# Patient Record
Sex: FEMALE | Race: WHITE | NOT HISPANIC OR LATINO | Employment: FULL TIME | URBAN - NONMETROPOLITAN AREA
[De-identification: names, ages, dates, MRNs, and addresses within clinical notes are randomized per-mention and may not be internally consistent; named-entity substitution may affect disease eponyms.]

---

## 2017-01-28 ENCOUNTER — OFFICE VISIT - GICH (OUTPATIENT)
Dept: FAMILY MEDICINE | Facility: OTHER | Age: 67
End: 2017-01-28

## 2017-01-28 ENCOUNTER — HISTORY (OUTPATIENT)
Dept: FAMILY MEDICINE | Facility: OTHER | Age: 67
End: 2017-01-28

## 2017-01-28 DIAGNOSIS — J39.2 OTHER DISEASES OF PHARYNX (CODE): ICD-10-CM

## 2017-01-28 DIAGNOSIS — H57.89 OTHER SPECIFIED DISORDERS OF EYE AND ADNEXA: ICD-10-CM

## 2017-01-28 DIAGNOSIS — G44.89 OTHER HEADACHE SYNDROME: ICD-10-CM

## 2017-01-28 DIAGNOSIS — M43.6 TORTICOLLIS: ICD-10-CM

## 2017-01-28 DIAGNOSIS — J34.89 OTHER SPECIFIED DISORDERS OF NOSE AND NASAL SINUSES: ICD-10-CM

## 2017-02-25 ENCOUNTER — COMMUNICATION - GICH (OUTPATIENT)
Dept: FAMILY MEDICINE | Facility: OTHER | Age: 67
End: 2017-02-25

## 2017-02-25 DIAGNOSIS — I10 ESSENTIAL (PRIMARY) HYPERTENSION: ICD-10-CM

## 2017-03-01 ENCOUNTER — COMMUNICATION - GICH (OUTPATIENT)
Dept: FAMILY MEDICINE | Facility: OTHER | Age: 67
End: 2017-03-01

## 2017-03-01 DIAGNOSIS — E78.5 HYPERLIPIDEMIA: ICD-10-CM

## 2017-03-01 DIAGNOSIS — E87.6 HYPOKALEMIA: ICD-10-CM

## 2017-03-27 ENCOUNTER — COMMUNICATION - GICH (OUTPATIENT)
Dept: FAMILY MEDICINE | Facility: OTHER | Age: 67
End: 2017-03-27

## 2017-03-30 ENCOUNTER — COMMUNICATION - GICH (OUTPATIENT)
Dept: FAMILY MEDICINE | Facility: OTHER | Age: 67
End: 2017-03-30

## 2017-03-30 DIAGNOSIS — K21.9 GASTRO-ESOPHAGEAL REFLUX DISEASE WITHOUT ESOPHAGITIS: ICD-10-CM

## 2017-03-31 ENCOUNTER — OFFICE VISIT - GICH (OUTPATIENT)
Dept: FAMILY MEDICINE | Facility: OTHER | Age: 67
End: 2017-03-31

## 2017-03-31 ENCOUNTER — HISTORY (OUTPATIENT)
Dept: FAMILY MEDICINE | Facility: OTHER | Age: 67
End: 2017-03-31

## 2017-03-31 ENCOUNTER — COMMUNICATION - GICH (OUTPATIENT)
Dept: FAMILY MEDICINE | Facility: OTHER | Age: 67
End: 2017-03-31

## 2017-03-31 DIAGNOSIS — E78.5 HYPERLIPIDEMIA: ICD-10-CM

## 2017-03-31 DIAGNOSIS — K21.9 GASTRO-ESOPHAGEAL REFLUX DISEASE WITHOUT ESOPHAGITIS: ICD-10-CM

## 2017-03-31 DIAGNOSIS — I10 ESSENTIAL (PRIMARY) HYPERTENSION: ICD-10-CM

## 2017-03-31 DIAGNOSIS — E87.6 HYPOKALEMIA: ICD-10-CM

## 2017-03-31 DIAGNOSIS — F41.9 ANXIETY DISORDER: ICD-10-CM

## 2017-03-31 LAB
A/G RATIO - HISTORICAL: 1.2 (ref 1–2)
ALBUMIN SERPL-MCNC: 3.8 G/DL (ref 3.5–5.7)
ALP SERPL-CCNC: 53 IU/L (ref 34–104)
ALT (SGPT) - HISTORICAL: 21 IU/L (ref 7–52)
ANION GAP - HISTORICAL: 9 (ref 5–18)
AST SERPL-CCNC: 23 IU/L (ref 13–39)
BILIRUB SERPL-MCNC: 0.4 MG/DL (ref 0.3–1)
BUN SERPL-MCNC: 16 MG/DL (ref 7–25)
BUN/CREAT RATIO - HISTORICAL: 18
CALCIUM SERPL-MCNC: 9.5 MG/DL (ref 8.6–10.3)
CHLORIDE SERPLBLD-SCNC: 105 MMOL/L (ref 98–107)
CHOL/HDL RATIO - HISTORICAL: 3.94
CHOLESTEROL TOTAL: 193 MG/DL
CO2 SERPL-SCNC: 27 MMOL/L (ref 21–31)
CREAT SERPL-MCNC: 0.89 MG/DL (ref 0.7–1.3)
GFR IF NOT AFRICAN AMERICAN - HISTORICAL: >60 ML/MIN/1.73M2
GLOBULIN - HISTORICAL: 3.3 G/DL (ref 2–3.7)
GLUCOSE SERPL-MCNC: 114 MG/DL (ref 70–105)
HDLC SERPL-MCNC: 49 MG/DL (ref 23–92)
LDLC SERPL CALC-MCNC: 109 MG/DL
NON-HDL CHOLESTEROL - HISTORICAL: 144 MG/DL
PATIENT STATUS - HISTORICAL: ABNORMAL
POTASSIUM SERPL-SCNC: 4 MMOL/L (ref 3.5–5.1)
PROT SERPL-MCNC: 7.1 G/DL (ref 6.4–8.9)
SODIUM SERPL-SCNC: 141 MMOL/L (ref 133–143)
TRIGL SERPL-MCNC: 177 MG/DL

## 2017-03-31 ASSESSMENT — PATIENT HEALTH QUESTIONNAIRE - PHQ9: SUM OF ALL RESPONSES TO PHQ QUESTIONS 1-9: 11

## 2017-03-31 ASSESSMENT — ANXIETY QUESTIONNAIRES
1. FEELING NERVOUS, ANXIOUS, OR ON EDGE: NEARLY EVERY DAY
7. FEELING AFRAID AS IF SOMETHING AWFUL MIGHT HAPPEN: NOT AT ALL
5. BEING SO RESTLESS THAT IT IS HARD TO SIT STILL: MORE THAN HALF THE DAYS
4. TROUBLE RELAXING: NEARLY EVERY DAY
6. BECOMING EASILY ANNOYED OR IRRITABLE: NEARLY EVERY DAY
3. WORRYING TOO MUCH ABOUT DIFFERENT THINGS: NEARLY EVERY DAY
GAD7 TOTAL SCORE: 17
2. NOT BEING ABLE TO STOP OR CONTROL WORRYING: NEARLY EVERY DAY

## 2017-04-06 ENCOUNTER — AMBULATORY - GICH (OUTPATIENT)
Dept: SCHEDULING | Facility: OTHER | Age: 67
End: 2017-04-06

## 2017-04-20 ENCOUNTER — AMBULATORY - GICH (OUTPATIENT)
Dept: SCHEDULING | Facility: OTHER | Age: 67
End: 2017-04-20

## 2017-04-28 ENCOUNTER — OFFICE VISIT - GICH (OUTPATIENT)
Dept: FAMILY MEDICINE | Facility: OTHER | Age: 67
End: 2017-04-28

## 2017-04-28 ENCOUNTER — HISTORY (OUTPATIENT)
Dept: FAMILY MEDICINE | Facility: OTHER | Age: 67
End: 2017-04-28

## 2017-04-28 DIAGNOSIS — F41.9 ANXIETY DISORDER: ICD-10-CM

## 2017-04-28 ASSESSMENT — ANXIETY QUESTIONNAIRES
6. BECOMING EASILY ANNOYED OR IRRITABLE: MORE THAN HALF THE DAYS
2. NOT BEING ABLE TO STOP OR CONTROL WORRYING: MORE THAN HALF THE DAYS
GAD7 TOTAL SCORE: 10
5. BEING SO RESTLESS THAT IT IS HARD TO SIT STILL: SEVERAL DAYS
7. FEELING AFRAID AS IF SOMETHING AWFUL MIGHT HAPPEN: NOT AT ALL
3. WORRYING TOO MUCH ABOUT DIFFERENT THINGS: SEVERAL DAYS
4. TROUBLE RELAXING: MORE THAN HALF THE DAYS
1. FEELING NERVOUS, ANXIOUS, OR ON EDGE: MORE THAN HALF THE DAYS

## 2017-04-28 ASSESSMENT — PATIENT HEALTH QUESTIONNAIRE - PHQ9: SUM OF ALL RESPONSES TO PHQ QUESTIONS 1-9: 14

## 2017-05-02 ENCOUNTER — AMBULATORY - GICH (OUTPATIENT)
Dept: SCHEDULING | Facility: OTHER | Age: 67
End: 2017-05-02

## 2017-05-25 ENCOUNTER — COMMUNICATION - GICH (OUTPATIENT)
Dept: FAMILY MEDICINE | Facility: OTHER | Age: 67
End: 2017-05-25

## 2017-05-25 DIAGNOSIS — F41.9 ANXIETY DISORDER: ICD-10-CM

## 2017-05-30 ENCOUNTER — HISTORY (OUTPATIENT)
Dept: FAMILY MEDICINE | Facility: OTHER | Age: 67
End: 2017-05-30

## 2017-05-30 ENCOUNTER — OFFICE VISIT - GICH (OUTPATIENT)
Dept: FAMILY MEDICINE | Facility: OTHER | Age: 67
End: 2017-05-30

## 2017-05-30 DIAGNOSIS — F41.9 ANXIETY DISORDER: ICD-10-CM

## 2017-05-30 ASSESSMENT — ANXIETY QUESTIONNAIRES
GAD7 TOTAL SCORE: 10
5. BEING SO RESTLESS THAT IT IS HARD TO SIT STILL: MORE THAN HALF THE DAYS
4. TROUBLE RELAXING: MORE THAN HALF THE DAYS
6. BECOMING EASILY ANNOYED OR IRRITABLE: MORE THAN HALF THE DAYS
2. NOT BEING ABLE TO STOP OR CONTROL WORRYING: MORE THAN HALF THE DAYS
1. FEELING NERVOUS, ANXIOUS, OR ON EDGE: SEVERAL DAYS
3. WORRYING TOO MUCH ABOUT DIFFERENT THINGS: SEVERAL DAYS
7. FEELING AFRAID AS IF SOMETHING AWFUL MIGHT HAPPEN: NOT AT ALL

## 2017-05-30 ASSESSMENT — PATIENT HEALTH QUESTIONNAIRE - PHQ9: SUM OF ALL RESPONSES TO PHQ QUESTIONS 1-9: 16

## 2017-06-01 ENCOUNTER — COMMUNICATION - GICH (OUTPATIENT)
Dept: FAMILY MEDICINE | Facility: OTHER | Age: 67
End: 2017-06-01

## 2017-06-01 DIAGNOSIS — E78.5 HYPERLIPIDEMIA: ICD-10-CM

## 2017-07-24 ENCOUNTER — OFFICE VISIT - GICH (OUTPATIENT)
Dept: FAMILY MEDICINE | Facility: OTHER | Age: 67
End: 2017-07-24

## 2017-07-24 ENCOUNTER — HISTORY (OUTPATIENT)
Dept: FAMILY MEDICINE | Facility: OTHER | Age: 67
End: 2017-07-24

## 2017-07-24 DIAGNOSIS — G44.209 TENSION-TYPE HEADACHE, NOT INTRACTABLE: ICD-10-CM

## 2017-08-01 ENCOUNTER — COMMUNICATION - GICH (OUTPATIENT)
Dept: FAMILY MEDICINE | Facility: OTHER | Age: 67
End: 2017-08-01

## 2017-08-01 DIAGNOSIS — F41.9 ANXIETY DISORDER: ICD-10-CM

## 2017-09-17 ENCOUNTER — COMMUNICATION - GICH (OUTPATIENT)
Dept: FAMILY MEDICINE | Facility: OTHER | Age: 67
End: 2017-09-17

## 2017-09-17 DIAGNOSIS — G44.209 TENSION-TYPE HEADACHE, NOT INTRACTABLE: ICD-10-CM

## 2017-10-08 ENCOUNTER — OFFICE VISIT - GICH (OUTPATIENT)
Dept: FAMILY MEDICINE | Facility: OTHER | Age: 67
End: 2017-10-08

## 2017-10-08 ENCOUNTER — HISTORY (OUTPATIENT)
Dept: FAMILY MEDICINE | Facility: OTHER | Age: 67
End: 2017-10-08

## 2017-10-08 DIAGNOSIS — A08.4 VIRAL INTESTINAL INFECTION: ICD-10-CM

## 2017-10-08 DIAGNOSIS — R11.11 VOMITING WITHOUT NAUSEA: ICD-10-CM

## 2017-10-08 LAB
ABSOLUTE BASOPHILS - HISTORICAL: 0.1 THOU/CU MM
ABSOLUTE EOSINOPHILS - HISTORICAL: 0.3 THOU/CU MM
ABSOLUTE IMMATURE GRANULOCYTES(METAS,MYELOS,PROS) - HISTORICAL: 0 THOU/CU MM
ABSOLUTE LYMPHOCYTES - HISTORICAL: 3.4 THOU/CU MM (ref 0.9–2.9)
ABSOLUTE MONOCYTES - HISTORICAL: 1.4 THOU/CU MM
ABSOLUTE NEUTROPHILS - HISTORICAL: 5.3 THOU/CU MM (ref 1.7–7)
ANION GAP - HISTORICAL: 14 (ref 5–18)
BASOPHILS # BLD AUTO: 0.8 %
BUN SERPL-MCNC: 24 MG/DL (ref 7–25)
BUN/CREAT RATIO - HISTORICAL: 27
CALCIUM SERPL-MCNC: 9.8 MG/DL (ref 8.6–10.3)
CHLORIDE SERPLBLD-SCNC: 104 MMOL/L (ref 98–107)
CO2 SERPL-SCNC: 26 MMOL/L (ref 21–31)
CREAT SERPL-MCNC: 0.9 MG/DL (ref 0.7–1.3)
EOSINOPHIL NFR BLD AUTO: 3.2 %
ERYTHROCYTE [DISTWIDTH] IN BLOOD BY AUTOMATED COUNT: 13.2 % (ref 11.5–15.5)
GFR IF NOT AFRICAN AMERICAN - HISTORICAL: >60 ML/MIN/1.73M2
GLUCOSE SERPL-MCNC: 140 MG/DL (ref 70–105)
HCT VFR BLD AUTO: 46.3 % (ref 33–51)
HEMOGLOBIN: 15.6 G/DL (ref 12–16)
IMMATURE GRANULOCYTES(METAS,MYELOS,PROS) - HISTORICAL: 0.3 %
LYMPHOCYTES NFR BLD AUTO: 32.3 % (ref 20–44)
MCH RBC QN AUTO: 29.9 PG (ref 26–34)
MCHC RBC AUTO-ENTMCNC: 33.7 G/DL (ref 32–36)
MCV RBC AUTO: 89 FL (ref 80–100)
MONOCYTES NFR BLD AUTO: 13.2 %
NEUTROPHILS NFR BLD AUTO: 50.2 % (ref 42–72)
PLATELET # BLD AUTO: 277 THOU/CU MM (ref 140–440)
PMV BLD: 9.4 FL (ref 6.5–11)
POTASSIUM SERPL-SCNC: 4.2 MMOL/L (ref 3.5–5.1)
RED BLOOD COUNT - HISTORICAL: 5.21 MIL/CU MM (ref 4–5.2)
SODIUM SERPL-SCNC: 144 MMOL/L (ref 133–143)
WHITE BLOOD COUNT - HISTORICAL: 10.5 THOU/CU MM (ref 4.5–11)

## 2017-12-15 ENCOUNTER — HISTORY (OUTPATIENT)
Dept: FAMILY MEDICINE | Facility: OTHER | Age: 67
End: 2017-12-15

## 2017-12-15 ENCOUNTER — OFFICE VISIT - GICH (OUTPATIENT)
Dept: FAMILY MEDICINE | Facility: OTHER | Age: 67
End: 2017-12-15

## 2017-12-15 DIAGNOSIS — H92.09 OTALGIA: ICD-10-CM

## 2017-12-15 DIAGNOSIS — J04.0 ACUTE LARYNGITIS: ICD-10-CM

## 2017-12-28 NOTE — TELEPHONE ENCOUNTER
Patient Information     Patient Name MRN Ovidio Cevallos [id number] Female 1950      Telephone Encounter by Ashu Arceo RN at 8/3/2017  1:22 PM     Author:  Ashu Arceo RN Service:  (none) Author Type:  NURS- Registered Nurse     Filed:  8/3/2017  1:25 PM Encounter Date:  2017 Status:  Signed     :  Ashu Arceo RN (NURS- Registered Nurse)            Depression-in adults 18 and over  SSRI    Office visit in the past 12 months or as indicated in chart.  Should have clinic visit 1-2 months after initial prescription.    Last visit with ELAINA AGUAYO was on: 2017 in [facility]  Next visit with ELAINA AGUAYO is on: No future appointment listed with this provider  Next visit with Family Practice is on: No future appointment listed in this department    Max refills 12 months from last office visit or per providers notes.      PHQ Depression Screening 2017   Date of PHQ exam (doc flow) 2017   1. Lack of interest/pleasure 1 - Several days 2 - More than half the days   2. Feeling down/depressed 2 - More than half the days 2 - More than half the days   PHQ-2 TOTAL SCORE 3 4   3. Trouble sleeping 3 - Nearly every day 2 - More than half the days   4. Decreased energy 3 - Nearly every day 3 - Nearly every day   5. Appetite change 1 - Several days 1 - Several days   6. Feelings of failure 2 - More than half the days 2 - More than half the days   7. Trouble concentrating 1 - Several days 2 - More than half the days   8. Activity level 1 - Several days 2 - More than half the days   9. Hurting yourself 0 - Not at all 0 - Not at all   PHQ-9 TOTAL SCORE 14 16   PHQ-9 Severity Level moderate moderately severe   Functional Impairment somewhat difficult somewhat difficult   Some recent data might be hidden       Chart review shows that patient was last seen by PCP for diagnosis of anxiety as well as use of celexa on 17. Celexa 10 mg daily was to  continue as per office visit notes on that date. Writer will refill rx as requested.    Prescription refilled per RN Medication Refill Policy.................... Ashu Arceo RN ....................  8/3/2017   1:24 PM

## 2017-12-28 NOTE — TELEPHONE ENCOUNTER
Patient Information     Patient Name MRN Sex Gunner Reynosorikathy [id number] Female 1950      Telephone Encounter by Gris Dial RN at 2017  2:38 PM     Author:  Gris Dial RN Service:  (none) Author Type:  NURS- Registered Nurse     Filed:  2017  2:40 PM Encounter Date:  2017 Status:  Signed     :  Gris Dial RN (NURS- Registered Nurse)            Simvastatin refilled on 3/31/17 #90 x 3 refills. To [facility]  Unable to complete prescription refill per RN Medication Refill Policy.................... GRIS DIAL RN ....................  2017   2:39 PM

## 2017-12-28 NOTE — PROGRESS NOTES
"Patient Information     Patient Name MRN Sex Oivdio Reynoso [id number] Female 1950      Progress Notes by Glynn Botello MD at 2017 11:00 AM     Author:  Glynn Botello MD Service:  (none) Author Type:  Physician     Filed:  2017 11:25 AM Encounter Date:  2017 Status:  Signed     :  Glynn Botello MD (Physician)            SUBJECTIVE:  Ovidio Luna is a 66 y.o. female here for headache. Patient reports that she has had a headache over the last week. She describes the headache as starting in her right side of her neck going up to the top of her head and feels like there is a \"vice .\" She is also had some numbness on both her right and left face. She's had no vision change. She's had no hearing changes. No nausea or vomiting. The pain is not brought on by any particular movements. There is no activities that started her symptoms.    Allergies:  Allergies      Allergen   Reactions     Altaryl  [Diphenhydramine Hcl]  Tachycardia     Rapid heart Rate      Benadryl [Diphenhydramine]  Tachycardia     Rapid heart Rate      Chantix [Varenicline]  Nausea And Vomiting     Codeine  Confusion     Rapid heart rate.  Nauseated      Darvocet [Propoxyphene-Acetaminophen]  Tremors     unknown      Lisinopril  Cough     Morphine Sulfate  Tachycardia     Rapid heart rate      Tetracycline  Rash and Tremors     unknown        ROS:    As above otherwise ROS is unremarkable.    OBJECTIVE:  /78  Pulse 60  Ht 1.575 m (5' 2\")  Wt 66.6 kg (146 lb 12.8 oz)  BMI 26.85 kg/m2    EXAM:  General Appearance: Pleasant, alert, appropriate appearance for age. No acute distress  Head: Normal. Normocephalic, atraumatic.  Eyes: PERRL, EOMI  Ears: Normal TM's bilaterally. Normal auditory canals and external ears.   OroPharynx: Dental hygiene adequate. Normal buccal mucosa. Normal pharynx.  Neck: Supple, no masses or nodes, no lymphadenopathy.  No thyromegaly.  eletal: No edema.  Skin: no concerning or " new rashes.  Neurologic Exam: Subjective decreased sensation over all 3 branches of her fifth cranial nerve. Otherwise normal cranial nerve exam.  Normal gait.  Symmetric DTRs, No focal motor or sensory deficits. No tremor.  Psychiatric Exam: Alert and oriented, appropriate affect.    ASSESSEMENT AND PLAN:    Ovidio was seen today for headache.    Diagnoses and all orders for this visit:    Tension headache  -     naproxen (NAPROSYN) 500 mg tablet; Take 1 tablet by mouth 2 times daily with meals.  -     cyclobenzaprine (FLEXERIL) 10 mg tablet; Take 1 tablet by mouth 3 times daily if needed for Muscle Spasm.    Her symptoms and history are consistent with a tension type headache. We'll try naproxen twice daily with meals for the next couple of days in addition to her Tylenol. We'll also add Flexeril for muscle spasming. If she has no significant improvement in her headaches and facial numbness continue with the recommend MRI of her head and neck.      Anup Botello MD    This document was prepared using voice generated software.  While every attempt was made for accuracy, grammatical errors may exist.

## 2017-12-28 NOTE — TELEPHONE ENCOUNTER
Patient Information     Patient Name MRN Sex Ovidio Reynoso [id number] Female 1950      Telephone Encounter by Ashu Arceo RN at 2017  4:03 PM     Author:  Ashu Arceo RN Service:  (none) Author Type:  NURS- Registered Nurse     Filed:  2017  4:10 PM Encounter Date:  2017 Status:  Signed     :  Ashu Arceo RN (NURS- Registered Nurse)            Nsaids    Office visit in the past 12 months or per provider note.    Last visit with ELAINA AGUAYO was on: 2017 in [facility]  Next visit with ELAINA AGUAYO is on: No future appointment listed with this provider  Next visit with Family Practice is on: No future appointment listed in this department    Max refill for 12 months from last office visit or per provider note.    Chart review shows that rx as requested was started by PCP on 17. Naproxen was filled for a 2 month supply at that time, however no specific follow up timeline noted in office visit notes on that date other than patient was to follow up if pain did not decrease. Writer is unable to fill rx as requested as is not sure on if patient was to remain on rx long term. Did contact patient to discuss. Patient reports that the naproxen has helped with her symptoms as described on 17, and that she would like to continue on rx as requested. Writer will route rx request to PCP for his consideration/approval at this time.    Prescription refilled per RN Medication Refill Policy.................... Ashu Arceo RN ....................  2017   4:08 PM

## 2017-12-28 NOTE — PATIENT INSTRUCTIONS
Patient Information     Patient Name MRN Ovidio Cevallos [id number] Female 1950      Patient Instructions by Glynn Botello MD at 2017 11:18 AM     Author:  Glynn Botello MD Service:  (none) Author Type:  Physician     Filed:  2017 11:18 AM Encounter Date:  2017 Status:  Signed     :  Glynn Botello MD (Physician)               Index Qatari Related topics   Tension Headache   ________________________________________________________________________  KEY POINTS    A tension headache is caused by tense muscles or stress. Most often the muscles are in your neck, shoulders, or scalp.    Treatment to relive pain may include rest, stretches and massage, nonprescription pain medicine, ice, or moist heat.    If taking care of yourself at home does not relieve a tension headache, your healthcare provider may recommend physical therapy, biofeedback, or a different pain reliever.  ________________________________________________________________________  What is a tension headache?  A tension headache is caused by tense muscles or stress. Most often the muscles are in your neck, shoulders, or scalp. Tension headaches are very common.  What is the cause?  Tension headaches may be caused by:    Stress, such as family, work, finances, or health problems    Anxiety or depression    Sitting or standing in one position for a long time    Injury such as from a car accident    Getting too little or too much sleep    Heavy smoking    Drinking too much alcohol, or going through alcohol withdrawal    Being somewhere that is too noisy    Pushing your body too hard at work or playing sports    Some medical conditions, such as sleep apnea or arthritis  Taking pain medicine too often for headaches can cause rebound headaches or drug-induced headaches. It can create a bad cycle: You have a headache, so you take pain medicine. When the pain medicine wears off it causes another headache, which causes  you to take more medicine, which causes another headache. Examples of nonprescription medicines that can cause rebound headaches are aspirin, acetaminophen, and ibuprofen. Some prescribed pain medicines can also cause this problem. If you often take medicine for headaches, talk with your healthcare provider.  To learn about possible triggers and help you to avoid them, it will help if you keep a record of:    When you have the headaches    What part of your head, body, or face is affected    What type of pain it is, such as sharp, dull, or pressure    How bad the pain is and how long it lasts    Other symptoms you have at the same time    What makes the headache better or worse  What are the symptoms?  Symptoms usually last a few hours to a day. Symptoms may include:    A feeling like a tight band is around your head    An ache or dull and steady pain felt at the temples or affecting the whole head that worsens through the day, sometimes with a sore or stiff neck    Trouble concentrating    Trouble sleeping    Your muscles might twitch or spasm. Sometimes your head may feel like it is throbbing.  How is it treated?  Most of the time, you don t need to see your healthcare provider for treatment. Here are some things you can do to relieve a tension headache:    Rest in a quiet, dark room until symptoms lessen or go away.    Stretch and massage your neck, shoulders, and back.    Put an ice pack, gel pack, or package of frozen vegetables wrapped in a cloth on your neck, shoulders, and back every 3 to 4 hours for up to 20 minutes at a time.    Put moist heat on tense muscles for up to 30 minutes to relieve pain. Moist heat includes heat patches or moist heating pads that you can buy at most drugstores, a warm wet washcloth, or a hot shower. To prevent burns to your skin, follow directions on the package and do not lie on any type of hot pad. Don t use heat if you have swelling.    Take a warm bath or hot shower.    Take  a walk or do some other type of physical exercise.    Take nonprescription pain medicine as soon as you notice symptoms. Recognizing early warning signs and starting treatment right away can lower the pain and shorten the time you have the headache.  If taking care of yourself at home does not relieve a tension headache, your healthcare provider may be able to help. Tell your healthcare provider about all the medicines and supplements that you take. Your provider may recommend:    Physical therapy    Biofeedback therapy, which uses a machine to help you learn to control muscle tension    A different pain reliever  Follow the full course of treatment prescribed by your healthcare provider. Ask your provider:    How long it will normally take to recover from each headache    If there are activities you should avoid and when you can return to your normal activities    How to take care of yourself at home    What symptoms or problems you should watch for and what to do if you have them  Make sure you know when you should come back for a checkup. Keep all appointments for provider visits or tests. Contact your healthcare provider if you have new or worsening symptoms.  How can I help prevent tension headaches?  A healthy lifestyle may help:    Eat a healthy diet. Don t skip meals.    Stay fit with the right kind of exercise for you.    Limit caffeine.    Learn to manage stress. Ask for help at home and work when the load is too great to handle. Find ways to relax. For example, take up a hobby, listen to music, watch movies, or take walks. Try yoga, meditation, or deep breathing exercises when you feel stressed.    If you smoke, try to quit. Talk to your healthcare provider about ways to quit smoking.    If you want to drink alcohol, ask your healthcare provider how much is safe for you to drink.    Try to get at least 7 to 9 hours of sleep each night.  You are at risk for rebound headaches if you regularly take pain  medicine for headaches 3 or more days every week or on more than 9 days per month. If you find that you are taking medicine for headaches this often, talk to your provider.  You can get more information from:    American [place] for Headache Education (ACHE)  311-601- 9912  http://www.achenet.org/    National Headache Foundation  929.382.9064   http://www.headaches.org/  Developed by [employer]  Adult Advisor 2016.3 published by [employer].  Last modified: 2016-05-09  Last reviewed: 2016-05-03  This content is reviewed periodically and is subject to change as new health information becomes available. The information is intended to inform and educate and is not a replacement for medical evaluation, advice, diagnosis or treatment by a healthcare professional.  References   Adult Advisor 2016.3 Index    Copyright   2016 [employer], a division of [employer] All rights reserved.        Index Amharic Related topics   Tension Headache   ________________________________________________________________________  KEY POINTS    A tension headache is caused by tense muscles or stress. Most often the muscles are in your neck, shoulders, or scalp.    Treatment to relive pain may include rest, stretches and massage, nonprescription pain medicine, ice, or moist heat.    If taking care of yourself at home does not relieve a tension headache, your healthcare provider may recommend physical therapy, biofeedback, or a different pain reliever.  ________________________________________________________________________  What is a tension headache?  A tension headache is caused by tense muscles or stress. Most often the muscles are in your neck, shoulders, or scalp. Tension headaches are very common.  What is the cause?  Tension headaches may be caused by:    Stress, such as family, work, finances, or health problems    Anxiety or depression    Sitting or standing in one position for a long time    Injury such as from a  car accident    Getting too little or too much sleep    Heavy smoking    Drinking too much alcohol, or going through alcohol withdrawal    Being somewhere that is too noisy    Pushing your body too hard at work or playing sports    Some medical conditions, such as sleep apnea or arthritis  Taking pain medicine too often for headaches can cause rebound headaches or drug-induced headaches. It can create a bad cycle: You have a headache, so you take pain medicine. When the pain medicine wears off it causes another headache, which causes you to take more medicine, which causes another headache. Examples of nonprescription medicines that can cause rebound headaches are aspirin, acetaminophen, and ibuprofen. Some prescribed pain medicines can also cause this problem. If you often take medicine for headaches, talk with your healthcare provider.  To learn about possible triggers and help you to avoid them, it will help if you keep a record of:    When you have the headaches    What part of your head, body, or face is affected    What type of pain it is, such as sharp, dull, or pressure    How bad the pain is and how long it lasts    Other symptoms you have at the same time    What makes the headache better or worse  What are the symptoms?  Symptoms usually last a few hours to a day. Symptoms may include:    A feeling like a tight band is around your head    An ache or dull and steady pain felt at the temples or affecting the whole head that worsens through the day, sometimes with a sore or stiff neck    Trouble concentrating    Trouble sleeping    Your muscles might twitch or spasm. Sometimes your head may feel like it is throbbing.  How is it treated?  Most of the time, you don t need to see your healthcare provider for treatment. Here are some things you can do to relieve a tension headache:    Rest in a quiet, dark room until symptoms lessen or go away.    Stretch and massage your neck, shoulders, and back.    Put an ice  pack, gel pack, or package of frozen vegetables wrapped in a cloth on your neck, shoulders, and back every 3 to 4 hours for up to 20 minutes at a time.    Put moist heat on tense muscles for up to 30 minutes to relieve pain. Moist heat includes heat patches or moist heating pads that you can buy at most drugstores, a warm wet washcloth, or a hot shower. To prevent burns to your skin, follow directions on the package and do not lie on any type of hot pad. Don t use heat if you have swelling.    Take a warm bath or hot shower.    Take a walk or do some other type of physical exercise.    Take nonprescription pain medicine as soon as you notice symptoms. Recognizing early warning signs and starting treatment right away can lower the pain and shorten the time you have the headache.  If taking care of yourself at home does not relieve a tension headache, your healthcare provider may be able to help. Tell your healthcare provider about all the medicines and supplements that you take. Your provider may recommend:    Physical therapy    Biofeedback therapy, which uses a machine to help you learn to control muscle tension    A different pain reliever  Follow the full course of treatment prescribed by your healthcare provider. Ask your provider:    How long it will normally take to recover from each headache    If there are activities you should avoid and when you can return to your normal activities    How to take care of yourself at home    What symptoms or problems you should watch for and what to do if you have them  Make sure you know when you should come back for a checkup. Keep all appointments for provider visits or tests. Contact your healthcare provider if you have new or worsening symptoms.  How can I help prevent tension headaches?  A healthy lifestyle may help:    Eat a healthy diet. Don t skip meals.    Stay fit with the right kind of exercise for you.    Limit caffeine.    Learn to manage stress. Ask for help  at home and work when the load is too great to handle. Find ways to relax. For example, take up a hobby, listen to music, watch movies, or take walks. Try yoga, meditation, or deep breathing exercises when you feel stressed.    If you smoke, try to quit. Talk to your healthcare provider about ways to quit smoking.    If you want to drink alcohol, ask your healthcare provider how much is safe for you to drink.    Try to get at least 7 to 9 hours of sleep each night.  You are at risk for rebound headaches if you regularly take pain medicine for headaches 3 or more days every week or on more than 9 days per month. If you find that you are taking medicine for headaches this often, talk to your provider.  You can get more information from:    American [place] for Headache Education (ACHE)  939-044- 1344  http://www.achenet.org/    National Headache Foundation  451.853.9907   http://www.headaches.org/  Developed by [employer].  Adult Advisor 2016.3 published by [employer].  Last modified: 2016-05-09  Last reviewed: 2016-05-03  This content is reviewed periodically and is subject to change as new health information becomes available. The information is intended to inform and educate and is not a replacement for medical evaluation, advice, diagnosis or treatment by a healthcare professional.  References   Adult Advisor 2016.3 Index    Copyright   2016 [employer], a division of [employer] All rights reserved.

## 2017-12-28 NOTE — PROGRESS NOTES
Patient Information     Patient Name MRN Sex Ovidio Reynoso [id number] Female 1950      Progress Notes by China Weaver NP at 10/8/2017  2:15 PM     Author:  China Weaver NP Service:  (none) Author Type:  PHYS- Nurse Practitioner     Filed:  10/9/2017 11:13 PM Encounter Date:  10/8/2017 Status:  Signed     :  China Weaver NP (PHYS- Nurse Practitioner)            Nursing Notes:   Makayla Quigley  10/8/2017  3:24 PM  Signed  Patient presents to the clinic for vomiting that started on Friday. Patient reports her left ear aching, having a raw, scratchy throat and being unable to keep anything down due to the vomiting.  Makayla JIMENEZ CMA.......10/8/2017..2:59 PM    SUBJECTIVE:    Ovidio Luna is a 66 y.o. female who presents for Nausea and vomiting.     Gi Problem   The primary symptoms include fatigue, abdominal pain, nausea and vomiting. Primary symptoms do not include fever, weight loss, diarrhea, hematemesis, jaundice, dysuria, myalgias, arthralgias or rash. The illness began 2 days ago. The onset was sudden. The problem has been gradually improving.   The fatigue began 2 days ago. The fatigue has been improving since its onset.   Nausea began 2 days ago. The nausea is associated with eating. The nausea is exacerbated by food and activity.   The vomiting began 2 days ago. Vomiting occurs 2 to 5 times per day (Slowly decreaseing in amounts and times. ). The emesis contains stomach contents.   Associated medical issues do not include inflammatory bowel disease, gallstones, liver disease, PUD, gastric bypass or bowel resection.       Current Outpatient Prescriptions on File Prior to Visit       Medication  Sig Dispense Refill     amLODIPine (NORVASC) 5 mg tablet Take 1 tablet by mouth once daily. 90 tablet 3     busPIRone (BUSPAR) 10 mg tablet Take 0.5-1 tablets by mouth 3 times daily if needed (anxiety). 30 tablet 3     cholecalciferol (VITAMIN D-3) 2,000 unit capsule Take 1 capsule by  mouth once daily.  0     citalopram (CELEXA) 10 mg tablet TAKE 1 TABLET BY MOUTH EVERY MORNING 90 tablet 2     cyclobenzaprine (FLEXERIL) 10 mg tablet Take 1 tablet by mouth 3 times daily if needed for Muscle Spasm. 30 tablet 0     fexofenadine (ALLEGRA) 180 mg tablet Take 1 tablet by mouth once daily with a meal.  0     hydroCHLOROthiazide (HCTZ) 25 mg tablet Take 1 tablet by mouth once daily. 90 tablet 3     metoprolol (LOPRESSOR) 100 mg tablet Take 1 tablet by mouth 2 times daily. 180 tablet 3     naproxen (NAPROSYN) 500 mg tablet Take 1 tablet by mouth 2 times daily with meals. 180 tablet 2     nitroglycerin 0.4 mg per dose sublingual (NITROLINGUAL) 0.4 mg/dose spray Place 1 Spray under the tongue every 5 minutes if needed for Chest Pain. 1 Bottle 11     omega-3 fatty acids-vitamin E (FISH OIL) 1,000 mg cap Take 1 capsule by mouth once daily.  0     omeprazole (PRILOSEC) 20 mg Delayed-Release capsule Take 1 capsule by mouth once daily before a meal. 90 capsule 3     potassium chloride (KLOR-CON M20) 20 mEq Extended-Release tablet Take 1 tablet by mouth 2 times daily with meals. 180 tablet 3     simvastatin (ZOCOR) 80 mg tablet Take 1 tablet by mouth once daily. 90 tablet 3     No current facility-administered medications on file prior to visit.     and   Patient Active Problem List       Diagnosis  Date Noted     Centrilobular emphysema (HC)  12/12/2016     SOMATIC DYSFUNCTION, SPINE, THORACIC-LUMBAR  08/17/2012     SOMATIC DYSFUNCTION, PELVIC  08/17/2012     LUMBAGO  08/17/2012     CVA WITH LEFT HEMIPARESIS  06/15/2012     MENOPAUSE-RELATED VASOMOTOR SYMPTOMS  03/12/2010     CORONARY ARTERY DISEASE       Coronary artery disease, PTCA of mid LAD and PTCA of the proximal right   coronary artery 2/28/04, normal left ventricular systolic function          PERIPHERAL VASCULAR DISEASE       TOBACCO ABUSE       HYPERLIPIDEMIA       HEMANGIOMA       Multiple capillary spider hemangioma          HYPERTENSION        "VASCULAR INSUFFICIENCY       right lower extremity          SHOULDER IMPINGEMENT SYNDROME, LEFT       OSTEOPENIA  11/14/2008     DXA scan - osteopenia of hips.  Normal density LS spine.          GASTRITIS  10/27/2008     Acute            REVIEW OF SYSTEMS:  Review of Systems   Constitutional: Positive for fatigue. Negative for fever and weight loss.   Gastrointestinal: Positive for abdominal pain, nausea and vomiting. Negative for diarrhea, hematemesis and jaundice.   Genitourinary: Negative for dysuria.   Musculoskeletal: Negative for arthralgias and myalgias.   Skin: Negative for rash.       OBJECTIVE:  /68  Pulse 56  Temp 98  F (36.7  C) (Tympanic)  Ht 1.581 m (5' 2.25\")  Wt 66.8 kg (147 lb 6 oz)  BMI 26.74 kg/m2    EXAM:   Physical Exam   Constitutional: She is well-developed, well-nourished, and in no distress.   HENT:   Head: Normocephalic and atraumatic.   Eyes: Conjunctivae are normal.   Cardiovascular: Normal rate, regular rhythm and normal heart sounds.    Pulmonary/Chest: Effort normal and breath sounds normal. No respiratory distress. She has no wheezes. She has no rales.   Abdominal: Soft. Bowel sounds are normal. There is no hepatosplenomegaly. There is tenderness in the epigastric area. There is no rigidity, no rebound, no guarding and no CVA tenderness.   Lymphadenopathy:     She has no cervical adenopathy.   Skin: Skin is warm and dry. No rash noted.   Psychiatric: Mood and affect normal.   Nursing note and vitals reviewed.    Results for orders placed or performed in visit on 10/08/17      BASIC METABOLIC PANEL      Result  Value Ref Range    SODIUM 144 (H) 133 - 143 mmol/L    POTASSIUM 4.2 3.5 - 5.1 mmol/L    CHLORIDE 104 98 - 107 mmol/L    CO2,TOTAL 26 21 - 31 mmol/L    ANION GAP 14 5 - 18                    GLUCOSE 140 (H) 70 - 105 mg/dL    CALCIUM 9.8 8.6 - 10.3 mg/dL    BUN 24 7 - 25 mg/dL    CREATININE 0.90 0.70 - 1.30 mg/dL    BUN/CREAT RATIO           27                    GFR if " African American >60 >60 ml/min/1.73m2    GFR if not African American >60 >60 ml/min/1.73m2   CBC WITH AUTO DIFFERENTIAL      Result  Value Ref Range    WHITE BLOOD COUNT         10.5 4.5 - 11.0 thou/cu mm    RED BLOOD COUNT           5.21 (H) 4.00 - 5.20 mil/cu mm    HEMOGLOBIN                15.6 12.0 - 16.0 g/dL    HEMATOCRIT                46.3 33.0 - 51.0 %    MCV                       89 80 - 100 fL    MCH                       29.9 26.0 - 34.0 pg    MCHC                      33.7 32.0 - 36.0 g/dL    RDW                       13.2 11.5 - 15.5 %    PLATELET COUNT            277 140 - 440 thou/cu mm    MPV                       9.4 6.5 - 11.0 fL    NEUTROPHILS               50.2 42.0 - 72.0 %    LYMPHOCYTES               32.3 20.0 - 44.0 %    MONOCYTES                 13.2 (H) <12.0 %    EOSINOPHILS               3.2 <8.0 %    BASOPHILS                 0.8 <3.0 %    IMMATURE GRANULOCYTES(METAS,MYELOS,PROS) 0.3 %    ABSOLUTE NEUTROPHILS      5.3 1.7 - 7.0 thou/cu mm    ABSOLUTE LYMPHOCYTES      3.4 (H) 0.9 - 2.9 thou/cu mm    ABSOLUTE MONOCYTES        1.4 (H) <0.9 thou/cu mm    ABSOLUTE EOSINOPHILS      0.3 <0.5 thou/cu mm    ABSOLUTE BASOPHILS        0.1 <0.3 thou/cu mm    ABSOLUTE IMMATURE GRANULOCYTES(METAS,MYELOS,PROS) 0.0 <=0.3 thou/cu mm       ASSESSMENT/PLAN:    ICD-10-CM    1. Vomiting without nausea, intractability of vomiting not specified, unspecified vomiting type R11.11 CBC WITH DIFFERENTIAL      BASIC METABOLIC PANEL      CBC WITH DIFFERENTIAL      BASIC METABOLIC PANEL      CBC WITH AUTO DIFFERENTIAL      ondansetron (ZOFRAN ODT) 4 mg disintegrating tablet   2. Viral gastroenteritis A08.4         Plan:  Completed labs at today's visit CBC diff, CMP.  I personally reviewed the labs with the patient/parent at the visit. Abnormalities include Not contributory. Note written for work. Viral illness is gone over. Home cares and OTC gone over.    F/u with PCP if needed.         SERVANDO GUZMAN NP  ....................  10/9/2017   11:13 PM

## 2017-12-29 NOTE — PATIENT INSTRUCTIONS
Patient Information     Patient Name MROvidio Oviedo [id number] Female 1950      Patient Instructions by China Weaver NP at 10/8/2017  2:15 PM     Author:  China Weaver NP Service:  (none) Author Type:  PHYS- Nurse Practitioner     Filed:  10/8/2017  4:36 PM Encounter Date:  10/8/2017 Status:  Signed     :  China Weaver NP (PHYS- Nurse Practitioner)            Stomach Flu   ________________________________________________________________________  KEY POINTS    Stomach flu is a viral infection that affects the stomach and small bowel and usually lasts just 1 to 3 days.    Most of the time, you do not need to see your healthcare provider for treatment. You can rest your stomach and bowel but make sure that you keep getting fluids. If you are very dehydrated, you may need IV fluids at the hospital.    The best thing you can do to help prevent the spread of stomach flu is to wash your hands often.  ________________________________________________________________________  What is stomach flu?  Stomach flu is a viral infection that affects the stomach and small bowel. The illness is usually brief, lasting just 1 to 3 days. However, it may be 1 to 2 weeks before your bowel habits are back to normal.  What is the cause?  Many different kinds of viruses can cause stomach flu. You can get a virus from contact with other people who are infected. For example, you might get it by kissing or shaking hands or by sharing food, drink, or eating utensils.  What are the symptoms?  When you have stomach flu, you may have 1 or more of the following symptoms:    Nausea    Vomiting    Stomach cramps    Diarrhea    Mild fever and chills    Feeling very tired    Loss of appetite    Headache    Muscle aches  You may suddenly have stomach cramps, vomiting, or diarrhea, or your symptoms may start more slowly.  Some bacteria, parasites, medicines, or other medical problems can cause symptoms that  are like stomach flu. If your symptoms are severe, last longer than a couple days, or you have blood in your vomit or bowel movement, contact your healthcare provider.  How is it treated?  Most of the time, you don t need to see your healthcare provider for treatment. Here are some things you can do to feel better:    Rest your stomach and bowel but make sure that you keep getting fluids. You can do this by not eating anything and by drinking clear liquids only. If you have been vomiting a lot, it s best to have just small, frequent sips. Drinking too much at once may cause more vomiting. Clear liquids include:    Water    Weak tea    Fruit juice mixed half and half with water    Light-colored soft drinks without caffeine (like 7-UP) after stirring until the bubbles are gone (the bubbles can make vomiting worse)    Sport drinks or other rehydration drinks    Avoid liquids that are acidic, like orange juice, or caffeinated, like coffee. If you have diarrhea, don t drink milk.    It may be easier to keep down liquids that are cold. Suck on ice chips or Popsicles if you feel too sick to sip fluids. Build up to drinking larger amounts of clear fluids over several hours. If you vomit, wait an hour and then start over with small sips.    You may start eating soft, plain foods when you have not vomited for several hours and are able to drink clear liquids without further upset. Good choices are:    Jell-O    Soda crackers    Toast    Plain noodles    Rice    Cooked cereal    Baked or mashed potatoes    Soft-boiled eggs    Applesauce    Bananas    Eat small amounts slowly and avoid foods that are hard to digest or may irritate your stomach, such as foods with acid (like tomatoes or oranges), spicy or fatty food, meats, and raw vegetables. You may be able to go back to your normal diet in 3 days or so.    Rest as much as possible. Sit or lie down with your head propped up. Don t lie flat for at least 2 hours after  eating.    Don t take aspirin, ibuprofen, or other nonsteroidal anti-inflammatory drugs (NSAIDS) without checking first with your healthcare provider. NSAIDs, such as ibuprofen, naproxen, and aspirin, may cause stomach bleeding and other problems. These risks increase with age. Read the label and take as directed. Unless recommended by your healthcare provider, do not take for more than 10 days.    You can buy nonprescription medicine to treat diarrhea at the drugstore. If you use it, make sure you use only the dose recommended on the package. Don t use the medicine for more than 2 days without checking with your healthcare provider. If you have chronic health problems, always check with your provider before you use any medicine for diarrhea.    Call your healthcare provider if:    Your symptoms are getting worse.    You keep having severe symptoms (vomiting or diarrhea every 1 to 2 hours) for more than 24 hours, or you are not getting better after a few days.    You start having symptoms that are not usually caused by stomach flu, such as blood in your vomit, bloody diarrhea, or severe stomach pain.    You have a long term health problem such as diabetes, liver disease, or kidney disease  Your healthcare provider may recommend prescription medicine to prevent nausea and vomiting or to treat diarrhea.  If you have severe vomiting or diarrhea, your body can lose too much fluid and you can get dehydrated. Dehydration can be very dangerous, especially for children and older adults. You may also be losing minerals that your body needs to keep working normally. Your healthcare provider may recommend an oral rehydration solution, which is a drink that replaces fluids and minerals. If you are very dehydrated, you may need to be given IV fluids at the hospital.  How can I help prevent stomach flu?  The best thing you can do to help prevent the spread of stomach flu is to wash your hands often. Be especially sure to always  wash your hands before you eat and after using the bathroom. Also, avoid contact with the body fluids of others who may be sick, including their saliva. Don't share food with someone who has stomach flu.

## 2017-12-30 NOTE — NURSING NOTE
"Patient Information     Patient Name MROvidio Oviedo [id number] Female 1950      Nursing Note by Keira Klein at 2017 11:00 AM     Author:  Keira Klein Service:  (none) Author Type:  (none)     Filed:  2017 11:10 AM Encounter Date:  2017 Status:  Signed     :  Keira Klein            Patient presents today with complaints of on and off headache x 1 week. Patient states she has been taking a lot of extra strength tylenol and that helps dull the pain some, but not completely alleviate. Patient states her current headache starts at the back of her neck and works up into her head and feel like a \"vice \" over her head.  Keira Klein LPN .............2017  11:06 AM          "

## 2018-01-03 NOTE — NURSING NOTE
Patient Information     Patient Name MRN Sex Ovidio Reynoso [id number] Female 1950      Nursing Note by Antoinette Nguyen at 2017  9:00 AM     Author:  Antoinette Nguyen Service:  (none) Author Type:  NURS- Student Practical Nurse     Filed:  2017  9:09 AM Encounter Date:  2017 Status:  Signed     :  Antoinette Nguyen (NURS- Student Practical Nurse)            Patient presents with stiff right neck, ear pain, watery eye and scratchy throat starting yesterday. Patient states low grade fever. Antoinette Nguyen LPN .............2017  9:00 AM

## 2018-01-03 NOTE — TELEPHONE ENCOUNTER
Patient Information     Patient Name MRN Sex Ovidio Reynoso [id number] Female 1950      Telephone Encounter by Ashu Arceo RN at 3/1/2017  4:12 PM     Author:  Ashu Arceo RN Service:  (none) Author Type:  NURS- Registered Nurse     Filed:  3/1/2017  4:17 PM Encounter Date:  3/1/2017 Status:  Signed     :  Ashu Arceo RN (NURS- Registered Nurse)            Statins    Office visit in the past 12 months.    Last visit with ELAINA AGUAYO was on: 2016 in [employer] [facility]  Next visit with ELAINA AGUAYO is on: No future appointment listed with this provider  Next visit with Family Practice is on: No future appointment listed in this department    Last Lipids:  Chol: 192    2016  T    2016  HDL:   46    2016  LDL:  98    2016  LDL DIRECT:  No results found in past 5 years    .    Max refills 12 months from last office visit.      Patient is due for medication management appointment as last office visit with PCP to address continued use of simvastatin as well as hyperlipidemia noted to be on 2/15/16. Limited refill provided at this time and letter sent for reminder to patient. Prescription refilled per RN Medication Refill Policy.................... Ashu Arceo RN ....................  3/1/2017   4:15 PM

## 2018-01-03 NOTE — TELEPHONE ENCOUNTER
Patient Information     Patient Name MRN Sex Ovidio Reynoso [id number] Female 1950      Telephone Encounter by Ashu Arceo RN at 3/1/2017  4:02 PM     Author:  Ashu Arceo RN Service:  (none) Author Type:  NURS- Registered Nurse     Filed:  3/1/2017  4:12 PM Encounter Date:  3/1/2017 Status:  Signed     :  Ashu Arceo RN (NURS- Registered Nurse)            This is a Refill request from: [facility]  Name of Medication: Potassium chloride  Quantity requested: 180 tabs with 3 refills  Last fill date: 2017  Due for refill: Unknown  Last visit with JAYME LOMAX was on: 2016 in [facility]  PCP:  Glynn Botello MD  Controlled Substance Agreement:  N/A   Diagnosis r/t this medication request: Hypokalemia    Per chart review, last K+ level of 3.0 on 16 at office visit with Dr. Lomax. No indication in office visit notes that patient was to take or continue K+, but lab result notes show that Dr. Lomax sent in new rx for K+ on 17. PCP completed refill of HCTZ on 17 for duration of 1 year. Will pend this rx request to PCP for same duration.      Unable to complete prescription refill per RN Medication Refill Policy.................... Ashu Arceo RN ....................  3/1/2017   4:05 PM

## 2018-01-03 NOTE — PROGRESS NOTES
Patient Information     Patient Name MRN Sex Ovidio Reynoso [id number] Female 1950      Progress Notes by Maya Combs NP at 2017  9:00 AM     Author:  Maya Combs NP Service:  (none) Author Type:  PHYS- Nurse Practitioner     Filed:  2017 10:09 AM Encounter Date:  2017 Status:  Signed     :  Maya Combs NP (PHYS- Nurse Practitioner)            HPI:    Ovidio Luna is a 66 y.o. female who presents to clinic today for throat/neck.  Right side of neck with soreness and stiffness, scratchy throat, watery right eye, and right ear ache started yesterday.  Chills.  No sweats.  No known fevers.  Intermittent occasional mild cough.  No pain with swallowing.  No choking or difficulty swallowing.  Neck is painful to move.  Neck feels tight and stiff.  Decreased movement of neck.  No muscle spasms.  Dull steady headache across the entire right side of head.  Denies dizziness or light headedness.  Denies any weakness.  States she has had a stroke in the past and this is not it.  No rashes.  Energy decreased for a while.  No change in appetite.  Right side with congestion, occasional drainage.  Taking Tylenol.  Using heat on the neck.            Past Medical History      Diagnosis   Date     ASO (arteriosclerosis obliterans)       History of ASO with claudication      Bilateral tibial fractures  01     History of bilateral tibial fx, work related injury       Coronary artery disease  2004     Stent times 2      CVA (cerebral infarction)  2012     CVA with residual left sided weakness, incidental meningioma found      Estrogen Replacement Therapy       ERT      Herpes zoster       Herpes zoster, left shoulder and neck       History of migraines       History of  migraines      Hx of osteopenia   06     Osteopenia, proximal femur.  Normal bone mineral density lumbar spine 06.  Start Fosamax 70 mg. weekly (didn't start until ), stopped  fosamax.      Shingles  2008     left shoulder and neck      Past Surgical History       Procedure   Laterality Date     Wojciech and bso   1987     Hemorrhoidectomy        Stress test pharmacological   2003     dobutamine, normal       Colonoscopy screening   2004     2 hyperplastic polyps, repeat in 2014       Pr bypass graft aortobifemoral   2004     Ptca   2004     mid LAD, proximal RCA       Stress test pharmacological   2006     adenosine, negative       Colonoscopy screening   1992, 2004     Colonoscopy, normal       Nm cardiac mpi stress test   03/06     Adenosine Cardiolite stress done and is negative for ischemia or infarction.  Ejection fraction is 54%.       Cystoscopy w/ ureteroscopy w/ lithotripsy  Right 03/08/16     Dr. Mario DC       Social History       Substance Use Topics         Smoking status:   Current Every Day Smoker     Packs/day:  0.50     Years:  46.00     Types:  Cigarettes     Smokeless tobacco:   Never Used      Comment: states very hard to do.       Alcohol use   No     Current Outpatient Prescriptions       Medication  Sig Dispense Refill     amLODIPine (NORVASC) 5 mg tablet Take 1 tablet by mouth once daily. 90 tablet 3     cholecalciferol (VITAMIN D-3) 2,000 unit capsule Take 1 capsule by mouth once daily.  0     fexofenadine (ALLEGRA) 180 mg tablet Take 1 tablet by mouth once daily with a meal.  0     hydrochlorothiazide (HCTZ) 25 mg tablet Take 1 tablet by mouth once daily. 90 tablet 3     metoprolol (LOPRESSOR) 100 mg tablet Take 1 tablet by mouth 2 times daily. 180 tablet 3     nitroglycerin (NITROSTAT) 0.4 mg sublingual tablet Place 1 tablet under the tongue every 5 minutes if needed for Chest Pain. 25 tablet 0     nitroglycerin 0.4 mg per dose sublingual (NITROLINGUAL) 0.4 mg/dose spray Place 1 Spray under the tongue every 5 minutes if needed for Chest Pain. 1 Bottle 11     omega-3 fatty acids-vitamin E (FISH OIL) 1,000 mg cap Take 1 capsule by mouth once daily.  0      "omeprazole (PRILOSEC) 20 mg Delayed-Release capsule Take 1 capsule by mouth once daily before a meal. 30 capsule 3     potassium chloride (KLOR-CON M20) 20 mEq Extended-Release tablet Take 1 tablet by mouth 2 times daily with meals. 60 tablet 2     simvastatin (ZOCOR) 80 mg tablet Take 1 tablet by mouth once daily. 90 tablet 3     No current facility-administered medications for this visit.      Medications have been reviewed by me and are current to the best of my knowledge and ability.    Allergies      Allergen   Reactions     Altaryl  [Diphenhydramine Hcl]  Tachycardia     Rapid heart Rate      Benadryl [Diphenhydramine]  Tachycardia     Rapid heart Rate      Chantix [Varenicline]  Nausea And Vomiting     Codeine  Confusion     Rapid heart rate.  Nauseated      Darvocet [Propoxyphene-Acetaminophen]  Tremors     unknown      Lisinopril  Cough     Morphine Sulfate  Tachycardia     Rapid heart rate      Tetracycline  Rash and Tremors     unknown        ROS:  Refer to HPI    Visit Vitals       /64     Pulse 60     Temp 97.2  F (36.2  C) (Tympanic)     Ht 1.58 m (5' 2.21\")     Wt 65 kg (143 lb 3.2 oz)     Breastfeeding No     BMI 26.02 kg/m2       EXAM:  General Appearance: Fatigued appearing adult female, appropriate appearance for age. No acute distress  Head: normocephalic, atraumatic  Ears: Left TM with bony landmarks appreciated, no erythema, no effusion, no bulging, no purulence.  Right TM with bony landmarks appreciated, no erythema, no effusion, no bulging, no purulence.   Left auditory canal clear.  Right auditory canal clear.  Normal external ears, right ear with tenderness on exam.  Eyes: Right bulbar and palpebral conjunctivae without erythema, scant watery drainage.  Left conjunctivae without erythema, no drainage.  Corneas clear.  No subconjunctival hemorrhage.  PERRLA.    Orophayrnx: moist mucous membranes, posterior pharynx with erythema, tonsils without hypertrophy, no erythema, no exudates or " petechiae, no post nasal drip seen.    Right maxillary sinus tenderness to palpation.  Non tender left maxillary and bilateral frontal sinuses   Nose: Bilateral nares without erythema, edema, drainage, or congestion present  Neck: Right side of neck with generalized tenderness to even light palpation, no palpable enlarged lymph nodes, left side non tender  Respiratory: normal chest wall and respirations.  Normal effort.  Clear to auscultation bilaterally, no wheezes or rhonchi or congestion, no cough appreciated  Cardiac: RRR with no murmurs  Musculoskeletal:  No tenderness to palpation over the cervical spine.  Decreased ROM of neck side to side due to stiffness and guarding.  Able to touch chin to chest.  Right side of neck with taunt/tight musculature to palpation.    Dermatological: no facial erythema or rash  Neurological:  No facial droop.  Speech clear.  Normal gait and balance.    Psychological: normal affect, alert and pleasant    ASSESSMENT/PLAN:    ICD-10-CM    1. Neck stiffness M43.6 methocarbamol (ROBAXIN) 750 mg tablet      traMADol (ULTRAM) 50 mg tablet   2. Irritation of right eye H57.8    3. Throat irritation J39.2    4. Other headache syndrome G44.89 traMADol (ULTRAM) 50 mg tablet   5. Sinus pressure J34.89          Tramadol 50 mg Q 6 hours PRN severe pain  Tylenol TID PRN and Naproxen BID PRN  Encouraged fluids  Symptomatic treatment - salt water gargles, honey, elevation, humidifier, saline nasal spray, sinus rinse/netti pot, lozenges, alternate ice and heat to neck, topical analgesia to neck, etc   Follow up in ER or main clinic if symptoms persist or worsen or concerns    Discussed case with Dr. Rodriguez, agrees with plan       Patient Instructions   Tramadol 1 tab up to every 6 hours as needed for severe pain    Tylenol every 6 hours as needed    Aleve (Naproxen) 1 tab every 12 hours as needed for pain, stiffness    Alternate ice and heat    Try topical pain cream    Follow up in ER if pain  worsens, fevers, or concerns

## 2018-01-03 NOTE — PATIENT INSTRUCTIONS
Patient Information     Patient Name MRN Sex Ovidio Reynoso [id number] Female 1950      Patient Instructions by Maya Combs NP at 2017  9:00 AM     Author:  Maya Combs NP Service:  (none) Author Type:  PHYS- Nurse Practitioner     Filed:  2017  9:43 AM Encounter Date:  2017 Status:  Signed     :  Maya Combs NP (PHYS- Nurse Practitioner)            Tramadol 1 tab up to every 6 hours as needed for severe pain    Tylenol every 6 hours as needed    Aleve (Naproxen) 1 tab every 12 hours as needed for pain, stiffness    Alternate ice and heat    Try topical pain cream    Follow up in ER if pain worsens, fevers, or concerns

## 2018-01-03 NOTE — TELEPHONE ENCOUNTER
Patient Information     Patient Name MRN Sex Ovidio Reynoso [id number] Female 1950      Telephone Encounter by Ashu Arceo RN at 2017 11:50 AM     Author:  Ashu Arceo RN Service:  (none) Author Type:  NURS- Registered Nurse     Filed:  2017 12:00 PM Encounter Date:  2017 Status:  Signed     :  Ashu Arceo RN (NURS- Registered Nurse)            This is a Refill request from: [facility]  Name of Medication: HCTZ  Quantity requested: 90 tabs  Last fill date: 2016  Due for refill: Yes, as per refill request and per chart review  Last visit with GLYNN AGUAYO was on: 2016 in [facility]  PCP:  Glynn Aguayo MD  Controlled Substance Agreement:  N/A   Diagnosis r/t this medication request: HTN    Per chart review, last K+ level of 3.0 on 16. Patient was also seen by Dr. Lomax on that date, and per chart review is on a K+ supplement. Was to continue on current medications both per 16 office visit notes, as well as 16 Internal medicine MD notes. However, writer is uncomfortable refilling requested rx. Will pend rx request to PCP for his consideration/approval.     Unable to complete prescription refill per RN Medication Refill Policy.................... Ashu Arceo RN ....................  2017   11:55 AM

## 2018-01-04 NOTE — TELEPHONE ENCOUNTER
Patient Information     Patient Name MRN Sex Gunner Reynosorikathy [id number] Female 1950      Telephone Encounter by Teri Zuleta at 3/27/2017 10:13 AM     Author:  Teri Zuleta Service:  (none) Author Type:  (none)     Filed:  3/27/2017 10:14 AM Encounter Date:  3/27/2017 Status:  Signed     :  Teri Zuleta            She had an appointment already scheduled for 3/28/17 at 2:30 that she canceled. There are openings on Friday, 3/31/17 at the [facility].  Teri Zuleta [facility])  ..................3/27/2017   10:14 AM

## 2018-01-04 NOTE — TELEPHONE ENCOUNTER
Patient Information     Patient Name MRN Sex Ovidio Reynoso [id number] Female 1950      Telephone Encounter by Shirley Nunn RN at 3/30/2017 11:05 AM     Author:  Shirley Nunn RN Service:  (none) Author Type:  NURS- Registered Nurse     Filed:  3/30/2017 11:06 AM Encounter Date:  3/30/2017 Status:  Signed     :  Shirley Nunn RN (NURS- Registered Nurse)            Proton Pump Inhibitors    Office visit in the past 12 months or per provider note.    Last visit with JAYME DUARTE was on: 2016 in [facility]  Next visit with JAYME DUARTE is on: No future appointment listed with this provider  Next visit with Family Practice is on: 2017 in [facility]    Max refill for 12 months from last office visit or per provider note.    Due for FU on medication. Has appt with Glynn Botello MD on 3/31/17 will give one refill pending appt. Prescription refilled per RN Medication Refill Policy.................... SHIRLEY NUNN RN ....................  3/30/2017   11:05 AM

## 2018-01-04 NOTE — NURSING NOTE
Patient Information     Patient Name MRGunner Oviedorikathy [id number] Female 1950      Nursing Note by Catie Hodgson at 3/31/2017 10:45 AM     Author:  Catie Hodgson Service:  (none) Author Type:  (none)     Filed:  3/31/2017 10:54 AM Encounter Date:  3/31/2017 Status:  Signed     :  Catie Hodgson            Patient presents of issues with work. States that she is under a lot of stress at work. Wondering about a short term disability while she gets several medical issues taken care of.  Catie Hodgson LPN   3/31/2017  10:34 AM

## 2018-01-04 NOTE — TELEPHONE ENCOUNTER
Patient Information     Patient Name MRN Sex Ovidio Reynoso [id number] Female 1950      Telephone Encounter by Nancy Zepeda at 3/27/2017  1:05 PM     Author:  Nancy Zepeda Service:  (none) Author Type:  (none)     Filed:  3/27/2017  1:09 PM Encounter Date:  3/27/2017 Status:  Signed     :  Still, Allison            Called patient and transferred her to the appointment line.  Nancy Zepeda LPN ....................3/27/2017  1:05 PM

## 2018-01-04 NOTE — NURSING NOTE
Patient Information     Patient Name MRN Sex GUZMAN Luna Nonamarie [id number] Female 1950      Nursing Note by Olive Nixon at 2017  9:45 AM     Author:  Olive Nixon Service:  (none) Author Type:  (none)     Filed:  2017 10:08 AM Encounter Date:  2017 Status:  Signed     :  Olive Nixon            Patient is here for follow up anxiety, states is about the same.  Olive Nixon LPN .............2017  9:55 AM

## 2018-01-04 NOTE — PROGRESS NOTES
Patient Information     Patient Name MRN Sex Ovidio Reynoso [id number] Female 1950      Progress Notes by Glynn Botello MD at 3/31/2017 10:45 AM     Author:  Glynn Botello MD Service:  (none) Author Type:  Physician     Filed:  3/31/2017 11:09 AM Encounter Date:  3/31/2017 Status:  Signed     :  Glynn Botello MD (Physician)            SUBJECTIVE:  Ovidio Luna is a 66 y.o. female here for follow-up.  Patient has a history of coronary artery disease, CVA with left-sided hemiparesis, tobacco abuse, hyperlipidemia, hypertension. She was seen over the winter for atypical chest pain and ultimately had a stress test which came back normal. She reports the last several months she has had increasing chest pain, shortness of breath. This is happening consistently while she is going to work. She admits that she is having quite a bit of stress at work and at home. She is interested in taking some time off of work to help get her anxiety and stress under control. She does not have a history of PTSD her anxiety the past. She has not been medicated for anxiety or depression.    She is also due for labs and refills of her medications.      Patient Active Problem List       Diagnosis  Date Noted     Centrilobular emphysema (HC)  2016     SOMATIC DYSFUNCTION, SPINE, THORACIC-LUMBAR  2012     SOMATIC DYSFUNCTION, PELVIC  2012     LUMBAGO  2012     CVA WITH LEFT HEMIPARESIS  06/15/2012     MENOPAUSE-RELATED VASOMOTOR SYMPTOMS  2010     CORONARY ARTERY DISEASE       Coronary artery disease, PTCA of mid LAD and PTCA of the proximal right   coronary artery 04, normal left ventricular systolic function          PERIPHERAL VASCULAR DISEASE       TOBACCO ABUSE       HYPERLIPIDEMIA       HEMANGIOMA       Multiple capillary spider hemangioma          HYPERTENSION       VASCULAR INSUFFICIENCY       right lower extremity          SHOULDER IMPINGEMENT SYNDROME, LEFT        OSTEOPENIA  11/14/2008     DXA scan - osteopenia of hips.  Normal density LS spine.          GASTRITIS  10/27/2008     Acute            Past Medical History      Diagnosis   Date     ASO (arteriosclerosis obliterans)       History of ASO with claudication      Bilateral tibial fractures  12/26/01     History of bilateral tibial fx, work related injury       Coronary artery disease  2004     Stent times 2      CVA (cerebral infarction)  05/20/2012     CVA with residual left sided weakness, incidental meningioma found      Estrogen Replacement Therapy       ERT      Herpes zoster  [id number]     Herpes zoster, left shoulder and neck       History of migraines       History of  migraines      Hx of osteopenia   03/14/06     Osteopenia, proximal femur.  Normal bone mineral density lumbar spine 03/14/06.  Start Fosamax 70 mg. weekly (didn't start until 08/07), stopped fosamax.      Shingles  2008     left shoulder and neck        Past Surgical History       Procedure   Laterality Date     Wojciech and bso   1987     Hemorrhoidectomy        Stress test pharmacological   2003     dobutamine, normal       Colonoscopy screening   2004     2 hyperplastic polyps, repeat in 2014       Pr bypass graft aortobifemoral   2004     Ptca   2004     mid LAD, proximal RCA       Stress test pharmacological   2006     adenosine, negative       Colonoscopy screening   1992, 2004     Colonoscopy, normal       Nm cardiac mpi stress test   03/06     Adenosine Cardiolite stress done and is negative for ischemia or infarction.  Ejection fraction is 54%.       Cystoscopy w/ ureteroscopy w/ lithotripsy  Right 03/08/16     Dr. Mario DC         Current Outpatient Prescriptions       Medication  Sig Dispense Refill     amLODIPine (NORVASC) 5 mg tablet Take 1 tablet by mouth once daily. 90 tablet 3     cholecalciferol (VITAMIN D-3) 2,000 unit capsule Take 1 capsule by mouth once daily.  0     citalopram (CELEXA) 10 mg tablet Take 1 tablet daily for 2  weeks, then 2 tablets daily. 45 tablet 1     fexofenadine (ALLEGRA) 180 mg tablet Take 1 tablet by mouth once daily with a meal.  0     hydroCHLOROthiazide (HCTZ) 25 mg tablet Take 1 tablet by mouth once daily. 90 tablet 3     methocarbamol (ROBAXIN) 750 mg tablet 1 tab twice daily as needed 12 tablet 0     metoprolol (LOPRESSOR) 100 mg tablet Take 1 tablet by mouth 2 times daily. 180 tablet 3     nitroglycerin 0.4 mg per dose sublingual (NITROLINGUAL) 0.4 mg/dose spray Place 1 Spray under the tongue every 5 minutes if needed for Chest Pain. 1 Bottle 11     omega-3 fatty acids-vitamin E (FISH OIL) 1,000 mg cap Take 1 capsule by mouth once daily.  0     omeprazole (PRILOSEC) 20 mg Delayed-Release capsule Take 1 capsule by mouth once daily before a meal. 90 capsule 3     potassium chloride (KLOR-CON M20) 20 mEq Extended-Release tablet Take 1 tablet by mouth 2 times daily with meals. 180 tablet 3     simvastatin (ZOCOR) 80 mg tablet Take 1 tablet by mouth once daily. 90 tablet 3     No current facility-administered medications for this visit.      Medications have been reviewed by me and are current to the best of my knowledge and ability.      Allergies:  Allergies      Allergen   Reactions     Altaryl  [Diphenhydramine Hcl]  Tachycardia     Rapid heart Rate      Benadryl [Diphenhydramine]  Tachycardia     Rapid heart Rate      Chantix [Varenicline]  Nausea And Vomiting     Codeine  Confusion     Rapid heart rate.  Nauseated      Darvocet [Propoxyphene-Acetaminophen]  Tremors     unknown      Lisinopril  Cough     Morphine Sulfate  Tachycardia     Rapid heart rate      Tetracycline  Rash and Tremors     unknown        Family History       Problem   Relation Age of Onset     Heart Disease  Father      Cancer  Father      myeloma       Heart Disease  Mother      MI, stenting       Other  Sister      chronic pain syndrome       Other  Other      FHx Father's side Coronary artery disease       Stroke  Paternal Uncle       Cancer  Paternal Uncle      Bladder x2       Cancer-breast  No Family History        Social History       Substance Use Topics         Smoking status:   Current Every Day Smoker     Packs/day:  0.50     Years:  46.00     Types:  Cigarettes     Smokeless tobacco:   Never Used      Comment: states very hard to do.       Alcohol use   No       ROS:    As above otherwise ROS is unremarkable.      OBJECTIVE:  /63  Pulse (!) 47  Wt 66.2 kg (146 lb)  BMI 26.53 kg/m2    EXAM:  General Appearance: Pleasant, alert, appropriate appearance for age. No acute distress  Lungs: Normal chest wall and respirations. Clear to auscultation, no wheezes or crackles.  Cardiovascular: Regular rate and rhythm. S1, S2, no murmurs.  Musculoskeletal: No edema.  Skin: no concerning or new rashes.  Neurologic Exam: CN 2-12 grossly intact.  Normal gait.  Symmetric DTRs, No focal motor or sensory deficits. No tremor.  Psychiatric Exam: Alert and oriented, appropriate affect. She is tearful at times. She has no psychomotor agitation or retardation. No hallucinations. No thoughts of harming herself or others.    ASSESSEMENT AND PLAN:    Ovidio was seen today for stress.    Diagnoses and all orders for this visit:    Anxiety  -     citalopram (CELEXA) 10 mg tablet; Take 1 tablet daily for 2 weeks, then 2 tablets daily.    Essential hypertension  -     amLODIPine (NORVASC) 5 mg tablet; Take 1 tablet by mouth once daily.  -     metoprolol (LOPRESSOR) 100 mg tablet; Take 1 tablet by mouth 2 times daily.    HTN (hypertension)  -     hydroCHLOROthiazide (HCTZ) 25 mg tablet; Take 1 tablet by mouth once daily.  -     LIPID PANEL; Future  -     COMPLETE METABOLIC PANEL; Future    Gastroesophageal reflux disease, esophagitis presence not specified  -     omeprazole (PRILOSEC) 20 mg Delayed-Release capsule; Take 1 capsule by mouth once daily before a meal.    Hypokalemia  -     potassium chloride (KLOR-CON M20) 20 mEq Extended-Release tablet; Take 1  tablet by mouth 2 times daily with meals.    Hyperlipidemia, unspecified hyperlipidemia type  -     simvastatin (ZOCOR) 80 mg tablet; Take 1 tablet by mouth once daily.    Other orders  -     Cancel: methocarbamol (ROBAXIN) 750 mg tablet; 1 tab twice daily as needed    Her symptoms are consistent with anxiety. We will initiate treatment with Celexa 10 mg daily for 2 weeks then increase up to 20 mg daily. I offered using clonazepam to use before work but she declined. She would rather take time off to get this under better control. I discussed that that seems reasonable but I would also highly encouraged her to seek psychotherapy which she will pursue on her own. She'll follow-up in one month for reassessment. Potential side effects were discussed.    Her blood pressure has been well controlled we'll continue current regimen. We'll get fasting labs today.    50 minutes were spent with the patient, greater than 50% was in coordination of further care, including counseling of anxiety, PTSD, work-related stress and related paperwork for her disability and counseling of the above medical problems.        Anup Botello MD

## 2018-01-05 NOTE — NURSING NOTE
Patient Information     Patient Name MRN Ovidio Cevallos [id number] Female 1950      Nursing Note by Keira Klein at 2017  9:30 AM     Author:  Keira Klein Service:  (none) Author Type:  (none)     Filed:  2017  9:34 AM Encounter Date:  2017 Status:  Signed     :  Keira Klein            Patient presents today to follow up on anxiety.  Keira Klein LPN .............2017  9:26 AM

## 2018-01-05 NOTE — PROGRESS NOTES
"Patient Information     Patient Name MRN Sex Ovidio Reynoso [id number] Female 1950      Progress Notes by Glynn Botello MD at 2017  9:30 AM     Author:  Glynn Botello MD Service:  (none) Author Type:  Physician     Filed:  2017 10:03 AM Encounter Date:  2017 Status:  Signed     :  Glynn Botello MD (Physician)            SUBJECTIVE:  Ovidio Luna is a 66 y.o. female here for follow-up.  She has a history of anxiety and depression and we have been using Celexa. She initially increased to 20 mg a day on her own which caused a \"dropping sensation\" that has since improved but has not resolved. She states that her anxiety seems to be somewhat improved. She has been the psychotherapy a couple of times which seems to be helping. She is ready to go back to work starting tonight. She's had no side effects from medications.    Patient Active Problem List       Diagnosis  Date Noted     Centrilobular emphysema (HC)  2016     SOMATIC DYSFUNCTION, SPINE, THORACIC-LUMBAR  2012     SOMATIC DYSFUNCTION, PELVIC  2012     LUMBAGO  2012     CVA WITH LEFT HEMIPARESIS  06/15/2012     MENOPAUSE-RELATED VASOMOTOR SYMPTOMS  2010     CORONARY ARTERY DISEASE       Coronary artery disease, PTCA of mid LAD and PTCA of the proximal right   coronary artery 04, normal left ventricular systolic function          PERIPHERAL VASCULAR DISEASE       TOBACCO ABUSE       HYPERLIPIDEMIA       HEMANGIOMA       Multiple capillary spider hemangioma          HYPERTENSION       VASCULAR INSUFFICIENCY       right lower extremity          SHOULDER IMPINGEMENT SYNDROME, LEFT       OSTEOPENIA  2008     DXA scan - osteopenia of hips.  Normal density LS spine.          GASTRITIS  10/27/2008     Acute            Past Medical History:     Diagnosis  Date     ASO (arteriosclerosis obliterans)     History of ASO with claudication      Bilateral tibial fractures 01    History of " bilateral tibial fx, work related injury       Coronary artery disease 2004    Stent times 2      CVA (cerebral infarction) 05/20/2012    CVA with residual left sided weakness, incidental meningioma found      Estrogen Replacement Therapy     ERT      Herpes zoster [id number]    Herpes zoster, left shoulder and neck       History of migraines     History of  migraines      Hx of osteopenia  03/14/06    Osteopenia, proximal femur.  Normal bone mineral density lumbar spine 03/14/06.  Start Fosamax 70 mg. weekly (didn't start until 08/07), stopped fosamax.      Shingles 2008    left shoulder and neck        Past Surgical History:      Procedure  Laterality Date     COLONOSCOPY SCREENING  2004    2 hyperplastic polyps, repeat in 2014       COLONOSCOPY SCREENING  1992, 2004    Colonoscopy, normal       CYSTOSCOPY W/ URETEROSCOPY W/ LITHOTRIPSY Right 03/08/16    Dr. Mario DC       HEMORRHOIDECTOMY       NM CARDIAC MPI STRESS TEST  03/06    Adenosine Cardiolite stress done and is negative for ischemia or infarction.  Ejection fraction is 54%.       MO BYPASS GRAFT AORTOBIFEMORAL  2004     PTCA  2004    mid LAD, proximal RCA       STRESS TEST PHARMACOLOGICAL  2003    dobutamine, normal       STRESS TEST PHARMACOLOGICAL  2006    adenosine, negative       JACK AND BSO  1987       Current Outpatient Prescriptions       Medication  Sig Dispense Refill     amLODIPine (NORVASC) 5 mg tablet Take 1 tablet by mouth once daily. 90 tablet 3     busPIRone (BUSPAR) 10 mg tablet Take 0.5-1 tablets by mouth 3 times daily if needed (anxiety). 30 tablet 3     cholecalciferol (VITAMIN D-3) 2,000 unit capsule Take 1 capsule by mouth once daily.  0     citalopram (CELEXA) 10 mg tablet Take 1 tablet by mouth every morning. 30 tablet 1     fexofenadine (ALLEGRA) 180 mg tablet Take 1 tablet by mouth once daily with a meal.  0     hydroCHLOROthiazide (HCTZ) 25 mg tablet Take 1 tablet by mouth once daily. 90 tablet 3     methocarbamol (ROBAXIN)  750 mg tablet 1 tab twice daily as needed 12 tablet 0     metoprolol (LOPRESSOR) 100 mg tablet Take 1 tablet by mouth 2 times daily. 180 tablet 3     nitroglycerin 0.4 mg per dose sublingual (NITROLINGUAL) 0.4 mg/dose spray Place 1 Spray under the tongue every 5 minutes if needed for Chest Pain. 1 Bottle 11     omega-3 fatty acids-vitamin E (FISH OIL) 1,000 mg cap Take 1 capsule by mouth once daily.  0     omeprazole (PRILOSEC) 20 mg Delayed-Release capsule Take 1 capsule by mouth once daily before a meal. 90 capsule 3     potassium chloride (KLOR-CON M20) 20 mEq Extended-Release tablet Take 1 tablet by mouth 2 times daily with meals. 180 tablet 3     simvastatin (ZOCOR) 80 mg tablet Take 1 tablet by mouth once daily. 90 tablet 3     No current facility-administered medications for this visit.      Medications have been reviewed by me and are current to the best of my knowledge and ability.      Allergies:  Allergies      Allergen   Reactions     Altaryl  [Diphenhydramine Hcl]  Tachycardia     Rapid heart Rate      Benadryl [Diphenhydramine]  Tachycardia     Rapid heart Rate      Chantix [Varenicline]  Nausea And Vomiting     Codeine  Confusion     Rapid heart rate.  Nauseated      Darvocet [Propoxyphene-Acetaminophen]  Tremors     unknown      Lisinopril  Cough     Morphine Sulfate  Tachycardia     Rapid heart rate      Tetracycline  Rash and Tremors     unknown        Family History       Problem   Relation Age of Onset     Heart Disease  Father      Cancer  Father      myeloma       Heart Disease  Mother      MI, stenting       Other  Sister      chronic pain syndrome       Other  Other      FHx Father's side Coronary artery disease       Stroke  Paternal Uncle      Cancer  Paternal Uncle      Bladder x2       Cancer-breast  No Family History        Social History       Substance Use Topics         Smoking status:   Current Every Day Smoker     Packs/day:  0.50     Years:  46.00     Types:  Cigarettes      Smokeless tobacco:   Never Used      Comment: states very hard to do.       Alcohol use   No       ROS:    As above otherwise ROS is unremarkable.      OBJECTIVE:  /70  Pulse 64  Wt 67.3 kg (148 lb 6.4 oz)  BMI 26.96 kg/m2    EXAM:  General Appearance: Pleasant, alert, appropriate appearance for age. No acute distress  Neurologic Exam: CN 2-12 grossly intact.  Normal gait.  Symmetric DTRs, No focal motor or sensory deficits. No tremor.  Psychiatric Exam: Alert and oriented, appropriate affect. She is tearful at times. She has no psychomotor agitation or retardation. No hallucinations. No thoughts of harming herself or others.    MIKE-7 ANXIETY SCREENING 4/28/2017 5/30/2017   MIKE date (doc flow) 4/28/2017 5/30/2017   Nervous, anxious 2 1   Cannot stop worrying 2 2   Worry about different things 1 1   Cannot relax 2 2   Feeling restless 1 2   Easily annoyed/irritated 2 2   Afraid of awful event 0 0   Score 10 10   Severity moderate anxiety moderate anxiety       PHQ Depression Screening 4/28/2017 5/30/2017   Date of PHQ exam (doc flow) 4/28/2017 5/30/2017   1. Lack of interest/pleasure 1 - Several days 2 - More than half the days   2. Feeling down/depressed 2 - More than half the days 2 - More than half the days   PHQ-2 TOTAL SCORE 3 4   3. Trouble sleeping 3 - Nearly every day 2 - More than half the days   4. Decreased energy 3 - Nearly every day 3 - Nearly every day   5. Appetite change 1 - Several days 1 - Several days   6. Feelings of failure 2 - More than half the days 2 - More than half the days   7. Trouble concentrating 1 - Several days 2 - More than half the days   8. Activity level 1 - Several days 2 - More than half the days   9. Hurting yourself 0 - Not at all 0 - Not at all   PHQ-9 TOTAL SCORE 14 16   PHQ-9 Severity Level moderate moderately severe   Functional Impairment somewhat difficult somewhat difficult         ASSESSEMENT AND PLAN:    Ovidio was seen today for follow up.    Diagnoses and  all orders for this visit:    Anxiety  -     busPIRone (BUSPAR) 10 mg tablet; Take 0.5-1 tablets by mouth 3 times daily if needed (anxiety).    We'll continue Celexa 10 mg daily and add BuSpar 5-10 mg up to 3 times daily as needed. Over this will help prior to her going to work as this seems to be a significant trigger for her stresses and anxiety. She'll continue psychotherapy. Paperwork was completed for her to return to work tonight.    Anup Botello MD

## 2018-01-05 NOTE — TELEPHONE ENCOUNTER
Patient Information     Patient Name MRN Sex Gunner Reynosorikathy [id number] Female 1950      Telephone Encounter by Daniella Glaser RN at 2017  4:01 PM     Author:  Daniella Glaser RN Service:  (none) Author Type:  NURS- Registered Nurse     Filed:  2017  4:12 PM Encounter Date:  2017 Status:  Signed     :  Daniella Glaser RN (NURS- Registered Nurse)            Refill request for citalopram refused.  eRx on 17 for 30 and 1 refill - take 1 tablet by mouth every morning - sent to [facility].  Pt also has f/u visit scheduled for May 30th.  Unable to complete prescription refill per RN Medication Refill Policy.................... Daniella Glaser RN ....................  2017   4:10 PM

## 2018-01-17 PROBLEM — D18.00 HEMANGIOMA: Status: ACTIVE | Noted: 2018-01-17

## 2018-01-17 PROBLEM — I25.10 CORONARY ATHEROSCLEROSIS: Status: ACTIVE | Noted: 2018-01-17

## 2018-01-17 PROBLEM — I10 HYPERTENSION: Status: ACTIVE | Noted: 2018-01-17

## 2018-01-17 PROBLEM — I99.9 CIRCULATORY SYSTEM DISORDER: Status: ACTIVE | Noted: 2018-01-17

## 2018-01-17 PROBLEM — E78.5 HYPERLIPIDEMIA: Status: ACTIVE | Noted: 2018-01-17

## 2018-01-17 PROBLEM — Z72.0 TOBACCO ABUSE: Status: ACTIVE | Noted: 2018-01-17

## 2018-01-17 PROBLEM — M25.819 OTHER AFFECTIONS OF SHOULDER REGION, NOT ELSEWHERE CLASSIFIED: Status: ACTIVE | Noted: 2018-01-17

## 2018-01-17 PROBLEM — I73.9 PERIPHERAL VASCULAR DISEASE (H): Status: ACTIVE | Noted: 2018-01-17

## 2018-01-17 RX ORDER — CYCLOBENZAPRINE HCL 10 MG
10 TABLET ORAL 3 TIMES DAILY PRN
COMMUNITY
Start: 2017-07-24 | End: 2018-06-03

## 2018-01-17 RX ORDER — NAPROXEN 500 MG/1
500 TABLET ORAL 2 TIMES DAILY WITH MEALS
COMMUNITY
Start: 2017-09-19 | End: 2018-06-26

## 2018-01-17 RX ORDER — ONDANSETRON 4 MG/1
4 TABLET, ORALLY DISINTEGRATING ORAL EVERY 8 HOURS PRN
COMMUNITY
Start: 2017-10-08 | End: 2018-06-03

## 2018-01-17 RX ORDER — AMLODIPINE BESYLATE 5 MG/1
5 TABLET ORAL DAILY
COMMUNITY
Start: 2017-03-31 | End: 2018-04-11

## 2018-01-17 RX ORDER — CITALOPRAM HYDROBROMIDE 10 MG/1
10 TABLET ORAL EVERY MORNING
COMMUNITY
Start: 2017-08-03 | End: 2018-04-26

## 2018-01-17 RX ORDER — POTASSIUM CHLORIDE 1500 MG/1
20 TABLET, EXTENDED RELEASE ORAL 2 TIMES DAILY WITH MEALS
COMMUNITY
Start: 2017-03-31 | End: 2018-06-04

## 2018-01-17 RX ORDER — SIMVASTATIN 80 MG
80 TABLET ORAL DAILY
COMMUNITY
Start: 2017-03-31 | End: 2018-05-22

## 2018-01-17 RX ORDER — ACETAMINOPHEN 160 MG
4000 TABLET,DISINTEGRATING ORAL 2 TIMES DAILY
COMMUNITY
Start: 2012-09-18

## 2018-01-17 RX ORDER — CHLORAL HYDRATE 500 MG
1 CAPSULE ORAL DAILY
COMMUNITY
Start: 2012-09-18 | End: 2019-03-21

## 2018-01-17 RX ORDER — METOPROLOL TARTRATE 100 MG
100 TABLET ORAL 2 TIMES DAILY
COMMUNITY
Start: 2017-03-31 | End: 2018-04-11

## 2018-01-17 RX ORDER — HYDROCHLOROTHIAZIDE 25 MG/1
25 TABLET ORAL DAILY
COMMUNITY
Start: 2017-03-31 | End: 2018-03-07

## 2018-01-17 RX ORDER — NITROGLYCERIN 400 UG/1
1 SPRAY ORAL EVERY 5 MIN PRN
COMMUNITY
Start: 2016-12-12 | End: 2019-03-21

## 2018-01-17 RX ORDER — BUSPIRONE HYDROCHLORIDE 10 MG/1
5-10 TABLET ORAL 3 TIMES DAILY PRN
COMMUNITY
Start: 2017-05-30 | End: 2018-04-04

## 2018-01-17 RX ORDER — FEXOFENADINE HCL 180 MG/1
180 TABLET ORAL DAILY
COMMUNITY
Start: 2012-09-18 | End: 2020-01-01 | Stop reason: ALTCHOICE

## 2018-01-26 VITALS
WEIGHT: 147.38 LBS | BODY MASS INDEX: 27.12 KG/M2 | TEMPERATURE: 98 F | SYSTOLIC BLOOD PRESSURE: 120 MMHG | HEIGHT: 62 IN | HEART RATE: 56 BPM | DIASTOLIC BLOOD PRESSURE: 68 MMHG

## 2018-01-26 VITALS
SYSTOLIC BLOOD PRESSURE: 115 MMHG | DIASTOLIC BLOOD PRESSURE: 63 MMHG | HEART RATE: 47 BPM | WEIGHT: 146 LBS | BODY MASS INDEX: 26.49 KG/M2

## 2018-01-26 VITALS
WEIGHT: 148.4 LBS | HEART RATE: 64 BPM | DIASTOLIC BLOOD PRESSURE: 70 MMHG | BODY MASS INDEX: 26.96 KG/M2 | SYSTOLIC BLOOD PRESSURE: 118 MMHG

## 2018-01-26 VITALS
HEIGHT: 62 IN | BODY MASS INDEX: 27.02 KG/M2 | HEART RATE: 60 BPM | WEIGHT: 146.8 LBS | SYSTOLIC BLOOD PRESSURE: 126 MMHG | DIASTOLIC BLOOD PRESSURE: 78 MMHG

## 2018-01-26 VITALS
TEMPERATURE: 97.2 F | HEART RATE: 60 BPM | HEIGHT: 62 IN | BODY MASS INDEX: 26.35 KG/M2 | SYSTOLIC BLOOD PRESSURE: 112 MMHG | DIASTOLIC BLOOD PRESSURE: 64 MMHG | WEIGHT: 143.2 LBS

## 2018-01-26 VITALS
SYSTOLIC BLOOD PRESSURE: 98 MMHG | HEART RATE: 51 BPM | BODY MASS INDEX: 26.38 KG/M2 | WEIGHT: 145.4 LBS | DIASTOLIC BLOOD PRESSURE: 61 MMHG

## 2018-01-30 ENCOUNTER — OFFICE VISIT - GICH (OUTPATIENT)
Dept: FAMILY MEDICINE | Facility: OTHER | Age: 68
End: 2018-01-30

## 2018-01-30 ENCOUNTER — HISTORY (OUTPATIENT)
Dept: FAMILY MEDICINE | Facility: OTHER | Age: 68
End: 2018-01-30

## 2018-01-30 DIAGNOSIS — I69.959 HEMIPLEGIA AND HEMIPARESIS FOLLOWING UNSPECIFIED CEREBROVASCULAR DISEASE AFFECTING UNSPECIFIED SIDE (H): ICD-10-CM

## 2018-02-04 ASSESSMENT — PATIENT HEALTH QUESTIONNAIRE - PHQ9
SUM OF ALL RESPONSES TO PHQ QUESTIONS 1-9: 16
SUM OF ALL RESPONSES TO PHQ QUESTIONS 1-9: 14
SUM OF ALL RESPONSES TO PHQ QUESTIONS 1-9: 11

## 2018-02-04 ASSESSMENT — ANXIETY QUESTIONNAIRES
GAD7 TOTAL SCORE: 10
GAD7 TOTAL SCORE: 10
GAD7 TOTAL SCORE: 17

## 2018-02-05 ENCOUNTER — HOSPITAL ENCOUNTER (OUTPATIENT)
Dept: PHYSICAL THERAPY | Facility: OTHER | Age: 68
Setting detail: THERAPIES SERIES
End: 2018-02-05
Attending: FAMILY MEDICINE

## 2018-02-05 DIAGNOSIS — I69.959 HEMIPLEGIA AND HEMIPARESIS FOLLOWING UNSPECIFIED CEREBROVASCULAR DISEASE AFFECTING UNSPECIFIED SIDE (H): ICD-10-CM

## 2018-02-09 VITALS
SYSTOLIC BLOOD PRESSURE: 130 MMHG | BODY MASS INDEX: 26.51 KG/M2 | DIASTOLIC BLOOD PRESSURE: 66 MMHG | HEART RATE: 56 BPM | WEIGHT: 145 LBS

## 2018-02-09 VITALS
DIASTOLIC BLOOD PRESSURE: 72 MMHG | BODY MASS INDEX: 26.68 KG/M2 | SYSTOLIC BLOOD PRESSURE: 112 MMHG | HEART RATE: 60 BPM | WEIGHT: 145 LBS | HEIGHT: 62 IN | TEMPERATURE: 98.4 F

## 2018-02-12 NOTE — PATIENT INSTRUCTIONS
Patient Information     Patient Name MRN Sex Ovidio Reynoso [id number] Female 1950      Patient Instructions by Maya Combs NP at 12/15/2017 10:35 AM     Author:  Maya Combs NP  Service:  (none) Author Type:  PHYS- Nurse Practitioner     Filed:  12/15/2017 10:35 AM  Encounter Date:  12/15/2017 Status:  Addendum     :  Maya Combs NP (PHYS- Nurse Practitioner)        Related Notes: Original Note by Maya Combs NP (PHYS- Nurse Practitioner) filed at 12/15/2017 10:35 AM               Index Yoruba   Laryngitis   ________________________________________________________________________  KEY POINTS    Laryngitis is irritation and swelling of your vocal cords and voice box (larynx). It causes hoarseness, which means your voice sounds unnaturally low, deep, or raspy. Sometimes you can only whisper or hardly speak at all.    If another health problem is causing the laryngitis, treatment for that problem will also treat the laryngitis. The main treatment is resting your voice as much as you can.    Follow the full course of treatment prescribed by your healthcare provider. Ask your healthcare provider if there are activities you should avoid and when you can return to your normal activities.  ________________________________________________________________________  What is laryngitis?  Laryngitis is irritation and swelling of your vocal cords and voice box (larynx). It causes hoarseness, which means your voice sounds unnaturally low, deep, or raspy. Sometimes you can only whisper or hardly speak at all.  Laryngitis may be acute or chronic. Acute laryngitis starts suddenly and lasts no more than a few days. Laryngitis is chronic if symptoms last for at least 3 weeks.  What is the cause?  Acute laryngitis is usually a symptom of a cold, flu, bronchitis, sinusitis, and other infections or allergies. Chronic laryngitis can be caused by:    Heavy smoking or drinking    Overuse of  your voice, such as in teaching or public speaking, singing or shouting    Coughing often and hard    Exposure to dust, chemicals, or cigarette smoke  Health problems that can cause changes in your vocal cords include:    Drainage from your sinuses (post-nasal drip) that you may not notice    Thyroid disease    Noncancerous growths on the vocal cords    Acid reflux from the stomach    Cancer of the vocal cords  What are the symptoms?  Symptoms may include:    Low, raspy voice and hoarseness    A dry cough (meaning that you don t cough up mucus)    A throat that feels dry or sore    A voice that weakens throughout the day  You may lose your voice completely and just be able to whisper.  How is it diagnosed?  Your healthcare provider will ask about your symptoms and medical history and examine you. To check for possible causes of your symptoms, you may have tests such as:    Laryngoscopy, in which a slim, flexible, lighted tube is passed through your mouth to look at your vocal cords    A biopsy, which is the removal of a small sample of tissue for testing    CT scan, which uses X-rays and a computer to show detailed pictures of the throat    MRI, which uses a strong magnetic field and radio waves to show detailed pictures of the throat  Your healthcare provider may check you for allergies or infections also.  How is it treated?  If another health problem is causing the laryngitis, such as thyroid disease, acid reflux, or sinusitis, treatment for that problem will also treat the laryngitis. If no health problem has caused laryngitis, the main treatment is resting your voice as much as you can. Symptoms should improve in a few days or a couple of weeks.  Your healthcare provider may recommend using a steroid spray to help the larynx (voice box) heal faster. Using a steroid for a long time can have serious side effects. Take steroid medicine exactly as your healthcare provider prescribes. Don t take more or less of it  than prescribed by your provider and don t take it longer than prescribed. Don t stop taking a steroid without your provider's approval. You may have to lower your dosage slowly before stopping it.  How can I take care of myself?  Follow the full course of treatment prescribed by your healthcare provider. In addition:    Don t smoke, and stay away from others who are smoking.    Avoid breathing dust and chemical fumes.    Rest your voice as much as possible.    Drink extra fluids, such as water, fruit juice, and tea.    Use a humidifier to put more moisture in the air. Avoid steam vaporizers because they can cause burns. Be sure to keep the humidifier clean, as recommended in the 's instructions. It's important to keep bacteria and mold from growing in the water container.  Ask your healthcare provider:    How and when you will get your test results    How long it will take to recover    If there are activities you should avoid and when you can return to your normal activities    How to take care of yourself at home    What symptoms or problems you should watch for and what to do if you have them  Make sure you know when you should come back for a checkup. Keep all appointments for provider visits or tests.  How can I help prevent laryngitis?    Get plenty of rest when you have a viral or bacterial infection, such as a cold or sinusitis.    Ask your healthcare provider how to treat your allergies to prevent sinus infections.    Avoid vocal strain by not yelling, screaming, or talking loudly, especially when you have a cold or other throat or sinus infection.    If you smoke, try to quit. Talk to your healthcare provider about ways to quit smoking.    Avoid secondhand smoke.    If you want to drink alcohol, ask your healthcare provider how much is safe for you to drink.    If you have frequent heartburn or reflux disease, see your healthcare provider about preventing or treating these problems.  Developed  by [employer].  Adult Advisor 2017.2 published by [employer].  Last modified: 2016-09-21  Last reviewed: 2016-08-30  This content is reviewed periodically and is subject to change as new health information becomes available. The information is intended to inform and educate and is not a replacement for medical evaluation, advice, diagnosis or treatment by a healthcare professional.  References   Adult Advisor 2017.2 Index    Copyright   2017 [employer], a division of [employer] All rights reserved.            Index Nepali Related topics   Hoarseness   ________________________________________________________________________  KEY POINTS    Hoarseness is a symptom of an irritated voice box. When you are hoarse, your voice sounds unnaturally low, deep, or raspy.    If another health problem is causing the hoarseness, such as thyroid disease, acid reflux, or sinusitis, treatment for that problem will also treat the hoarseness.    Follow the full course of treatment prescribed by your healthcare provider. Don't smoke, and rest your voice. Ask your healthcare provider how long it will take to recover.  ________________________________________________________________________  What is hoarseness?  Hoarseness is a symptom of an irritated voice box. When you are hoarse, your voice sounds unnaturally low, deep, or raspy. Depending on the cause, hoarseness can last for hours to weeks, or it can be a life-long condition.  What is the cause?  Hoarseness can be a symptom of a cold, flu, bronchitis, sinusitis, and other infections or allergies. Hoarseness can be caused by:    Heavy smoking or drinking    Overuse of your voice, such as in teaching or public speaking, shouting or singing    Coughing often or hard    Exposure to dust, chemicals, or cigarette smoke    Thyroid disease    Noncancerous growths on the vocal cords    Acid reflux from the stomach    Cancer of the vocal cords  How is it diagnosed?  Your  healthcare provider will ask about your symptoms and medical history and examine you. If your hoarseness lasts for more than a few weeks you may have tests to check for possible causes of your symptoms. You may have tests such as:    Blood tests    Laryngoscopy, which uses a slim, flexible, lighted tube passed through your mouth to look at your vocal cords    A biopsy, which is the removal of a small sample of tissue for testing    CT scan, which uses X-rays and a computer to show detailed pictures of the throat    MRI, which uses a strong magnetic field and radio waves to show detailed pictures of the throat  Your healthcare provider may check you for allergies or infections also.  How is it treated?  If another health problem is causing the hoarseness, such as thyroid disease, acid reflux, or sinusitis, treatment for that problem will also treat the hoarseness. If no other health problem has caused hoarseness, the main treatment is resting your voice as much as you can. Symptoms should improve in a few days.  Your healthcare provider may recommend using a steroid spray to help your voice box heal faster. Using a steroid for a long time can have serious side effects. Take steroid medicine exactly as your healthcare provider prescribes. Don t take more or less of it than prescribed by your provider and don t take it longer than prescribed. Don t stop taking a steroid without your provider's approval. You may have to lower your dosage slowly before stopping it.  How can I take care of myself?  Follow the full course of treatment prescribed by your healthcare provider. In addition    Don t smoke, and stay away from others who are smoking.    Avoid breathing dust and chemical fumes.    Rest your voice as much as possible.    Drink extra fluids, such as water, fruit juice, and tea.    Use a humidifier to put more moisture in the air. Avoid steam vaporizers because they can cause burns. Be sure to keep the humidifier  clean, as recommended in the 's instructions. It's important to keep bacteria and mold from growing in the water container.  Ask your healthcare provider:    How and when you will get your test results    How long it will take to recover    If there are activities you should avoid and when you can return to your normal activities    How to take care of yourself at home    What symptoms or problems you should watch for and what to do if you have them  Make sure you know when you should come back for a checkup. Keep all appointments for provider visits or tests.  How can I help prevent hoarseness?    Get plenty of rest when you have an infection, such as a cold or sinusitis.    Avoid vocal strain by not yelling, screaming, or talking loudly, especially when you have a cold or other throat or sinus infection.    If you smoke, try to quit. Talk to your healthcare provider about ways to quit smoking.    If you want to drink alcohol, ask your healthcare provider how much is safe for you to drink.    If you have frequent heartburn or reflux disease, see your healthcare provider about preventing or treating these problems.  Developed by [employer].  Adult Advisor 2017.2 published by [employer].  Last modified: 2016-08-30  Last reviewed: 2016-08-30  This content is reviewed periodically and is subject to change as new health information becomes available. The information is intended to inform and educate and is not a replacement for medical evaluation, advice, diagnosis or treatment by a healthcare professional.  References   Adult Advisor 2017.2 Index    Copyright   2017 [employer], a division of [employer] All rights reserved.

## 2018-02-12 NOTE — PROGRESS NOTES
Patient Information     Patient Name MRN Sex     Ovidio Luna [id number] Female 1950      Progress Notes by Maya Combs NP at 12/15/2017 10:15 AM     Author:  Maya Combs NP Service:  (none) Author Type:  PHYS- Nurse Practitioner     Filed:  12/15/2017 10:40 AM Encounter Date:  12/15/2017 Status:  Signed     :  Maya Combs NP (PHYS- Nurse Practitioner)            HPI:    Ovidio Luna is a 67 y.o. female who presents to clinic today for ear pain, laryngitis.   Laryngitis x 2 days.  Difficult to talk. No pain with talking.  No sore throat.  Throat feels scratchy/itchy.   Right ear painful since yesterday.  Left ear feels plugged.  No ringing in ears.  Stuffy nose.  Cough at night x 2 days.  Dry cough.  No chest tightness.  No shortness of breath.  No painful breathing.  Intermittent headaches.  Chronic chills.  No fevers.  Appetite decreased the past few days.  Drinking extra fluids.  feels dry.  No body aches.  Energy decreased.  Taking Tylenol.  No known strep exposure.  Works in [employer].            Past Medical History:     Diagnosis  Date     ASO (arteriosclerosis obliterans)     History of ASO with claudication      Bilateral tibial fractures 01    History of bilateral tibial fx, work related injury       Coronary artery disease 2004    Stent times 2      CVA (cerebral infarction) 2012    CVA with residual left sided weakness, incidental meningioma found      Estrogen Replacement Therapy     ERT      Herpes zoster     Herpes zoster, left shoulder and neck       History of migraines     History of  migraines      Hx of osteopenia  06    Osteopenia, proximal femur.  Normal bone mineral density lumbar spine 06.  Start Fosamax 70 mg. weekly (didn't start until ), stopped fosamax.      Shingles 2008    left shoulder and neck      Past Surgical History:      Procedure  Laterality Date     COLONOSCOPY SCREENING      2 hyperplastic  polyps, repeat in 2014       COLONOSCOPY SCREENING  1992, 2004    Colonoscopy, normal       CYSTOSCOPY W/ URETEROSCOPY W/ LITHOTRIPSY Right 03/08/16    Dr. Mario DC       HEMORRHOIDECTOMY       NM CARDIAC MPI STRESS TEST  03/06    Adenosine Cardiolite stress done and is negative for ischemia or infarction.  Ejection fraction is 54%.       SD BYPASS GRAFT AORTOBIFEMORAL  2004     PTCA  2004    mid LAD, proximal RCA       STRESS TEST PHARMACOLOGICAL  2003    dobutamine, normal       STRESS TEST PHARMACOLOGICAL  2006    adenosine, negative       JACK AND BSO  1987     Social History      Substance Use Topics        Smoking status:  Current Every Day Smoker     Packs/day: 0.50     Years: 46.00     Types: Cigarettes     Smokeless tobacco:  Never Used     Alcohol use  No     Current Outpatient Prescriptions       Medication  Sig Dispense Refill     amLODIPine (NORVASC) 5 mg tablet Take 1 tablet by mouth once daily. 90 tablet 3     busPIRone (BUSPAR) 10 mg tablet Take 0.5-1 tablets by mouth 3 times daily if needed (anxiety). 30 tablet 3     cholecalciferol (VITAMIN D-3) 2,000 unit capsule Take 1 capsule by mouth once daily.  0     citalopram (CELEXA) 10 mg tablet TAKE 1 TABLET BY MOUTH EVERY MORNING 90 tablet 2     cyclobenzaprine (FLEXERIL) 10 mg tablet Take 1 tablet by mouth 3 times daily if needed for Muscle Spasm. 30 tablet 0     fexofenadine (ALLEGRA) 180 mg tablet Take 1 tablet by mouth once daily with a meal.  0     hydroCHLOROthiazide (HCTZ) 25 mg tablet Take 1 tablet by mouth once daily. 90 tablet 3     metoprolol (LOPRESSOR) 100 mg tablet Take 1 tablet by mouth 2 times daily. 180 tablet 3     naproxen (NAPROSYN) 500 mg tablet Take 1 tablet by mouth 2 times daily with meals. 180 tablet 2     nitroglycerin 0.4 mg per dose sublingual (NITROLINGUAL) 0.4 mg/dose spray Place 1 Spray under the tongue every 5 minutes if needed for Chest Pain. 1 Bottle 11     omega-3 fatty acids-vitamin E (FISH OIL) 1,000 mg cap Take 1  "capsule by mouth once daily.  0     omeprazole (PRILOSEC) 20 mg Delayed-Release capsule Take 1 capsule by mouth once daily before a meal. 90 capsule 3     ondansetron (ZOFRAN ODT) 4 mg disintegrating tablet Place 1 tablet on the tongue every 8 hours if needed for Nausea/Vomiting. 6 tablet 0     potassium chloride (KLOR-CON M20) 20 mEq Extended-Release tablet Take 1 tablet by mouth 2 times daily with meals. 180 tablet 3     simvastatin (ZOCOR) 80 mg tablet Take 1 tablet by mouth once daily. 90 tablet 3     No current facility-administered medications for this visit.      Medications have been reviewed by me and are current to the best of my knowledge and ability.    Allergies      Allergen   Reactions     Altaryl  [Diphenhydramine Hcl]  Tachycardia     Rapid heart Rate      Benadryl [Diphenhydramine]  Tachycardia     Rapid heart Rate      Chantix [Varenicline]  Nausea And Vomiting     Codeine  Confusion     Rapid heart rate.  Nauseated      Darvocet [Propoxyphene-Acetaminophen]  Tremors     unknown      Lisinopril  Cough     Morphine Sulfate  Tachycardia     Rapid heart rate      Tetracycline  Rash and Tremors     unknown        Past medical history, past surgical history, current medications and allergies reviewed and accurate to the best of my knowledge.        ROS:  Refer to HPI    /72 (Cuff Site: Right Arm, Position: Sitting, Cuff Size: Adult Regular)  Pulse 60  Temp 98.4  F (36.9  C) (Tympanic)   Ht 1.575 m (5' 2.01\")  Wt 65.8 kg (145 lb)  Breastfeeding? No  BMI 26.51 kg/m2    EXAM:  General Appearance: Well appearing adult female, appropriate appearance for age. No acute distress  Head: normocephalic, atraumatic  Ears: Left TM with bony landmarks appreciated, no erythema, no effusion, no bulging, no purulence.  Right TM with bony landmarks appreciated, no erythema, no effusion, no bulging, no purulence.   Left auditory canal clear.  Right auditory canal clear.  Normal external ears, non " tender.  Eyes: conjunctivae normal without erythema or irritation, no drainage or crusting, no eyelid swelling, pupils equal   Orophayrnx: moist mucous membranes, posterior pharynx with diffuse erythema, tonsils without hypertrophy, no erythema, no exudates or petechiae, no post nasal drip seen, no lesions.    Neck: supple without adenopathy  Respiratory: normal chest wall and respirations.  Normal effort.  Clear to auscultation bilaterally, no wheezing, crackles or rhonchi.  No increased work of breathing.  No cough appreciated.   Cardiac: RRR with no murmurs  Musculoskeletal:  Normal gait.  Equal movement of bilateral upper extremities.  Equal movement of bilateral lower extremities.    Dermatological: no rashes noted of exposed skin  Psychological: normal affect, alert and pleasant          ASSESSMENT/PLAN:    ICD-10-CM    1. Laryngitis, acute J04.0    2. Ear ache H92.09          No indications of infection on exam.  No antibiotics indicated.  Encouraged fluids - water, tea, soup, etc   Symptomatic treatment - salt water gargles, honey, elevation, humidifier,  lozenges, etc   Tylenol PRN  Follow up if symptoms persist or worsen or concerns          Patient Instructions      Index [place]   Laryngitis   ________________________________________________________________________  KEY POINTS    Laryngitis is irritation and swelling of your vocal cords and voice box (larynx). It causes hoarseness, which means your voice sounds unnaturally low, deep, or raspy. Sometimes you can only whisper or hardly speak at all.    If another health problem is causing the laryngitis, treatment for that problem will also treat the laryngitis. The main treatment is resting your voice as much as you can.    Follow the full course of treatment prescribed by your healthcare provider. Ask your healthcare provider if there are activities you should avoid and when you can return to your normal  activities.  ________________________________________________________________________  What is laryngitis?  Laryngitis is irritation and swelling of your vocal cords and voice box (larynx). It causes hoarseness, which means your voice sounds unnaturally low, deep, or raspy. Sometimes you can only whisper or hardly speak at all.  Laryngitis may be acute or chronic. Acute laryngitis starts suddenly and lasts no more than a few days. Laryngitis is chronic if symptoms last for at least 3 weeks.  What is the cause?  Acute laryngitis is usually a symptom of a cold, flu, bronchitis, sinusitis, and other infections or allergies. Chronic laryngitis can be caused by:    Heavy smoking or drinking    Overuse of your voice, such as in teaching or public speaking, singing or shouting    Coughing often and hard    Exposure to dust, chemicals, or cigarette smoke  Health problems that can cause changes in your vocal cords include:    Drainage from your sinuses (post-nasal drip) that you may not notice    Thyroid disease    Noncancerous growths on the vocal cords    Acid reflux from the stomach    Cancer of the vocal cords  What are the symptoms?  Symptoms may include:    Low, raspy voice and hoarseness    A dry cough (meaning that you don t cough up mucus)    A throat that feels dry or sore    A voice that weakens throughout the day  You may lose your voice completely and just be able to whisper.  How is it diagnosed?  Your healthcare provider will ask about your symptoms and medical history and examine you. To check for possible causes of your symptoms, you may have tests such as:    Laryngoscopy, in which a slim, flexible, lighted tube is passed through your mouth to look at your vocal cords    A biopsy, which is the removal of a small sample of tissue for testing    CT scan, which uses X-rays and a computer to show detailed pictures of the throat    MRI, which uses a strong magnetic field and radio waves to show detailed pictures  of the throat  Your healthcare provider may check you for allergies or infections also.  How is it treated?  If another health problem is causing the laryngitis, such as thyroid disease, acid reflux, or sinusitis, treatment for that problem will also treat the laryngitis. If no health problem has caused laryngitis, the main treatment is resting your voice as much as you can. Symptoms should improve in a few days or a couple of weeks.  Your healthcare provider may recommend using a steroid spray to help the larynx (voice box) heal faster. Using a steroid for a long time can have serious side effects. Take steroid medicine exactly as your healthcare provider prescribes. Don t take more or less of it than prescribed by your provider and don t take it longer than prescribed. Don t stop taking a steroid without your provider's approval. You may have to lower your dosage slowly before stopping it.  How can I take care of myself?  Follow the full course of treatment prescribed by your healthcare provider. In addition:    Don t smoke, and stay away from others who are smoking.    Avoid breathing dust and chemical fumes.    Rest your voice as much as possible.    Drink extra fluids, such as water, fruit juice, and tea.    Use a humidifier to put more moisture in the air. Avoid steam vaporizers because they can cause burns. Be sure to keep the humidifier clean, as recommended in the 's instructions. It's important to keep bacteria and mold from growing in the water container.  Ask your healthcare provider:    How and when you will get your test results    How long it will take to recover    If there are activities you should avoid and when you can return to your normal activities    How to take care of yourself at home    What symptoms or problems you should watch for and what to do if you have them  Make sure you know when you should come back for a checkup. Keep all appointments for provider visits or  tests.  How can I help prevent laryngitis?    Get plenty of rest when you have a viral or bacterial infection, such as a cold or sinusitis.    Ask your healthcare provider how to treat your allergies to prevent sinus infections.    Avoid vocal strain by not yelling, screaming, or talking loudly, especially when you have a cold or other throat or sinus infection.    If you smoke, try to quit. Talk to your healthcare provider about ways to quit smoking.    Avoid secondhand smoke.    If you want to drink alcohol, ask your healthcare provider how much is safe for you to drink.    If you have frequent heartburn or reflux disease, see your healthcare provider about preventing or treating these problems.  Developed by [employer].  Adult Advisor 2017.2 published by [employer].  Last modified: 2016-09-21  Last reviewed: 2016-08-30  This content is reviewed periodically and is subject to change as new health information becomes available. The information is intended to inform and educate and is not a replacement for medical evaluation, advice, diagnosis or treatment by a healthcare professional.  References   Adult Advisor 2017.2 Index    Copyright   2017 [employer], a division of [employer] All rights reserved.            Index [place] Related topics   Hoarseness   ________________________________________________________________________  KEY POINTS    Hoarseness is a symptom of an irritated voice box. When you are hoarse, your voice sounds unnaturally low, deep, or raspy.    If another health problem is causing the hoarseness, such as thyroid disease, acid reflux, or sinusitis, treatment for that problem will also treat the hoarseness.    Follow the full course of treatment prescribed by your healthcare provider. Don't smoke, and rest your voice. Ask your healthcare provider how long it will take to recover.  ________________________________________________________________________  What is  hoarseness?  Hoarseness is a symptom of an irritated voice box. When you are hoarse, your voice sounds unnaturally low, deep, or raspy. Depending on the cause, hoarseness can last for hours to weeks, or it can be a life-long condition.  What is the cause?  Hoarseness can be a symptom of a cold, flu, bronchitis, sinusitis, and other infections or allergies. Hoarseness can be caused by:    Heavy smoking or drinking    Overuse of your voice, such as in teaching or public speaking, shouting or singing    Coughing often or hard    Exposure to dust, chemicals, or cigarette smoke    Thyroid disease    Noncancerous growths on the vocal cords    Acid reflux from the stomach    Cancer of the vocal cords  How is it diagnosed?  Your healthcare provider will ask about your symptoms and medical history and examine you. If your hoarseness lasts for more than a few weeks you may have tests to check for possible causes of your symptoms. You may have tests such as:    Blood tests    Laryngoscopy, which uses a slim, flexible, lighted tube passed through your mouth to look at your vocal cords    A biopsy, which is the removal of a small sample of tissue for testing    CT scan, which uses X-rays and a computer to show detailed pictures of the throat    MRI, which uses a strong magnetic field and radio waves to show detailed pictures of the throat  Your healthcare provider may check you for allergies or infections also.  How is it treated?  If another health problem is causing the hoarseness, such as thyroid disease, acid reflux, or sinusitis, treatment for that problem will also treat the hoarseness. If no other health problem has caused hoarseness, the main treatment is resting your voice as much as you can. Symptoms should improve in a few days.  Your healthcare provider may recommend using a steroid spray to help your voice box heal faster. Using a steroid for a long time can have serious side effects. Take steroid medicine exactly  as your healthcare provider prescribes. Don t take more or less of it than prescribed by your provider and don t take it longer than prescribed. Don t stop taking a steroid without your provider's approval. You may have to lower your dosage slowly before stopping it.  How can I take care of myself?  Follow the full course of treatment prescribed by your healthcare provider. In addition    Don t smoke, and stay away from others who are smoking.    Avoid breathing dust and chemical fumes.    Rest your voice as much as possible.    Drink extra fluids, such as water, fruit juice, and tea.    Use a humidifier to put more moisture in the air. Avoid steam vaporizers because they can cause burns. Be sure to keep the humidifier clean, as recommended in the 's instructions. It's important to keep bacteria and mold from growing in the water container.  Ask your healthcare provider:    How and when you will get your test results    How long it will take to recover    If there are activities you should avoid and when you can return to your normal activities    How to take care of yourself at home    What symptoms or problems you should watch for and what to do if you have them  Make sure you know when you should come back for a checkup. Keep all appointments for provider visits or tests.  How can I help prevent hoarseness?    Get plenty of rest when you have an infection, such as a cold or sinusitis.    Avoid vocal strain by not yelling, screaming, or talking loudly, especially when you have a cold or other throat or sinus infection.    If you smoke, try to quit. Talk to your healthcare provider about ways to quit smoking.    If you want to drink alcohol, ask your healthcare provider how much is safe for you to drink.    If you have frequent heartburn or reflux disease, see your healthcare provider about preventing or treating these problems.  Developed by [facility].  Adult Advisor 2017.2 published by  [employer].  Last modified: 2016-08-30  Last reviewed: 2016-08-30  This content is reviewed periodically and is subject to change as new health information becomes available. The information is intended to inform and educate and is not a replacement for medical evaluation, advice, diagnosis or treatment by a healthcare professional.  References   Adult Advisor 2017.2 Index    Copyright   2017 [employer], a division of [employer] All rights reserved.

## 2018-02-12 NOTE — NURSING NOTE
Patient Information     Patient Name MRN Sex Ovidio Reynoso [id number] Female 1950      Nursing Note by Antoinette Nguyen at 12/15/2017 10:15 AM     Author:  Antoinette Nguyen Service:  (none) Author Type:  NURS- Student Practical Nurse     Filed:  12/15/2017 10:27 AM Encounter Date:  12/15/2017 Status:  Signed     :  Antoinette Nguyen (NURS- Student Practical Nurse)            Patient presents with loss of voice since Wednesday. Bilateral ear pain and feels plugged starting yesterday. Antoinette Nguyen LPN .............12/15/2017  10:19 AM

## 2018-02-13 NOTE — PROGRESS NOTES
Patient Information     Patient Name MRN Sex Ovidio Reynoos [id number] Female 1950      Progress Notes by Glynn Botello MD at 2018 11:30 AM     Author:  Glynn Botello MD Service:  (none) Author Type:  Physician     Filed:  2018 12:17 PM Encounter Date:  2018 Status:  Signed     :  Glynn Botello MD (Physician)            SUBJECTIVE:  Ovidio Luna is a 67 y.o. female here for follow-up. She has a history of a stroke with left-sided hemiparesis in May 2011. She states that 3 weeks ago she had 24 hours of pain in her left hand which was new for her. Within 24 hours she is able to start moving her fingers, wrist and elbow. She has not regained full range of motion or strength but her hand is more functional. She has little bit of numbness from her fifth finger up into her elbow but otherwise no numbness or pain at this time. She is working on range of motion at home and she is also working on strength. She is right-hand dominant. Her left leg has some weakness and she has a little bit of a foot drop but this is also improving.    ROS:    As above otherwise ROS is unremarkable.    OBJECTIVE:  /66 (Cuff Site: Right Arm, Position: Sitting, Cuff Size: Adult Regular)  Pulse 56  Wt 65.8 kg (145 lb)  BMI 26.51 kg/m2    EXAM:  General Appearance: Pleasant, alert, appropriate appearance for age. No acute distress  Musculoskeletal: Left elbow shows range of motion and she lacks the last 10  of extension. Wrist lacks approximately 10-15  when compared to her left side with extension. Finger range of motion is improved, her index finger is worsened especially with extension.  Skin: Capillary refill is intact.  Neurologic Exam: Sensation is intact to light touch along the majority of her hand with the exception of her fifth volar finger.    ASSESSEMENT AND PLAN:    Ovidio was seen today for arm pain/problem.    Diagnoses and all orders for this visit:    CVA WITH LEFT  HEMIPARESIS  -     AMB CONSULT TO OCCUPATIONAL THERAPY; Future     interestingly after almost 7 years she has regained function of her left arm. We'll refer her to occupational therapy continue to work on range of motion and strengthening. She'll continue working on her lower extremity on her own but it could consider physical therapy in the future for this. She'll follow-up as needed.      Anup Botello MD    This document was prepared using voice generated software.  While every attempt was made for accuracy, grammatical errors may exist.

## 2018-02-13 NOTE — NURSING NOTE
Patient Information     Patient Name MRN Sex Ovidio Reynoso [id number] Female 1950      Nursing Note by Keira Klein at 2018 11:30 AM     Author:  Keira Klein Service:  (none) Author Type:  (none)     Filed:  2018 11:49 AM Encounter Date:  2018 Status:  Signed     :  Keira Klein            Patient presents today with complaints of left arm pain that started about 3 weeks ago. The pain starts in her middle finger and radiates up the arm. She would like to discuss PT.  Keira Klein LPN .............2018  11:32 AM

## 2018-02-13 NOTE — INITIAL ASSESSMENTS
"Patient Information     Patient Name MRN Ovidio Cevallos [id number] Female 1950      Initial Assessments by Marysol Adam OT at 2018  2:01 PM     Author:  Marysol Adam OT Service:  (none) Author Type:  OT- Occupational Therapist     Filed:  2018  2:40 PM Date of Service:  2018  2:01 PM Status:  Signed     :  Marysol Adam OT (OT- Occupational Therapist)            [facility]  Outpatient OT   Neurological Initial Evaluation      Date of Service:  18  Visit #: 1    Patient Name:  Francesca Luna  Referring MD/Provider: Anup Botello  Diagnosis: CVA with left hemiparesis and recent onset of new active movement in LUE  Treatment Diagnosis:  LUE incoordination/weakness  Insurance: [employer] Replacement  Onset date:  CVA , recent onset of new LUE motion 2018  Start of Care Date:    18  Certification Dates: 18-18    Next MD visit: prn    Subjective:    Reason for referral:  Francesca presents today after receiving a referral to occupational therapy.  She has a history of a stroke with left-sided hemiparesis in May 2012. She states that 3 weeks ago she had severe pain in her left UE. She states \"It woke me up like 3 times in the night\". Subsequently she started noticing more ability to move all joints of her LUE.  She has not regained full range of motion or strength but her hand is more functional. She has little bit of numbness from her fifth finger up into her elbow but otherwise no numbness or pain at this time. She was pleased to show therapist that she can now reach behind her back and scratch it, \"I haven't been able to do that since before the stroke.\" States,  \"My arm knows it's supposed to work, but my brain and arm aren't working together like they should.\"    Pain Ratin = no pain, comfortable / Location:  No daily pain    Living Situation:  Independent in living situation - lives in [place] with her " "mom (90 years old) daughter and granddaughter, 3 dogs.       Preadmission Functional Ability: Independent- ambulates without an assistive device  Precautions:  none  Cognition:  Oriented to Person, Place, and Time.      Were cultural / age or other special adaptations needed? 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 Rarely has to move a box of rolled coin, when necessary she uses the rolling cart. Has started incorporating her left arm into daily tasks, washing dishes, preparing meals. She tries to use her left hand on the steering wheel, but due to incoordination this is not yet safe. See below for functional rating scores.     Fall Risk Screening:  Have you fallen 2 more more times in the past year? no  Have you fallen and had an injury in the past year? no    Objective:  Hand Dominance:   Right     ROM and/or MMT Norms Right- all WNL Left   Shoulder Flexion 170  4   Shoulder Extension 60  NT   Shoulder Abduction 170  4   Shoulder IR 70  3+   Shoulder ER 90  3+   Elbow Extension 0  4   Elbow Flexion 135-150  4   Supination 80-90  2+   Pronation 80-90  2+   Wrist Flexion 80  3   Wrist Extension 70  3   Radial Deviation 20  NT   Ulnar Deviation 30  NT   **measurements in degrees        Strength Right Norms Left Norms    66 49.6 55 41   Lateral pinch 14 15 14 14.1   3 point pinch  16 14.2 15 13.7   2 point pinch 15 10.6 11 10.5   P = pain with testing  (Therapist  use only: Dynamometer #8   Pinch gauge #1)  Able to make composite fist  Opposition thumb to digits: intact to all fingertips (unable to do this before the severe pain 2 weeks ago)    Sensory:  Little finger and proximally \"feels kind of numbish\"     Coordination testing:     Right Standard Score Left " "Standard Score   9 Hole Peg Test: (time to place/remove 9 pegs) 22 seconds 16-25 sec 1'08\"- below average 17-26 seconds   PCE Nuts and Bolts: (time to place/remove 10 nuts) 1'41\" 70- average 9 nuts placed in 4'01\", stopped due to fatigue     \"My arm is getting tired.\" 0- Below average   PCE 3 Piece Peg (# pieces placed in 1 minute) 24 70- average 9 25 below average   PCE round blocks (# pieces turned over in 1 minute) 46 55 below average 17    Index finger flexes with fatigue/tone 20 below average     Standard Scores:  0-70: Low  : Average  100-130: Above Average    Edema: no concerns, no edema present or reported    Cognition: no concerns, no recent changes     Visual Perception:   Motor-Free Visual Perception Test (MFVPT-R): not indicated  Visual Motor:  Will have her cataracts checked \"one of these days\". Wears reading glasses, no glasses needed for driving. Francesca denies vision concerns.       Today s treatment included:  Upper body cycle: cues for pacing and technique, cues to attend to left hand (tends to slide from handle)  Effort level 2-3  BUE: 1 minute forward, 1 minute backward  LUE alone: 1 minute forward, 1 minute backward     Standing pull downs: cues to promote symmetry of BUE (unable, but tries hard)  10#  10 reps x 1    Chest press:   20#  BUE: 10 reps x 1, cues for symmetry and technique  LUE alone: 5 reps max before fatigue limited motion    Initiated home program including: functional hand activities handout, instructed to choose one daily, hand exerciser (one yellow and one red band), yellow putty for intrinsic strengthening (tip pinch)     Assessment:    Signs and symptoms are consistent with: recent onset of new LUE AROM post CVA in 2012    Problem list includes: LUE incoordination/weakness, impaired bilateral coordination skills    Patient s goal: use both hands to do dishes and cook.     Functional short term goals (established in collaboration with the patient, to be met in 8 " weeks):  1) Francesca will rate her ability to manage the steering wheel of her car with left hand as at least a 6 on the PSFS rating scale, improving from a current rating of 3.  2) Francesca will rate her ability to wash her body/hair using left hand as at least 5 on the PSFS rating scale, improving from a current rating of 2, to facilitate improved I with ADL tasks.  3) Francesca will rate her ability to use her left UE for washing dishes and cooking as at least 6 on the PSFS rating scale, improving from a current rating of 1-2, to facilitate improved I with ADL tasks.   4) Francesca will rate her ability to manage buttons as at least a 4 on the PSFS rating scale, improving from a current rating of 0, to facilitate improved I with ADL tasks.     Long term goal (to be met in 12 weeks):  1) Francesca will improve the functional use of LUE, as evidenced by improving her PSFS score from an impairment rating of 84% to an impairment rating of 50% or above.     Rehab potential: Good  for stated goals.    Risk, benefits, and alternatives were discussed with patient. Patient agrees with the plan of care.    Response to intervention: Positive. Francesca is an active participant in the therapeutic process, and is familiar with interventions from rehab post-stroke in 2012.    Patient Specific Functional and Pain Scales (PSFS):    Clinician Instructions: Complete after the history and before the exam.    Initial Assessment: We want to know what 3 activities in your life you are unable to perform, or are having the most difficulty performing, as a result of your chief problem. Please list and score at least 3 activities that you are unable to perform, or having the most difficulty performing, because of your chief problem.     Patient Specific Activity Scoring Scheme (score one number for each activity):   Activity Score (0-10)  0= Unable to perform activity  10= Able to perform activity at same level as before injury or problem   1. Steering wheel 3/10    2. Doing dishes 2/10   3. cooking 1/10   4. buttons 0/10   5. Bathing-using left hand to wash right side and hair 2/10   Totals:  8/50 = 16% ability which relates to 84% impairment    Patient verbally states that they understand that the information they have provided above is current and complete to the best of their knowledge.     G codes and Modifier taken from patient completing the PSFS:     Initial Primary G Code and Modifier:    Per the Patient's intake and/or assessment the Primary G Code is: Handling Objects .   The Patient's Impairment, Limitation or Restriction Modifier would be best described as: CM - 80% - 100% Impairment.     Goal Primary G Code and Modifier:    The Patient's G Code Goal would be: Handling Objects    The Patient's Impairment, Limitation or Restriction Modifier goal would be best described as: CK - 40% - 60% Impairment.       Plan:    Treatment Plan (may include any combination of the following):    Home programming   Therapeutic exercise   Self-care/home managment   Manual therapy   Therapeutic activities   NMR   Ultrasound   Phonophoresis (10% Ketoprofen)   Iontophoresis (.2% Dexamethasone)   Superficial modalities prn   Electrical stimulation, including NMES   Orthotic management   Standardized testing   Cognitive skills development   ROM hand measurements  Other    Frequency/Duration: up to 24 visits in 12 weeks    Plan for next visit: train in sup/pro strengthening, add MedEx strengthening, incorporate coordination tasks (ball catch/bounce, dynavision for LUE)    Student has been instructed in and demonstrates skills necessary to carry out above stated treatment plan.     Brittany Adam OTR/L  Occupational Therapist

## 2018-03-07 DIAGNOSIS — I10 HYPERTENSION: Primary | ICD-10-CM

## 2018-03-07 RX ORDER — HYDROCHLOROTHIAZIDE 25 MG/1
25 TABLET ORAL DAILY
Qty: 90 TABLET | Refills: 1 | Status: SHIPPED | OUTPATIENT
Start: 2018-03-07 | End: 2018-08-23

## 2018-03-07 NOTE — TELEPHONE ENCOUNTER
Chart review shows that patient was last seen by PCP on 1/30/18. No changes to rx as requested noted in office visit notes on that date. Patient was to follow up as needed. Labs up to date through October 2018. Writer will refill rx as requested for a 6 month supply at this time.    Prescription refilled per RN Medication Refill Policy..................Ashu Arceo 3/7/2018 3:01 PM

## 2018-03-13 ENCOUNTER — OFFICE VISIT (OUTPATIENT)
Dept: FAMILY MEDICINE | Facility: OTHER | Age: 68
End: 2018-03-13
Attending: NURSE PRACTITIONER
Payer: COMMERCIAL

## 2018-03-13 VITALS
HEART RATE: 46 BPM | SYSTOLIC BLOOD PRESSURE: 130 MMHG | DIASTOLIC BLOOD PRESSURE: 78 MMHG | HEIGHT: 62 IN | BODY MASS INDEX: 27.38 KG/M2 | RESPIRATION RATE: 22 BRPM | OXYGEN SATURATION: 98 % | TEMPERATURE: 97.5 F | WEIGHT: 148.8 LBS

## 2018-03-13 DIAGNOSIS — R09.81 SINUS CONGESTION: ICD-10-CM

## 2018-03-13 DIAGNOSIS — J06.9 VIRAL UPPER RESPIRATORY INFECTION: Primary | ICD-10-CM

## 2018-03-13 PROCEDURE — 99213 OFFICE O/P EST LOW 20 MIN: CPT | Performed by: NURSE PRACTITIONER

## 2018-03-13 RX ORDER — POTASSIUM CHLORIDE 1500 MG/1
TABLET, EXTENDED RELEASE ORAL
COMMUNITY
Start: 2018-02-21 | End: 2018-03-15

## 2018-03-13 RX ORDER — BUSPIRONE HYDROCHLORIDE 10 MG/1
TABLET ORAL
COMMUNITY
Start: 2018-02-21 | End: 2018-03-15

## 2018-03-13 RX ORDER — METOPROLOL TARTRATE 100 MG
TABLET ORAL
COMMUNITY
Start: 2017-12-26 | End: 2018-03-15

## 2018-03-13 RX ORDER — SIMVASTATIN 80 MG
TABLET ORAL
COMMUNITY
Start: 2018-02-19 | End: 2018-03-15

## 2018-03-13 RX ORDER — HYDROCHLOROTHIAZIDE 25 MG/1
TABLET ORAL
COMMUNITY
Start: 2018-03-07 | End: 2018-03-15

## 2018-03-13 RX ORDER — NAPROXEN 500 MG/1
TABLET ORAL
COMMUNITY
Start: 2017-12-17 | End: 2018-03-15

## 2018-03-13 RX ORDER — CITALOPRAM HYDROBROMIDE 10 MG/1
TABLET ORAL
COMMUNITY
Start: 2018-01-29 | End: 2018-03-15

## 2018-03-13 RX ORDER — AMLODIPINE BESYLATE 5 MG/1
TABLET ORAL
COMMUNITY
Start: 2017-12-26 | End: 2018-03-15

## 2018-03-13 ASSESSMENT — PAIN SCALES - GENERAL: PAINLEVEL: MODERATE PAIN (4)

## 2018-03-13 NOTE — PROGRESS NOTES
"Nursing Notes:   ElenaAissatoukathy LONG, LPN  3/13/2018  1:11 PM  Unsigned  Patient presents to the clinic for possible sinus infection. States last Friday she started with sinus congestion and now every time she blows her nose she gets a sharp, stabbing feeling in her cheeks. Has been afebrile.   Eveline Parker LPN............. March 13, 2018 1:11 PM       SUBJECTIVE:   Ovidio Luna is a 67 year old female who presents to clinic today for the following health issues:    RESPIRATORY SYMPTOMS      Duration: 4 days    Description  nasal congestion, facial pain/pressure, headache and fatigue/malaise    Severity: moderate    Accompanying signs and symptoms: facial pain when blowing nose    History (predisposing factors):  none    Precipitating or alleviating factors: None    Therapies tried and outcome:  rest and fluids      Problem list and histories reviewed & adjusted, as indicated.  Additional history: as documented    Current Outpatient Prescriptions   Medication Sig Dispense Refill     amLODIPine (NORVASC) 5 MG tablet        busPIRone (BUSPAR) 10 MG tablet        citalopram (CELEXA) 10 MG tablet        hydrochlorothiazide (HYDRODIURIL) 25 MG tablet        metoprolol tartrate (LOPRESSOR) 100 MG tablet        naproxen (NAPROSYN) 500 MG tablet        potassium chloride SA (K-DUR/KLOR-CON M) 20 MEQ CR tablet        simvastatin (ZOCOR) 80 MG tablet        No Known Allergies    Reviewed and updated as needed this visit by clinical staff  Tobacco  Allergies  Meds  Soc Hx      Reviewed and updated as needed this visit by Provider     ROS:  A comprehensive 10 point ROS was obtained and documented for notable findings in the HPI.       OBJECTIVE:     /78 (BP Location: Right arm, Patient Position: Sitting, Cuff Size: Adult Large)  Pulse (!) 46  Temp 97.5  F (36.4  C) (Tympanic)  Resp 22  Ht 5' 2\" (1.575 m)  Wt 148 lb 12.8 oz (67.5 kg)  SpO2 98%  Breastfeeding? No  BMI 27.22 kg/m2  Body mass index is " 27.22 kg/(m^2).  GENERAL: healthy, alert and no distress  EYES: Eyes grossly normal to inspection  HENT: normal cephalic/atraumatic, right ear: normal: no effusions, no erythema, normal landmarks, left ear: normal: no effusions, no erythema, normal landmarks, nose and mouth without ulcers or lesions, nasal mucosa edematous , rhinorrhea clear, oropharynx clear and oral mucous membranes moist. Both maxillary and frontal sinuses tender on palpation.   NECK: no adenopathy  RESP: lungs clear to auscultation - no rales, rhonchi or wheezes  CV: regular rate and rhythm, normal S1 S2, no S3 or S4, no murmur, click or rub, no peripheral edema and peripheral pulses strong  SKIN: no suspicious lesions or rashes  PSYCH: mentation appears normal, affect normal/bright    Diagnostic Test Results:  none     ASSESSMENT/PLAN:     1. Viral upper respiratory infection    2. Sinus congestion      Medical Decision Making:    Differential Diagnosis:  URI Adult/Peds:  Sinusitis, Viral upper respiratory illness and Tobacco dependency    Serious Comorbid Conditions:  Adult:  Smoker    PLAN:    URI Adult:  Tylenol, Ibuprofen, Fluids, Rest, OTC decongestant/antihistamine, Saline nasal spray and Vaporizer    Symptoms likely due to virus. No antibiotic is needed at this time. Symptoms typically worse on days 2-5 and then stabilize and you are sick for days 5-12. Days 12-14 there is slow resolution and if there is a cough, studies show it can linger longer, however one is not as ill as in the beginning. If symptoms begin worsening or fail to improve after 14 days, return to clinic for reevaluation. All questions were answered and she is in agreement with plan.       Followup:    If not improving or if condition worsens, follow up with your Primary Care Provider        China Weaver NP, 3/13/2018 1:19 PM

## 2018-03-13 NOTE — NURSING NOTE
Patient presents to the clinic for possible sinus infection. States last Friday she started with sinus congestion and now every time she blows her nose she gets a sharp, stabbing feeling in her cheeks. Has been afebrile.   Eveline Parker LPN............. March 13, 2018 1:11 PM

## 2018-03-13 NOTE — MR AVS SNAPSHOT
After Visit Summary   3/13/2018    Oivdio Luna    MRN: [id number]           Patient Information     Date Of Birth          1950        Visit Information        Provider Department      3/13/2018 12:45 PM China Weaver NP [facility]        Care Instructions      Sinusitis (No Antibiotics)    The sinuses are air-filled spaces within the bones of the face. They connect to the inside of the nose. Sinusitis is an inflammation of the tissue lining the sinus cavity. Sinus inflammation can occur during a cold. It can also be due to allergies to pollens and other particles in the air. It can cause symptoms such as sinus congestion, headache, sore throat, facial swelling and fullness. It may also cause a low-grade fever. No infection is present, and no antibiotic treatment is needed.  Home care    Drink plenty of water, hot tea, and other liquids. This may help thin mucus. It also may promote sinus drainage.    Heat may help soothe painful areas of the face. Use a towel soaked in hot water. Or,  the shower and direct the hot spray onto your face. Using a vaporizer along with a menthol rub at night may also help.     An expectorant containing guaifenesin may help thin the mucus and promote drainage from the sinuses.    Over-the-counter decongestants may be used unless a similar medicine was prescribed. Nasal sprays work the fastest. Use one that contains phenylephrine or oxymetazoline. First blow the nose gently. Then use the spray. Do not use these medicines more often than directed on the label or symptoms may get worse. You may also use tablets containing pseudoephedrine. Avoid products that combine ingredients, because side effects may be increased. Read labels. You can also ask the pharmacist for help. (NOTE: Persons with high blood pressure should not use decongestants. They can raise blood pressure.)    Over-the-counter antihistamines may help if allergies  contributed to your sinusitis.      Use acetaminophen or ibuprofen to control pain, unless another pain medicine was prescribed. (If you have chronic liver or kidney disease or ever had a stomach ulcer, talk with your doctor before using these medicines. Aspirin should never be used in anyone under 18 years of age who is ill with a fever. It may cause severe liver damage.)    Use nasal rinses or irrigation as instructed by your health care provider.    Don't smoke. This can worsen symptoms.  Follow-up care  Follow up with your healthcare provider or our staff if you are not improving within the next week.  When to seek medical advice  Call your healthcare provider if any of these occur:    Green or yellow discharge from the nose or into the throat    Facial pain or headache becoming more severe    Stiff neck    Unusual drowsiness or confusion    Swelling of the forehead or eyelids    Vision problems, including blurred or double vision    Fever of 100.4 F (38 C) or higher, or as directed by your healthcare provider    Seizure    Breathing problems    Symptoms not resolving within 14 days  Date Last Reviewed: 4/13/2015 2000-2017 [employer]. [address] [place] [address]. All rights reserved. This information is not intended as a substitute for professional medical care. Always follow your healthcare professional's instructions.                Follow-ups after your visit        Who to contact     If you have questions or need follow up information about today's clinic visit or your schedule please contact [facility] directly at [phone].  Normal or non-critical lab and imaging results will be communicated to you by MyChart, letter or phone within 4 business days after the clinic has received the results. If you do not hear from us within 7 days, please contact the clinic through MyChart or phone. If you have a critical or abnormal lab result, we will notify you by  "phone as soon as possible.  Submit refill requests through [employer] or call your pharmacy and they will forward the refill request to us. Please allow 3 business days for your refill to be completed.          Additional Information About Your Visit        [employer] Information     [employer] lets you send messages to your doctor, view your test results, renew your prescriptions, schedule appointments and more. To sign up, go to [email or web address] . Click on \"Log in\" on the left side of the screen, which will take you to the Welcome page. Then click on \"Sign up Now\" on the right side of the page.     You will be asked to enter the access code listed below, as well as some personal information. Please follow the directions to create your username and password.     Your access code is: [email or web address]  Expires: 2018  1:32 PM     Your access code will  in 90 days. If you need help or a new code, please call your [place] clinic or [phone].        Care EveryWhere ID     This is your Care EveryWhere ID. This could be used by other organizations to access your [place] medical records  [id number]        Your Vitals Were     Pulse Temperature Respirations Height Pulse Oximetry Breastfeeding?    46 97.5  F (36.4  C) (Tympanic) 22 5' 2\" (1.575 m) 98% No    BMI (Body Mass Index)                   27.22 kg/m2            Blood Pressure from Last 3 Encounters:   18 130/78    Weight from Last 3 Encounters:   18 148 lb 12.8 oz (67.5 kg)              Today, you had the following     No orders found for display       Primary Care Provider Office Phone # Fax #    Glynn Botello [phone] [address] [facility] [address]        Equal Access to Services     CECILIA PAYNE : Hadii cruz Griggs, waheather mendoza, qaybta kaaldennise vazquez, tiffany jimenez. So [facility] [phone].    ATENCIÓN: Si habla español, tiene a garcia disposición servicios " stephanie de asistencia lingüística. Ag sue [phone].    We comply with applicable federal civil rights laws and [place] laws. We do not discriminate on the basis of race, color, national origin, age, disability, sex, sexual orientation, or gender identity.            Thank you!     Thank you for choosing [facility]  for your care. Our goal is always to provide you with excellent care. Hearing back from our patients is one way we can continue to improve our services. Please take a few minutes to complete the written survey that you may receive in the mail after your visit with us. Thank you!             Your Updated Medication List - Protect others around you: Learn how to safely use, store and throw away your medicines at www.disposemymeds.org.          This list is accurate as of 3/13/18  1:32 PM.  Always use your most recent med list.                   Brand Name Dispense Instructions for use Diagnosis    amLODIPine 5 MG tablet    NORVASC          busPIRone 10 MG tablet    BUSPAR          citalopram 10 MG tablet    celeXA          hydrochlorothiazide 25 MG tablet    HYDRODIURIL          metoprolol tartrate 100 MG tablet    LOPRESSOR          naproxen 500 MG tablet    NAPROSYN          potassium chloride SA 20 MEQ CR tablet    K-DUR/KLOR-CON M          simvastatin 80 MG tablet    ZOCOR

## 2018-03-15 ENCOUNTER — OFFICE VISIT (OUTPATIENT)
Dept: FAMILY MEDICINE | Facility: OTHER | Age: 68
End: 2018-03-15
Attending: FAMILY MEDICINE
Payer: MEDICARE

## 2018-03-15 VITALS
DIASTOLIC BLOOD PRESSURE: 80 MMHG | SYSTOLIC BLOOD PRESSURE: 122 MMHG | HEART RATE: 52 BPM | BODY MASS INDEX: 26.89 KG/M2 | WEIGHT: 147 LBS | TEMPERATURE: 98 F

## 2018-03-15 DIAGNOSIS — J01.00 ACUTE MAXILLARY SINUSITIS, RECURRENCE NOT SPECIFIED: Primary | ICD-10-CM

## 2018-03-15 PROCEDURE — 99213 OFFICE O/P EST LOW 20 MIN: CPT | Performed by: FAMILY MEDICINE

## 2018-03-15 PROCEDURE — G0463 HOSPITAL OUTPT CLINIC VISIT: HCPCS

## 2018-03-15 RX ORDER — AMOXICILLIN 500 MG/1
500 CAPSULE ORAL 3 TIMES DAILY
Qty: 30 CAPSULE | Refills: 0 | Status: SHIPPED | OUTPATIENT
Start: 2018-03-15 | End: 2018-06-03

## 2018-03-15 NOTE — MR AVS SNAPSHOT
"              After Visit Summary   3/15/2018    Ovidio Luna    MRN: [id number]           Patient Information     Date Of Birth          1950        Visit Information        Provider Department      3/15/2018 10:00 AM Luan Rodriguez MD [facility]        Today's Diagnoses     Acute maxillary sinusitis, recurrence not specified    -  1       Follow-ups after your visit        Who to contact     If you have questions or need follow up information about today's clinic visit or your schedule please contact [facility] directly at [phone].  Normal or non-critical lab and imaging results will be communicated to you by [email or web address], letter or phone within 4 business days after the clinic has received the results. If you do not hear from us within 7 days, please contact the clinic through [employer] or phone. If you have a critical or abnormal lab result, we will notify you by phone as soon as possible.  Submit refill requests through [employer] or call your pharmacy and they will forward the refill request to us. Please allow 3 business days for your refill to be completed.          Additional Information About Your Visit        MyChart Information     [employer] lets you send messages to your doctor, view your test results, renew your prescriptions, schedule appointments and more. To sign up, go to [email or web address] . Click on \"Log in\" on the left side of the screen, which will take you to the Welcome page. Then click on \"Sign up Now\" on the right side of the page.     You will be asked to enter the access code listed below, as well as some personal information. Please follow the directions to create your username and password.     Your access code is: [email or web address]  Expires: 2018  1:32 PM     Your access code will  in 90 days. If you need help or a new code, please call your [facility] clinic or [phone].        Care EveryWhere ID     This is your Care " EveryWhere ID. This could be used by other organizations to access your [facility] medical records  [id number]        Your Vitals Were     Pulse Temperature BMI (Body Mass Index)             52 98  F (36.7  C) 26.89 kg/m2          Blood Pressure from Last 3 Encounters:   03/15/18 122/80   03/13/18 130/78   01/30/18 130/66    Weight from Last 3 Encounters:   03/15/18 147 lb (66.7 kg)   03/13/18 148 lb 12.8 oz (67.5 kg)   01/30/18 145 lb (65.8 kg)              Today, you had the following     No orders found for display         Today's Medication Changes          These changes are accurate as of 3/15/18 11:59 PM.  If you have any questions, ask your nurse or doctor.               Start taking these medicines.        Dose/Directions    amoxicillin 500 MG capsule   Commonly known as:  AMOXIL   Used for:  Acute maxillary sinusitis, recurrence not specified   Started by:  Luan Rodriguez MD        Dose:  500 mg   Take 1 capsule (500 mg) by mouth 3 times daily   Quantity:  30 capsule   Refills:  0            Where to get your medicines      These medications were sent to [id number] - [place], [place] - [address]  [address], [facility] [address]     Phone:  [phone]     amoxicillin 500 MG capsule                Primary Care Provider Office Phone # Fax #    Glynn Botello [phone] [address] [facility] [address]        Equal Access to Services     [facility]: Hadii cruz morris Soalmita, waaxda luqadaha, qaybta kaalmada adeegyada, waxnuha jose jimenez. So [facility] [phone].    ATENCIÓN: Si sindyla jina, tiene a garcia disposición servicios gratuitos de asistencia lingüística. Llame al [phone].    We comply with applicable federal civil rights laws and [place] laws. We do not discriminate on the basis of race, color, national origin, age, disability, sex, sexual orientation, or gender  identity.            Thank you!     Thank you for choosing [facility] [place]  for your care. Our goal is always to provide you with excellent care. Hearing back from our patients is one way we can continue to improve our services. Please take a few minutes to complete the written survey that you may receive in the mail after your visit with us. Thank you!             Your Updated Medication List - Protect others around you: Learn how to safely use, store and throw away your medicines at www.disposemymeds.org.          This list is accurate as of 3/15/18 11:59 PM.  Always use your most recent med list.                   Brand Name Dispense Instructions for use Diagnosis    amLODIPine 5 MG tablet    NORVASC     Take 5 mg by mouth daily        amoxicillin 500 MG capsule    AMOXIL    30 capsule    Take 1 capsule (500 mg) by mouth 3 times daily    Acute maxillary sinusitis, recurrence not specified       busPIRone 10 MG tablet    BUSPAR     Take 5-10 mg by mouth 3 times daily as needed        citalopram 10 MG tablet    celeXA     Take 10 mg by mouth every morning        cyclobenzaprine 10 MG tablet    FLEXERIL     Take 10 mg by mouth 3 times daily as needed        fexofenadine 180 MG tablet    ALLEGRA     Take 180 mg by mouth daily with food        fish oil-omega-3 fatty acids 1000 MG capsule      Take 1 capsule by mouth daily        hydrochlorothiazide 25 MG tablet    HYDRODIURIL    90 tablet    Take 1 tablet (25 mg) by mouth daily    Hypertension       metoprolol tartrate 100 MG tablet    LOPRESSOR     Take 100 mg by mouth 2 times daily        naproxen 500 MG tablet    NAPROSYN     Take 500 mg by mouth 2 times daily (with meals)        nitroGLYcerin 0.4 MG/SPRAY spray    NITROLINGUAL     Place 1 spray under the tongue        omeprazole 20 MG CR capsule    priLOSEC     Take 20 mg by mouth every morning (before breakfast)        ondansetron 4 MG ODT tab    ZOFRAN-ODT     Place 4 mg under the tongue every 8  hours as needed        potassium chloride SA 20 MEQ CR tablet    K-DUR/KLOR-CON M     Take 20 mEq by mouth 2 times daily (with meals)        simvastatin 80 MG tablet    ZOCOR     Take 80 mg by mouth daily        vitamin D3 2000 UNITS Caps      Take 2,000 Units by mouth daily

## 2018-03-15 NOTE — NURSING NOTE
Patient presents to clinic with c/o sinus pressure and pain.  Traci Chavira LPN ...... 3/15/2018 10:14 AM

## 2018-03-15 NOTE — LETTER
[facility]  [address]  [place] [address]  Phone: [phone]  Fax: [phone]    March 15, 2018        Ovidio Luna  [address]  [facility] [address]          To whom it may concern:    RE: Ovidio Luna    Patient was seen and treated today at our clinic. She has missed the last couple of day of word   Patient may return to work in 2 to 3 days      Please contact me for questions or concerns.      Sincerely,        Luan Rodriguez MD

## 2018-03-16 NOTE — PROGRESS NOTES
SUBJECTIVE:   Ovidio Luna is a 67 year old female who presents to clinic today for the following health issues: Right maxillary sinus pain    HPI Comments: Patient arrives here for sinus pain.  It has been going on for about a week.  Is very sharp extends up into the eye.  Comes in waves at times.  No recent cold.  No fevers or chills.  [facility].    Sinus Problem            Patient Active Problem List    Diagnosis Date Noted     Coronary atherosclerosis 01/17/2018     Priority: Medium     Overview:   Coronary artery disease, PTCA of mid LAD and PTCA of the proximal right   coronary artery 2/28/04, normal left ventricular systolic function       Hemangioma 01/17/2018     Priority: Medium     Overview:   Multiple capillary spider hemangioma       Hyperlipidemia 01/17/2018     Priority: Medium     Hypertension 01/17/2018     Priority: Medium     Peripheral vascular disease (H) 01/17/2018     Priority: Medium     Other affections of shoulder region, not elsewhere classified 01/17/2018     Priority: Medium     Tobacco abuse 01/17/2018     Priority: Medium     Circulatory system disorder 01/17/2018     Priority: Medium     Overview:   right lower extremity       Centrilobular emphysema (H) 12/12/2016     Priority: Medium     Lumbago 08/17/2012     Priority: Medium     Nonallopathic lesion of pelvic region 08/17/2012     Priority: Medium     Nonallopathic lesion of thoracic region 08/17/2012     Priority: Medium     Hemiplegia, late effect of cerebrovascular disease (H) 06/15/2012     Priority: Medium     Symptomatic menopausal or female climacteric states 03/12/2010     Priority: Medium     Disorder of bone and cartilage 11/14/2008     Priority: Medium     Overview:   DXA scan - osteopenia of hips.  Normal density LS spine.       Gastritis 10/27/2008     Priority: Medium     Overview:   Acute       Past Medical History:   Diagnosis Date     Allergy status to other antibiotic agents  status     ERT     Atherosclerosis     History of ASO with claudication     Atherosclerotic heart disease of native coronary artery without angina pectoris     2004,Stent times 2     Cerebral infarction (H)     05/20/2012,CVA with residual left sided weakness, incidental meningioma found     Closed fracture of shaft of left tibia     12/26/01,History of bilateral tibial fx, work related injury     Personal history of other diseases of the musculoskeletal system and connective tissue      03/14/06,Osteopenia, proximal femur.  Normal bone mineral density lumbar spine 03/14/06.  Start Fosamax 70 mg. weekly (didn't start until 08/07), stopped fosamax.     Personal history of other diseases of the nervous system and sense organs     History of  migraines     Zoster without complications     2008,left shoulder and neck     Zoster without complications     209/35504,Herpes zoster, left shoulder and neck      Past Surgical History:   Procedure Laterality Date     COLONOSCOPY      2004,2 hyperplastic polyps, repeat in 2014     COLONOSCOPY      1992, 2004,Colonoscopy, normal     HEMORRHOID SURGERY      No Comments Provided     HYSTERECTOMY TOTAL ABDOMINAL, BILATERAL SALPINGO-OOPHORECTOMY, COMBINED      1987     OTHER SURGICAL HISTORY      2003,206617,STRESS TEST PHARMACOLOGICAL,dobutamine, normal     OTHER SURGICAL HISTORY      2004,79000.0,VA BYPASS GRAFT AORTOBIFEMORAL     OTHER SURGICAL HISTORY      2004,LOC-1006.05,PTCA,mid LAD, proximal RCA     OTHER SURGICAL HISTORY      2006,206617,STRESS TEST PHARMACOLOGICAL,adenosine, negative     OTHER SURGICAL HISTORY      03/06,062713,NM CARDIAC MPI STRESS TEST,Adenosine Cardiolite stress done and is negative for ischemia or infarction.  Ejection fraction is 54%.     OTHER SURGICAL HISTORY      03/08/16,QVB715,CYSTOSCOPY W/ URETEROSCOPY W/ LITHOTRIPSY,Right,Dr. Mario DC     Current Outpatient Prescriptions   Medication Sig Dispense Refill     amoxicillin (AMOXIL) 500 MG  capsule Take 1 capsule (500 mg) by mouth 3 times daily 30 capsule 0     hydrochlorothiazide (HYDRODIURIL) 25 MG tablet Take 1 tablet (25 mg) by mouth daily 90 tablet 1     amLODIPine (NORVASC) 5 MG tablet Take 5 mg by mouth daily       busPIRone (BUSPAR) 10 MG tablet Take 5-10 mg by mouth 3 times daily as needed       Cholecalciferol (VITAMIN D3) 2000 UNITS CAPS Take 2,000 Units by mouth daily       citalopram (CELEXA) 10 MG tablet Take 10 mg by mouth every morning       cyclobenzaprine (FLEXERIL) 10 MG tablet Take 10 mg by mouth 3 times daily as needed       fexofenadine (ALLEGRA) 180 MG tablet Take 180 mg by mouth daily with food       metoprolol tartrate (LOPRESSOR) 100 MG tablet Take 100 mg by mouth 2 times daily       naproxen (NAPROSYN) 500 MG tablet Take 500 mg by mouth 2 times daily (with meals)       nitroGLYcerin (NITROLINGUAL) 0.4 MG/SPRAY spray Place 1 spray under the tongue       fish oil-omega-3 fatty acids 1000 MG capsule Take 1 capsule by mouth daily       omeprazole (PRILOSEC) 20 MG CR capsule Take 20 mg by mouth every morning (before breakfast)       ondansetron (ZOFRAN-ODT) 4 MG ODT tab Place 4 mg under the tongue every 8 hours as needed       potassium chloride SA (K-DUR/KLOR-CON M) 20 MEQ CR tablet Take 20 mEq by mouth 2 times daily (with meals)       simvastatin (ZOCOR) 80 MG tablet Take 80 mg by mouth daily       Allergies   Allergen Reactions     Codeine      Rapid heart rate.  Nauseated  Other reaction(s): Confusion  Rapid heart rate.  Nauseated     Diphenhydramine      Rapid heart Rate  Other reaction(s): Tachycardia  Rapid heart Rate  Other reaction(s): Tachycardia  Rapid heart Rate     Lisinopril Cough     Morphine      Rapid heart rate  Other reaction(s): Tachycardia  Rapid heart rate     No Clinical Screening - See Comments      Pt states allergies to white/yellow gold.  Sugical steel.  Copper.     Propoxyphene N-Apap      unknown  Other reaction(s): Tremors  unknown     Varenicline  Nausea and Vomiting     Tetracycline Rash     unknown  Other reaction(s): Tremors  unknown       Review of Systems     OBJECTIVE:     /80 (BP Location: Right arm, Patient Position: Sitting, Cuff Size: Adult Regular)  Pulse 52  Temp 98  F (36.7  C)  Wt 147 lb (66.7 kg)  BMI 26.89 kg/m2  Body mass index is 26.89 kg/(m^2).  Physical Exam   Constitutional:   Patient at times during the exam appears to be in moderate amount of pain   HENT:   Right Ear: External ear normal.   Left Ear: External ear normal.   Pulmonary/Chest: Effort normal.   Abdominal: Soft.   Musculoskeletal: She exhibits tenderness.   All along the maxillary sinus   Skin: Skin is warm. No rash noted. No erythema.       none     ASSESSMENT/PLAN:           ICD-10-CM    1. Acute maxillary sinusitis, recurrence not specified J01.00 amoxicillin (AMOXIL) 500 MG capsule     Also discussed that this could be trigeminal neuralgia versus early Bell's palsy versus shingles.  If any changes should occur she does not get good relief in the next 3-4 days follow-up in clinic.  Return if worsening.    Luan Rodriguez MD  [facility]

## 2018-04-04 ENCOUNTER — TELEPHONE (OUTPATIENT)
Dept: FAMILY MEDICINE | Facility: OTHER | Age: 68
End: 2018-04-04

## 2018-04-04 DIAGNOSIS — F43.20 ADJUSTMENT DISORDER, UNSPECIFIED TYPE: Primary | ICD-10-CM

## 2018-04-04 RX ORDER — BUSPIRONE HYDROCHLORIDE 10 MG/1
10 TABLET ORAL 3 TIMES DAILY PRN
Qty: 90 TABLET | Refills: 3 | Status: SHIPPED | OUTPATIENT
Start: 2018-04-04 | End: 2018-10-15

## 2018-04-11 DIAGNOSIS — I10 ESSENTIAL HYPERTENSION: Primary | ICD-10-CM

## 2018-04-11 RX ORDER — METOPROLOL TARTRATE 100 MG
100 TABLET ORAL 2 TIMES DAILY
Qty: 180 TABLET | Refills: 1 | Status: SHIPPED | OUTPATIENT
Start: 2018-04-11 | End: 2018-10-29

## 2018-04-11 RX ORDER — AMLODIPINE BESYLATE 5 MG/1
5 TABLET ORAL DAILY
Qty: 90 TABLET | Refills: 1 | Status: SHIPPED | OUTPATIENT
Start: 2018-04-11 | End: 2018-10-15

## 2018-04-11 NOTE — TELEPHONE ENCOUNTER
Chart review shows that patient was last seen by Dr. Rodriguez on 3/15/18. Both rxs as requested were noted in office visit notes on that date with no changes. Writer will refill both rxs as requested as per RN refill protocol at this time.    Prescription refilled per RN Medication Refill Policy..................Ashu Arceo 4/11/2018 3:58 PM

## 2018-04-26 DIAGNOSIS — K21.9 GASTROESOPHAGEAL REFLUX DISEASE, ESOPHAGITIS PRESENCE NOT SPECIFIED: Primary | ICD-10-CM

## 2018-04-26 DIAGNOSIS — F41.9 ANXIETY: ICD-10-CM

## 2018-04-26 RX ORDER — CITALOPRAM HYDROBROMIDE 10 MG/1
10 TABLET ORAL EVERY MORNING
Qty: 90 TABLET | Refills: 2 | Status: SHIPPED | OUTPATIENT
Start: 2018-04-26 | End: 2018-11-02

## 2018-04-26 NOTE — TELEPHONE ENCOUNTER
Chart review shows that patient was last seen by Dr. Rodriguez on 3/15/18. Both rxs as requested were noted in office visit notes on that date with no changes. Patient was to follow up as needed. Writer will refill both rxs as requested at this time.    Prescription refilled per RN Medication Refill Policy..................Ashu Arceo 4/26/2018 12:43 PM

## 2018-05-22 DIAGNOSIS — E78.5 HYPERLIPIDEMIA, UNSPECIFIED HYPERLIPIDEMIA TYPE: Primary | ICD-10-CM

## 2018-05-22 RX ORDER — SIMVASTATIN 80 MG
80 TABLET ORAL DAILY
Qty: 90 TABLET | Refills: 3 | Status: SHIPPED | OUTPATIENT
Start: 2018-05-22 | End: 2019-03-21

## 2018-05-29 DIAGNOSIS — E87.6 HYPOKALEMIA: Primary | ICD-10-CM

## 2018-06-03 ENCOUNTER — OFFICE VISIT (OUTPATIENT)
Dept: FAMILY MEDICINE | Facility: OTHER | Age: 68
End: 2018-06-03
Attending: FAMILY MEDICINE
Payer: COMMERCIAL

## 2018-06-03 VITALS
BODY MASS INDEX: 27.44 KG/M2 | DIASTOLIC BLOOD PRESSURE: 80 MMHG | RESPIRATION RATE: 24 BRPM | SYSTOLIC BLOOD PRESSURE: 114 MMHG | OXYGEN SATURATION: 92 % | HEART RATE: 60 BPM | TEMPERATURE: 97 F | WEIGHT: 150 LBS

## 2018-06-03 DIAGNOSIS — J43.2 CENTRILOBULAR EMPHYSEMA (H): ICD-10-CM

## 2018-06-03 DIAGNOSIS — Z72.0 TOBACCO ABUSE: ICD-10-CM

## 2018-06-03 DIAGNOSIS — J44.1 CHRONIC OBSTRUCTIVE PULMONARY DISEASE WITH ACUTE EXACERBATION (H): Primary | ICD-10-CM

## 2018-06-03 PROCEDURE — G0463 HOSPITAL OUTPT CLINIC VISIT: HCPCS

## 2018-06-03 PROCEDURE — 99213 OFFICE O/P EST LOW 20 MIN: CPT | Performed by: FAMILY MEDICINE

## 2018-06-03 PROCEDURE — G0463 HOSPITAL OUTPT CLINIC VISIT: HCPCS | Mod: 25

## 2018-06-03 RX ORDER — DOXYCYCLINE 100 MG/1
100 CAPSULE ORAL 2 TIMES DAILY
Qty: 14 CAPSULE | Refills: 0 | Status: SHIPPED | OUTPATIENT
Start: 2018-06-03 | End: 2018-07-09

## 2018-06-03 RX ORDER — PREDNISONE 20 MG/1
TABLET ORAL
Qty: 20 TABLET | Refills: 0 | Status: SHIPPED | OUTPATIENT
Start: 2018-06-03 | End: 2018-07-09

## 2018-06-03 ASSESSMENT — PAIN SCALES - GENERAL: PAINLEVEL: NO PAIN (0)

## 2018-06-03 NOTE — PROGRESS NOTES
SUBJECTIVE:   Ovidio Luna is a 67 year old female who presents to clinic today for the following health issues: Congestion    HPI Comments: Patient arrives here for congestion cold.  Reports initially was very productive yellow sputum.  That has lessened but she continues to have congestion whitish sputum she gets cold.  Is wearing a jacket.  She has shortness of breath more than usual.  In the cough is getting worse.  She does have a history of tobacco abuse and smoking 1 pack per day no fevers    URI           Patient Active Problem List    Diagnosis Date Noted     Chronic obstructive pulmonary disease with acute exacerbation (H) 06/03/2018     Priority: Medium     Coronary atherosclerosis 01/17/2018     Priority: Medium     Overview:   Coronary artery disease, PTCA of mid LAD and PTCA of the proximal right   coronary artery 2/28/04, normal left ventricular systolic function       Hemangioma 01/17/2018     Priority: Medium     Overview:   Multiple capillary spider hemangioma       Hyperlipidemia 01/17/2018     Priority: Medium     Hypertension 01/17/2018     Priority: Medium     Peripheral vascular disease (H) 01/17/2018     Priority: Medium     Other affections of shoulder region, not elsewhere classified 01/17/2018     Priority: Medium     Tobacco abuse 01/17/2018     Priority: Medium     Circulatory system disorder 01/17/2018     Priority: Medium     Overview:   right lower extremity       Centrilobular emphysema (H) 12/12/2016     Priority: Medium     Lumbago 08/17/2012     Priority: Medium     Nonallopathic lesion of pelvic region 08/17/2012     Priority: Medium     Nonallopathic lesion of thoracic region 08/17/2012     Priority: Medium     Hemiplegia, late effect of cerebrovascular disease (H) 06/15/2012     Priority: Medium     Symptomatic menopausal or female climacteric states 03/12/2010     Priority: Medium     Disorder of bone and cartilage 11/14/2008     Priority: Medium     Overview:   DXA scan  - osteopenia of hips.  Normal density LS spine.       Gastritis 10/27/2008     Priority: Medium     Overview:   Acute       Past Medical History:   Diagnosis Date     Allergy status to other antibiotic agents status     ERT     Atherosclerosis     History of ASO with claudication     Atherosclerotic heart disease of native coronary artery without angina pectoris     2004,Stent times 2     Cerebral infarction (H)     05/20/2012,CVA with residual left sided weakness, incidental meningioma found     Closed fracture of shaft of left tibia     12/26/01,History of bilateral tibial fx, work related injury     Personal history of other diseases of the musculoskeletal system and connective tissue      03/14/06,Osteopenia, proximal femur.  Normal bone mineral density lumbar spine 03/14/06.  Start Fosamax 70 mg. weekly (didn't start until 08/07), stopped fosamax.     Personal history of other diseases of the nervous system and sense organs     History of  migraines     Zoster without complications     2008,left shoulder and neck     Zoster without complications     209/25008,Herpes zoster, left shoulder and neck      Past Surgical History:   Procedure Laterality Date     COLONOSCOPY      2004,2 hyperplastic polyps, repeat in 2014     COLONOSCOPY      1992, 2004,Colonoscopy, normal     HEMORRHOID SURGERY      No Comments Provided     HYSTERECTOMY TOTAL ABDOMINAL, BILATERAL SALPINGO-OOPHORECTOMY, COMBINED      1987     OTHER SURGICAL HISTORY      2003,206617,STRESS TEST PHARMACOLOGICAL,dobutamine, normal     OTHER SURGICAL HISTORY      2004,32317.0,WA BYPASS GRAFT AORTOBIFEMORAL     OTHER SURGICAL HISTORY      2004,LOC-1006.05,PTCA,mid LAD, proximal RCA     OTHER SURGICAL HISTORY      2006,206617,STRESS TEST PHARMACOLOGICAL,adenosine, negative     OTHER SURGICAL HISTORY      03/06,063976,NM CARDIAC MPI STRESS TEST,Adenosine Cardiolite stress done and is negative for ischemia or infarction.  Ejection fraction is 54%.     OTHER  SURGICAL HISTORY      03/08/16,VMA795,CYSTOSCOPY W/ URETEROSCOPY W/ LITHOTRIPSY,Right,Dr. Mario DC     Current Outpatient Prescriptions   Medication Sig Dispense Refill     amLODIPine (NORVASC) 5 MG tablet Take 1 tablet (5 mg) by mouth daily 90 tablet 1     busPIRone (BUSPAR) 10 MG tablet Take 1 tablet (10 mg) by mouth 3 times daily as needed 90 tablet 3     Cholecalciferol (VITAMIN D3) 2000 UNITS CAPS Take 2,000 Units by mouth daily       citalopram (CELEXA) 10 MG tablet Take 1 tablet (10 mg) by mouth every morning 90 tablet 2     doxycycline (VIBRAMYCIN) 100 MG capsule Take 1 capsule (100 mg) by mouth 2 times daily 14 capsule 0     fexofenadine (ALLEGRA) 180 MG tablet Take 180 mg by mouth daily with food       fish oil-omega-3 fatty acids 1000 MG capsule Take 1 capsule by mouth daily       hydrochlorothiazide (HYDRODIURIL) 25 MG tablet Take 1 tablet (25 mg) by mouth daily 90 tablet 1     metoprolol tartrate (LOPRESSOR) 100 MG tablet Take 1 tablet (100 mg) by mouth 2 times daily 180 tablet 1     naproxen (NAPROSYN) 500 MG tablet Take 500 mg by mouth 2 times daily (with meals)       nitroGLYcerin (NITROLINGUAL) 0.4 MG/SPRAY spray Place 1 spray under the tongue       omeprazole (PRILOSEC) 20 MG CR capsule Take 1 capsule (20 mg) by mouth every morning (before breakfast) 90 capsule 2     potassium chloride SA (K-DUR/KLOR-CON M) 20 MEQ CR tablet Take 20 mEq by mouth 2 times daily (with meals)       predniSONE (DELTASONE) 20 MG tablet Take 3 tabs (60 mg) by mouth daily x 3 days, 2 tabs (40 mg) daily x 3 days, 1 tab (20 mg) daily x 3 days, then 1/2 tab (10 mg) x 3 days. 20 tablet 0     simvastatin (ZOCOR) 80 MG tablet Take 1 tablet (80 mg) by mouth daily 90 tablet 3     Allergies   Allergen Reactions     Codeine      Rapid heart rate.  Nauseated  Other reaction(s): Confusion  Rapid heart rate.  Nauseated     Diphenhydramine      Rapid heart Rate  Other reaction(s): Tachycardia  Rapid heart Rate  Other reaction(s):  Tachycardia  Rapid heart Rate     Lisinopril Cough     Morphine      Rapid heart rate  Other reaction(s): Tachycardia  Rapid heart rate     No Clinical Screening - See Comments      Pt states allergies to white/yellow gold.  Sugical steel.  Copper.     Propoxyphene N-Apap      unknown  Other reaction(s): Tremors  unknown     Varenicline Nausea and Vomiting     Tetracycline Rash     unknown  Other reaction(s): Tremors  unknown       Review of Systems     OBJECTIVE:     /80 (BP Location: Right arm, Patient Position: Chair, Cuff Size: Adult Regular)  Pulse 60  Temp 97  F (36.1  C)  Resp 24  Wt 150 lb (68 kg)  SpO2 92%  Breastfeeding? No  BMI 27.44 kg/m2  Body mass index is 27.44 kg/(m^2).  Physical Exam   Constitutional:   Older than stated age   HENT:   Head: Normocephalic.   Pulmonary/Chest: Effort normal.   Patient has both rhonchi and expiratory wheezes.  In all lung fields.  Very wet sounding cough   Musculoskeletal: Normal range of motion.       none     ASSESSMENT/PLAN:           ICD-10-CM    1. Chronic obstructive pulmonary disease with acute exacerbation (H) J44.1 predniSONE (DELTASONE) 20 MG tablet     doxycycline (VIBRAMYCIN) 100 MG capsule   2. Tobacco abuse Z72.0    3. Centrilobular emphysema (H) J43.2      Start prednisone and doxycycline.  Highly encourage the patient to quit smoking and she was only minimally receptive to this.  Discussed that these episodes will continue to worsen.  To the point where she will need continuous oxygen.  She states her sister is currently at the state.  Luan Rodriguez MD  [facility]

## 2018-06-03 NOTE — LETTER
[facility]  [address]  [place] [address]  Phone: [phone]  Fax: [phone]    Kelsey 3, 2018        Ovidio Luna  [address] [facility] [address]          To whom it may concern:    RE: Ovidio Luna    Patient was seen and treated today at our clinic and missed work.    Please contact me for questions or concerns.      Sincerely,        Luan Rodriguez MD

## 2018-06-03 NOTE — NURSING NOTE
She stated she has had a cough for 3 days. She started friday night coughing up thick green mucous.  Sylvia Gardner LPN..................6/3/2018   10:28 AM

## 2018-06-03 NOTE — MR AVS SNAPSHOT
"              After Visit Summary   6/3/2018    Ovidio Luna    MRN: [id number]           Patient Information     Date Of Birth          1950        Visit Information        Provider Department      6/3/2018 10:30 AM Luan Rodriguez MD [facility]        Today's Diagnoses     Chronic obstructive pulmonary disease with acute exacerbation (H)    -  1    Tobacco abuse        Centrilobular emphysema (H)           Follow-ups after your visit        Who to contact     If you have questions or need follow up information about today's clinic visit or your schedule please contact [facility] directly at [phone].  Normal or non-critical lab and imaging results will be communicated to you by [employer], letter or phone within 4 business days after the clinic has received the results. If you do not hear from us within 7 days, please contact the clinic through [employer] or phone. If you have a critical or abnormal lab result, we will notify you by phone as soon as possible.  Submit refill requests through [employer] or call your pharmacy and they will forward the refill request to us. Please allow 3 business days for your refill to be completed.          Additional Information About Your Visit        MyChart Information     [employer] lets you send messages to your doctor, view your test results, renew your prescriptions, schedule appointments and more. To sign up, go to [email or web address] . Click on \"Log in\" on the left side of the screen, which will take you to the Welcome page. Then click on \"Sign up Now\" on the right side of the page.     You will be asked to enter the access code listed below, as well as some personal information. Please follow the directions to create your username and password.     Your access code is: [email or web address]  Expires: 2018  1:32 PM     Your access code will  in 90 days. If you need help or a new code, please call your [place] clinic or " [phone].        Care EveryWhere ID     This is your Care EveryWhere ID. This could be used by other organizations to access your [place] medical records  [id number]        Your Vitals Were     Pulse Temperature Respirations Pulse Oximetry Breastfeeding? BMI (Body Mass Index)    60 97  F (36.1  C) 24 92% No 27.44 kg/m2       Blood Pressure from Last 3 Encounters:   06/03/18 114/80   03/15/18 122/80   03/13/18 130/78    Weight from Last 3 Encounters:   06/03/18 150 lb (68 kg)   03/15/18 147 lb (66.7 kg)   03/13/18 148 lb 12.8 oz (67.5 kg)              Today, you had the following     No orders found for display         Today's Medication Changes          These changes are accurate as of 6/3/18 10:46 AM.  If you have any questions, ask your nurse or doctor.               Start taking these medicines.        Dose/Directions    doxycycline 100 MG capsule   Commonly known as:  VIBRAMYCIN   Used for:  Chronic obstructive pulmonary disease with acute exacerbation (H)   Started by:  Luan Rodriguez MD        Dose:  100 mg   Take 1 capsule (100 mg) by mouth 2 times daily   Quantity:  14 capsule   Refills:  0       predniSONE 20 MG tablet   Commonly known as:  DELTASONE   Used for:  Chronic obstructive pulmonary disease with acute exacerbation (H)   Started by:  Luan Rodriguez MD        Take 3 tabs (60 mg) by mouth daily x 3 days, 2 tabs (40 mg) daily x 3 days, 1 tab (20 mg) daily x 3 days, then 1/2 tab (10 mg) x 3 days.   Quantity:  20 tablet   Refills:  0         Stop taking these medicines if you haven't already. Please contact your care team if you have questions.     amoxicillin 500 MG capsule   Commonly known as:  AMOXIL   Stopped by:  Luan Rodriguez MD           cyclobenzaprine 10 MG tablet   Commonly known as:  FLEXERIL   Stopped by:  Luan Rodriguez MD           ondansetron 4 MG ODT tab   Commonly known as:  ZOFRAN-ODT   Stopped by:  Luan Rodriguez MD                Where to get your medicines       These medications were sent to [id number] - [place], [place] - [address] AT [address]  [address], [place] [address]     Phone:  [phone]     doxycycline 100 MG capsule    predniSONE 20 MG tablet                Primary Care Provider Office Phone # Fax #    Glynn Botello [phone]       [address]  [place] [address]        Equal Access to Services     CECILIA PAYNE : Hadii aad ku hadasho Soomaali, waaxda luqadaha, qaybta kaalmada adeegyada, waxay idiin hayaan adeeg kharash la'nelda . So [facility] [phone].    ATENCIÓN: Si habla español, tiene a garcia disposición servicios gratuitos de asistencia lingüística. [facility] [phone].    We comply with applicable federal civil rights laws and [place] laws. We do not discriminate on the basis of race, color, national origin, age, disability, sex, sexual orientation, or gender identity.            Thank you!     Thank you for choosing [facility] AND [facility]  for your care. Our goal is always to provide you with excellent care. Hearing back from our patients is one way we can continue to improve our services. Please take a few minutes to complete the written survey that you may receive in the mail after your visit with us. Thank you!             Your Updated Medication List - Protect others around you: Learn how to safely use, store and throw away your medicines at www.disposemymeds.org.          This list is accurate as of 6/3/18 10:46 AM.  Always use your most recent med list.                   Brand Name Dispense Instructions for use Diagnosis    amLODIPine 5 MG tablet    NORVASC    90 tablet    Take 1 tablet (5 mg) by mouth daily    Essential hypertension       busPIRone 10 MG tablet    BUSPAR    90 tablet    Take 1 tablet (10 mg) by mouth 3 times daily as needed    Adjustment disorder, unspecified type       citalopram 10 MG tablet    celeXA    90 tablet    Take 1 tablet (10 mg)  by mouth every morning    Anxiety       doxycycline 100 MG capsule    VIBRAMYCIN    14 capsule    Take 1 capsule (100 mg) by mouth 2 times daily    Chronic obstructive pulmonary disease with acute exacerbation (H)       fexofenadine 180 MG tablet    ALLEGRA     Take 180 mg by mouth daily with food        fish oil-omega-3 fatty acids 1000 MG capsule      Take 1 capsule by mouth daily        hydrochlorothiazide 25 MG tablet    HYDRODIURIL    90 tablet    Take 1 tablet (25 mg) by mouth daily    Hypertension       metoprolol tartrate 100 MG tablet    LOPRESSOR    180 tablet    Take 1 tablet (100 mg) by mouth 2 times daily    Essential hypertension       naproxen 500 MG tablet    NAPROSYN     Take 500 mg by mouth 2 times daily (with meals)        nitroGLYcerin 0.4 MG/SPRAY spray    NITROLINGUAL     Place 1 spray under the tongue        omeprazole 20 MG CR capsule    priLOSEC    90 capsule    Take 1 capsule (20 mg) by mouth every morning (before breakfast)    Gastroesophageal reflux disease, esophagitis presence not specified       potassium chloride SA 20 MEQ CR tablet    K-DUR/KLOR-CON M     Take 20 mEq by mouth 2 times daily (with meals)        predniSONE 20 MG tablet    DELTASONE    20 tablet    Take 3 tabs (60 mg) by mouth daily x 3 days, 2 tabs (40 mg) daily x 3 days, 1 tab (20 mg) daily x 3 days, then 1/2 tab (10 mg) x 3 days.    Chronic obstructive pulmonary disease with acute exacerbation (H)       simvastatin 80 MG tablet    ZOCOR    90 tablet    Take 1 tablet (80 mg) by mouth daily    Hyperlipidemia, unspecified hyperlipidemia type       vitamin D3 2000 units Caps      Take 2,000 Units by mouth daily

## 2018-06-04 ASSESSMENT — PATIENT HEALTH QUESTIONNAIRE - PHQ9: SUM OF ALL RESPONSES TO PHQ QUESTIONS 1-9: 17

## 2018-06-05 RX ORDER — POTASSIUM CHLORIDE 1500 MG/1
20 TABLET, EXTENDED RELEASE ORAL 2 TIMES DAILY WITH MEALS
Qty: 60 TABLET | Refills: 11 | Status: SHIPPED | OUTPATIENT
Start: 2018-06-05 | End: 2019-06-10

## 2018-06-07 ENCOUNTER — TELEPHONE (OUTPATIENT)
Dept: FAMILY MEDICINE | Facility: OTHER | Age: 68
End: 2018-06-07

## 2018-06-07 ENCOUNTER — HOSPITAL ENCOUNTER (OUTPATIENT)
Dept: GENERAL RADIOLOGY | Facility: OTHER | Age: 68
Discharge: HOME | End: 2018-06-07
Attending: NURSE PRACTITIONER | Admitting: NURSE PRACTITIONER
Payer: COMMERCIAL

## 2018-06-07 ENCOUNTER — OFFICE VISIT (OUTPATIENT)
Dept: FAMILY MEDICINE | Facility: OTHER | Age: 68
End: 2018-06-07
Attending: NURSE PRACTITIONER
Payer: COMMERCIAL

## 2018-06-07 VITALS
OXYGEN SATURATION: 98 % | WEIGHT: 151 LBS | TEMPERATURE: 97.9 F | DIASTOLIC BLOOD PRESSURE: 70 MMHG | RESPIRATION RATE: 16 BRPM | HEART RATE: 44 BPM | SYSTOLIC BLOOD PRESSURE: 114 MMHG | BODY MASS INDEX: 27.62 KG/M2

## 2018-06-07 DIAGNOSIS — R06.02 SOB (SHORTNESS OF BREATH): ICD-10-CM

## 2018-06-07 DIAGNOSIS — J44.1 COPD EXACERBATION (H): Primary | ICD-10-CM

## 2018-06-07 LAB — NT-PROBNP SERPL-MCNC: 287 PG/ML (ref 0–100)

## 2018-06-07 PROCEDURE — 71046 X-RAY EXAM CHEST 2 VIEWS: CPT

## 2018-06-07 PROCEDURE — 99213 OFFICE O/P EST LOW 20 MIN: CPT | Performed by: NURSE PRACTITIONER

## 2018-06-07 PROCEDURE — G0463 HOSPITAL OUTPT CLINIC VISIT: HCPCS | Mod: 25

## 2018-06-07 PROCEDURE — G0463 HOSPITAL OUTPT CLINIC VISIT: HCPCS

## 2018-06-07 PROCEDURE — 83880 ASSAY OF NATRIURETIC PEPTIDE: CPT | Performed by: NURSE PRACTITIONER

## 2018-06-07 PROCEDURE — 36415 COLL VENOUS BLD VENIPUNCTURE: CPT | Performed by: NURSE PRACTITIONER

## 2018-06-07 RX ORDER — LEVOFLOXACIN 500 MG/1
500 TABLET, FILM COATED ORAL DAILY
Qty: 7 TABLET | Refills: 0 | Status: SHIPPED | OUTPATIENT
Start: 2018-06-07 | End: 2018-07-09

## 2018-06-07 RX ORDER — ALBUTEROL SULFATE 90 UG/1
1-2 AEROSOL, METERED RESPIRATORY (INHALATION) EVERY 6 HOURS PRN
Qty: 1 INHALER | Refills: 0 | Status: SHIPPED | OUTPATIENT
Start: 2018-06-07

## 2018-06-07 NOTE — MR AVS SNAPSHOT
After Visit Summary   6/7/2018    Ovidio Luna    MRN: [id number]           Patient Information     Date Of Birth          1950        Visit Information        Provider Department      6/7/2018 11:00 AM Fely Gregorio APRN CNP [facility]        Today's Diagnoses     COPD exacerbation (H)    -  1    SOB (shortness of breath)           Follow-ups after your visit        Follow-up notes from your care team     Return if symptoms worsen or fail to improve.      Who to contact     If you have questions or need follow up information about today's clinic visit or your schedule please contact [facility] directly at [phone].  Normal or non-critical lab and imaging results will be communicated to you by MyChart, letter or phone within 4 business days after the clinic has received the results. If you do not hear from us within 7 days, please contact the clinic through MyChart or phone. If you have a critical or abnormal lab result, we will notify you by phone as soon as possible.  Submit refill requests through [employer] or call your pharmacy and they will forward the refill request to us. Please allow 3 business days for your refill to be completed.          Additional Information About Your Visit        Care EveryWhere ID     This is your Care EveryWhere ID. This could be used by other organizations to access your [place] medical records  [id number]        Your Vitals Were     Pulse Temperature Respirations Pulse Oximetry BMI (Body Mass Index)       44 97.9  F (36.6  C) (Tympanic) 16 98% 27.62 kg/m2        Blood Pressure from Last 3 Encounters:   06/07/18 114/70   06/03/18 114/80   03/15/18 122/80    Weight from Last 3 Encounters:   06/07/18 151 lb (68.5 kg)   06/03/18 150 lb (68 kg)   03/15/18 147 lb (66.7 kg)              We Performed the Following     Brain Natriuretic Peptide (BNP)          Today's Medication Changes          These  changes are accurate as of 6/7/18 11:59 PM.  If you have any questions, ask your nurse or doctor.               Start taking these medicines.        Dose/Directions    albuterol 108 (90 Base) MCG/ACT Inhaler   Commonly known as:  PROAIR HFA/PROVENTIL HFA/VENTOLIN HFA   Used for:  COPD exacerbation (H)   Started by:  Fely Gregorio APRN CNP        Dose:  1-2 puff   Inhale 1-2 puffs into the lungs every 6 hours as needed for shortness of breath / dyspnea or wheezing   Quantity:  1 Inhaler   Refills:  0       levofloxacin 500 MG tablet   Commonly known as:  LEVAQUIN   Used for:  COPD exacerbation (H)   Started by:  Fely Gregorio APRN CNP        Dose:  500 mg   Take 1 tablet (500 mg) by mouth daily   Quantity:  7 tablet   Refills:  0            Where to get your medicines      These medications were sent to [id number] - [place], [place] - [address] AT [address]  [address], [facility] [address]     Phone:  [phone]     albuterol 108 (90 Base) MCG/ACT Inhaler    levofloxacin 500 MG tablet                Primary Care Provider Office Phone # Fax #    Glynn Botello [phone]       [address] [facility] [address]        Equal Access to Services     [facility] AH: Hadii cruz dugan hadasho Sophillali, waaxda luqadaha, qaybta kaalmada adeegyada, tiffany buckner . So [facility] [phone].    ATENCIÓN: Si habla español, tiene a garcia disposición servicios gratuitos de asistencia lingüística. Llame al [phone].    We comply with applicable federal civil rights laws and [place] laws. We do not discriminate on the basis of race, color, national origin, age, disability, sex, sexual orientation, or gender identity.            Thank you!     Thank you for choosing [facility]  for your care. Our goal is always to provide you with excellent care. Hearing back from our patients is one way we can  continue to improve our services. Please take a few minutes to complete the written survey that you may receive in the mail after your visit with us. Thank you!             Your Updated Medication List - Protect others around you: Learn how to safely use, store and throw away your medicines at www.disposemymeds.org.          This list is accurate as of 6/7/18 11:59 PM.  Always use your most recent med list.                   Brand Name Dispense Instructions for use Diagnosis    albuterol 108 (90 Base) MCG/ACT Inhaler    PROAIR HFA/PROVENTIL HFA/VENTOLIN HFA    1 Inhaler    Inhale 1-2 puffs into the lungs every 6 hours as needed for shortness of breath / dyspnea or wheezing    COPD exacerbation (H)       amLODIPine 5 MG tablet    NORVASC    90 tablet    Take 1 tablet (5 mg) by mouth daily    Essential hypertension       busPIRone 10 MG tablet    BUSPAR    90 tablet    Take 1 tablet (10 mg) by mouth 3 times daily as needed    Adjustment disorder, unspecified type       citalopram 10 MG tablet    celeXA    90 tablet    Take 1 tablet (10 mg) by mouth every morning    Anxiety       doxycycline 100 MG capsule    VIBRAMYCIN    14 capsule    Take 1 capsule (100 mg) by mouth 2 times daily    Chronic obstructive pulmonary disease with acute exacerbation (H)       fexofenadine 180 MG tablet    ALLEGRA     Take 180 mg by mouth daily with food        fish oil-omega-3 fatty acids 1000 MG capsule      Take 1 capsule by mouth daily        hydrochlorothiazide 25 MG tablet    HYDRODIURIL    90 tablet    Take 1 tablet (25 mg) by mouth daily    Hypertension       levofloxacin 500 MG tablet    LEVAQUIN    7 tablet    Take 1 tablet (500 mg) by mouth daily    COPD exacerbation (H)       metoprolol tartrate 100 MG tablet    LOPRESSOR    180 tablet    Take 1 tablet (100 mg) by mouth 2 times daily    Essential hypertension       naproxen 500 MG tablet    NAPROSYN     Take 500 mg by mouth 2 times daily (with meals)        nitroGLYcerin 0.4  MG/SPRAY spray    NITROLINGUAL     Place 1 spray under the tongue        omeprazole 20 MG CR capsule    priLOSEC    90 capsule    Take 1 capsule (20 mg) by mouth every morning (before breakfast)    Gastroesophageal reflux disease, esophagitis presence not specified       potassium chloride SA 20 MEQ CR tablet    K-DUR/KLOR-CON M    60 tablet    Take 1 tablet (20 mEq) by mouth 2 times daily (with meals)    Hypokalemia       predniSONE 20 MG tablet    DELTASONE    20 tablet    Take 3 tabs (60 mg) by mouth daily x 3 days, 2 tabs (40 mg) daily x 3 days, 1 tab (20 mg) daily x 3 days, then 1/2 tab (10 mg) x 3 days.    Chronic obstructive pulmonary disease with acute exacerbation (H)       simvastatin 80 MG tablet    ZOCOR    90 tablet    Take 1 tablet (80 mg) by mouth daily    Hyperlipidemia, unspecified hyperlipidemia type       vitamin D3 2000 units Caps      Take 2,000 Units by mouth daily

## 2018-06-07 NOTE — PROGRESS NOTES
HPI Comments: Nursing Notes:   Rathje, Nathaniel, LPN  6/7/2018 11:17 AM  Signed  Patient presents to clinic for followup on cough and shortness of breath.   Patient was seen in clinic 6/3/18 for the same issues. She states that she   has not gotten better. Multiple times per day she will have coughing fits   that are so strong that she vomits.   Nathaniel Bland LPN...... 10:55 AM 6/7/2018    Symptoms started six days ago feeling SOB. Evaluated four days ago and started on Doxycycline. Tonsils are itchy, coughing so hard will vomit several times a day. SOB, no diagnosis with COPD. Denies fevers, sore throat, ear pain. Wheezing and SOB. Fatigue, no swelling in extremities.         Review of Systems   Constitutional: Negative for fever.   HENT: Negative for ear pain and sore throat.    Respiratory: Positive for cough, shortness of breath and wheezing.          Physical Exam   HENT:   Right Ear: External ear normal.   Left Ear: External ear normal.   Mouth/Throat: Oropharynx is clear and moist.   Cardiovascular: Normal heart sounds and intact distal pulses.    Pulmonary/Chest: She has wheezes.   Expiratory wheezes in all lobes   Lymphadenopathy:     She has no cervical adenopathy.   Skin: Skin is warm and dry.   Psychiatric: Affect normal.       Assessment: female without fever, expiratory wheezes in all lobes, no edema to extremities, tonsils without erythema, tms without erythema     Results for orders placed or performed in visit on 06/07/18   Brain Natriuretic Peptide (BNP)   Result Value Ref Range    N-Terminal Pro Bnp 287 (H) 0 - 100 pg/mL   Somewhat elevated BNP  Chest xray reviewed by radiologist with slightly enlarged heart-stable    Symptoms most likely COPD exacerbation    Treat with Levaquin 500 mgs PO Daily 7 days  If symptoms not improving in 2 days follow up with PCP

## 2018-06-07 NOTE — TELEPHONE ENCOUNTER
"Writer returned call to patient to discuss her concerns as noted in reason for call. Patient reports:    She is having coughing spells so bad that she is vomiting. Has been taking Rxs as prescribed by Dr. Rodriguez on 6/3/18, but she doesn't seem to be getting any better. Is short of breath, but she isn't worried about it. Is not in pain of any kind. Is coughing up sputum still, but has changed from green to clear now. Is almost like a really clear mucus now. No audible wheezing heard. Congestion heard with patient on the line. Is not using oxygen at this time.    Patient feels as though she is not getting better. Did not go into work today as because of the coughing and vomiting.     Has been taking robitussin DM and is sucking on honey drops.     \"I feel rotten, but not bad enough to go into the ED. I'm not having chest pains, not feeling faint, dizzy.\"     As patient is reporting no improvement in symptoms after being seen [facility] over the [facility], but did not identify any emergent symptoms to warrant an ED visit, as well as patient doesn't feel as though she should be seen in the ED, as well as no openings are noted in the clinic at this time, writer suggested another evaluation at the [facility].     Patient happy with this plan of care. Feels as though it is an appropriate start/option for her. [facility] will [facility] for an evaluation at this time. Writer will close this encounter.    Ashu Arceo RN on 6/7/2018 at 10:13 AM  "

## 2018-06-07 NOTE — LETTER
June 7, 2018      Ovidio Luna  [address]  [facility] [address]        To Whom It May Concern:    Ovidio Luna  was seen on 06/07/18  Please excuse her  until 06/09/18 due to illness.        Sincerely,        VOLODYMYR Pérez CNP     No significant past surgical history

## 2018-06-07 NOTE — NURSING NOTE
Patient presents to clinic for followup on cough and shortness of breath. Patient was seen in clinic 6/3/18 for the same issues. She states that she has not gotten better. Multiple times per day she will have coughing fits that are so strong that she vomits.   Nathaniel Bland LPN...... 10:55 AM 6/7/2018

## 2018-06-13 ASSESSMENT — ENCOUNTER SYMPTOMS
COUGH: 1
WHEEZING: 1
SHORTNESS OF BREATH: 1
SORE THROAT: 0
FEVER: 0

## 2018-06-26 DIAGNOSIS — M25.50 MULTIPLE JOINT PAIN: Primary | ICD-10-CM

## 2018-06-27 RX ORDER — NAPROXEN 500 MG/1
500 TABLET ORAL 2 TIMES DAILY WITH MEALS
Qty: 60 TABLET | Refills: 11 | Status: SHIPPED | OUTPATIENT
Start: 2018-06-27 | End: 2019-03-21

## 2018-07-09 ENCOUNTER — OFFICE VISIT (OUTPATIENT)
Dept: FAMILY MEDICINE | Facility: OTHER | Age: 68
End: 2018-07-09
Attending: FAMILY MEDICINE
Payer: MEDICARE

## 2018-07-09 VITALS
HEART RATE: 64 BPM | SYSTOLIC BLOOD PRESSURE: 98 MMHG | DIASTOLIC BLOOD PRESSURE: 68 MMHG | WEIGHT: 150.8 LBS | OXYGEN SATURATION: 97 % | BODY MASS INDEX: 27.58 KG/M2

## 2018-07-09 DIAGNOSIS — J30.89 CHRONIC NON-SEASONAL ALLERGIC RHINITIS, UNSPECIFIED TRIGGER: ICD-10-CM

## 2018-07-09 DIAGNOSIS — Z72.0 TOBACCO ABUSE: ICD-10-CM

## 2018-07-09 DIAGNOSIS — J44.1 CHRONIC OBSTRUCTIVE PULMONARY DISEASE WITH ACUTE EXACERBATION (H): Primary | ICD-10-CM

## 2018-07-09 PROBLEM — I99.9 CIRCULATORY SYSTEM DISORDER: Status: RESOLVED | Noted: 2018-01-17 | Resolved: 2018-07-09

## 2018-07-09 PROCEDURE — 99213 OFFICE O/P EST LOW 20 MIN: CPT | Performed by: FAMILY MEDICINE

## 2018-07-09 PROCEDURE — G0463 HOSPITAL OUTPT CLINIC VISIT: HCPCS

## 2018-07-09 RX ORDER — BENZONATATE 200 MG/1
200 CAPSULE ORAL 3 TIMES DAILY PRN
Qty: 21 CAPSULE | Refills: 3 | Status: SHIPPED | OUTPATIENT
Start: 2018-07-09 | End: 2019-03-21

## 2018-07-09 RX ORDER — FLUTICASONE PROPIONATE 50 MCG
1 SPRAY, SUSPENSION (ML) NASAL DAILY
Qty: 1 BOTTLE | Refills: 3 | Status: ON HOLD | OUTPATIENT
Start: 2018-07-09 | End: 2019-03-11

## 2018-07-09 NOTE — NURSING NOTE
Patient here for cough, sinus drainage since May. Marysol Danielle LPN .......................7/9/2018  8:33 AM

## 2018-07-09 NOTE — MR AVS SNAPSHOT
After Visit Summary   7/9/2018    Ovidio Luna    MRN: [id number]           Patient Information     Date Of Birth          1950        Visit Information        Provider Department      7/9/2018 8:30 AM Luan Rodriguez MD [facility]        Today's Diagnoses     Chronic obstructive pulmonary disease with acute exacerbation (H)    -  1    Tobacco abuse        Chronic non-seasonal allergic rhinitis, unspecified trigger           Follow-ups after your visit        Who to contact     If you have questions or need follow up information about today's clinic visit or your schedule please contact [facility] directly at [phone].  Normal or non-critical lab and imaging results will be communicated to you by MyChart, letter or phone within 4 business days after the clinic has received the results. If you do not hear from us within 7 days, please contact the clinic through MyChart or phone. If you have a critical or abnormal lab result, we will notify you by phone as soon as possible.  Submit refill requests through [employer] or call your pharmacy and they will forward the refill request to us. Please allow 3 business days for your refill to be completed.          Additional Information About Your Visit        Care EveryWhere ID     This is your Care EveryWhere ID. This could be used by other organizations to access your [facility] medical records  [id number]        Your Vitals Were     Pulse Pulse Oximetry BMI (Body Mass Index)             64 97% 27.58 kg/m2          Blood Pressure from Last 3 Encounters:   07/09/18 98/68   06/07/18 114/70   06/03/18 114/80    Weight from Last 3 Encounters:   07/09/18 150 lb 12.8 oz (68.4 kg)   06/07/18 151 lb (68.5 kg)   06/03/18 150 lb (68 kg)              Today, you had the following     No orders found for display         Today's Medication Changes          These changes are accurate as of 7/9/18 11:59 PM.  If you have  any questions, ask your nurse or doctor.               Start taking these medicines.        Dose/Directions    benzonatate 200 MG capsule   Commonly known as:  TESSALON   Used for:  Tobacco abuse, Chronic obstructive pulmonary disease with acute exacerbation (H)   Started by:  Luan Rodriguez MD        Dose:  200 mg   Take 1 capsule (200 mg) by mouth 3 times daily as needed for cough   Quantity:  21 capsule   Refills:  3       fluticasone 50 MCG/ACT spray   Commonly known as:  FLONASE   Used for:  Chronic non-seasonal allergic rhinitis, unspecified trigger   Started by:  Luan Rodriguez MD        Dose:  1 spray   Spray 1 spray into both nostrils daily   Quantity:  1 Bottle   Refills:  3       fluticasone-salmeterol 250-50 MCG/DOSE diskus inhaler   Commonly known as:  ADVAIR   Used for:  Chronic non-seasonal allergic rhinitis, unspecified trigger   Started by:  Luan Rodriguez MD        Dose:  1 puff   Inhale 1 puff into the lungs 2 times daily   Quantity:  1 Inhaler   Refills:  3         Stop taking these medicines if you haven't already. Please contact your care team if you have questions.     doxycycline 100 MG capsule   Commonly known as:  VIBRAMYCIN   Stopped by:  Luan Rodriguez MD           levofloxacin 500 MG tablet   Commonly known as:  LEVAQUIN   Stopped by:  Luan Rodriguez MD           predniSONE 20 MG tablet   Commonly known as:  DELTASONE   Stopped by:  Luan Rodriguez MD                Where to get your medicines      These medications were sent to [id number] - [place], [place] - [address]  [address], [facility] [address]     Phone:  [phone]     benzonatate 200 MG capsule    fluticasone 50 MCG/ACT spray    fluticasone-salmeterol 250-50 MCG/DOSE diskus inhaler                Primary Care Provider Office Phone # Fax #    Glynn Botello [phone]       [address] [facility] [address]        Equal Access  to Services     EMMA PAYNE : Aquiles Griggs, waheather mendoza, qamadysonteri kaalmatiffany raymundo. So [facility] [phone].    ATENCIÓN: Si habla jina, tiene a garcia disposición servicios gratuitos de asistencia lingüística. Llame al [phone].    We comply with applicable federal civil rights laws and [place] laws. We do not discriminate on the basis of race, color, national origin, age, disability, sex, sexual orientation, or gender identity.            Thank you!     Thank you for choosing [facility] AND [facility] [place]  for your care. Our goal is always to provide you with excellent care. Hearing back from our patients is one way we can continue to improve our services. Please take a few minutes to complete the written survey that you may receive in the mail after your visit with us. Thank you!             Your Updated Medication List - Protect others around you: Learn how to safely use, store and throw away your medicines at www.disposemymeds.org.          This list is accurate as of 7/9/18 11:59 PM.  Always use your most recent med list.                   Brand Name Dispense Instructions for use Diagnosis    albuterol 108 (90 Base) MCG/ACT Inhaler    PROAIR HFA/PROVENTIL HFA/VENTOLIN HFA    1 Inhaler    Inhale 1-2 puffs into the lungs every 6 hours as needed for shortness of breath / dyspnea or wheezing    COPD exacerbation (H)       amLODIPine 5 MG tablet    NORVASC    90 tablet    Take 1 tablet (5 mg) by mouth daily    Essential hypertension       benzonatate 200 MG capsule    TESSALON    21 capsule    Take 1 capsule (200 mg) by mouth 3 times daily as needed for cough    Tobacco abuse, Chronic obstructive pulmonary disease with acute exacerbation (H)       busPIRone 10 MG tablet    BUSPAR    90 tablet    Take 1 tablet (10 mg) by mouth 3 times daily as needed    Adjustment disorder, unspecified type       citalopram 10 MG tablet    celeXA    90 tablet     Take 1 tablet (10 mg) by mouth every morning    Anxiety       fexofenadine 180 MG tablet    ALLEGRA     Take 180 mg by mouth daily with food        fish oil-omega-3 fatty acids 1000 MG capsule      Take 1 capsule by mouth daily        fluticasone 50 MCG/ACT spray    FLONASE    1 Bottle    Spray 1 spray into both nostrils daily    Chronic non-seasonal allergic rhinitis, unspecified trigger       fluticasone-salmeterol 250-50 MCG/DOSE diskus inhaler    ADVAIR    1 Inhaler    Inhale 1 puff into the lungs 2 times daily    Chronic non-seasonal allergic rhinitis, unspecified trigger       hydrochlorothiazide 25 MG tablet    HYDRODIURIL    90 tablet    Take 1 tablet (25 mg) by mouth daily    Hypertension       metoprolol tartrate 100 MG tablet    LOPRESSOR    180 tablet    Take 1 tablet (100 mg) by mouth 2 times daily    Essential hypertension       naproxen 500 MG tablet    NAPROSYN    60 tablet    Take 1 tablet (500 mg) by mouth 2 times daily (with meals)    Multiple joint pain       nitroGLYcerin 0.4 MG/SPRAY spray    NITROLINGUAL     Place 1 spray under the tongue        omeprazole 20 MG CR capsule    priLOSEC    90 capsule    Take 1 capsule (20 mg) by mouth every morning (before breakfast)    Gastroesophageal reflux disease, esophagitis presence not specified       potassium chloride SA 20 MEQ CR tablet    K-DUR/KLOR-CON M    60 tablet    Take 1 tablet (20 mEq) by mouth 2 times daily (with meals)    Hypokalemia       simvastatin 80 MG tablet    ZOCOR    90 tablet    Take 1 tablet (80 mg) by mouth daily    Hyperlipidemia, unspecified hyperlipidemia type       vitamin D3 2000 units Caps      Take 2,000 Units by mouth daily

## 2018-07-10 ASSESSMENT — PATIENT HEALTH QUESTIONNAIRE - PHQ9: SUM OF ALL RESPONSES TO PHQ QUESTIONS 1-9: 0

## 2018-07-11 NOTE — PROGRESS NOTES
SUBJECTIVE:   Ovidio Luna is a 67 year old female who presents to clinic today for the following health issues: Cough congestion    HPI Comments: Patient arrives here for cough and congestion.  Very productive cough since May.  She does smoke.  She is been on doxycycline and Levaquin without any improvement.  Recent chest x-ray was unremarkable.  She is also been on steroid orally without any improvement.        Patient Active Problem List    Diagnosis Date Noted     Chronic non-seasonal allergic rhinitis 07/09/2018     Priority: Medium     Chronic obstructive pulmonary disease with acute exacerbation (H) 06/03/2018     Priority: Medium     Coronary atherosclerosis 01/17/2018     Priority: Medium     Overview:   Coronary artery disease, PTCA of mid LAD and PTCA of the proximal right   coronary artery 2/28/04, normal left ventricular systolic function       Hemangioma 01/17/2018     Priority: Medium     Overview:   Multiple capillary spider hemangioma       Hyperlipidemia 01/17/2018     Priority: Medium     Hypertension 01/17/2018     Priority: Medium     Peripheral vascular disease (H) 01/17/2018     Priority: Medium     Other affections of shoulder region, not elsewhere classified 01/17/2018     Priority: Medium     Tobacco abuse 01/17/2018     Priority: Medium     Centrilobular emphysema (H) 12/12/2016     Priority: Medium     Lumbago 08/17/2012     Priority: Medium     Nonallopathic lesion of pelvic region 08/17/2012     Priority: Medium     Nonallopathic lesion of thoracic region 08/17/2012     Priority: Medium     Hemiplegia, late effect of cerebrovascular disease (H) 06/15/2012     Priority: Medium     Symptomatic menopausal or female climacteric states 03/12/2010     Priority: Medium     Disorder of bone and cartilage 11/14/2008     Priority: Medium     Overview:   DXA scan - osteopenia of hips.  Normal density LS spine.       Gastritis 10/27/2008     Priority: Medium     Overview:   Acute       Past  Medical History:   Diagnosis Date     Allergy status to other antibiotic agents status     ERT     Atherosclerosis     History of ASO with claudication     Atherosclerotic heart disease of native coronary artery without angina pectoris     2004,Stent times 2     Cerebral infarction (H)     05/20/2012,CVA with residual left sided weakness, incidental meningioma found     Closed fracture of shaft of left tibia     12/26/01,History of bilateral tibial fx, work related injury     Personal history of other diseases of the musculoskeletal system and connective tissue      03/14/06,Osteopenia, proximal femur.  Normal bone mineral density lumbar spine 03/14/06.  Start Fosamax 70 mg. weekly (didn't start until 08/07), stopped fosamax.     Personal history of other diseases of the nervous system and sense organs     History of  migraines     Zoster without complications     2008,left shoulder and neck     Zoster without complications     209/25008,Herpes zoster, left shoulder and neck      Past Surgical History:   Procedure Laterality Date     COLONOSCOPY      2004,2 hyperplastic polyps, repeat in 2014     COLONOSCOPY      1992, 2004,Colonoscopy, normal     HEMORRHOID SURGERY      No Comments Provided     HYSTERECTOMY TOTAL ABDOMINAL, BILATERAL SALPINGO-OOPHORECTOMY, COMBINED      1987     OTHER SURGICAL HISTORY      2003,206617,STRESS TEST PHARMACOLOGICAL,dobutamine, normal     OTHER SURGICAL HISTORY      2004,86659.0,AR BYPASS GRAFT AORTOBIFEMORAL     OTHER SURGICAL HISTORY      2004,LOC-1006.05,PTCA,mid LAD, proximal RCA     OTHER SURGICAL HISTORY      2006,206617,STRESS TEST PHARMACOLOGICAL,adenosine, negative     OTHER SURGICAL HISTORY      03/06,054950,NM CARDIAC MPI STRESS TEST,Adenosine Cardiolite stress done and is negative for ischemia or infarction.  Ejection fraction is 54%.     OTHER SURGICAL HISTORY      03/08/16,LIJ378,CYSTOSCOPY W/ URETEROSCOPY W/ LITHOTRIPSY,Right,Dr. Mario HOGUE     Social History      Social History Narrative    ** Merged History Encounter **         Works at [employer]  [facility] Mother  [facility] Sister  One brother     Current Outpatient Prescriptions   Medication Sig Dispense Refill     albuterol (PROAIR HFA/PROVENTIL HFA/VENTOLIN HFA) 108 (90 Base) MCG/ACT Inhaler Inhale 1-2 puffs into the lungs every 6 hours as needed for shortness of breath / dyspnea or wheezing 1 Inhaler 0     amLODIPine (NORVASC) 5 MG tablet Take 1 tablet (5 mg) by mouth daily 90 tablet 1     benzonatate (TESSALON) 200 MG capsule Take 1 capsule (200 mg) by mouth 3 times daily as needed for cough 21 capsule 3     busPIRone (BUSPAR) 10 MG tablet Take 1 tablet (10 mg) by mouth 3 times daily as needed 90 tablet 3     Cholecalciferol (VITAMIN D3) 2000 UNITS CAPS Take 2,000 Units by mouth daily       citalopram (CELEXA) 10 MG tablet Take 1 tablet (10 mg) by mouth every morning 90 tablet 2     fexofenadine (ALLEGRA) 180 MG tablet Take 180 mg by mouth daily with food       fish oil-omega-3 fatty acids 1000 MG capsule Take 1 capsule by mouth daily       fluticasone (FLONASE) 50 MCG/ACT spray Spray 1 spray into both nostrils daily 1 Bottle 3     fluticasone-salmeterol (ADVAIR) 250-50 MCG/DOSE diskus inhaler Inhale 1 puff into the lungs 2 times daily 1 Inhaler 3     hydrochlorothiazide (HYDRODIURIL) 25 MG tablet Take 1 tablet (25 mg) by mouth daily 90 tablet 1     metoprolol tartrate (LOPRESSOR) 100 MG tablet Take 1 tablet (100 mg) by mouth 2 times daily 180 tablet 1     naproxen (NAPROSYN) 500 MG tablet Take 1 tablet (500 mg) by mouth 2 times daily (with meals) 60 tablet 11     nitroGLYcerin (NITROLINGUAL) 0.4 MG/SPRAY spray Place 1 spray under the tongue       omeprazole (PRILOSEC) 20 MG CR capsule Take 1 capsule (20 mg) by mouth every morning (before breakfast) 90 capsule 2     potassium chloride SA (K-DUR/KLOR-CON M) 20 MEQ CR tablet Take 1 tablet (20 mEq) by mouth 2 times daily (with meals) 60  tablet 11     simvastatin (ZOCOR) 80 MG tablet Take 1 tablet (80 mg) by mouth daily 90 tablet 3     Allergies   Allergen Reactions     Codeine      Rapid heart rate.  Nauseated  Other reaction(s): Confusion  Rapid heart rate.  Nauseated     Diphenhydramine      Rapid heart Rate  Other reaction(s): Tachycardia  Rapid heart Rate  Other reaction(s): Tachycardia  Rapid heart Rate     Lisinopril Cough     Morphine      Rapid heart rate  Other reaction(s): Tachycardia  Rapid heart rate     No Clinical Screening - See Comments      Pt states allergies to white/yellow gold.  Sugical steel.  Copper.     Propoxyphene N-Apap      unknown  Other reaction(s): Tremors  unknown     Varenicline Nausea and Vomiting     Tetracycline Rash     unknown  Other reaction(s): Tremors  unknown       Review of Systems     OBJECTIVE:     BP 98/68  Pulse 64  Wt 150 lb 12.8 oz (68.4 kg)  SpO2 97%  BMI 27.58 kg/m2  Body mass index is 27.58 kg/(m^2).  Physical Exam   Constitutional: She appears well-developed.   Older than stated age   HENT:   Head: Normocephalic.   Eyes: Pupils are equal, round, and reactive to light.   Cardiovascular: Normal rate and regular rhythm.    Pulmonary/Chest: Effort normal and breath sounds normal.   Musculoskeletal: Normal range of motion.   Neurological: She is alert.       none     ASSESSMENT/PLAN:         1. Chronic obstructive pulmonary disease with acute exacerbation (H)  Encourage tobacco sensation.  Patient's only mildly receptive to this.  - benzonatate (TESSALON) 200 MG capsule; Take 1 capsule (200 mg) by mouth 3 times daily as needed for cough  Dispense: 21 capsule; Refill: 3    2. Tobacco abuse  .- benzonatate (TESSALON) 200 MG capsule; Take 1 capsule (200 mg) by mouth 3 times daily as needed for cough  Dispense: 21 capsule; Refill: 3    3. Chronic non-seasonal allergic rhinitis, unspecified trigger  We will also add Advair and Flonase.  I advised her that the cough may not resolve as long as she  continues to smoke  - fluticasone-salmeterol (ADVAIR) 250-50 MCG/DOSE diskus inhaler; Inhale 1 puff into the lungs 2 times daily  Dispense: 1 Inhaler; Refill: 3  - fluticasone (FLONASE) 50 MCG/ACT spray; Spray 1 spray into both nostrils daily  Dispense: 1 Bottle; Refill: 3      Luan Rodriguez MD  [facility]

## 2018-07-23 NOTE — PROGRESS NOTES
Patient Information     Patient Name  Ovidio Luna MRN  [id number] Sex  Female   1950      Letter by Glynn Botello MD at      Author:  Glynn Botello MD Service:  (none) Author Type:  (none)    Filed:   Encounter Date:  3/31/2017 Status:  (Other)           Ovidio Luna  [address] [place] [address]          2017    Dear Ms. Luna:    Your recent lab values can be seen below.     Your cholesterol panel is stable. Your fasting glucose continues to be on the elevated side placing you at risk for diabetes in the future. I'll continue to recommend daily exercise and improving your diet. Your kidney and liver tests came back normal. We should repeat these labs again in one year.    If you have any questions, do not hesitate to contact me.    Results for orders placed or performed in visit on 17      LIPID PANEL      Result  Value Ref Range    CHOLESTEROL,TOTAL 193 <200 mg/dL    TRIGLYCERIDES 177 (H) <150 mg/dL    HDL CHOLESTEROL 49 23 - 92 mg/dL    NON-HDL CHOLESTEROL 144 <145 mg/dl    CHOL/HDL RATIO            3.94 <4.50                    LDL CHOLESTEROL 109 (H) <100 mg/dL    PATIENT STATUS            FASTING                   COMP METABOLIC PANEL      Result  Value Ref Range    SODIUM 141 133 - 143 mmol/L    POTASSIUM 4.0 3.5 - 5.1 mmol/L    CHLORIDE 105 98 - 107 mmol/L    CO2,TOTAL 27 21 - 31 mmol/L    ANION GAP 9 5 - 18                    GLUCOSE 114 (H) 70 - 105 mg/dL    CALCIUM 9.5 8.6 - 10.3 mg/dL    BUN 16 7 - 25 mg/dL    CREATININE 0.89 0.70 - 1.30 mg/dL    BUN/CREAT RATIO           18                    GFR if African American >60 >60 ml/min/1.73m2    GFR if not African American >60 >60 ml/min/1.73m2    ALBUMIN 3.8 3.5 - 5.7 g/dL    PROTEIN,TOTAL 7.1 6.4 - 8.9 g/dL    GLOBULIN                  3.3 2.0 - 3.7 g/dL    A/G RATIO 1.2 1.0 - 2.0                    BILIRUBIN,TOTAL 0.4 0.3 - 1.0 mg/dL    ALK PHOSPHATASE 53 34 - 104 IU/L    ALT (SGPT) 21 7 - 52 IU/L    AST (SGOT) 23  13 - 39 IU/L         Sincerely,        Anup Botello MD  Family Medicine

## 2018-07-23 NOTE — PROGRESS NOTES
Patient Information     Patient Name  Ovidio Luna MRN  [id number] Sex  Female   1950      Letter by Homar Herrera NP at      Author:  Homar Herrera NP Service:  (none) Author Type:  (none)    Filed:   Encounter Date:  2017 Status:  (Other)             Work/School Excuse and Restrictions                    Discharged:                                                              9:43 AM  2017        Ovidio Luna  was seen today in the [facility].    Ovidio is able to return to work with no restrictions on 17.            If you have any questions regarding the validity of this note please call the number above.       *As an acute care facility, we do not provide follow-up care and are therefore unable to complete paperwork for injuries requiring more than 72 hours away from work. Patients need to follow up with a primary care or occupational health provider.    **If you have questions regarding the validity of this note, please call the number above.        HOMAR HERRERA NP ....................  2017   9:44 AM

## 2018-07-23 NOTE — PROGRESS NOTES
Patient Information     Patient Name  Ovidio Luna MRN  [id number] Sex  Female   1950      Letter by China Weaver NP at      Author:  China Weaver NP Service:  (none) Author Type:  (none)    Filed:   Encounter Date:  10/8/2017 Status:  (Other)           Ovidio Luna  [address] [facility] [address]          2017    To whom it may concern,     Ovidio Luna was seen today in the [facility] and missed work. Patient may return to work on Monday 10/9/17.    Thank you,    China Weaver MSN, FNP  [facility]

## 2018-07-24 NOTE — PROGRESS NOTES
Patient Information     Patient Name  Ovidio Luna MRN  [id number] Sex  Female   1950      Letter by Glynn Botello MD at      Author:  Glynn Botello MD Service:  (none) Author Type:  (none)    Filed:   Encounter Date:  3/31/2017 Status:  (Other)           Ovidio Luna  [address] [facility] [address]          2017      CERTIFICATE TO RETURN TO WORK OR SCHOOL      Ovidio Luna was seen in clinic today, 3/31/2017.  She will be unable to work at this time due to a non-work related medical problem.  She will be seen in 1 month for reassessment where workability will be updated.    Sincerely,          Glynn Botello MD

## 2018-07-24 NOTE — PROGRESS NOTES
Patient Information     Patient Name  Ovidio Luna MRN  [id number] Sex  Female   1950      Letter by Glynn Botello MD at      Author:  Glynn Botello MD Service:  (none) Author Type:  (none)    Filed:   Encounter Date:  3/1/2017 Status:  (Other)           Ovidio Luna  [address] [facility] [address]          2017    Dear Ms. Luna:    This is to remind you that you are due for your 1 year medication management appointment with Glynn Botello MD in regards to hyperlipidemia as well as continued use of simvastatin.  Your last visit was on 2/15/16. Additional refills of your medication require you to complete this visit.    Please call [phone] to schedule your appointment.    Thank you for choosing [facility] for your health care needs.    Sincerely,      Refill RN  [facility]

## 2018-07-24 NOTE — PROGRESS NOTES
Patient Information     Patient Name  Ovidio Luna MRN  [id number] Sex  Female   1950      Letter by Glynn Botello MD at      Author:  Glynn Botello MD Service:  (none) Author Type:  (none)    Filed:   Encounter Date:  2017 Status:  (Other)           Ovidio Luna  [address] [facility] [address]          2017      CERTIFICATE TO RETURN TO WORK OR SCHOOL      Ovidio Luna was seen in clinic today, 2017.  She will be unable to work at this time due to a non-work related medical problem.  She will be seen in 1 month for reassessment where workability will be updated.    Sincerely,          Glynn Botello MD

## 2018-07-24 NOTE — PROGRESS NOTES
Patient Information     Patient Name  Ovidio Luna MRN  [id number] Sex  Female   1950      Letter by Homar Herrera NP at      Author:  Homar Herrera NP Service:  (none) Author Type:  (none)    Filed:   Encounter Date:  12/15/2017 Status:  (Other)             Work/School Excuse and Restrictions                    Discharged:                                                              10:35 AM  12/15/2017        Ovidio Luna  was seen today in the [facility] for illness.    Ovidio is unable to return to work until  due to illness.                 *As an acute care facility, we do not provide follow-up care and are therefore unable to complete paperwork for injuries requiring more than 72 hours away from work. Patients need to follow up with a primary care or occupational health provider.    **If you have questions regarding the validity of this note, please call the number above.          HOMAR HERRERA NP ....................  12/15/2017   10:36 AM

## 2018-08-23 DIAGNOSIS — I10 HYPERTENSION, UNSPECIFIED TYPE: ICD-10-CM

## 2018-08-27 RX ORDER — HYDROCHLOROTHIAZIDE 25 MG/1
25 TABLET ORAL DAILY
Qty: 90 TABLET | Refills: 3 | Status: SHIPPED | OUTPATIENT
Start: 2018-08-27 | End: 2019-06-10

## 2018-10-01 ENCOUNTER — OFFICE VISIT (OUTPATIENT)
Dept: FAMILY MEDICINE | Facility: OTHER | Age: 68
End: 2018-10-01
Payer: COMMERCIAL

## 2018-10-01 VITALS
HEIGHT: 62 IN | OXYGEN SATURATION: 97 % | HEART RATE: 48 BPM | RESPIRATION RATE: 16 BRPM | DIASTOLIC BLOOD PRESSURE: 60 MMHG | TEMPERATURE: 96.7 F | BODY MASS INDEX: 28.36 KG/M2 | SYSTOLIC BLOOD PRESSURE: 106 MMHG | WEIGHT: 154.1 LBS

## 2018-10-01 DIAGNOSIS — J01.00 ACUTE NON-RECURRENT MAXILLARY SINUSITIS: Primary | ICD-10-CM

## 2018-10-01 DIAGNOSIS — J01.10 ACUTE NON-RECURRENT FRONTAL SINUSITIS: ICD-10-CM

## 2018-10-01 PROCEDURE — G0463 HOSPITAL OUTPT CLINIC VISIT: HCPCS

## 2018-10-01 PROCEDURE — 99213 OFFICE O/P EST LOW 20 MIN: CPT | Performed by: PHYSICIAN ASSISTANT

## 2018-10-01 ASSESSMENT — PAIN SCALES - GENERAL: PAINLEVEL: SEVERE PAIN (6)

## 2018-10-01 NOTE — NURSING NOTE
"Patient presents to the clinic for possible sinus infection. States her s/sx started on Saturday and include right sided sinus pressure/congestion and headache. No OTC. Afebrile.   Eveline Parker LPN............. October 1, 2018 10:06 AM       Chief Complaint   Patient presents with     Sinus Problem       Initial /60 (BP Location: Left arm, Patient Position: Sitting, Cuff Size: Adult Regular)  Pulse (!) 48  Temp 96.7  F (35.9  C) (Tympanic)  Resp 16  Ht 5' 2\" (1.575 m)  Wt 154 lb 1.6 oz (69.9 kg)  SpO2 97%  Breastfeeding? No  BMI 28.19 kg/m2 Estimated body mass index is 28.19 kg/(m^2) as calculated from the following:    Height as of this encounter: 5' 2\" (1.575 m).    Weight as of this encounter: 154 lb 1.6 oz (69.9 kg).  Medication Reconciliation: complete    Eveline Parker LPN   "

## 2018-10-01 NOTE — PROGRESS NOTES
SUBJECTIVE: Ovidio Luna is a 67 year old female patient complaining of sinus pain, frontal headache, muffled hearing. Onset 3 days ago. Course is worsening. Associated symptoms: denies fever, cough, chest pain, shortness of breath.     Treatments: saline spray, Flonase, Advair  History of allergies and asthma  Tobacco use: typically 1 ppd,no about 1/3 ppd    Past Medical History:   Diagnosis Date     Allergy status to other antibiotic agents status     ERT     Atherosclerosis     History of ASO with claudication     Atherosclerotic heart disease of native coronary artery without angina pectoris     2004,Stent times 2     Cerebral infarction (H)     05/20/2012,CVA with residual left sided weakness, incidental meningioma found     Closed fracture of shaft of left tibia     12/26/01,History of bilateral tibial fx, work related injury     Personal history of other diseases of the musculoskeletal system and connective tissue      03/14/06,Osteopenia, proximal femur.  Normal bone mineral density lumbar spine 03/14/06.  Start Fosamax 70 mg. weekly (didn't start until 08/07), stopped fosamax.     Personal history of other diseases of the nervous system and sense organs     History of  migraines     Zoster without complications     2008,left shoulder and neck     Zoster without complications     [id number]/[id number],Herpes zoster, left shoulder and neck     Current Outpatient Prescriptions   Medication     albuterol (PROAIR HFA/PROVENTIL HFA/VENTOLIN HFA) 108 (90 Base) MCG/ACT Inhaler     amLODIPine (NORVASC) 5 MG tablet     benzonatate (TESSALON) 200 MG capsule     busPIRone (BUSPAR) 10 MG tablet     Cholecalciferol (VITAMIN D3) 2000 UNITS CAPS     citalopram (CELEXA) 10 MG tablet     fexofenadine (ALLEGRA) 180 MG tablet     fish oil-omega-3 fatty acids 1000 MG capsule     fluticasone (FLONASE) 50 MCG/ACT spray     fluticasone-salmeterol (ADVAIR) 250-50 MCG/DOSE diskus inhaler     hydrochlorothiazide (HYDRODIURIL) 25 MG tablet      "metoprolol tartrate (LOPRESSOR) 100 MG tablet     naproxen (NAPROSYN) 500 MG tablet     nitroGLYcerin (NITROLINGUAL) 0.4 MG/SPRAY spray     omeprazole (PRILOSEC) 20 MG CR capsule     potassium chloride SA (K-DUR/KLOR-CON M) 20 MEQ CR tablet     simvastatin (ZOCOR) 80 MG tablet     No current facility-administered medications for this visit.         Allergies   Allergen Reactions     Codeine      Rapid heart rate.  Nauseated  Other reaction(s): Confusion  Rapid heart rate.  Nauseated     Diphenhydramine      Rapid heart Rate  Other reaction(s): Tachycardia  Rapid heart Rate  Other reaction(s): Tachycardia  Rapid heart Rate     Lisinopril Cough     Morphine      Rapid heart rate  Other reaction(s): Tachycardia  Rapid heart rate     No Clinical Screening - See Comments      Pt states allergies to white/yellow gold.  Sugical steel.  Copper.     Propoxyphene N-Apap      unknown  Other reaction(s): Tremors  unknown     Varenicline Nausea and Vomiting     Tetracycline Rash     unknown  Other reaction(s): Tremors  unknown     ROS  General: fatigue, chills, no sweats or fever  HENT: sinus symptoms  Respiratory: no cough    OBJECTIVE:   /60 (BP Location: Left arm, Patient Position: Sitting, Cuff Size: Adult Regular)  Pulse (!) 48  Temp 96.7  F (35.9  C) (Tympanic)  Resp 16  Ht 5' 2\" (1.575 m)  Wt 154 lb 1.6 oz (69.9 kg)  SpO2 97%  Breastfeeding? No  BMI 28.19 kg/m2    The patient appears healthy, alert and mild distress.   EARS: positive findings: Mld effusion bilaterally  NOSE/SINUS: positive findings: mucosa erythematous and swollen  Sinus palpation: Frontal sinus and Maxillary sinus tender to palpation   THROAT: mild erythema   NECK:positive findings: mild adenopathy   CHEST: Clear    ASSESSMENT:   (J01.00) Acute non-recurrent maxillary sinusitis  (primary encounter diagnosis)  (J01.10) Acute non-recurrent frontal sinusitis    Plan: amoxicillin-clavulanate (AUGMENTIN) 875-125 MG         per tablet    Acute " maxillary sinusitis  Start Augmentin oral tablet, take twice daily for 10-14 days. Use a probiotic while on this medicine. Take with food.   Warm compress, hot steamy shower  OTC decongestant (sudafed), OTC 3 day nasal spray - Afrin, OTC inhaled corticosteroid - Flonase, OTC antihistamine - cetirizine as directed, OTC Nasal sinus rinse.   Ibuprofen or tylenol as directed for discomfort or fever  Return to clinic if symptoms persist or worsen  Patient received verbal and written instruction including review of warning signs    Lisa Quigley PA-C on 10/1/2018 at 12:42 PM

## 2018-10-01 NOTE — PATIENT INSTRUCTIONS
Acute maxillary sinusitis  Start Augmentin oral tablet, take twice daily for 10-14 days. Use a probiotic while on this medicine. Take with food.   Warm compress, hot steamy shower  OTC decongestant (sudafed), OTC 3 day nasal spray - Afrin, OTC inhaled corticosteroid - Flonase, OTC antihistamine - cetirizine as directed, OTC Nasal sinus rinse.   Ibuprofen or tylenol as directed for discomfort or fever  Return to clinic if symptoms persist or worsen  Seek immediate care for    Facial pain or headache that gets worse    Stiff neck    Unusual drowsiness or confusion    Swelling of your forehead or eyelids    Vision problems, such as blurred or double vision    Fever of 100.4 F (38 C) or higher, or as directed by your healthcare provider    Seizure    Breathing problems    Symptoms don't go away in 10 days    Sinusitis (Antibiotic Treatment)    The sinuses are air-filled spaces within the bones of the face. They connect to the inside of the nose. Sinusitis is an inflammation of the tissue that lines the sinuses. Sinusitis can occur during a cold. It can also happen due to allergies to pollens and other particles in the air. Sinusitis can cause symptoms of sinus congestion and a feeling of fullness. A sinus infection causes fever, headache, and facial pain. There is often green or yellow fluid draining from the nose or into the back of the throat (post-nasal drip). You have been given antibiotics to treat this condition.  Home care    Take the full course of antibiotics as instructed. Do not stop taking them, even when you feel better.    Drink plenty of water, hot tea, and other liquids. This may help thin nasal mucus. It also may help your sinuses drain fluids.    Heat may help soothe painful areas of your face. Use a towel soaked in hot water. Or,  the shower and direct the warm spray onto your face. Using a vaporizer along with a menthol rub at night may also help soothe symptoms.     An expectorant with  guaifenesin may help thin nasal mucus and help your sinuses drain fluids.    You can use an over-the-counter decongestant, unless a similar medicine was prescribed to you. Nasal sprays work the fastest. Use one that contains phenylephrine or oxymetazoline. First blow your nose gently. Then use the spray. Do not use these medicines more often than directed on the label. If you do, your symptoms may get worse. You may also take pills that contain pseudoephedrine. Don t use products that combine multiple medicines. This is because side effects may be increased. Read labels. You can also ask the pharmacist for help. (People with high blood pressure should not use decongestants. They can raise blood pressure.)    Over-the-counter antihistamines may help if allergies contributed to your sinusitis.      Do not use nasal rinses or irrigation during an acute sinus infection, unless your healthcare provider tells you to. Rinsing may spread the infection to other areas in your sinuses.    Use acetaminophen or ibuprofen to control pain, unless another pain medicine was prescribed to you. If you have chronic liver or kidney disease or ever had a stomach ulcer, talk with your healthcare provider before using these medicines. (Aspirin should never be taken by anyone under age 18 who is ill with a fever. It may cause severe liver damage.)    Don't smoke. This can make symptoms worse.  Follow-up care  Follow up with your healthcare provider or our staff if you are better in 1 week.  When to seek medical advice  Call your healthcare provider if any of these occur:    Facial pain or headache that gets worse    Stiff neck    Unusual drowsiness or confusion    Swelling of your forehead or eyelids    Vision problems, such as blurred or double vision    Fever of 100.4 F (38 C) or higher, or as directed by your healthcare provider    Seizure    Breathing problems    Symptoms don't go away in 10 days  Prevention  Here are steps you can take  to help prevent an infection:    Keep good hand washing habits.    Don t have close contact with people who have sore throats, colds, or other upper respiratory infections.    Don t smoke, and stay away from secondhand smoke.    Stay up to date with of your vaccines.  Date Last Reviewed: 11/1/2017 2000-2017 [employer]. [address] [place] [address], [place] [address]. All rights reserved. This information is not intended as a substitute for professional medical care. Always follow your healthcare professional's instructions.

## 2018-10-01 NOTE — MR AVS SNAPSHOT
After Visit Summary   10/1/2018    Ovidio Luna    MRN: [id number]           Patient Information     Date Of Birth          1950        Visit Information        Provider Department      10/1/2018 10:00 AM Lisa Quigley PA-C [facility]        Today's Diagnoses     Acute non-recurrent maxillary sinusitis    -  1    Acute non-recurrent frontal sinusitis          Care Instructions    Acute maxillary sinusitis  Start Augmentin oral tablet, take twice daily for 10-14 days. Use a probiotic while on this medicine. Take with food.   Warm compress, hot steamy shower  OTC decongestant (sudafed), OTC 3 day nasal spray - Afrin, OTC inhaled corticosteroid - Flonase, OTC antihistamine - cetirizine as directed, OTC Nasal sinus rinse.   Ibuprofen or tylenol as directed for discomfort or fever  Return to clinic if symptoms persist or worsen  Seek immediate care for    Facial pain or headache that gets worse    Stiff neck    Unusual drowsiness or confusion    Swelling of your forehead or eyelids    Vision problems, such as blurred or double vision    Fever of 100.4 F (38 C) or higher, or as directed by your healthcare provider    Seizure    Breathing problems    Symptoms don't go away in 10 days    Sinusitis (Antibiotic Treatment)    The sinuses are air-filled spaces within the bones of the face. They connect to the inside of the nose. Sinusitis is an inflammation of the tissue that lines the sinuses. Sinusitis can occur during a cold. It can also happen due to allergies to pollens and other particles in the air. Sinusitis can cause symptoms of sinus congestion and a feeling of fullness. A sinus infection causes fever, headache, and facial pain. There is often green or yellow fluid draining from the nose or into the back of the throat (post-nasal drip). You have been given antibiotics to treat this condition.  Home care    Take the full course of antibiotics as instructed. Do not stop  taking them, even when you feel better.    Drink plenty of water, hot tea, and other liquids. This may help thin nasal mucus. It also may help your sinuses drain fluids.    Heat may help soothe painful areas of your face. Use a towel soaked in hot water. Or,  the shower and direct the warm spray onto your face. Using a vaporizer along with a menthol rub at night may also help soothe symptoms.     An expectorant with guaifenesin may help thin nasal mucus and help your sinuses drain fluids.    You can use an over-the-counter decongestant, unless a similar medicine was prescribed to you. Nasal sprays work the fastest. Use one that contains phenylephrine or oxymetazoline. First blow your nose gently. Then use the spray. Do not use these medicines more often than directed on the label. If you do, your symptoms may get worse. You may also take pills that contain pseudoephedrine. Don t use products that combine multiple medicines. This is because side effects may be increased. Read labels. You can also ask the pharmacist for help. (People with high blood pressure should not use decongestants. They can raise blood pressure.)    Over-the-counter antihistamines may help if allergies contributed to your sinusitis.      Do not use nasal rinses or irrigation during an acute sinus infection, unless your healthcare provider tells you to. Rinsing may spread the infection to other areas in your sinuses.    Use acetaminophen or ibuprofen to control pain, unless another pain medicine was prescribed to you. If you have chronic liver or kidney disease or ever had a stomach ulcer, talk with your healthcare provider before using these medicines. (Aspirin should never be taken by anyone under age 18 who is ill with a fever. It may cause severe liver damage.)    Don't smoke. This can make symptoms worse.  Follow-up care  Follow up with your healthcare provider or our staff if you are better in 1 week.  When to seek medical  advice  Call your healthcare provider if any of these occur:    Facial pain or headache that gets worse    Stiff neck    Unusual drowsiness or confusion    Swelling of your forehead or eyelids    Vision problems, such as blurred or double vision    Fever of 100.4 F (38 C) or higher, or as directed by your healthcare provider    Seizure    Breathing problems    Symptoms don't go away in 10 days  Prevention  Here are steps you can take to help prevent an infection:    Keep good hand washing habits.    Don t have close contact with people who have sore throats, colds, or other upper respiratory infections.    Don t smoke, and stay away from secondhand smoke.    Stay up to date with of your vaccines.  Date Last Reviewed: 11/1/2017 2000-2017 [employer]. [address] [place] [address], [place] [address]. All rights reserved. This information is not intended as a substitute for professional medical care. Always follow your healthcare professional's instructions.                Follow-ups after your visit        Who to contact     If you have questions or need follow up information about today's clinic visit or your schedule please contact [facility] directly at [phone].  Normal or non-critical lab and imaging results will be communicated to you by Merushart, letter or phone within 4 business days after the clinic has received the results. If you do not hear from us within 7 days, please contact the clinic through zlient or phone. If you have a critical or abnormal lab result, we will notify you by phone as soon as possible.  Submit refill requests through Headplay or call your pharmacy and they will forward the refill request to us. Please allow 3 business days for your refill to be completed.          Additional Information About Your Visit        Headplay Information     Headplay lets you send messages to your doctor, view your test results, renew your prescriptions, schedule appointments  "and more. To sign up, go to [email or web address] . Click on \"Log in\" on the left side of the screen, which will take you to the Welcome page. Then click on \"Sign up Now\" on the right side of the page.     You will be asked to enter the access code listed below, as well as some personal information. Please follow the directions to create your username and password.     Your access code is: [email or web address]  Expires: 2018 10:22 AM     Your access code will  in 90 days. If you need help or a new code, please call your [facility] clinic or [phone].        Care EveryWhere ID     This is your Care EveryWhere ID. This could be used by other organizations to access your [facility] medical records  [id number]        Your Vitals Were     Pulse Temperature Respirations Height Pulse Oximetry Breastfeeding?    48 96.7  F (35.9  C) (Tympanic) 16 5' 2\" (1.575 m) 97% No    BMI (Body Mass Index)                   28.19 kg/m2            Blood Pressure from Last 3 Encounters:   10/01/18 106/60   18 98/68   18 114/70    Weight from Last 3 Encounters:   10/01/18 154 lb 1.6 oz (69.9 kg)   18 150 lb 12.8 oz (68.4 kg)   18 151 lb (68.5 kg)              Today, you had the following     No orders found for display         Today's Medication Changes          These changes are accurate as of 10/1/18 10:22 AM.  If you have any questions, ask your nurse or doctor.               Start taking these medicines.        Dose/Directions    amoxicillin-clavulanate 875-125 MG per tablet   Commonly known as:  AUGMENTIN   Used for:  Acute non-recurrent maxillary sinusitis, Acute non-recurrent frontal sinusitis   Started by:  Lisa Quigley PA-C        Dose:  1 tablet   Take 1 tablet by mouth 2 times daily for 10 days   Quantity:  20 tablet   Refills:  0            Where to get your medicines      These medications were sent to [id number] - [place], [place] - [address], [facility] [address]     Phone:  [phone]     amoxicillin-clavulanate 875-125 MG per tablet                Primary Care Provider Office Phone # Fax #    Glynn Botello [phone]       [address] [facility] [address]        Equal Access to Services     EMMA PAYNE : Hadii aad ku hadasho Soomaali, waaxda luqadaha, qaybta kaalmada adeegyada, waxnuha avendañoin hayaan adepedro galeminafartun buckner . So [facility] [phone].    ATENCIÓN: Si delta muro, tiene a garcia disposición servicios gratuitos de asistencia lingüística. [facility] [phone].    We comply with applicable federal civil rights laws and [place] laws. We do not discriminate on the basis of race, color, national origin, age, disability, sex, sexual orientation, or gender identity.            Thank you!     Thank you for choosing [facility] \[facility]\""  for your care. Our goal is always to provide you with excellent care. Hearing back from our patients is one way we can continue to improve our services. Please take a few minutes to complete the written survey that you may receive in the mail after your visit with us. Thank you!             Your Updated Medication List - Protect others around you: Learn how to safely use, store and throw away your medicines at www.disposemymeds.org.          This list is accurate as of 10/1/18 10:22 AM.  Always use your most recent med list.                   Brand Name Dispense Instructions for use Diagnosis    albuterol 108 (90 Base) MCG/ACT inhaler    PROAIR HFA/PROVENTIL HFA/VENTOLIN HFA    1 Inhaler    Inhale 1-2 puffs into the lungs every 6 hours as needed for shortness of breath / dyspnea or wheezing    COPD exacerbation (H)       amLODIPine 5 MG tablet    NORVASC    90 tablet    Take 1 tablet (5 mg) by mouth daily    Essential hypertension       amoxicillin-clavulanate 875-125 MG per tablet    AUGMENTIN    20 tablet    Take 1 tablet by mouth 2 times daily for 10 days     Acute non-recurrent maxillary sinusitis, Acute non-recurrent frontal sinusitis       benzonatate 200 MG capsule    TESSALON    21 capsule    Take 1 capsule (200 mg) by mouth 3 times daily as needed for cough    Tobacco abuse, Chronic obstructive pulmonary disease with acute exacerbation (H)       busPIRone 10 MG tablet    BUSPAR    90 tablet    Take 1 tablet (10 mg) by mouth 3 times daily as needed    Adjustment disorder, unspecified type       citalopram 10 MG tablet    celeXA    90 tablet    Take 1 tablet (10 mg) by mouth every morning    Anxiety       fexofenadine 180 MG tablet    ALLEGRA     Take 180 mg by mouth daily with food        fish oil-omega-3 fatty acids 1000 MG capsule      Take 1 capsule by mouth daily        fluticasone 50 MCG/ACT spray    FLONASE    1 Bottle    Spray 1 spray into both nostrils daily    Chronic non-seasonal allergic rhinitis, unspecified trigger       fluticasone-salmeterol 250-50 MCG/DOSE diskus inhaler    ADVAIR    1 Inhaler    Inhale 1 puff into the lungs 2 times daily    Chronic non-seasonal allergic rhinitis, unspecified trigger       hydrochlorothiazide 25 MG tablet    HYDRODIURIL    90 tablet    Take 1 tablet (25 mg) by mouth daily    Hypertension, unspecified type       metoprolol tartrate 100 MG tablet    LOPRESSOR    180 tablet    Take 1 tablet (100 mg) by mouth 2 times daily    Essential hypertension       naproxen 500 MG tablet    NAPROSYN    60 tablet    Take 1 tablet (500 mg) by mouth 2 times daily (with meals)    Multiple joint pain       nitroGLYcerin 0.4 MG/SPRAY spray    NITROLINGUAL     Place 1 spray under the tongue        omeprazole 20 MG CR capsule    priLOSEC    90 capsule    Take 1 capsule (20 mg) by mouth every morning (before breakfast)    Gastroesophageal reflux disease, esophagitis presence not specified       potassium chloride SA 20 MEQ CR tablet    K-DUR/KLOR-CON M    60 tablet    Take 1 tablet (20 mEq) by mouth 2 times daily (with meals)    Hypokalemia        simvastatin 80 MG tablet    ZOCOR    90 tablet    Take 1 tablet (80 mg) by mouth daily    Hyperlipidemia, unspecified hyperlipidemia type       vitamin D3 2000 units Caps      Take 2,000 Units by mouth daily

## 2018-10-03 DIAGNOSIS — F43.20 ADJUSTMENT DISORDER, UNSPECIFIED TYPE: ICD-10-CM

## 2018-10-08 RX ORDER — BUSPIRONE HYDROCHLORIDE 10 MG/1
10 TABLET ORAL 3 TIMES DAILY PRN
Qty: 90 TABLET | Refills: 3 | OUTPATIENT
Start: 2018-10-08

## 2018-10-08 NOTE — TELEPHONE ENCOUNTER
Redundant refill request refused: Too soon:    busPIRone (BUSPAR) 10 MG tablet 90 tablet 3 4/4/2018  No   Sig - Route: Take 1 tablet (10 mg) by mouth 3 times daily as needed - Oral   Class: E-Prescribe   Order: [id number]   E-Prescribing Status: Receipt confirmed by pharmacy (4/4/2018  2:10 PM CDT)     [facility] - [place], [place] - [address]     Unable to complete prescription refill per RN Medication Refill Policy. Daniella Luna RN .............. 10/8/2018  10:52 AM

## 2018-10-15 DIAGNOSIS — I10 ESSENTIAL HYPERTENSION: ICD-10-CM

## 2018-10-15 DIAGNOSIS — F43.20 ADJUSTMENT DISORDER, UNSPECIFIED TYPE: ICD-10-CM

## 2018-10-15 DIAGNOSIS — J44.1 CHRONIC OBSTRUCTIVE PULMONARY DISEASE WITH ACUTE EXACERBATION (H): ICD-10-CM

## 2018-10-15 DIAGNOSIS — Z72.0 TOBACCO ABUSE: ICD-10-CM

## 2018-10-17 RX ORDER — BUSPIRONE HYDROCHLORIDE 10 MG/1
10 TABLET ORAL 3 TIMES DAILY PRN
Qty: 90 TABLET | Refills: 1 | Status: ON HOLD | OUTPATIENT
Start: 2018-10-17 | End: 2019-03-11

## 2018-10-17 RX ORDER — AMLODIPINE BESYLATE 5 MG/1
5 TABLET ORAL DAILY
Qty: 60 TABLET | Refills: 0 | Status: SHIPPED | OUTPATIENT
Start: 2018-10-17 | End: 2018-12-31

## 2018-10-17 NOTE — TELEPHONE ENCOUNTER
"PT IS OUT  Refill request from [facility] for:  amLODIPine (NORVASC) 5 MG tablet-Ordered 4/11/2018 for 90 X 1 (1 tablet once daily)  busPIRone (BUSPAR) 10 MG tablet-Ordered 4/4/2018 for 90 X 3 with updated sig (3 times daily as needed for anxiety-as prn reason noted 5/30/2017)    LOV 10/1/2018 [facility]    LOV in clinic 7/9/2018 with Dr. Rodriguez COPD acute exacerbation    LOV with PCP 1/30/2018-hemiplegia and hemiparesis    Patient is due for medication management appt.     Will provide armand refill and add appt reminder to pharmacy note    Requested Prescriptions   Pending Prescriptions Disp Refills     amLODIPine (NORVASC) 5 MG tablet 90 tablet 1     Sig: Take 1 tablet (5 mg) by mouth daily    Calcium Channel Blockers Protocol  Failed    10/17/2018 10:30 AM       Failed - Normal serum creatinine on file in past 12 months    Recent Labs   Lab Test  10/08/17   1607   CR  0.90            Passed - Blood pressure under 140/90 in past 12 months    BP Readings from Last 3 Encounters:   10/01/18 106/60   07/09/18 98/68   06/07/18 114/70            Passed - Recent (12 mo) or future (30 days) visit within the authorizing provider's specialty    Patient had office visit in the last 12 months or has a visit in the next 30 days with authorizing provider or within the authorizing provider's specialty.  See \"Patient Info\" tab in inbasket, or \"Choose Columns\" in Meds & Orders section of the refill encounter.             Passed - Patient is age 18 or older       Passed - No active pregnancy on record       Passed - No positive pregnancy test in past 12 months        busPIRone (BUSPAR) 10 MG tablet 90 tablet 3     Sig: Take 1 tablet (10 mg) by mouth 3 times daily as needed    Atypical Antidepressants Protocol Passed    10/17/2018 10:30 AM       Passed - Recent (12 mo) or future (30 days) visit within the authorizing provider's specialty    Patient had office visit in the last 12 months or has a visit in the next 30 days with " "authorizing provider or within the authorizing provider's specialty.  See \"Patient Info\" tab in inbasket, or \"Choose Columns\" in Meds & Orders section of the refill encounter.             Passed - Patient is age 18 or older       Passed - No active pregnancy on record       Passed - No positive pregnancy test in past 12 mos        Didi Marks RN  ....................  10/17/2018   11:41 AM      "

## 2018-10-28 ENCOUNTER — OFFICE VISIT (OUTPATIENT)
Dept: FAMILY MEDICINE | Facility: OTHER | Age: 68
End: 2018-10-28
Payer: COMMERCIAL

## 2018-10-28 VITALS
HEART RATE: 65 BPM | DIASTOLIC BLOOD PRESSURE: 72 MMHG | HEIGHT: 62 IN | WEIGHT: 152.6 LBS | TEMPERATURE: 96.3 F | SYSTOLIC BLOOD PRESSURE: 120 MMHG | OXYGEN SATURATION: 98 % | BODY MASS INDEX: 28.08 KG/M2

## 2018-10-28 DIAGNOSIS — R42 VERTIGO: ICD-10-CM

## 2018-10-28 DIAGNOSIS — H92.02 OTALGIA, LEFT: Primary | ICD-10-CM

## 2018-10-28 PROCEDURE — 99213 OFFICE O/P EST LOW 20 MIN: CPT | Performed by: NURSE PRACTITIONER

## 2018-10-28 RX ORDER — MECLIZINE HYDROCHLORIDE 25 MG/1
25 TABLET ORAL EVERY 6 HOURS PRN
Qty: 15 TABLET | Refills: 0 | Status: SHIPPED | OUTPATIENT
Start: 2018-10-28 | End: 2019-03-10

## 2018-10-28 ASSESSMENT — PAIN SCALES - GENERAL: PAINLEVEL: SEVERE PAIN (7)

## 2018-10-28 NOTE — NURSING NOTE
EAR PAIN  Onset: friday  Location:  left  Fever:  no  Recent URI:  Runny nose  Discharge:  no  Able to sleep:  yes  Pain Scale:  7  Patient is having vertigo.  Antoinette Nguyen LPN .............10/28/2018  10:07 AM

## 2018-10-28 NOTE — PROGRESS NOTES
HPI:    Madelineamarikathy Luna is a 67 year old female who presents to clinic today for ear pain. Has pain in left ear and some crackling in her right ear. She does have dizziness with moving too quickly. Has runny nose due to allergies. She is not taking anything for her sx. She is sleeping a lot. No hx of recurrent OM. Was tx for sinusitis earlier this month with Augmentin. Smokes daily.     Past Medical History:   Diagnosis Date     Allergy status to other antibiotic agents status     ERT     Atherosclerosis     History of ASO with claudication     Atherosclerotic heart disease of native coronary artery without angina pectoris     2004,Stent times 2     Cerebral infarction (H)     05/20/2012,CVA with residual left sided weakness, incidental meningioma found     Closed fracture of shaft of left tibia     12/26/01,History of bilateral tibial fx, work related injury     Personal history of other diseases of the musculoskeletal system and connective tissue      03/14/06,Osteopenia, proximal femur.  Normal bone mineral density lumbar spine 03/14/06.  Start Fosamax 70 mg. weekly (didn't start until 08/07), stopped fosamax.     Personal history of other diseases of the nervous system and sense organs     History of  migraines     Zoster without complications     2008,left shoulder and neck     Zoster without complications     209/25008,Herpes zoster, left shoulder and neck       Current Outpatient Prescriptions   Medication Sig Dispense Refill     meclizine (ANTIVERT) 25 MG tablet Take 1 tablet (25 mg) by mouth every 6 hours as needed for dizziness 15 tablet 0     albuterol (PROAIR HFA/PROVENTIL HFA/VENTOLIN HFA) 108 (90 Base) MCG/ACT Inhaler Inhale 1-2 puffs into the lungs every 6 hours as needed for shortness of breath / dyspnea or wheezing 1 Inhaler 0     amLODIPine (NORVASC) 5 MG tablet Take 1 tablet (5 mg) by mouth daily 60 tablet 0     benzonatate (TESSALON) 200 MG capsule Take 1 capsule (200 mg) by mouth 3 times daily  as needed for cough 21 capsule 3     busPIRone (BUSPAR) 10 MG tablet Take 1 tablet (10 mg) by mouth 3 times daily as needed (anxiety) 90 tablet 1     Cholecalciferol (VITAMIN D3) 2000 UNITS CAPS Take 2,000 Units by mouth daily       citalopram (CELEXA) 10 MG tablet Take 1 tablet (10 mg) by mouth every morning 90 tablet 2     fexofenadine (ALLEGRA) 180 MG tablet Take 180 mg by mouth daily with food       fish oil-omega-3 fatty acids 1000 MG capsule Take 1 capsule by mouth daily       fluticasone (FLONASE) 50 MCG/ACT spray Spray 1 spray into both nostrils daily 1 Bottle 3     fluticasone-salmeterol (ADVAIR) 250-50 MCG/DOSE diskus inhaler Inhale 1 puff into the lungs 2 times daily 1 Inhaler 3     hydrochlorothiazide (HYDRODIURIL) 25 MG tablet Take 1 tablet (25 mg) by mouth daily 90 tablet 3     metoprolol tartrate (LOPRESSOR) 100 MG tablet Take 1 tablet (100 mg) by mouth 2 times daily 180 tablet 1     naproxen (NAPROSYN) 500 MG tablet Take 1 tablet (500 mg) by mouth 2 times daily (with meals) 60 tablet 11     nitroGLYcerin (NITROLINGUAL) 0.4 MG/SPRAY spray Place 1 spray under the tongue       omeprazole (PRILOSEC) 20 MG CR capsule Take 1 capsule (20 mg) by mouth every morning (before breakfast) 90 capsule 2     potassium chloride SA (K-DUR/KLOR-CON M) 20 MEQ CR tablet Take 1 tablet (20 mEq) by mouth 2 times daily (with meals) 60 tablet 11     simvastatin (ZOCOR) 80 MG tablet Take 1 tablet (80 mg) by mouth daily 90 tablet 3       Allergies   Allergen Reactions     Codeine      Rapid heart rate.  Nauseated  Other reaction(s): Confusion  Rapid heart rate.  Nauseated     Diphenhydramine      Rapid heart Rate  Other reaction(s): Tachycardia  Rapid heart Rate  Other reaction(s): Tachycardia  Rapid heart Rate     Lisinopril Cough     Morphine      Rapid heart rate  Other reaction(s): Tachycardia  Rapid heart rate     No Clinical Screening - See Comments      Pt states allergies to white/yellow gold.  Sugical steel.  Copper.      Propoxyphene N-Apap      unknown  Other reaction(s): Tremors  unknown     Varenicline Nausea and Vomiting     Tetracycline Rash     unknown  Other reaction(s): Tremors  unknown       ROS:  Pertinent positives and negatives are noted in HPI.    EXAM:  General appearance: well appearing female in no acute distress  Head: normocephalic, atraumatic  Ears: TM's with cone of light, no erythema, fluid level noted, canals clear bilaterally  Eyes: conjunctivae normal  Orophayrnx: moist mucous membranes, tonsils without erythema, exudates or petechiae, no post nasal drip seen  Neck: supple without adenopathy  Respiratory: clear to auscultation bilaterally  Cardiac: RRR with no murmurs  Neuro: CN II-XII intact  Psychological: normal affect, alert and pleasant    ASSESSMENT AND PLAN:    1. Otalgia, left    2. Vertigo      Exam normal other than some fluid noted behind TM. Discussed sx management including warm packs, chewing, fluids, flonase. Given meclizine for dizziness. Reviewed s/s that would warrant f/u. All questions were answered and she is in agreement with plan.         Maryan Aguilar..................10/28/2018 10:05 AM

## 2018-10-28 NOTE — MR AVS SNAPSHOT
"              After Visit Summary   10/28/2018    Ovidio Luna    MRN: [id number]           Patient Information     Date Of Birth          1950        Visit Information        Provider Department      10/28/2018 10:15 AM Maryan Aguilar APRN CNP [facility]        Today's Diagnoses     Otalgia, left    -  1    Vertigo          Care Instructions    Meclizine as needed for dizziness  Tylenol or ibuprofen for ear pain  Warm packs to ear for pain  flonase nasal spray daily  May use benadryl or sudafed to help reduce the fluid behind ears.          Follow-ups after your visit        Who to contact     If you have questions or need follow up information about today's clinic visit or your schedule please contact [facility] directly at [phone].  Normal or non-critical lab and imaging results will be communicated to you by MyChart, letter or phone within 4 business days after the clinic has received the results. If you do not hear from us within 7 days, please contact the clinic through MyChart or phone. If you have a critical or abnormal lab result, we will notify you by phone as soon as possible.  Submit refill requests through [facility] or call your pharmacy and they will forward the refill request to us. Please allow 3 business days for your refill to be completed.          Additional Information About Your Visit        Care EveryWhere ID     This is your Care EveryWhere ID. This could be used by other organizations to access your [place] medical records  [id number]        Your Vitals Were     Pulse Temperature Height Pulse Oximetry Breastfeeding? BMI (Body Mass Index)    65 96.3  F (35.7  C) (Tympanic) 5' 2.4\" (1.585 m) 98% No 27.55 kg/m2       Blood Pressure from Last 3 Encounters:   10/28/18 120/72   10/01/18 106/60   07/09/18 98/68    Weight from Last 3 Encounters:   10/28/18 152 lb 9.6 oz (69.2 kg)   10/01/18 154 lb 1.6 oz (69.9 kg)   07/09/18 150 lb 12.8 " oz (68.4 kg)              Today, you had the following     No orders found for display         Today's Medication Changes          These changes are accurate as of 10/28/18 10:21 AM.  If you have any questions, ask your nurse or doctor.               Start taking these medicines.        Dose/Directions    meclizine 25 MG tablet   Commonly known as:  ANTIVERT   Used for:  Vertigo   Started by:  Maryan Aguilar APRN CNP        Dose:  25 mg   Take 1 tablet (25 mg) by mouth every 6 hours as needed for dizziness   Quantity:  15 tablet   Refills:  0            Where to get your medicines      These medications were sent to [id number] - [place], [place] - [address]  [address], [place] [address]     Phone:  [phone]     meclizine 25 MG tablet                Primary Care Provider Office Phone # Fax #    Glynn Botello [phone] [address]  [facility] [address]        Equal Access to Services     [facility]: Hadii aad ku hadasho Soomaali, waaxda luqadaha, qaybta kaalmada adeegyada, waxay idiin hayaajared buckner . So [facility] [phone].    ATENCIÓN: Si habla español, tiene a garcia disposición servicios gratuitos de asistencia lingüística. [facility] [phone].    We comply with applicable federal civil rights laws and [place] laws. We do not discriminate on the basis of race, color, national origin, age, disability, sex, sexual orientation, or gender identity.            Thank you!     Thank you for choosing [facility] AND [facility]  for your care. Our goal is always to provide you with excellent care. Hearing back from our patients is one way we can continue to improve our services. Please take a few minutes to complete the written survey that you may receive in the mail after your visit with us. Thank you!             Your Updated Medication List - Protect others around you: Learn how to safely use,  store and throw away your medicines at www.disposemymeds.org.          This list is accurate as of 10/28/18 10:21 AM.  Always use your most recent med list.                   Brand Name Dispense Instructions for use Diagnosis    albuterol 108 (90 Base) MCG/ACT inhaler    PROAIR HFA/PROVENTIL HFA/VENTOLIN HFA    1 Inhaler    Inhale 1-2 puffs into the lungs every 6 hours as needed for shortness of breath / dyspnea or wheezing    COPD exacerbation (H)       amLODIPine 5 MG tablet    NORVASC    60 tablet    Take 1 tablet (5 mg) by mouth daily    Essential hypertension       benzonatate 200 MG capsule    TESSALON    21 capsule    Take 1 capsule (200 mg) by mouth 3 times daily as needed for cough    Tobacco abuse, Chronic obstructive pulmonary disease with acute exacerbation (H)       busPIRone 10 MG tablet    BUSPAR    90 tablet    Take 1 tablet (10 mg) by mouth 3 times daily as needed (anxiety)    Adjustment disorder, unspecified type       citalopram 10 MG tablet    celeXA    90 tablet    Take 1 tablet (10 mg) by mouth every morning    Anxiety       fexofenadine 180 MG tablet    ALLEGRA     Take 180 mg by mouth daily with food        fish oil-omega-3 fatty acids 1000 MG capsule      Take 1 capsule by mouth daily        fluticasone 50 MCG/ACT spray    FLONASE    1 Bottle    Spray 1 spray into both nostrils daily    Chronic non-seasonal allergic rhinitis, unspecified trigger       fluticasone-salmeterol 250-50 MCG/DOSE diskus inhaler    ADVAIR    1 Inhaler    Inhale 1 puff into the lungs 2 times daily    Chronic non-seasonal allergic rhinitis, unspecified trigger       hydrochlorothiazide 25 MG tablet    HYDRODIURIL    90 tablet    Take 1 tablet (25 mg) by mouth daily    Hypertension, unspecified type       meclizine 25 MG tablet    ANTIVERT    15 tablet    Take 1 tablet (25 mg) by mouth every 6 hours as needed for dizziness    Vertigo       metoprolol tartrate 100 MG tablet    LOPRESSOR    180 tablet    Take 1 tablet  (100 mg) by mouth 2 times daily    Essential hypertension       naproxen 500 MG tablet    NAPROSYN    60 tablet    Take 1 tablet (500 mg) by mouth 2 times daily (with meals)    Multiple joint pain       nitroGLYcerin 0.4 MG/SPRAY spray    NITROLINGUAL     Place 1 spray under the tongue        omeprazole 20 MG CR capsule    priLOSEC    90 capsule    Take 1 capsule (20 mg) by mouth every morning (before breakfast)    Gastroesophageal reflux disease, esophagitis presence not specified       potassium chloride SA 20 MEQ CR tablet    K-DUR/KLOR-CON M    60 tablet    Take 1 tablet (20 mEq) by mouth 2 times daily (with meals)    Hypokalemia       simvastatin 80 MG tablet    ZOCOR    90 tablet    Take 1 tablet (80 mg) by mouth daily    Hyperlipidemia, unspecified hyperlipidemia type       vitamin D3 2000 units Caps      Take 2,000 Units by mouth daily

## 2018-10-28 NOTE — PATIENT INSTRUCTIONS
Meclizine as needed for dizziness  Tylenol or ibuprofen for ear pain  Warm packs to ear for pain  flonase nasal spray daily  May use benadryl or sudafed to help reduce the fluid behind ears.

## 2018-10-28 NOTE — LETTER
October 28, 2018      Ovidio Luna  [address] [facility] [address]        To Whom It May Concern:    Ovidio Luna was seen in our clinic on 10/28/2018. She may return to work without restrictions once her dizziness is improved and she feels ready.      Sincerely,        VOLODYMYR Vences CNP

## 2018-10-29 DIAGNOSIS — I10 ESSENTIAL HYPERTENSION: ICD-10-CM

## 2018-11-01 RX ORDER — METOPROLOL TARTRATE 100 MG
100 TABLET ORAL 2 TIMES DAILY
Qty: 180 TABLET | Refills: 0 | Status: SHIPPED | OUTPATIENT
Start: 2018-11-01 | End: 2019-02-04

## 2018-11-01 NOTE — TELEPHONE ENCOUNTER
"Refill request from [facility]  for:  metoprolol tartrate (LOPRESSOR) 100 MG tablet  LOV 7/9/2018 with Luan Rodriguez MD    Last ordered 4/11/2018 for 180 X 1 = approx 6 month supply    Patient is due for medication management appt.    No upcoming appt noted at this time    Refilled for 90 days and added appt reminder letter to pharmacy in RX  Requested Prescriptions   Pending Prescriptions Disp Refills     metoprolol tartrate (LOPRESSOR) 100 MG tablet 180 tablet 1     Sig: Take 1 tablet (100 mg) by mouth 2 times daily    Beta-Blockers Protocol Passed    10/29/2018 11:04 AM       Passed - Blood pressure under 140/90 in past 12 months    BP Readings from Last 3 Encounters:   10/28/18 120/72   10/01/18 106/60   07/09/18 98/68          Passed - Patient is age 6 or older       Passed - Recent (12 mo) or future (30 days) visit within the authorizing provider's specialty    Patient had office visit in the last 12 months or has a visit in the next 30 days with authorizing provider or within the authorizing provider's specialty.  See \"Patient Info\" tab in inbasket, or \"Choose Columns\" in Meds & Orders section of the refill encounter.              Didi Marks RN  ....................  11/1/2018   11:46 AM        "

## 2018-11-02 DIAGNOSIS — F41.9 ANXIETY: ICD-10-CM

## 2018-11-02 RX ORDER — CITALOPRAM HYDROBROMIDE 10 MG/1
10 TABLET ORAL EVERY MORNING
Qty: 90 TABLET | Refills: 0 | Status: SHIPPED | OUTPATIENT
Start: 2018-11-02 | End: 2019-04-08

## 2018-11-02 NOTE — TELEPHONE ENCOUNTER
[facility] pharmacy and pt request a Rx refill for citalopram (Celaxa).  Pt's last exam with PCP Dr. MERCEDES Botello was on [id number].  Prescription approved per [facility] Refill Protocol.  Daniella Patricia RN on 11/2/2018 at 2:39 PM    SSRIs Protocol Passed

## 2018-12-31 DIAGNOSIS — I10 ESSENTIAL HYPERTENSION: Primary | ICD-10-CM

## 2019-01-01 ENCOUNTER — ALLIED HEALTH/NURSE VISIT (OUTPATIENT)
Dept: CARDIOLOGY | Facility: OTHER | Age: 69
End: 2019-01-01
Attending: NURSE PRACTITIONER
Payer: COMMERCIAL

## 2019-01-01 VITALS
DIASTOLIC BLOOD PRESSURE: 82 MMHG | HEART RATE: 92 BPM | OXYGEN SATURATION: 98 % | RESPIRATION RATE: 18 BRPM | SYSTOLIC BLOOD PRESSURE: 140 MMHG

## 2019-01-01 DIAGNOSIS — I15.0 RENOVASCULAR HYPERTENSION: ICD-10-CM

## 2019-01-01 DIAGNOSIS — D64.9 ANEMIA, UNSPECIFIED TYPE: ICD-10-CM

## 2019-01-01 LAB — HGB BLD-MCNC: 9.1 G/DL (ref 11.7–15.7)

## 2019-01-01 PROCEDURE — 85018 HEMOGLOBIN: CPT | Mod: ZL | Performed by: INTERNAL MEDICINE

## 2019-01-01 PROCEDURE — 36415 COLL VENOUS BLD VENIPUNCTURE: CPT | Mod: ZL | Performed by: INTERNAL MEDICINE

## 2019-01-01 RX ORDER — HYDROCHLOROTHIAZIDE 12.5 MG/1
25 TABLET ORAL DAILY
Qty: 180 TABLET | Refills: 3 | Status: SHIPPED | OUTPATIENT
Start: 2019-01-01 | End: 2020-01-01

## 2019-01-02 RX ORDER — AMLODIPINE BESYLATE 5 MG/1
5 TABLET ORAL DAILY
Qty: 90 TABLET | Refills: 1 | Status: SHIPPED | OUTPATIENT
Start: 2019-01-02 | End: 2019-03-25

## 2019-01-02 NOTE — TELEPHONE ENCOUNTER
[facility] sent Rx request for the following:     amLODIPine (NORVASC) 5 MG tablet  Sig: Take 1 tablet (5 mg) by mouth daily  Last Written Prescription Date:  10/17/18  Last Fill Quantity: 60,   # refills: 0   Notes to Pharmacy with last refill: Patient is due for medication management appt. Please advise patient to contact GI for assistance in scheduling appt.  Thank you.  Last Office Visit: 10/28/18  Future Office visit: None.  Calcium Channel Blockers Protocol Failed1/2 3:10 PM   Normal serum creatinine on file in past 12 months     CMP lab pending MD co-sign. If approved, please notify Patient or address at next OV.     Unable to complete prescription refill per RN Medication Refill Policy. Daniella Luna RN .............. 1/2/2019  3:19 PM

## 2019-01-13 ENCOUNTER — HOSPITAL ENCOUNTER (OUTPATIENT)
Dept: GENERAL RADIOLOGY | Facility: OTHER | Age: 69
Discharge: HOME | End: 2019-01-13
Attending: NURSE PRACTITIONER | Admitting: NURSE PRACTITIONER
Payer: COMMERCIAL

## 2019-01-13 ENCOUNTER — OFFICE VISIT (OUTPATIENT)
Dept: FAMILY MEDICINE | Facility: OTHER | Age: 69
End: 2019-01-13
Attending: NURSE PRACTITIONER
Payer: COMMERCIAL

## 2019-01-13 VITALS
BODY MASS INDEX: 27.82 KG/M2 | TEMPERATURE: 98 F | HEIGHT: 62 IN | WEIGHT: 151.2 LBS | OXYGEN SATURATION: 98 % | HEART RATE: 63 BPM | SYSTOLIC BLOOD PRESSURE: 122 MMHG | DIASTOLIC BLOOD PRESSURE: 76 MMHG | RESPIRATION RATE: 16 BRPM

## 2019-01-13 DIAGNOSIS — J40 BRONCHITIS: Primary | ICD-10-CM

## 2019-01-13 DIAGNOSIS — R05.9 COUGH: ICD-10-CM

## 2019-01-13 LAB
BASOPHILS # BLD AUTO: 0.1 10E9/L (ref 0–0.2)
BASOPHILS NFR BLD AUTO: 0.6 %
DIFFERENTIAL METHOD BLD: ABNORMAL
EOSINOPHIL # BLD AUTO: 0.3 10E9/L (ref 0–0.7)
EOSINOPHIL NFR BLD AUTO: 2.6 %
ERYTHROCYTE [DISTWIDTH] IN BLOOD BY AUTOMATED COUNT: 12.8 % (ref 10–15)
FLUAV+FLUBV RNA SPEC QL NAA+PROBE: NEGATIVE
FLUAV+FLUBV RNA SPEC QL NAA+PROBE: NEGATIVE
HCT VFR BLD AUTO: 44.3 % (ref 35–47)
HGB BLD-MCNC: 14.4 G/DL (ref 11.7–15.7)
IMM GRANULOCYTES # BLD: 0 10E9/L (ref 0–0.4)
IMM GRANULOCYTES NFR BLD: 0.3 %
LYMPHOCYTES # BLD AUTO: 2.6 10E9/L (ref 0.8–5.3)
LYMPHOCYTES NFR BLD AUTO: 23.2 %
MCH RBC QN AUTO: 29 PG (ref 26.5–33)
MCHC RBC AUTO-ENTMCNC: 32.5 G/DL (ref 31.5–36.5)
MCV RBC AUTO: 89 FL (ref 78–100)
MONOCYTES # BLD AUTO: 1.5 10E9/L (ref 0–1.3)
MONOCYTES NFR BLD AUTO: 13.6 %
NEUTROPHILS # BLD AUTO: 6.8 10E9/L (ref 1.6–8.3)
NEUTROPHILS NFR BLD AUTO: 59.7 %
PLATELET # BLD AUTO: 252 10E9/L (ref 150–450)
RBC # BLD AUTO: 4.96 10E12/L (ref 3.8–5.2)
RSV RNA SPEC NAA+PROBE: NEGATIVE
SPECIMEN SOURCE: NORMAL
WBC # BLD AUTO: 11.3 10E9/L (ref 4–11)

## 2019-01-13 PROCEDURE — 87631 RESP VIRUS 3-5 TARGETS: CPT | Performed by: NURSE PRACTITIONER

## 2019-01-13 PROCEDURE — 71046 X-RAY EXAM CHEST 2 VIEWS: CPT

## 2019-01-13 PROCEDURE — 36415 COLL VENOUS BLD VENIPUNCTURE: CPT | Performed by: NURSE PRACTITIONER

## 2019-01-13 PROCEDURE — 99214 OFFICE O/P EST MOD 30 MIN: CPT | Performed by: NURSE PRACTITIONER

## 2019-01-13 PROCEDURE — G0463 HOSPITAL OUTPT CLINIC VISIT: HCPCS

## 2019-01-13 PROCEDURE — G0463 HOSPITAL OUTPT CLINIC VISIT: HCPCS | Mod: 25

## 2019-01-13 PROCEDURE — 85025 COMPLETE CBC W/AUTO DIFF WBC: CPT | Performed by: NURSE PRACTITIONER

## 2019-01-13 RX ORDER — AZITHROMYCIN 250 MG/1
TABLET, FILM COATED ORAL
Qty: 6 TABLET | Refills: 0 | Status: SHIPPED | OUTPATIENT
Start: 2019-01-13 | End: 2019-02-20

## 2019-01-13 RX ORDER — PREDNISONE 20 MG/1
TABLET ORAL
Qty: 5 TABLET | Refills: 0 | Status: SHIPPED | OUTPATIENT
Start: 2019-01-13 | End: 2019-02-19

## 2019-01-13 ASSESSMENT — PAIN SCALES - GENERAL: PAINLEVEL: WORST PAIN (10)

## 2019-01-13 ASSESSMENT — MIFFLIN-ST. JEOR: SCORE: 1175.47

## 2019-01-13 NOTE — LETTER
January 13, 2019      To Whom It May Concern:      Madelineamarikathy Luna was evaluated for illness she's had the past 2 days, she can return to work without restriction when she has improved in 1-3 days. Will see her PCP if not improving.    Sincerely,        VOLODYMYR Mosquera, NP-C  [facility]  1:13 PM January 13, 2019

## 2019-01-13 NOTE — PATIENT INSTRUCTIONS

## 2019-01-13 NOTE — NURSING NOTE
Chief Complaint   Patient presents with     Cough       Medication Reconciliation: complete   Patient presents with cough productive foamy creamy color, runny nose, watery eyes, lack of voice for 3 days. Antoinette Nguyen LPN .............1/13/2019  11:54 AM

## 2019-01-13 NOTE — PROGRESS NOTES
Chief Complaint   Patient presents with     Cough     SUBJECTIVE:   Nonamarie Jeremy is a 68 year old female who complains of cough x 5 days. Has a ton of sinus drainage and watery eyes. Doesn't run a fever. Has pain d/t so much coughing. Denies history of COPD. Has been using her albuterol with no benefit. Smokes 1 ppd. Works in a smoking environment as well.    History of CAD/PVD and stent placement, no MI.   Has missed work the last 2 days and is fatigued.        Allergies   Allergen Reactions     Codeine      Rapid heart rate.  Nauseated  Other reaction(s): Confusion  Rapid heart rate.  Nauseated     Diphenhydramine      Rapid heart Rate  Other reaction(s): Tachycardia  Rapid heart Rate  Other reaction(s): Tachycardia  Rapid heart Rate     Lisinopril Cough     Morphine      Rapid heart rate  Other reaction(s): Tachycardia  Rapid heart rate     No Clinical Screening - See Comments      Pt states allergies to white/yellow gold.  Sugical steel.  Copper.     Propoxyphene N-Apap      unknown  Other reaction(s): Tremors  unknown     Varenicline Nausea and Vomiting     Tetracycline Rash     unknown  Other reaction(s): Tremors  unknown     Patient Active Problem List   Diagnosis     Centrilobular emphysema (H)     Coronary atherosclerosis     Hemiplegia, late effect of cerebrovascular disease (H)     Gastritis     Hemangioma     Hyperlipidemia     Hypertension     Lumbago     Symptomatic menopausal or female climacteric states     Disorder of bone and cartilage     Peripheral vascular disease (H)     Other affections of shoulder region, not elsewhere classified     Nonallopathic lesion of pelvic region     Nonallopathic lesion of thoracic region     Tobacco abuse     Chronic obstructive pulmonary disease with acute exacerbation (H)     Chronic non-seasonal allergic rhinitis     Current Outpatient Medications   Medication     albuterol (PROAIR HFA/PROVENTIL HFA/VENTOLIN HFA) 108 (90 Base) MCG/ACT Inhaler     amLODIPine  (NORVASC) 5 MG tablet     benzonatate (TESSALON) 200 MG capsule     Cholecalciferol (VITAMIN D3) 2000 UNITS CAPS     citalopram (CELEXA) 10 MG tablet     fexofenadine (ALLEGRA) 180 MG tablet     fish oil-omega-3 fatty acids 1000 MG capsule     fluticasone (FLONASE) 50 MCG/ACT spray     hydrochlorothiazide (HYDRODIURIL) 25 MG tablet     metoprolol tartrate (LOPRESSOR) 100 MG tablet     naproxen (NAPROSYN) 500 MG tablet     omeprazole (PRILOSEC) 20 MG CR capsule     potassium chloride SA (K-DUR/KLOR-CON M) 20 MEQ CR tablet     simvastatin (ZOCOR) 80 MG tablet     busPIRone (BUSPAR) 10 MG tablet     fluticasone-salmeterol (ADVAIR) 250-50 MCG/DOSE diskus inhaler     meclizine (ANTIVERT) 25 MG tablet     nitroGLYcerin (NITROLINGUAL) 0.4 MG/SPRAY spray     No current facility-administered medications for this visit.      Past Medical History:   Diagnosis Date     Allergy status to other antibiotic agents status     ERT     Atherosclerosis     History of ASO with claudication     Atherosclerotic heart disease of native coronary artery without angina pectoris     2004,Stent times 2     Cerebral infarction (H)     05/20/2012,CVA with residual left sided weakness, incidental meningioma found     Closed fracture of shaft of left tibia     12/26/01,History of bilateral tibial fx, work related injury     Personal history of other diseases of the musculoskeletal system and connective tissue      03/14/06,Osteopenia, proximal femur.  Normal bone mineral density lumbar spine 03/14/06.  Start Fosamax 70 mg. weekly (didn't start until 08/07), stopped fosamax.     Personal history of other diseases of the nervous system and sense organs     History of  migraines     Zoster without complications     2008,left shoulder and neck     Zoster without complications     [id number]/25008,Herpes zoster, left shoulder and neck     Past Surgical History:   Procedure Laterality Date     COLONOSCOPY      2004,2 hyperplastic polyps, repeat in 2014      COLONOSCOPY      1992, 2004,Colonoscopy, normal     HEMORRHOID SURGERY      No Comments Provided     HYSTERECTOMY TOTAL ABDOMINAL, BILATERAL SALPINGO-OOPHORECTOMY, COMBINED      1987     OTHER SURGICAL HISTORY      2003,206617,STRESS TEST PHARMACOLOGICAL,dobutamine, normal     OTHER SURGICAL HISTORY      2004,29717.0,SD BYPASS GRAFT AORTOBIFEMORAL     OTHER SURGICAL HISTORY      2004,LOC-1006.05,PTCA,mid LAD, proximal RCA     OTHER SURGICAL HISTORY      2006,206617,STRESS TEST PHARMACOLOGICAL,adenosine, negative     OTHER SURGICAL HISTORY      03/06,077478,NM CARDIAC MPI STRESS TEST,Adenosine Cardiolite stress done and is negative for ischemia or infarction.  Ejection fraction is 54%.     OTHER SURGICAL HISTORY      03/08/16,EYP590,CYSTOSCOPY W/ URETEROSCOPY W/ LITHOTRIPSY,Right,Dr. Mario [place]     Social History     Socioeconomic History     Marital status: Single     Spouse name: Not on file     Number of children: Not on file     Years of education: Not on file     Highest education level: Not on file   Social Needs     Financial resource strain: Not on file     Food insecurity - worry: Not on file     Food insecurity - inability: Not on file     Transportation needs - medical: Not on file     Transportation needs - non-medical: Not on file   Occupational History     Not on file   Tobacco Use     Smoking status: Current Every Day Smoker     Packs/day: 1.00     Years: 16.00     Pack years: 16.00     Types: Cigarettes     Smokeless tobacco: Never Used   Substance and Sexual Activity     Alcohol use: No     Drug use: No     Sexual activity: Not on file   Other Topics Concern     Parent/sibling w/ CABG, MI or angioplasty before 65F 55M? Not Asked   Social History Narrative    ** Merged History Encounter **         Works at [employer]  Melly Spawn Mother  [facility]  One brother     Review Of Systems  Fatigued, no fever.   Skin: negative  Eyes: negative  Ears/Nose/Throat: nasal  "congestion, postnasal drainage  Respiratory: Cough, wheezing  Cardiovascular: negative  Gastrointestinal: negative  Genitourinary: negative  Musculoskeletal: Body aches from coughing  Neurologic: headaches  Psychiatric: negative       /76 (BP Location: Left arm, Patient Position: Sitting, Cuff Size: Adult Regular)   Pulse 63   Temp 98  F (36.7  C) (Tympanic)   Resp 16   Ht 1.585 m (5' 2.4\")   Wt 68.6 kg (151 lb 3.2 oz)   SpO2 98%   Breastfeeding? No   BMI 27.30 kg/m    Exam:  Constitutional: alert, active, no distress, over weight and appears fatigued  Head: Normocephalic. No masses, lesions, tenderness or abnormalities  Neck: Neck supple. No adenopathy. Thyroid symmetric, normal size,  ENT: ENT exam normal, no neck nodes or sinus tenderness  Cardiovascular:  RRR. No murmurs, clicks gallops or rub  Respiratory: positive findings: prolonged expiration, dim in R lower posterior and L lower posterior  Skin: warm and dry  Psychiatric: mentation appears normal and affect normal/bright      ASSESSMENT:   (J40) Bronchitis  (primary encounter diagnosis)  Comment: Patient with a history of smoking presents with a persistent cough times 5 days symptoms are getting worse.  She has missed work for 2 days and is consistently fatigued.  Influenza swab is negative, WBC is 11.3.  Chest x-ray is clear pending radiology read.  Will treat as bronchitis with azithromycin and prednisone. Patient has an allergy to tetracycline, doxy was not ordered.   Given a work note. Advised rest and fluids.   F/u with PCP in 3-5 days. Return to ED if sx worsen.   I explained my diagnostic considerations and recommendations to the patient, who voiced understanding and agreement with the treatment plan. All questions were answered. We discussed potential side effects of any prescribed or recommended therapies, as well as expectations for response to treatments.     Plan: azithromycin (ZITHROMAX) 250 MG tablet,         predniSONE " (DELTASONE) 20 MG tablet    (R05) Cough  Plan: XR Chest 2 Views, CBC and Differential,         Influenza A and B and RSV PCR       Results for orders placed or performed in visit on 01/13/19   CBC and Differential   Result Value Ref Range    WBC 11.3 (H) 4.0 - 11.0 10e9/L    RBC Count 4.96 3.8 - 5.2 10e12/L    Hemoglobin 14.4 11.7 - 15.7 g/dL    Hematocrit 44.3 35.0 - 47.0 %    MCV 89 78 - 100 fl    MCH 29.0 26.5 - 33.0 pg    MCHC 32.5 31.5 - 36.5 g/dL    RDW 12.8 10.0 - 15.0 %    Platelet Count 252 150 - 450 10e9/L    Diff Method Automated Method     % Neutrophils 59.7 %    % Lymphocytes 23.2 %    % Monocytes 13.6 %    % Eosinophils 2.6 %    % Basophils 0.6 %    % Immature Granulocytes 0.3 %    Absolute Neutrophil 6.8 1.6 - 8.3 10e9/L    Absolute Lymphocytes 2.6 0.8 - 5.3 10e9/L    Absolute Monocytes 1.5 (H) 0.0 - 1.3 10e9/L    Absolute Eosinophils 0.3 0.0 - 0.7 10e9/L    Absolute Basophils 0.1 0.0 - 0.2 10e9/L    Abs Immature Granulocytes 0.0 0 - 0.4 10e9/L   Influenza A and B and RSV PCR   Result Value Ref Range    Specimen Description Nasal     Influenza A PCR Negative NEG^Negative    Influenza B PCR Negative NEG^Negative    Resp Syncytial Virus Negative NEG^Negative     Chest XR: I personally reviewed the x-rays and find a clear chest with lungs clear on both views.   Radiology review pending and nurse will notify patient if there is any change in the treatment plan.

## 2019-01-14 DIAGNOSIS — K21.9 GASTROESOPHAGEAL REFLUX DISEASE, ESOPHAGITIS PRESENCE NOT SPECIFIED: ICD-10-CM

## 2019-01-14 NOTE — TELEPHONE ENCOUNTER
This is a Refill request from: [facility]  Name of Medication and Dose:  Omeprazole 20mg  Quantity requested:  90  Last fill date: 10-24-18  Last Office Visit: 10-18-18  PCP:  Glynn Knight contract:   Associate diagnosis: Gastritis    Mala Johansen LPN.........................1/14/2019  3:38 PM

## 2019-01-15 NOTE — TELEPHONE ENCOUNTER
"Refill request from [facility] for:  omeprazole (PRILOSEC) 20 MG DR capsule    LOV 10/28/2018 with VOLODYMYR Brand, CNP    No upcoming appt noted at this time.      No changes noted to requested medication.     Will refill    Requested Prescriptions   Pending Prescriptions Disp Refills     omeprazole (PRILOSEC) 20 MG DR capsule 90 capsule 2     Sig: Take 1 capsule (20 mg) by mouth every morning (before breakfast)    PPI Protocol Passed - 1/14/2019  3:38 PM       Passed - Not on Clopidogrel (unless Pantoprazole ordered)       Passed - No diagnosis of osteoporosis on record       Passed - Recent (12 mo) or future (30 days) visit within the authorizing provider's specialty    Patient had office visit in the last 12 months or has a visit in the next 30 days with authorizing provider or within the authorizing provider's specialty.  See \"Patient Info\" tab in inbasket, or \"Choose Columns\" in Meds & Orders section of the refill encounter.           Passed - Medication is active on med list       Passed - Patient is age 18 or older       Passed - No active pregnacy on record       Passed - No positive pregnancy test in past 12 months        Didi Marks RN  ....................  1/15/2019   11:49 AM      "

## 2019-02-04 DIAGNOSIS — I10 ESSENTIAL HYPERTENSION: ICD-10-CM

## 2019-02-05 RX ORDER — METOPROLOL TARTRATE 100 MG
100 TABLET ORAL 2 TIMES DAILY
Qty: 180 TABLET | Refills: 3 | Status: SHIPPED | OUTPATIENT
Start: 2019-02-05 | End: 2019-03-21 | Stop reason: DRUGHIGH

## 2019-02-05 NOTE — TELEPHONE ENCOUNTER
[facility] sent Rx request for the following:      metoprolol tartrate (LOPRESSOR) 100 MG tablet  Sig: Take 1 tablet (100 mg) by mouth 2 times daily    Last Written Prescription:  metoprolol tartrate (LOPRESSOR) 100 MG tablet 180 tablet 0 11/1/2018  No   Sig - Route: Take 1 tablet (100 mg) by mouth 2 times daily - Oral   Notes to Pharmacy: Pt due for medication management appt with PCP. Please advise patient to contact [facility] for assistance in scheduling appt.  Thank you.     Last Office Visit:10/28/18   Future Office visit: None.    Routing refill request to provider for review/approval because:  Patient failed to schedule medication management appointment and is overdue.    Unable to complete prescription refill per RN Medication Refill Policy. Daniella Luna RN .............. 2/5/2019  3:12 PM

## 2019-02-19 ENCOUNTER — APPOINTMENT (OUTPATIENT)
Dept: GENERAL RADIOLOGY | Facility: OTHER | Age: 69
End: 2019-02-19
Attending: PHYSICIAN ASSISTANT
Payer: COMMERCIAL

## 2019-02-19 ENCOUNTER — HOSPITAL ENCOUNTER (EMERGENCY)
Facility: OTHER | Age: 69
Discharge: HOME | End: 2019-02-19
Attending: PHYSICIAN ASSISTANT | Admitting: PHYSICIAN ASSISTANT
Payer: COMMERCIAL

## 2019-02-19 VITALS
SYSTOLIC BLOOD PRESSURE: 159 MMHG | OXYGEN SATURATION: 92 % | WEIGHT: 149 LBS | DIASTOLIC BLOOD PRESSURE: 72 MMHG | RESPIRATION RATE: 29 BRPM | TEMPERATURE: 99.4 F | HEART RATE: 68 BPM | BODY MASS INDEX: 27.42 KG/M2 | HEIGHT: 62 IN

## 2019-02-19 DIAGNOSIS — J10.1 INFLUENZA A: ICD-10-CM

## 2019-02-19 LAB
ANION GAP SERPL CALCULATED.3IONS-SCNC: 9 MMOL/L (ref 3–14)
BASOPHILS # BLD AUTO: 0.1 10E9/L (ref 0–0.2)
BASOPHILS NFR BLD AUTO: 0.6 %
BUN SERPL-MCNC: 17 MG/DL (ref 7–25)
CALCIUM SERPL-MCNC: 9.2 MG/DL (ref 8.6–10.3)
CHLORIDE SERPL-SCNC: 106 MMOL/L (ref 98–107)
CO2 SERPL-SCNC: 23 MMOL/L (ref 21–31)
CREAT SERPL-MCNC: 0.95 MG/DL (ref 0.6–1.2)
DIFFERENTIAL METHOD BLD: ABNORMAL
EOSINOPHIL # BLD AUTO: 0 10E9/L (ref 0–0.7)
EOSINOPHIL NFR BLD AUTO: 0.2 %
ERYTHROCYTE [DISTWIDTH] IN BLOOD BY AUTOMATED COUNT: 13.3 % (ref 10–15)
FLUAV+FLUBV RNA SPEC QL NAA+PROBE: NEGATIVE
FLUAV+FLUBV RNA SPEC QL NAA+PROBE: POSITIVE
GFR SERPL CREATININE-BSD FRML MDRD: 59 ML/MIN/{1.73_M2}
GLUCOSE SERPL-MCNC: 114 MG/DL (ref 70–105)
HCT VFR BLD AUTO: 44.3 % (ref 35–47)
HGB BLD-MCNC: 14.5 G/DL (ref 11.7–15.7)
IMM GRANULOCYTES # BLD: 0 10E9/L (ref 0–0.4)
IMM GRANULOCYTES NFR BLD: 0.3 %
LYMPHOCYTES # BLD AUTO: 0.6 10E9/L (ref 0.8–5.3)
LYMPHOCYTES NFR BLD AUTO: 5.9 %
MCH RBC QN AUTO: 28.8 PG (ref 26.5–33)
MCHC RBC AUTO-ENTMCNC: 32.7 G/DL (ref 31.5–36.5)
MCV RBC AUTO: 88 FL (ref 78–100)
MONOCYTES # BLD AUTO: 1.7 10E9/L (ref 0–1.3)
MONOCYTES NFR BLD AUTO: 16.2 %
NEUTROPHILS # BLD AUTO: 7.9 10E9/L (ref 1.6–8.3)
NEUTROPHILS NFR BLD AUTO: 76.8 %
PLATELET # BLD AUTO: 228 10E9/L (ref 150–450)
POTASSIUM SERPL-SCNC: 3.9 MMOL/L (ref 3.5–5.1)
RBC # BLD AUTO: 5.03 10E12/L (ref 3.8–5.2)
RSV RNA SPEC NAA+PROBE: NEGATIVE
SODIUM SERPL-SCNC: 138 MMOL/L (ref 134–144)
SPECIMEN SOURCE: ABNORMAL
WBC # BLD AUTO: 10.3 10E9/L (ref 4–11)

## 2019-02-19 PROCEDURE — 87631 RESP VIRUS 3-5 TARGETS: CPT | Performed by: FAMILY MEDICINE

## 2019-02-19 PROCEDURE — A9270 NON-COVERED ITEM OR SERVICE: HCPCS | Mod: GY | Performed by: PHYSICIAN ASSISTANT

## 2019-02-19 PROCEDURE — 71046 X-RAY EXAM CHEST 2 VIEWS: CPT

## 2019-02-19 PROCEDURE — 40000275 ZZH STATISTIC RCP TIME EA 10 MIN

## 2019-02-19 PROCEDURE — 25000125 ZZHC RX 250: Performed by: PHYSICIAN ASSISTANT

## 2019-02-19 PROCEDURE — 25000132 ZZH RX MED GY IP 250 OP 250 PS 637: Mod: GY | Performed by: PHYSICIAN ASSISTANT

## 2019-02-19 PROCEDURE — 94640 AIRWAY INHALATION TREATMENT: CPT

## 2019-02-19 PROCEDURE — 36415 COLL VENOUS BLD VENIPUNCTURE: CPT | Performed by: PHYSICIAN ASSISTANT

## 2019-02-19 PROCEDURE — 96374 THER/PROPH/DIAG INJ IV PUSH: CPT | Performed by: PHYSICIAN ASSISTANT

## 2019-02-19 PROCEDURE — 80048 BASIC METABOLIC PNL TOTAL CA: CPT | Performed by: PHYSICIAN ASSISTANT

## 2019-02-19 PROCEDURE — 85025 COMPLETE CBC W/AUTO DIFF WBC: CPT | Performed by: PHYSICIAN ASSISTANT

## 2019-02-19 PROCEDURE — 25000128 H RX IP 250 OP 636: Performed by: PHYSICIAN ASSISTANT

## 2019-02-19 PROCEDURE — 99284 EMERGENCY DEPT VISIT MOD MDM: CPT | Mod: 25 | Performed by: PHYSICIAN ASSISTANT

## 2019-02-19 PROCEDURE — 99283 EMERGENCY DEPT VISIT LOW MDM: CPT | Mod: Z6 | Performed by: PHYSICIAN ASSISTANT

## 2019-02-19 RX ORDER — OSELTAMIVIR PHOSPHATE 75 MG/1
75 CAPSULE ORAL 2 TIMES DAILY
Qty: 20 CAPSULE | Refills: 0 | Status: SHIPPED | OUTPATIENT
Start: 2019-02-19 | End: 2019-03-10

## 2019-02-19 RX ORDER — OSELTAMIVIR PHOSPHATE 30 MG/1
30 CAPSULE ORAL DAILY
Status: DISCONTINUED | OUTPATIENT
Start: 2019-02-19 | End: 2019-02-19 | Stop reason: HOSPADM

## 2019-02-19 RX ORDER — METHYLPREDNISOLONE SODIUM SUCCINATE 125 MG/2ML
125 INJECTION, POWDER, LYOPHILIZED, FOR SOLUTION INTRAMUSCULAR; INTRAVENOUS ONCE
Status: COMPLETED | OUTPATIENT
Start: 2019-02-19 | End: 2019-02-19

## 2019-02-19 RX ORDER — IPRATROPIUM BROMIDE AND ALBUTEROL SULFATE 2.5; .5 MG/3ML; MG/3ML
3 SOLUTION RESPIRATORY (INHALATION) ONCE
Status: COMPLETED | OUTPATIENT
Start: 2019-02-19 | End: 2019-02-19

## 2019-02-19 RX ORDER — PREDNISONE 20 MG/1
TABLET ORAL
Qty: 10 TABLET | Refills: 0 | Status: SHIPPED | OUTPATIENT
Start: 2019-02-19 | End: 2019-02-28

## 2019-02-19 RX ADMIN — OSELTAMIVIR PHOSPHATE 30 MG: 30 CAPSULE ORAL at 12:23

## 2019-02-19 RX ADMIN — IBUPROFEN 600 MG: 200 TABLET, FILM COATED ORAL at 11:14

## 2019-02-19 RX ADMIN — IPRATROPIUM BROMIDE AND ALBUTEROL SULFATE 3 ML: .5; 3 SOLUTION RESPIRATORY (INHALATION) at 11:15

## 2019-02-19 RX ADMIN — SODIUM CHLORIDE 1000 ML: 900 INJECTION, SOLUTION INTRAVENOUS at 12:23

## 2019-02-19 RX ADMIN — METHYLPREDNISOLONE SODIUM SUCCINATE 125 MG: 125 INJECTION, POWDER, FOR SOLUTION INTRAMUSCULAR; INTRAVENOUS at 11:14

## 2019-02-19 ASSESSMENT — ENCOUNTER SYMPTOMS
DYSURIA: 0
FEVER: 1
CHILLS: 0
WOUND: 0
ABDOMINAL PAIN: 0
BACK PAIN: 0
MYALGIAS: 1
SHORTNESS OF BREATH: 1
ADENOPATHY: 0
CHEST TIGHTNESS: 0
NAUSEA: 0
CONFUSION: 0
VOMITING: 0
COUGH: 1
BRUISES/BLEEDS EASILY: 0
HEMATURIA: 0

## 2019-02-19 ASSESSMENT — MIFFLIN-ST. JEOR: SCORE: 1165.46

## 2019-02-19 NOTE — DISCHARGE INSTRUCTIONS
Get plenty of fluids and rest.  You can take Tylenol ibuprofen as needed for fever control and discomfort.  Take your medication as prescribed.  Keep and attend your scheduled appointment for influenza recheck with PCP.  Use your home inhalers as needed.  Return to the ED if you have intractable fevers, or becoming dehydrated, or worsening breathing.

## 2019-02-19 NOTE — ED PROVIDER NOTES
History     Chief Complaint   Patient presents with     Shortness of Breath     Headache     Cough     Fatigue     HPI  Nonamarikathy Luna is a 68 year old female who presents to the ED today with a chief complaint of shortness of breath, headache, cough, fatigue.  Patient reports that her symptoms started approximately 4 days prior.  Patient reports that her cough is productive of clear phlegm.  Patient has tried her home inhalers with little to no relief.  Patient has also tried Tylenol for fever control.  Patient reports she has not been diagnosed with COPD but does have a long history of smoking for which she still does.  Patient reports that she did not receive her flu shot this year.  Patient denies chest pain, abdominal pain, dysuria.    Allergies:  Allergies   Allergen Reactions     Codeine      Rapid heart rate.  Nauseated  Other reaction(s): Confusion  Rapid heart rate.  Nauseated     Diphenhydramine      Rapid heart Rate  Other reaction(s): Tachycardia  Rapid heart Rate  Other reaction(s): Tachycardia  Rapid heart Rate     Lisinopril Cough     Morphine      Rapid heart rate  Other reaction(s): Tachycardia  Rapid heart rate     No Clinical Screening - See Comments      Pt states allergies to white/yellow gold.  Sugical steel.  Copper.     Propoxyphene N-Apap      unknown  Other reaction(s): Tremors  unknown     Varenicline Nausea and Vomiting     Tetracycline Rash     unknown  Other reaction(s): Tremors  unknown       Problem List:    Patient Active Problem List    Diagnosis Date Noted     Chronic non-seasonal allergic rhinitis 07/09/2018     Priority: Medium     Chronic obstructive pulmonary disease with acute exacerbation (H) 06/03/2018     Priority: Medium     Coronary atherosclerosis 01/17/2018     Priority: Medium     Overview:   Coronary artery disease, PTCA of mid LAD and PTCA of the proximal right   coronary artery 2/28/04, normal left ventricular systolic function       Hemangioma 01/17/2018      Priority: Medium     Overview:   Multiple capillary spider hemangioma       Hyperlipidemia 01/17/2018     Priority: Medium     Hypertension 01/17/2018     Priority: Medium     Peripheral vascular disease (H) 01/17/2018     Priority: Medium     Other affections of shoulder region, not elsewhere classified 01/17/2018     Priority: Medium     Tobacco abuse 01/17/2018     Priority: Medium     Centrilobular emphysema (H) 12/12/2016     Priority: Medium     Lumbago 08/17/2012     Priority: Medium     Nonallopathic lesion of pelvic region 08/17/2012     Priority: Medium     Nonallopathic lesion of thoracic region 08/17/2012     Priority: Medium     Hemiplegia, late effect of cerebrovascular disease (H) 06/15/2012     Priority: Medium     Symptomatic menopausal or female climacteric states 03/12/2010     Priority: Medium     Disorder of bone and cartilage 11/14/2008     Priority: Medium     Overview:   DXA scan - osteopenia of hips.  Normal density LS spine.       Gastritis 10/27/2008     Priority: Medium     Overview:   Acute          Past Medical History:    Past Medical History:   Diagnosis Date     Allergy status to other antibiotic agents status      Atherosclerosis      Atherosclerotic heart disease of native coronary artery without angina pectoris      Cerebral infarction (H)      Closed fracture of shaft of left tibia      Personal history of other diseases of the musculoskeletal system and connective tissue      Personal history of other diseases of the nervous system and sense organs      Zoster without complications      Zoster without complications        Past Surgical History:    Past Surgical History:   Procedure Laterality Date     COLONOSCOPY      2004,2 hyperplastic polyps, repeat in 2014     COLONOSCOPY      1992, 2004,Colonoscopy, normal     HEMORRHOID SURGERY      No Comments Provided     HYSTERECTOMY TOTAL ABDOMINAL, BILATERAL SALPINGO-OOPHORECTOMY, COMBINED      1987     OTHER SURGICAL HISTORY       2003,206617,STRESS TEST PHARMACOLOGICAL,dobutamine, normal     OTHER SURGICAL HISTORY      2004,54544.0,SC BYPASS GRAFT AORTOBIFEMORAL     OTHER SURGICAL HISTORY      2004,LOC-1006.05,PTCA,mid LAD, proximal RCA     OTHER SURGICAL HISTORY      2006,206617,STRESS TEST PHARMACOLOGICAL,adenosine, negative     OTHER SURGICAL HISTORY      03/06,734103,NM CARDIAC MPI STRESS TEST,Adenosine Cardiolite stress done and is negative for ischemia or infarction.  Ejection fraction is 54%.     OTHER SURGICAL HISTORY      03/08/16,[id number],CYSTOSCOPY W/ URETEROSCOPY W/ LITHOTRIPSY,Right,Dr. Mario DC       Family History:    Family History   Problem Relation Age of Onset     Heart Disease Father         Heart Disease     Cancer Father         Cancer,myeloma     Heart Disease Mother         Heart Disease,MI, stenting     Other - See Comments Sister         chronic pain syndrome     Other - See Comments Other         FHx Father's side Coronary artery disease     Other - See Comments Paternal Uncle         Stroke     Cancer Paternal Uncle         Cancer,Bladder x2     Breast Cancer No family hx of         Cancer-breast       Social History:  Marital Status:  Single [1]  Social History     Tobacco Use     Smoking status: Current Every Day Smoker     Packs/day: 1.00     Years: 16.00     Pack years: 16.00     Types: Cigarettes     Smokeless tobacco: Never Used   Substance Use Topics     Alcohol use: No     Drug use: No        Medications:      albuterol (PROAIR HFA/PROVENTIL HFA/VENTOLIN HFA) 108 (90 Base) MCG/ACT Inhaler   amLODIPine (NORVASC) 5 MG tablet   benzonatate (TESSALON) 200 MG capsule   busPIRone (BUSPAR) 10 MG tablet   Cholecalciferol (VITAMIN D3) 2000 UNITS CAPS   citalopram (CELEXA) 10 MG tablet   fexofenadine (ALLEGRA) 180 MG tablet   fish oil-omega-3 fatty acids 1000 MG capsule   fluticasone (FLONASE) 50 MCG/ACT spray   fluticasone-salmeterol (ADVAIR) 250-50 MCG/DOSE diskus inhaler   hydrochlorothiazide (HYDRODIURIL) 25  "MG tablet   meclizine (ANTIVERT) 25 MG tablet   metoprolol tartrate (LOPRESSOR) 100 MG tablet   naproxen (NAPROSYN) 500 MG tablet   potassium chloride SA (K-DUR/KLOR-CON M) 20 MEQ CR tablet   simvastatin (ZOCOR) 80 MG tablet   nitroGLYcerin (NITROLINGUAL) 0.4 MG/SPRAY spray         Review of Systems   Constitutional: Positive for fever. Negative for chills.   HENT: Negative for congestion.    Eyes: Negative for visual disturbance.   Respiratory: Positive for cough and shortness of breath. Negative for chest tightness.    Cardiovascular: Negative for chest pain.   Gastrointestinal: Negative for abdominal pain, nausea and vomiting.   Genitourinary: Negative for dysuria and hematuria.   Musculoskeletal: Positive for myalgias. Negative for back pain.   Skin: Negative for rash and wound.   Neurological: Negative for syncope.   Hematological: Negative for adenopathy. Does not bruise/bleed easily.   Psychiatric/Behavioral: Negative for confusion.       Physical Exam   BP: 133/66  Pulse: 62  Heart Rate: 56  Temp: 99.4  F (37.4  C)  Resp: 20  Height: 158.5 cm (5' 2.4\")  Weight: 67.6 kg (149 lb)  SpO2: 93 %      Physical Exam   Constitutional: She is oriented to person, place, and time. She appears well-developed. No distress.   HENT:   Head: Normocephalic and atraumatic.   Eyes: Conjunctivae and EOM are normal. Pupils are equal, round, and reactive to light. No scleral icterus.   Neck: Normal range of motion. Neck supple.   Cardiovascular: Normal rate, regular rhythm and normal heart sounds.   No murmur heard.  Pulmonary/Chest: Effort normal. No respiratory distress. She has wheezes.   Abdominal: Soft. There is no tenderness.   Musculoskeletal: She exhibits no deformity.   Lymphadenopathy:     She has no cervical adenopathy.   Neurological: She is alert and oriented to person, place, and time.   Skin: Skin is warm and dry. No rash noted. She is not diaphoretic.   Psychiatric: She has a normal mood and affect. Her behavior is " normal. Judgment and thought content normal.       ED Course        Procedures               Critical Care time:  none               Results for orders placed or performed during the hospital encounter of 02/19/19 (from the past 24 hour(s))   CBC with platelets differential   Result Value Ref Range    WBC 10.3 4.0 - 11.0 10e9/L    RBC Count 5.03 3.8 - 5.2 10e12/L    Hemoglobin 14.5 11.7 - 15.7 g/dL    Hematocrit 44.3 35.0 - 47.0 %    MCV 88 78 - 100 fl    MCH 28.8 26.5 - 33.0 pg    MCHC 32.7 31.5 - 36.5 g/dL    RDW 13.3 10.0 - 15.0 %    Platelet Count 228 150 - 450 10e9/L    Diff Method Automated Method     % Neutrophils 76.8 %    % Lymphocytes 5.9 %    % Monocytes 16.2 %    % Eosinophils 0.2 %    % Basophils 0.6 %    % Immature Granulocytes 0.3 %    Absolute Neutrophil 7.9 1.6 - 8.3 10e9/L    Absolute Lymphocytes 0.6 (L) 0.8 - 5.3 10e9/L    Absolute Monocytes 1.7 (H) 0.0 - 1.3 10e9/L    Absolute Eosinophils 0.0 0.0 - 0.7 10e9/L    Absolute Basophils 0.1 0.0 - 0.2 10e9/L    Abs Immature Granulocytes 0.0 0 - 0.4 10e9/L       Medications   ibuprofen (ADVIL/MOTRIN) tablet 600 mg (600 mg Oral Given 2/19/19 1114)   ipratropium - albuterol 0.5 mg/2.5 mg/3 mL (DUONEB) neb solution 3 mL (3 mLs Nebulization Given 2/19/19 1115)   methylPREDNISolone sodium succinate (solu-MEDROL) injection 125 mg (125 mg Intravenous Given 2/19/19 1114)       Assessments & Plan (with Medical Decision Making)   Patient seen and examined.  Patient is nontoxic but slightly ill-appearing.  Heart, bowel sounds normal.  Abdomen soft nontender palpation, nondistended.  Patient had slight wheezes throughout all lung fields.  No lower extremity edema.    Pt found to have influenza A. Pt started on tamiflu. PT has stable vital signs and workup. Pt would prefer to be treated as outpt and I feel she is well appearing enough to try that. Strict return precautions given, referral placed to f/u with pcp, pt understood and agreed with plan and was discharged.      Fred Chong PA-C        I have reviewed the nursing notes.    I have reviewed the findings, diagnosis, plan and need for follow up with the patient.          Medication List      ASK your doctor about these medications    amoxicillin-clavulanate 875-125 MG tablet  Commonly known as:  AUGMENTIN  1 tablet, Oral, 2 TIMES DAILY  Ask about: Should I take this medication?     azithromycin 250 MG tablet  Commonly known as:  ZITHROMAX  Take 2 tablets (500 mg) by mouth daily for 1 day, THEN 1 tablet (250 mg) daily for 4 days.  Start taking on:  1/13/2019  Ask about: Should I take this medication?            Final diagnoses:   None       2/19/2019   [facility]     Fred Chong PA  02/23/19 [id number]

## 2019-02-19 NOTE — LETTER
February 19, 2019      To Whom It May Concern:      Ovidio Luna was seen in our Emergency Department today, 02/19/19.  I expect her condition to improve over the next 5 days.  She may return to work when improved.    Sincerely,        BRITTON Luna

## 2019-02-19 NOTE — ED TRIAGE NOTES
Pt comes to the ER from [facility].  Pt had low oxygen sats for them, last reading 90.  In triage 92-94%.  Pt has a history of COPD, not on oxygen.  Pt complaining of headache 9/10 pain.  Resp rate 34 in triage.  Temp at [facility] 100.5, B/P 102/66, HR 70.

## 2019-02-19 NOTE — ED AVS SNAPSHOT
[facility]  [address]  [place] [address]  Phone:  [phone]  Fax:  [phone]                                    Ovidio Luna   MRN: [id number]    Department:  [facility]   Date of Visit:  2/19/2019           After Visit Summary Signature Page    I have received my discharge instructions, and my questions have been answered. I have discussed any challenges I see with this plan with the nurse or doctor.    ..........................................................................................................................................  Patient/Patient Representative Signature      ..........................................................................................................................................  Patient Representative Print Name and Relationship to Patient    ..................................................               ................................................  Date                                   Time    ..........................................................................................................................................  Reviewed by Signature/Title    ...................................................              ..............................................  Date                                               Time          22EPIC Rev 08/18

## 2019-02-20 ENCOUNTER — TELEPHONE (OUTPATIENT)
Dept: FAMILY MEDICINE | Facility: OTHER | Age: 69
End: 2019-02-20

## 2019-02-20 ENCOUNTER — OFFICE VISIT (OUTPATIENT)
Dept: FAMILY MEDICINE | Facility: OTHER | Age: 69
End: 2019-02-20
Attending: PHYSICIAN ASSISTANT
Payer: COMMERCIAL

## 2019-02-20 VITALS
HEART RATE: 93 BPM | DIASTOLIC BLOOD PRESSURE: 80 MMHG | RESPIRATION RATE: 28 BRPM | SYSTOLIC BLOOD PRESSURE: 122 MMHG | OXYGEN SATURATION: 97 % | WEIGHT: 149 LBS | TEMPERATURE: 98.3 F | BODY MASS INDEX: 26.9 KG/M2

## 2019-02-20 DIAGNOSIS — R05.9 COUGH: ICD-10-CM

## 2019-02-20 DIAGNOSIS — J44.1 CHRONIC OBSTRUCTIVE PULMONARY DISEASE WITH ACUTE EXACERBATION (H): Primary | ICD-10-CM

## 2019-02-20 DIAGNOSIS — J10.1 INFLUENZA A: ICD-10-CM

## 2019-02-20 DIAGNOSIS — J10.1 INFLUENZA A: Primary | ICD-10-CM

## 2019-02-20 PROCEDURE — G0463 HOSPITAL OUTPT CLINIC VISIT: HCPCS | Mod: 25

## 2019-02-20 PROCEDURE — G0463 HOSPITAL OUTPT CLINIC VISIT: HCPCS

## 2019-02-20 PROCEDURE — 25000125 ZZHC RX 250: Performed by: PHYSICIAN ASSISTANT

## 2019-02-20 PROCEDURE — 99213 OFFICE O/P EST LOW 20 MIN: CPT | Performed by: PHYSICIAN ASSISTANT

## 2019-02-20 PROCEDURE — 94640 AIRWAY INHALATION TREATMENT: CPT | Performed by: PHYSICIAN ASSISTANT

## 2019-02-20 RX ORDER — IPRATROPIUM BROMIDE AND ALBUTEROL SULFATE 2.5; .5 MG/3ML; MG/3ML
1 SOLUTION RESPIRATORY (INHALATION) EVERY 6 HOURS PRN
Qty: 1 BOX | Refills: 1 | Status: SHIPPED | OUTPATIENT
Start: 2019-02-20 | End: 2019-02-20

## 2019-02-20 RX ORDER — IPRATROPIUM BROMIDE AND ALBUTEROL SULFATE 2.5; .5 MG/3ML; MG/3ML
1 SOLUTION RESPIRATORY (INHALATION) EVERY 6 HOURS PRN
Qty: 1 BOX | Refills: 1 | Status: ON HOLD | OUTPATIENT
Start: 2019-02-20 | End: 2019-03-11

## 2019-02-20 RX ORDER — NEBULIZER ACCESSORIES
KIT MISCELLANEOUS
Qty: 1 KIT | Refills: 0 | Status: SHIPPED | OUTPATIENT
Start: 2019-02-20

## 2019-02-20 RX ORDER — NEBULIZER ACCESSORIES
KIT MISCELLANEOUS
Qty: 1 KIT | Refills: 0 | Status: SHIPPED | OUTPATIENT
Start: 2019-02-20 | End: 2019-02-20

## 2019-02-20 RX ORDER — IPRATROPIUM BROMIDE AND ALBUTEROL SULFATE 2.5; .5 MG/3ML; MG/3ML
3 SOLUTION RESPIRATORY (INHALATION)
Status: DISCONTINUED | OUTPATIENT
Start: 2019-02-20 | End: 2019-02-20 | Stop reason: CLARIF

## 2019-02-20 RX ORDER — IPRATROPIUM BROMIDE AND ALBUTEROL SULFATE 2.5; .5 MG/3ML; MG/3ML
3 SOLUTION RESPIRATORY (INHALATION) ONCE
Status: COMPLETED | OUTPATIENT
Start: 2019-02-20 | End: 2019-02-20

## 2019-02-20 RX ADMIN — IPRATROPIUM BROMIDE AND ALBUTEROL SULFATE 3 ML: .5; 3 SOLUTION RESPIRATORY (INHALATION) at 09:29

## 2019-02-20 NOTE — LETTER
February 20, 2019      Ovidio Luna  [address] [place] [address]        To Whom It May Concern:    Ovidio Luna was seen in our clinic. Please excuse from work on 2/17/2019-2/26/2019. Patient has influenza A.  She may return to work without restrictions on 2/27/2019.      Sincerely,        Lizet Garcia PA-C

## 2019-02-20 NOTE — NURSING NOTE
Chief Complaint   Patient presents with     Follow Up     ER     Breathing Problem         Medication Reconciliation: complete    Elisabeth Chavira, LPN

## 2019-02-20 NOTE — PROGRESS NOTES
Nursing Notes:   Elisabeth Chavira LPN  2/20/2019  8:21 AM  Signed  Chief Complaint   Patient presents with     Follow Up     ER     Breathing Problem         Medication Reconciliation: complete    Elisabeth Chavira LPN      HPI:    Ovidio Luna is a 68 year old female who presents for ER follow up. Diagnosed with influenza A yesterday in ED. Hasn't picked up prednisone or tamiflu yet.  Patient did receive a pill of the Tamiflu in the emergency room.  Currently using albuterol 3x day and advair 2x per day, seems to help a little bit. Does not have a spacer for albuterol. Taking robitussim DM, tylenol 3000 mg/ day. She is asking about a nebulizer because it helped a lot in the ER.  Does not have a nebulizer at home.  Keeping food and fluids down, will also stock upon soup on the way home.  No dehydration concerns.  Headaches and myalgias much improved with tylenol, denies nausea, vomiting, diarrhea, swelling, palpations, chest pain, confusion, or extreme fatigue.  Patient is having wheezing in her chest.  Stable as compared to yesterday.  No sinus pain or pressure.    Past Medical History:   Diagnosis Date     Allergy status to other antibiotic agents status     ERT     Atherosclerosis     History of ASO with claudication     Atherosclerotic heart disease of native coronary artery without angina pectoris     2004,Stent times 2     Cerebral infarction (H)     05/20/2012,CVA with residual left sided weakness, incidental meningioma found     Closed fracture of shaft of left tibia     12/26/01,History of bilateral tibial fx, work related injury     Personal history of other diseases of the musculoskeletal system and connective tissue      03/14/06,Osteopenia, proximal femur.  Normal bone mineral density lumbar spine 03/14/06.  Start Fosamax 70 mg. weekly (didn't start until 08/07), stopped fosamax.     Personal history of other diseases of the nervous system and sense organs     History of  migraines      Zoster without complications     2008,left shoulder and neck     Zoster without complications     [id number]/62712,Herpes zoster, left shoulder and neck       Past Surgical History:   Procedure Laterality Date     COLONOSCOPY      2004,2 hyperplastic polyps, repeat in 2014     COLONOSCOPY      1992, 2004,Colonoscopy, normal     HEMORRHOID SURGERY      No Comments Provided     HYSTERECTOMY TOTAL ABDOMINAL, BILATERAL SALPINGO-OOPHORECTOMY, COMBINED      1987     OTHER SURGICAL HISTORY      2003,206617,STRESS TEST PHARMACOLOGICAL,dobutamine, normal     OTHER SURGICAL HISTORY      2004,27748.0,WI BYPASS GRAFT AORTOBIFEMORAL     OTHER SURGICAL HISTORY      2004,LOC-1006.05,PTCA,mid LAD, proximal RCA     OTHER SURGICAL HISTORY      2006,206617,STRESS TEST PHARMACOLOGICAL,adenosine, negative     OTHER SURGICAL HISTORY      03/06,115877,NM CARDIAC MPI STRESS TEST,Adenosine Cardiolite stress done and is negative for ischemia or infarction.  Ejection fraction is 54%.     OTHER SURGICAL HISTORY      03/08/16,RPD089,CYSTOSCOPY W/ URETEROSCOPY W/ LITHOTRIPSY,Right,Dr. Mario DC       Family History   Problem Relation Age of Onset     Heart Disease Father         Heart Disease     Cancer Father         Cancer,myeloma     Heart Disease Mother         Heart Disease,MI, stenting     Other - See Comments Sister         chronic pain syndrome     Other - See Comments Other         FHx Father's side Coronary artery disease     Other - See Comments Paternal Uncle         Stroke     Cancer Paternal Uncle         Cancer,Bladder x2     Breast Cancer No family hx of         Cancer-breast       Social History     Tobacco Use     Smoking status: Current Every Day Smoker     Packs/day: 1.00     Years: 16.00     Pack years: 16.00     Types: Cigarettes     Smokeless tobacco: Never Used   Substance Use Topics     Alcohol use: No       Current Outpatient Medications   Medication Sig Dispense Refill     albuterol (PROAIR HFA/PROVENTIL HFA/VENTOLIN  HFA) 108 (90 Base) MCG/ACT Inhaler Inhale 1-2 puffs into the lungs every 6 hours as needed for shortness of breath / dyspnea or wheezing 1 Inhaler 0     amLODIPine (NORVASC) 5 MG tablet Take 1 tablet (5 mg) by mouth daily 90 tablet 1     benzonatate (TESSALON) 200 MG capsule Take 1 capsule (200 mg) by mouth 3 times daily as needed for cough 21 capsule 3     busPIRone (BUSPAR) 10 MG tablet Take 1 tablet (10 mg) by mouth 3 times daily as needed (anxiety) 90 tablet 1     Cholecalciferol (VITAMIN D3) 2000 UNITS CAPS Take 2,000 Units by mouth daily       citalopram (CELEXA) 10 MG tablet Take 1 tablet (10 mg) by mouth every morning 90 tablet 0     fexofenadine (ALLEGRA) 180 MG tablet Take 180 mg by mouth daily with food       fish oil-omega-3 fatty acids 1000 MG capsule Take 1 capsule by mouth daily       fluticasone (FLONASE) 50 MCG/ACT spray Spray 1 spray into both nostrils daily 1 Bottle 3     fluticasone-salmeterol (ADVAIR) 250-50 MCG/DOSE diskus inhaler Inhale 1 puff into the lungs 2 times daily 1 Inhaler 3     hydrochlorothiazide (HYDRODIURIL) 25 MG tablet Take 1 tablet (25 mg) by mouth daily 90 tablet 3     meclizine (ANTIVERT) 25 MG tablet Take 1 tablet (25 mg) by mouth every 6 hours as needed for dizziness 15 tablet 0     metoprolol tartrate (LOPRESSOR) 100 MG tablet Take 1 tablet (100 mg) by mouth 2 times daily 180 tablet 3     naproxen (NAPROSYN) 500 MG tablet Take 1 tablet (500 mg) by mouth 2 times daily (with meals) 60 tablet 11     nitroGLYcerin (NITROLINGUAL) 0.4 MG/SPRAY spray Place 1 spray under the tongue       oseltamivir (TAMIFLU) 75 MG capsule Take 1 capsule (75 mg) by mouth 2 times daily for 10 days 20 capsule 0     potassium chloride SA (K-DUR/KLOR-CON M) 20 MEQ CR tablet Take 1 tablet (20 mEq) by mouth 2 times daily (with meals) 60 tablet 11     predniSONE (DELTASONE) 20 MG tablet Take two tablets (= 40mg) each day for 5 (five) days. 10 tablet 0     simvastatin (ZOCOR) 80 MG tablet Take 1 tablet  (80 mg) by mouth daily 90 tablet 3     ipratropium - albuterol 0.5 mg/2.5 mg/3 mL (DUONEB) 0.5-2.5 (3) MG/3ML neb solution Take 1 vial (3 mLs) by nebulization every 6 hours as needed for shortness of breath / dyspnea or wheezing 1 Box 1     Respiratory Therapy Supplies (NEBULIZER/TUBING/MOUTHPIECE) KIT Dx: COPD with exacerbation. Sig: Use as directed. 1 kit 0       Allergies   Allergen Reactions     Codeine      Rapid heart rate.  Nauseated  Other reaction(s): Confusion  Rapid heart rate.  Nauseated     Diphenhydramine      Rapid heart Rate  Other reaction(s): Tachycardia  Rapid heart Rate  Other reaction(s): Tachycardia  Rapid heart Rate     Lisinopril Cough     Morphine      Rapid heart rate  Other reaction(s): Tachycardia  Rapid heart rate     No Clinical Screening - See Comments      Pt states allergies to white/yellow gold.  Sugical steel.  Copper.     Propoxyphene N-Apap      unknown  Other reaction(s): Tremors  unknown     Varenicline Nausea and Vomiting     Tetracycline Rash     unknown  Other reaction(s): Tremors  unknown       REVIEW OF SYSTEMS:  Refer to HPI.    EXAM:   Vitals:    /80 (BP Location: Right arm, Patient Position: Sitting, Cuff Size: Adult Regular)   Pulse 93   Temp 98.3  F (36.8  C)   Resp 28   Wt 67.6 kg (149 lb)   SpO2 97%   BMI 26.90 kg/m      General Appearance: Pleasant, alert, appropriate appearance for age. Ill but non toxic appearing,   Ear Exam: Normal TM's bilaterally, grey, translucent, bony landmarks appreciated.   Left/Right TM: Effusion is not present. TM is not bulging. There is no pus appreciated.  Normal auditory canals and external ears. Non-tender.  OroPharynx Exam:  Erythematous posterior pharynx with no exudates. No sinus pain upon palpation of frontal, ethmoid, and maxillary sinuses.  Chest/Respiratory Exam: Normal chest wall and respirations.  Wheezing appreciated throughout anterior and posterior lobes bilaterally.  No crackling appreciated. No retractions  appreciated.  Cardiovascular Exam: Regular rate and rhythm. S1, S2, no murmur, click, gallop, or rubs.  Lymphatic Exam: mild anterior cervical lymphadenopathy   Skin: no rash or abnormalities  Psychiatric Exam: Alert and oriented - appropriate affect.    PHQ Depression Screen  PHQ-9 SCORE 5/30/2017 6/3/2018 7/9/2018   PHQ-9 Total Score 16 17 0       LABS:    Results for orders placed or performed during the hospital encounter of 02/19/19   XR Chest 2 Views    Narrative    PROCEDURE: XR CHEST 2 VW 2/19/2019 11:44 AM    HISTORY: cough, smoker, fever, lungs? Pneumonia?    COMPARISONS: 1/13/2019.    TECHNIQUE: 2 views.    FINDINGS: Heart is mildly enlarged. No confluent infiltrate or pleural  effusion is seen. Mild degenerative changes seen in the spine.         Impression    IMPRESSION: Mild cardiomegaly. No confluent infiltrate.    KOMAL ROMO MD   CBC with platelets differential   Result Value Ref Range    WBC 10.3 4.0 - 11.0 10e9/L    RBC Count 5.03 3.8 - 5.2 10e12/L    Hemoglobin 14.5 11.7 - 15.7 g/dL    Hematocrit 44.3 35.0 - 47.0 %    MCV 88 78 - 100 fl    MCH 28.8 26.5 - 33.0 pg    MCHC 32.7 31.5 - 36.5 g/dL    RDW 13.3 10.0 - 15.0 %    Platelet Count 228 150 - 450 10e9/L    Diff Method Automated Method     % Neutrophils 76.8 %    % Lymphocytes 5.9 %    % Monocytes 16.2 %    % Eosinophils 0.2 %    % Basophils 0.6 %    % Immature Granulocytes 0.3 %    Absolute Neutrophil 7.9 1.6 - 8.3 10e9/L    Absolute Lymphocytes 0.6 (L) 0.8 - 5.3 10e9/L    Absolute Monocytes 1.7 (H) 0.0 - 1.3 10e9/L    Absolute Eosinophils 0.0 0.0 - 0.7 10e9/L    Absolute Basophils 0.1 0.0 - 0.2 10e9/L    Abs Immature Granulocytes 0.0 0 - 0.4 10e9/L   Basic metabolic panel   Result Value Ref Range    Sodium 138 134 - 144 mmol/L    Potassium 3.9 3.5 - 5.1 mmol/L    Chloride 106 98 - 107 mmol/L    Carbon Dioxide 23 21 - 31 mmol/L    Anion Gap 9 3 - 14 mmol/L    Glucose 114 (H) 70 - 105 mg/dL    Urea Nitrogen 17 7 - 25 mg/dL    Creatinine  0.95 0.60 - 1.20 mg/dL    GFR Estimate 59 (L) >60 mL/min/[1.73_m2]    GFR Estimate If Black 71 >60 mL/min/[1.73_m2]    Calcium 9.2 8.6 - 10.3 mg/dL   Influenza A and B and RSV PCR   Result Value Ref Range    Specimen Description Nasopharyngeal     Influenza A PCR Positive (A) NEG^Negative    Influenza B PCR Negative NEG^Negative    Resp Syncytial Virus Negative NEG^Negative         ASSESSMENT AND PLAN:      ICD-10-CM    1. Influenza A J10.1 ipratropium - albuterol 0.5 mg/2.5 mg/3 mL (DUONEB) neb solution 3 mL     DISCONTINUED: ipratropium - albuterol 0.5 mg/2.5 mg/3 mL (DUONEB) neb solution 3 mL     DISCONTINUED: Respiratory Therapy Supplies (NEBULIZER/TUBING/MOUTHPIECE) KIT     DISCONTINUED: ipratropium - albuterol 0.5 mg/2.5 mg/3 mL (DUONEB) 0.5-2.5 (3) MG/3ML neb solution   2. Cough R05 DISCONTINUED: ipratropium - albuterol 0.5 mg/2.5 mg/3 mL (DUONEB) neb solution 3 mL     DISCONTINUED: Respiratory Therapy Supplies (NEBULIZER/TUBING/MOUTHPIECE) KIT     DISCONTINUED: ipratropium - albuterol 0.5 mg/2.5 mg/3 mL (DUONEB) 0.5-2.5 (3) MG/3ML neb solution         Patient had an unremarkable CBC and BMP yesterday in the emergency room.  Positive influenza a in the emergency room.  Negative RSV.  Unremarkable chest x-ray yesterday.  No need to repeat blood work or imaging today.    Patient was given a DuoNeb in clinic to help calm down wheezing and for symptomatic relief.  Patient was given a prescription for a nebulizer to use as needed at home.  Patient was given a prescription for DuoNeb to use at home every 4-6 hours as needed.  Gave warning signs and symptoms.  Highly encouraged to start the Tamiflu and prednisone at home.  Can use over-the-counter cough cold remedies as needed.  Return if symptoms are changing or worsening as needed.    Patient Instructions   Encouraged to start tamiflu and prednisone.     May use symptomatic care with tylenol or ibuprofen. Sudafed or mucinex work well for congestion. May use cough  syrup or cough drops.  Using a humidifier works well to break up the congestion. You can also sleep propped up on a couple pillows to decrease symptoms at night. A nettipot works well to decrease nasal congestion.    Use a Neti Pot/sinus flush (Eliezer Med Sinus Rinse) 3 times daily to irrigate sinuses/mucosal tissue.     Sudafed or mucinex work well for congestion.   If you choose pseudoephedrine, usefor only 5-7 days AS DIRECTED. Speak to your pharmacist if you have any concerns about your medications. May also use decongestant nasal spray, but only for 3 days MAXIMUM.    You will need to be evaluated if you start to experience:  Fever higher than 102.5 F (39.2 C)   Sudden and severe pain in the face and head   Trouble seeing or seeing double   Trouble thinking clearly   Swelling or redness around 1 or both eyes   Trouble breathing or a stiff neck    * If you are a smoker, try to quit *    Call 9-1-1 or go to the emergency room if you:  Have trouble breathing   Are drooling because you cannot swallow your saliva   Have swelling of the neck or tongue   Cannot move your neck or have trouble opening your mouth       Lizet Garcia PA-C..................2/20/2019 8:19 AM

## 2019-02-20 NOTE — PATIENT INSTRUCTIONS
Encouraged to start tamiflu and prednisone.     May use symptomatic care with tylenol or ibuprofen. Sudafed or mucinex work well for congestion. May use cough syrup or cough drops.  Using a humidifier works well to break up the congestion. You can also sleep propped up on a couple pillows to decrease symptoms at night. A nettipot works well to decrease nasal congestion.    Use a Neti Pot/sinus flush (Eliezer Med Sinus Rinse) 3 times daily to irrigate sinuses/mucosal tissue.     Sudafed or mucinex work well for congestion.   If you choose pseudoephedrine, usefor only 5-7 days AS DIRECTED. Speak to your pharmacist if you have any concerns about your medications. May also use decongestant nasal spray, but only for 3 days MAXIMUM.    You will need to be evaluated if you start to experience:  Fever higher than 102.5 F (39.2 C)   Sudden and severe pain in the face and head   Trouble seeing or seeing double   Trouble thinking clearly   Swelling or redness around 1 or both eyes   Trouble breathing or a stiff neck    * If you are a smoker, try to quit *    Call 9-1-1 or go to the emergency room if you:  Have trouble breathing   Are drooling because you cannot swallow your saliva   Have swelling of the neck or tongue   Cannot move your neck or have trouble opening your mouth

## 2019-02-20 NOTE — TELEPHONE ENCOUNTER
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 They need a different diagnosis code for medicare to cover.  Miquel'd up below with COPD with exac diag. Code from problem list.  Traci Chavira LPN ...... 2/20/2019 10:08 AM

## 2019-02-28 ENCOUNTER — OFFICE VISIT (OUTPATIENT)
Dept: FAMILY MEDICINE | Facility: OTHER | Age: 69
End: 2019-02-28
Attending: PHYSICIAN ASSISTANT
Payer: COMMERCIAL

## 2019-02-28 VITALS
RESPIRATION RATE: 24 BRPM | DIASTOLIC BLOOD PRESSURE: 84 MMHG | OXYGEN SATURATION: 90 % | SYSTOLIC BLOOD PRESSURE: 128 MMHG | BODY MASS INDEX: 26.54 KG/M2 | WEIGHT: 147 LBS | TEMPERATURE: 98.6 F | HEART RATE: 69 BPM

## 2019-02-28 DIAGNOSIS — J10.1 INFLUENZA A: ICD-10-CM

## 2019-02-28 DIAGNOSIS — J22 LOWER RESPIRATORY INFECTION: Primary | ICD-10-CM

## 2019-02-28 PROCEDURE — G0463 HOSPITAL OUTPT CLINIC VISIT: HCPCS

## 2019-02-28 PROCEDURE — 99213 OFFICE O/P EST LOW 20 MIN: CPT | Performed by: PHYSICIAN ASSISTANT

## 2019-02-28 RX ORDER — LEVOFLOXACIN 500 MG/1
500 TABLET, FILM COATED ORAL DAILY
Qty: 10 TABLET | Refills: 0 | Status: SHIPPED | OUTPATIENT
Start: 2019-02-28 | End: 2019-03-10

## 2019-02-28 RX ORDER — PREDNISONE 20 MG/1
TABLET ORAL
Qty: 15 TABLET | Refills: 0 | Status: ON HOLD | OUTPATIENT
Start: 2019-02-28 | End: 2019-03-11

## 2019-02-28 NOTE — PATIENT INSTRUCTIONS
Antibiotic has been sent to pharmacy. Please take full course of antibiotic even if symptoms have completely resolved. This helps prevent against antibiotic resistance.     Patient prescribed antibiotics. Monitor for any fevers or chills. Return in 7-10 days if not feeling better. Please call clinic with any questions or concerns. Please take in a lot of fluids and get rest. Return with any change orworsening of symptoms.    May use symptomatic care with tylenol or ibuprofen. Sudafed or mucinex work well for congestion. May use cough syrup or cough drops.  Using a humidifier works well to break up the congestion. You can also sleep propped up on a couple pillows to decrease symptoms at night. A nettipot works well to decrease nasal congestion.    Use a Neti Pot/sinus flush (Eliezer Med Sinus Rinse) 3 times daily to irrigate sinuses/mucosal tissue.     Sudafed or mucinex work well for congestion.   If you choose pseudoephedrine, usefor only 5-7 days AS DIRECTED. Speak to your pharmacist if you have any concerns about your medications. May also use decongestant nasal spray, but only for 3 days MAXIMUM.    You will need to be evaluated if you start to experience:  Fever higher than 102.5 F (39.2 C)   Sudden and severe pain in the face and head   Trouble seeing or seeing double   Trouble thinking clearly   Swelling or redness around 1 or both eyes   Trouble breathing or a stiff neck    * If you are a smoker, try to quit *    Call 9-1-1 or go to the emergency room if you:  Have trouble breathing   Are drooling because you cannot swallow your saliva   Have swelling of the neck or tongue   Cannot move your neck or have trouble opening your mouth

## 2019-02-28 NOTE — LETTER
February 28, 2019      Ovidio Luna  [address] [place] [address]        To Whom It May Concern:    Ovidio Luna was seen in our clinic. Please excuse from work from 2/27-3/5/2019. She may return to work without restrictions.      Sincerely,        Lizet Garcia PA-C

## 2019-02-28 NOTE — PROGRESS NOTES
Nursing Notes:   Elisabeth Chavira LPN  2/28/2019 11:07 AM  Signed  Chief Complaint   Patient presents with     Follow Up     Respiratory Problems         Medication Reconciliation: complete    Elisabeth Chavira LPN      HPI:    Ovidio Luna is a 68 year old female who presents for influenza A follow up.  Currently taking Tamiflu for influenza treatment.  She is also using her Duoneb TID.  The duo nebs are helping to calm down her breathing.  Her cold symptoms up and down.  She gets into coughing fits.  Lightheaded at times with the coughing fits.  Diarrhea yesterday.  Mild stomach pain.  Nausea and vomiting with coughing fits.  Ears are sensitive.  Coughing up phlegm. Scratchy throat.  Sinus pressure.  Keeping food and fluids down.  No dehydration concerns.  Drinking chicken noodle soup.  Wheezing in her chest.  Feels out of breath.  No rattling.  No further fevers.    Past Medical History:   Diagnosis Date     Allergy status to other antibiotic agents status     ERT     Atherosclerosis     History of ASO with claudication     Atherosclerotic heart disease of native coronary artery without angina pectoris     2004,Stent times 2     Cerebral infarction (H)     05/20/2012,CVA with residual left sided weakness, incidental meningioma found     Closed fracture of shaft of left tibia     12/26/01,History of bilateral tibial fx, work related injury     Personal history of other diseases of the musculoskeletal system and connective tissue      03/14/06,Osteopenia, proximal femur.  Normal bone mineral density lumbar spine 03/14/06.  Start Fosamax 70 mg. weekly (didn't start until 08/07), stopped fosamax.     Personal history of other diseases of the nervous system and sense organs     History of  migraines     Zoster without complications     2008,left shoulder and neck     Zoster without complications     209/25008,Herpes zoster, left shoulder and neck       Past Surgical History:   Procedure Laterality  Date     COLONOSCOPY      2004,2 hyperplastic polyps, repeat in 2014     COLONOSCOPY      1992, 2004,Colonoscopy, normal     HEMORRHOID SURGERY      No Comments Provided     HYSTERECTOMY TOTAL ABDOMINAL, BILATERAL SALPINGO-OOPHORECTOMY, COMBINED      1987     OTHER SURGICAL HISTORY      2003,206617,STRESS TEST PHARMACOLOGICAL,dobutamine, normal     OTHER SURGICAL HISTORY      2004,62266.0,CA BYPASS GRAFT AORTOBIFEMORAL     OTHER SURGICAL HISTORY      2004,LOC-1006.05,PTCA,mid LAD, proximal RCA     OTHER SURGICAL HISTORY      2006,206617,STRESS TEST PHARMACOLOGICAL,adenosine, negative     OTHER SURGICAL HISTORY      03/06,395277,NM CARDIAC MPI STRESS TEST,Adenosine Cardiolite stress done and is negative for ischemia or infarction.  Ejection fraction is 54%.     OTHER SURGICAL HISTORY      03/08/16,RRL328,CYSTOSCOPY W/ URETEROSCOPY W/ LITHOTRIPSY,Right,Dr. Mario DC       Family History   Problem Relation Age of Onset     Heart Disease Father         Heart Disease     Cancer Father         Cancer,myeloma     Heart Disease Mother         Heart Disease,MI, stenting     Other - See Comments Sister         chronic pain syndrome     Other - See Comments Other         FHx Father's side Coronary artery disease     Other - See Comments Paternal Uncle         Stroke     Cancer Paternal Uncle         Cancer,Bladder x2     Breast Cancer No family hx of         Cancer-breast       Social History     Tobacco Use     Smoking status: Current Every Day Smoker     Packs/day: 1.00     Years: 16.00     Pack years: 16.00     Types: Cigarettes     Smokeless tobacco: Never Used   Substance Use Topics     Alcohol use: No       Current Outpatient Medications   Medication Sig Dispense Refill     albuterol (PROAIR HFA/PROVENTIL HFA/VENTOLIN HFA) 108 (90 Base) MCG/ACT Inhaler Inhale 1-2 puffs into the lungs every 6 hours as needed for shortness of breath / dyspnea or wheezing 1 Inhaler 0     amLODIPine (NORVASC) 5 MG tablet Take 1 tablet (5  mg) by mouth daily 90 tablet 1     benzonatate (TESSALON) 200 MG capsule Take 1 capsule (200 mg) by mouth 3 times daily as needed for cough 21 capsule 3     busPIRone (BUSPAR) 10 MG tablet Take 1 tablet (10 mg) by mouth 3 times daily as needed (anxiety) 90 tablet 1     Cholecalciferol (VITAMIN D3) 2000 UNITS CAPS Take 2,000 Units by mouth daily       citalopram (CELEXA) 10 MG tablet Take 1 tablet (10 mg) by mouth every morning 90 tablet 0     fexofenadine (ALLEGRA) 180 MG tablet Take 180 mg by mouth daily with food       fish oil-omega-3 fatty acids 1000 MG capsule Take 1 capsule by mouth daily       fluticasone (FLONASE) 50 MCG/ACT spray Spray 1 spray into both nostrils daily 1 Bottle 3     fluticasone-salmeterol (ADVAIR) 250-50 MCG/DOSE diskus inhaler Inhale 1 puff into the lungs 2 times daily 1 Inhaler 3     hydrochlorothiazide (HYDRODIURIL) 25 MG tablet Take 1 tablet (25 mg) by mouth daily 90 tablet 3     ipratropium - albuterol 0.5 mg/2.5 mg/3 mL (DUONEB) 0.5-2.5 (3) MG/3ML neb solution Take 1 vial (3 mLs) by nebulization every 6 hours as needed for shortness of breath / dyspnea or wheezing 1 Box 1     levofloxacin (LEVAQUIN) 500 MG tablet Take 1 tablet (500 mg) by mouth daily for 10 days 10 tablet 0     meclizine (ANTIVERT) 25 MG tablet Take 1 tablet (25 mg) by mouth every 6 hours as needed for dizziness 15 tablet 0     metoprolol tartrate (LOPRESSOR) 100 MG tablet Take 1 tablet (100 mg) by mouth 2 times daily 180 tablet 3     naproxen (NAPROSYN) 500 MG tablet Take 1 tablet (500 mg) by mouth 2 times daily (with meals) 60 tablet 11     nitroGLYcerin (NITROLINGUAL) 0.4 MG/SPRAY spray Place 1 spray under the tongue       order for DME [facility] [employer] NEBULIZER MACHINE ITEM #[id number] DX J44 [facility]   5     oseltamivir (TAMIFLU) 75 MG capsule Take 1 capsule (75 mg) by mouth 2 times daily for 10 days 20 capsule 0     potassium chloride SA (K-DUR/KLOR-CON M) 20 MEQ CR tablet Take 1 tablet (20 mEq) by mouth 2  times daily (with meals) 60 tablet 11     predniSONE (DELTASONE) 20 MG tablet Take 40 mg by mouth every morning for 5 days, THEN 20 mg every morning for 5 days. 15 tablet 0     Respiratory Therapy Supplies (NEBULIZER/TUBING/MOUTHPIECE) KIT Dx: COPD with exacerbation. Sig: Use as directed. 1 kit 0     simvastatin (ZOCOR) 80 MG tablet Take 1 tablet (80 mg) by mouth daily 90 tablet 3       Allergies   Allergen Reactions     Codeine      Rapid heart rate.  Nauseated  Other reaction(s): Confusion  Rapid heart rate.  Nauseated     Diphenhydramine      Rapid heart Rate  Other reaction(s): Tachycardia  Rapid heart Rate  Other reaction(s): Tachycardia  Rapid heart Rate     Lisinopril Cough     Morphine      Rapid heart rate  Other reaction(s): Tachycardia  Rapid heart rate     No Clinical Screening - See Comments      Pt states allergies to white/yellow gold.  Sugical steel.  Copper.     Propoxyphene N-Apap      unknown  Other reaction(s): Tremors  unknown     Varenicline Nausea and Vomiting     Tetracycline Rash     unknown  Other reaction(s): Tremors  unknown       REVIEW OF SYSTEMS:  Refer to HPI.    EXAM:   Vitals:    /84 (BP Location: Right arm, Patient Position: Sitting, Cuff Size: Adult Regular)   Pulse 69   Temp 98.6  F (37  C)   Resp 24   Wt 66.7 kg (147 lb)   SpO2 90%   BMI 26.54 kg/m      General Appearance: Pleasant, alert, appropriate appearance for age. No acute distress  Ear Exam: Normal TM's bilaterally, grey, translucent, bony landmarks appreciated.   Left/Right TM: Effusion is not present. TM is not bulging. There is no pus appreciated.    Normal auditory canals and external ears. Non-tender.  OroPharynx Exam:  Erythematous posterior pharynx with no exudates. No sinus pain upon palpation of frontal, ethmoid, and maxillary sinuses.  Chest/Respiratory Exam: Normal chest wall and respirations.  Wheezing appreciated throughout the lung lobes bilaterally.  No crackling appreciated. No retractions  appreciated.  Cardiovascular Exam: Regular rate and rhythm. S1, S2, no murmur, click, gallop, or rubs.  Lymphatic Exam: ACLN.  Skin: no rash or abnormalities  Psychiatric Exam: Alert and oriented - appropriate affect.    PHQ Depression Screen  PHQ-9 SCORE 5/30/2017 6/3/2018 7/9/2018   PHQ-9 Total Score 16 17 0           ASSESSMENT AND PLAN:      ICD-10-CM    1. Lower respiratory infection J22 levofloxacin (LEVAQUIN) 500 MG tablet     predniSONE (DELTASONE) 20 MG tablet   2. Influenza A J10.1          Patient currently on treatment for influenza A with Tamiflu.  Encouraged to complete the full course of the antiviral medication.  Encouraged to continue her DuoNeb's.  Due to patient's persistent coughing, wheezing and shortness of breath symptoms patient was started on levofloxacin for a lower respiratory tract infection.  Also started on a 10-day taper of prednisone.  Declined DuoNeb in clinic today.  After blood work and a chest x-ray today to rule out dehydration concerns or other acute concerns however patient declined at this time.  Encouraged to return to clinic or emergency room if symptoms are changing or worsening as needed.  Encouraged to increase fluids and rest.  Can use over-the-counter cough and cold remedies as needed.  Use a humidifier.  Gave warning signs and symptoms.    Patient Instructions   Antibiotic has been sent to pharmacy. Please take full course of antibiotic even if symptoms have completely resolved. This helps prevent against antibiotic resistance.     Patient prescribed antibiotics. Monitor for any fevers or chills. Return in 7-10 days if not feeling better. Please call clinic with any questions or concerns. Please take in a lot of fluids and get rest. Return with any change orworsening of symptoms.    May use symptomatic care with tylenol or ibuprofen. Sudafed or mucinex work well for congestion. May use cough syrup or cough drops.  Using a humidifier works well to break up the  congestion. You can also sleep propped up on a couple pillows to decrease symptoms at night. A nettipot works well to decrease nasal congestion.    Use a Neti Pot/sinus flush (Eliezer Med Sinus Rinse) 3 times daily to irrigate sinuses/mucosal tissue.     Sudafed or mucinex work well for congestion.   If you choose pseudoephedrine, usefor only 5-7 days AS DIRECTED. Speak to your pharmacist if you have any concerns about your medications. May also use decongestant nasal spray, but only for 3 days MAXIMUM.    You will need to be evaluated if you start to experience:  Fever higher than 102.5 F (39.2 C)   Sudden and severe pain in the face and head   Trouble seeing or seeing double   Trouble thinking clearly   Swelling or redness around 1 or both eyes   Trouble breathing or a stiff neck    * If you are a smoker, try to quit *    Call 9-1-1 or go to the emergency room if you:  Have trouble breathing   Are drooling because you cannot swallow your saliva   Have swelling of the neck or tongue   Cannot move your neck or have trouble opening your mouth       Lizet Garcia PA-C..................2/28/2019 11:08 AM

## 2019-02-28 NOTE — NURSING NOTE
Chief Complaint   Patient presents with     Follow Up     Respiratory Problems         Medication Reconciliation: complete    Elisabeth Chavira, LPN

## 2019-03-10 ENCOUNTER — HOSPITAL ENCOUNTER (INPATIENT)
Facility: OTHER | Age: 69
LOS: 1 days | Discharge: ANOTHER HOSPITAL | DRG: 310 | End: 2019-03-11
Attending: PHYSICIAN ASSISTANT | Admitting: HOSPITALIST
Payer: MEDICARE

## 2019-03-10 ENCOUNTER — APPOINTMENT (OUTPATIENT)
Dept: GENERAL RADIOLOGY | Facility: OTHER | Age: 69
DRG: 310 | End: 2019-03-10
Attending: PHYSICIAN ASSISTANT
Payer: MEDICARE

## 2019-03-10 DIAGNOSIS — I25.10 ATHEROSCLEROSIS OF NATIVE CORONARY ARTERY WITHOUT ANGINA PECTORIS, UNSPECIFIED WHETHER NATIVE OR TRANSPLANTED HEART: ICD-10-CM

## 2019-03-10 DIAGNOSIS — Z86.73 TRANSIENT ISCHEMIC ATTACK (TIA), AND CEREBRAL INFARCTION WITHOUT RESIDUAL DEFICITS(V12.54): ICD-10-CM

## 2019-03-10 DIAGNOSIS — I48.91 ATRIAL FIBRILLATION (H): ICD-10-CM

## 2019-03-10 DIAGNOSIS — F17.210 CIGARETTE SMOKER: ICD-10-CM

## 2019-03-10 DIAGNOSIS — E78.5 HYPERLIPIDEMIA, UNSPECIFIED HYPERLIPIDEMIA TYPE: ICD-10-CM

## 2019-03-10 DIAGNOSIS — I48.91 ATRIAL FIBRILLATION, UNSPECIFIED TYPE (H): ICD-10-CM

## 2019-03-10 DIAGNOSIS — J44.89 OBSTRUCTIVE CHRONIC BRONCHITIS WITHOUT EXACERBATION (H): ICD-10-CM

## 2019-03-10 DIAGNOSIS — Z95.5 STENTED CORONARY ARTERY: ICD-10-CM

## 2019-03-10 DIAGNOSIS — I10 ACCELERATED ESSENTIAL HYPERTENSION: ICD-10-CM

## 2019-03-10 LAB
ANION GAP SERPL CALCULATED.3IONS-SCNC: 12 MMOL/L (ref 3–14)
APTT PPP: 25 SEC (ref 26–39)
BASOPHILS # BLD AUTO: 0 10E9/L (ref 0–0.2)
BASOPHILS NFR BLD AUTO: 0.1 %
BUN SERPL-MCNC: 28 MG/DL (ref 7–25)
CALCIUM SERPL-MCNC: 9.6 MG/DL (ref 8.6–10.3)
CHLORIDE SERPL-SCNC: 99 MMOL/L (ref 98–107)
CO2 SERPL-SCNC: 25 MMOL/L (ref 21–31)
CREAT SERPL-MCNC: 1.04 MG/DL (ref 0.6–1.2)
D DIMER PPP DDU-MCNC: <200 NG/ML D-DU (ref 0–230)
DIFFERENTIAL METHOD BLD: ABNORMAL
EOSINOPHIL # BLD AUTO: 0 10E9/L (ref 0–0.7)
EOSINOPHIL NFR BLD AUTO: 0.1 %
ERYTHROCYTE [DISTWIDTH] IN BLOOD BY AUTOMATED COUNT: 13.4 % (ref 10–15)
GFR SERPL CREATININE-BSD FRML MDRD: 53 ML/MIN/{1.73_M2}
GLUCOSE SERPL-MCNC: 334 MG/DL (ref 70–105)
HCT VFR BLD AUTO: 42.2 % (ref 35–47)
HGB BLD-MCNC: 14 G/DL (ref 11.7–15.7)
IMM GRANULOCYTES # BLD: 0.1 10E9/L (ref 0–0.4)
IMM GRANULOCYTES NFR BLD: 0.8 %
INR PPP: 0.89 (ref 0–1.3)
LYMPHOCYTES # BLD AUTO: 1.4 10E9/L (ref 0.8–5.3)
LYMPHOCYTES NFR BLD AUTO: 11.6 %
MAGNESIUM SERPL-MCNC: 1.5 MG/DL (ref 1.9–2.7)
MCH RBC QN AUTO: 28.7 PG (ref 26.5–33)
MCHC RBC AUTO-ENTMCNC: 33.2 G/DL (ref 31.5–36.5)
MCV RBC AUTO: 87 FL (ref 78–100)
MONOCYTES # BLD AUTO: 1 10E9/L (ref 0–1.3)
MONOCYTES NFR BLD AUTO: 8.8 %
NEUTROPHILS # BLD AUTO: 9.4 10E9/L (ref 1.6–8.3)
NEUTROPHILS NFR BLD AUTO: 78.6 %
NT-PROBNP SERPL-MCNC: 130 PG/ML (ref 0–100)
PLATELET # BLD AUTO: 327 10E9/L (ref 150–450)
POTASSIUM SERPL-SCNC: 3.4 MMOL/L (ref 3.5–5.1)
RBC # BLD AUTO: 4.87 10E12/L (ref 3.8–5.2)
SODIUM SERPL-SCNC: 136 MMOL/L (ref 134–144)
TROPONIN I SERPL-MCNC: <0.03 UG/L (ref 0–0.03)
TROPONIN I SERPL-MCNC: <0.03 UG/L (ref 0–0.03)
TSH SERPL DL<=0.05 MIU/L-ACNC: 0.95 IU/ML (ref 0.34–5.6)
WBC # BLD AUTO: 11.9 10E9/L (ref 4–11)

## 2019-03-10 PROCEDURE — 85379 FIBRIN DEGRADATION QUANT: CPT | Performed by: PHYSICIAN ASSISTANT

## 2019-03-10 PROCEDURE — 83735 ASSAY OF MAGNESIUM: CPT | Performed by: PHYSICIAN ASSISTANT

## 2019-03-10 PROCEDURE — 96375 TX/PRO/DX INJ NEW DRUG ADDON: CPT | Performed by: PHYSICIAN ASSISTANT

## 2019-03-10 PROCEDURE — 85025 COMPLETE CBC W/AUTO DIFF WBC: CPT | Performed by: PHYSICIAN ASSISTANT

## 2019-03-10 PROCEDURE — 71045 X-RAY EXAM CHEST 1 VIEW: CPT

## 2019-03-10 PROCEDURE — 83880 ASSAY OF NATRIURETIC PEPTIDE: CPT | Performed by: PHYSICIAN ASSISTANT

## 2019-03-10 PROCEDURE — 36415 COLL VENOUS BLD VENIPUNCTURE: CPT | Performed by: PHYSICIAN ASSISTANT

## 2019-03-10 PROCEDURE — 84443 ASSAY THYROID STIM HORMONE: CPT | Performed by: PHYSICIAN ASSISTANT

## 2019-03-10 PROCEDURE — 96365 THER/PROPH/DIAG IV INF INIT: CPT | Performed by: PHYSICIAN ASSISTANT

## 2019-03-10 PROCEDURE — 85610 PROTHROMBIN TIME: CPT | Performed by: PHYSICIAN ASSISTANT

## 2019-03-10 PROCEDURE — 85730 THROMBOPLASTIN TIME PARTIAL: CPT | Performed by: PHYSICIAN ASSISTANT

## 2019-03-10 PROCEDURE — 25000132 ZZH RX MED GY IP 250 OP 250 PS 637: Mod: GY | Performed by: PHYSICIAN ASSISTANT

## 2019-03-10 PROCEDURE — A9270 NON-COVERED ITEM OR SERVICE: HCPCS | Mod: GY | Performed by: PHYSICIAN ASSISTANT

## 2019-03-10 PROCEDURE — 25000128 H RX IP 250 OP 636: Performed by: PHYSICIAN ASSISTANT

## 2019-03-10 PROCEDURE — 99285 EMERGENCY DEPT VISIT HI MDM: CPT | Mod: 25 | Performed by: PHYSICIAN ASSISTANT

## 2019-03-10 PROCEDURE — 96366 THER/PROPH/DIAG IV INF ADDON: CPT | Performed by: PHYSICIAN ASSISTANT

## 2019-03-10 PROCEDURE — 80048 BASIC METABOLIC PNL TOTAL CA: CPT | Performed by: PHYSICIAN ASSISTANT

## 2019-03-10 PROCEDURE — 84484 ASSAY OF TROPONIN QUANT: CPT | Mod: 91 | Performed by: PHYSICIAN ASSISTANT

## 2019-03-10 PROCEDURE — 84484 ASSAY OF TROPONIN QUANT: CPT | Performed by: PHYSICIAN ASSISTANT

## 2019-03-10 PROCEDURE — 93005 ELECTROCARDIOGRAM TRACING: CPT | Performed by: PHYSICIAN ASSISTANT

## 2019-03-10 PROCEDURE — 99285 EMERGENCY DEPT VISIT HI MDM: CPT | Mod: Z6 | Performed by: PHYSICIAN ASSISTANT

## 2019-03-10 PROCEDURE — 96376 TX/PRO/DX INJ SAME DRUG ADON: CPT | Performed by: PHYSICIAN ASSISTANT

## 2019-03-10 PROCEDURE — 93010 ELECTROCARDIOGRAM REPORT: CPT | Performed by: INTERNAL MEDICINE

## 2019-03-10 PROCEDURE — 36415 COLL VENOUS BLD VENIPUNCTURE: CPT | Mod: 91 | Performed by: PHYSICIAN ASSISTANT

## 2019-03-10 PROCEDURE — 20000006 ZZH R&B ICU - GICH

## 2019-03-10 PROCEDURE — 25000125 ZZHC RX 250: Performed by: PHYSICIAN ASSISTANT

## 2019-03-10 RX ORDER — NALOXONE HYDROCHLORIDE 0.4 MG/ML
.1-.4 INJECTION, SOLUTION INTRAMUSCULAR; INTRAVENOUS; SUBCUTANEOUS
Status: DISCONTINUED | OUTPATIENT
Start: 2019-03-10 | End: 2019-03-11 | Stop reason: HOSPADM

## 2019-03-10 RX ORDER — ONDANSETRON 4 MG/1
4 TABLET, ORALLY DISINTEGRATING ORAL EVERY 6 HOURS PRN
Status: DISCONTINUED | OUTPATIENT
Start: 2019-03-10 | End: 2019-03-11 | Stop reason: HOSPADM

## 2019-03-10 RX ORDER — PROCHLORPERAZINE 25 MG
12.5 SUPPOSITORY, RECTAL RECTAL EVERY 12 HOURS PRN
Status: DISCONTINUED | OUTPATIENT
Start: 2019-03-10 | End: 2019-03-11 | Stop reason: HOSPADM

## 2019-03-10 RX ORDER — POTASSIUM CHLORIDE 1500 MG/1
20 TABLET, EXTENDED RELEASE ORAL ONCE
Status: COMPLETED | OUTPATIENT
Start: 2019-03-10 | End: 2019-03-10

## 2019-03-10 RX ORDER — PROCHLORPERAZINE MALEATE 5 MG
5 TABLET ORAL EVERY 6 HOURS PRN
Status: DISCONTINUED | OUTPATIENT
Start: 2019-03-10 | End: 2019-03-11 | Stop reason: HOSPADM

## 2019-03-10 RX ORDER — ONDANSETRON 2 MG/ML
4 INJECTION INTRAMUSCULAR; INTRAVENOUS EVERY 6 HOURS PRN
Status: DISCONTINUED | OUTPATIENT
Start: 2019-03-10 | End: 2019-03-11 | Stop reason: HOSPADM

## 2019-03-10 RX ORDER — MAGNESIUM SULFATE HEPTAHYDRATE 40 MG/ML
2 INJECTION, SOLUTION INTRAVENOUS ONCE
Status: COMPLETED | OUTPATIENT
Start: 2019-03-10 | End: 2019-03-10

## 2019-03-10 RX ORDER — NALOXONE HYDROCHLORIDE 0.4 MG/ML
.1-.4 INJECTION, SOLUTION INTRAMUSCULAR; INTRAVENOUS; SUBCUTANEOUS
Status: DISCONTINUED | OUTPATIENT
Start: 2019-03-10 | End: 2019-03-10

## 2019-03-10 RX ORDER — DILTIAZEM HYDROCHLORIDE 5 MG/ML
20 INJECTION INTRAVENOUS ONCE
Status: COMPLETED | OUTPATIENT
Start: 2019-03-10 | End: 2019-03-10

## 2019-03-10 RX ORDER — FENTANYL CITRATE 50 UG/ML
50 INJECTION, SOLUTION INTRAMUSCULAR; INTRAVENOUS EVERY 4 HOURS PRN
Status: DISCONTINUED | OUTPATIENT
Start: 2019-03-10 | End: 2019-03-11 | Stop reason: HOSPADM

## 2019-03-10 RX ORDER — DILTIAZEM HYDROCHLORIDE 100 MG/1
5 INJECTION, POWDER, LYOPHILIZED, FOR SOLUTION INTRAVENOUS CONTINUOUS
Status: DISCONTINUED | OUTPATIENT
Start: 2019-03-10 | End: 2019-03-10

## 2019-03-10 RX ORDER — METOCLOPRAMIDE HYDROCHLORIDE 5 MG/ML
5 INJECTION INTRAMUSCULAR; INTRAVENOUS EVERY 6 HOURS PRN
Status: DISCONTINUED | OUTPATIENT
Start: 2019-03-10 | End: 2019-03-11 | Stop reason: HOSPADM

## 2019-03-10 RX ORDER — FENTANYL CITRATE 50 UG/ML
50 INJECTION, SOLUTION INTRAMUSCULAR; INTRAVENOUS ONCE
Status: COMPLETED | OUTPATIENT
Start: 2019-03-10 | End: 2019-03-10

## 2019-03-10 RX ORDER — DILTIAZEM HYDROCHLORIDE 100 MG/1
5 INJECTION, POWDER, LYOPHILIZED, FOR SOLUTION INTRAVENOUS CONTINUOUS
Status: DISCONTINUED | OUTPATIENT
Start: 2019-03-10 | End: 2019-03-11 | Stop reason: HOSPADM

## 2019-03-10 RX ORDER — METOCLOPRAMIDE 5 MG/1
5 TABLET ORAL EVERY 6 HOURS PRN
Status: DISCONTINUED | OUTPATIENT
Start: 2019-03-10 | End: 2019-03-11 | Stop reason: HOSPADM

## 2019-03-10 RX ADMIN — DILTIAZEM HYDROCHLORIDE 20 MG: 5 INJECTION INTRAVENOUS at 19:23

## 2019-03-10 RX ADMIN — DILTIAZEM HYDROCHLORIDE 5 MG/HR: 5 INJECTION INTRAVENOUS at 20:38

## 2019-03-10 RX ADMIN — POTASSIUM CHLORIDE 20 MEQ: 1500 TABLET, EXTENDED RELEASE ORAL at 22:46

## 2019-03-10 RX ADMIN — FENTANYL CITRATE 50 MCG: 50 INJECTION, SOLUTION INTRAMUSCULAR; INTRAVENOUS at 19:20

## 2019-03-10 RX ADMIN — MAGNESIUM SULFATE HEPTAHYDRATE 2 G: 40 INJECTION, SOLUTION INTRAVENOUS at 23:22

## 2019-03-10 ASSESSMENT — ACTIVITIES OF DAILY LIVING (ADL)
SWALLOWING: 0-->SWALLOWS FOODS/LIQUIDS WITHOUT DIFFICULTY
RETIRED_COMMUNICATION: 0-->UNDERSTANDS/COMMUNICATES WITHOUT DIFFICULTY
TOILETING: 0-->INDEPENDENT
AMBULATION: 0-->INDEPENDENT
RETIRED_EATING: 0-->INDEPENDENT
TRANSFERRING: 0-->INDEPENDENT
BATHING: 0-->INDEPENDENT
FALL_HISTORY_WITHIN_LAST_SIX_MONTHS: NO
DRESS: 0-->INDEPENDENT
COGNITION: 0 - NO COGNITION ISSUES REPORTED

## 2019-03-10 ASSESSMENT — MIFFLIN-ST. JEOR: SCORE: 1176.25

## 2019-03-10 ASSESSMENT — ENCOUNTER SYMPTOMS
FEVER: 0
VOMITING: 1
ABDOMINAL PAIN: 0
SHORTNESS OF BREATH: 0
NAUSEA: 1

## 2019-03-10 NOTE — ED TRIAGE NOTES
Pt comes into to the ER reporting chest pain on the left side of her chest that started about a half hour ago when she was watching TV. Pt drank a pop thinking it was indigestion, which didn't help. Pt took 5 nitroglycerin, which didn't help.   Pt is short of breath. Describes the pain as sharp. Nothing makes it better or worse.   Pt's skin is dry.   Pt did throw up.

## 2019-03-10 NOTE — LETTER
Kathy Kirby Advance Care Planning  [facility]  [address] [address]        Nonjosephrikathy Luna  [address]  [facility] [address]    Dear Ms. Luna,     We have reviewed the Health Care Directive dated 3/12/2018 which you presented for addition to your medical record. Unfortunately, our review indicates the document is not legally valid for   the following reason: The notary named themselves in error.    In order to create a legal document you will need to have your signature re-notarized or witnessed by 2 people. Notaries and witnesses cannot be named as your health care agents per state law. In addition, only one of your witnesses can be employed by our health care system.    We greatly value the opportunity to assist you in documenting your choices and to honor your   wishes. We apologize for any inconvenience. We have several options to assist you in updating   your document so it meets legal requirements.       Health Care Directives and Advance Care Planning resources can be viewed and printed  for free at our web site:[email or web address].       COPIES of completed Health Care Directives can be brought or mailed to any of our   locations, including the address listed below. You can also email a copy to [email or web address] .       For additional assistance or questions you can email us at [email or web address] or call [phone]      Sincerely,     Kathy Kirby Advance Care Planning  [facility], [facility], and [place]  [address] [place] [id number]   Email us: [email or web address] Call us: [phone]  Visit at: [email or web address]

## 2019-03-11 ENCOUNTER — APPOINTMENT (OUTPATIENT)
Dept: CARDIOLOGY | Facility: OTHER | Age: 69
DRG: 310 | End: 2019-03-11
Attending: HOSPITALIST
Payer: MEDICARE

## 2019-03-11 VITALS
SYSTOLIC BLOOD PRESSURE: 164 MMHG | HEIGHT: 62 IN | OXYGEN SATURATION: 94 % | BODY MASS INDEX: 27.99 KG/M2 | HEART RATE: 57 BPM | DIASTOLIC BLOOD PRESSURE: 79 MMHG | WEIGHT: 152.12 LBS | TEMPERATURE: 97.7 F | RESPIRATION RATE: 14 BRPM

## 2019-03-11 PROBLEM — R79.89 ELEVATED TROPONIN: Status: ACTIVE | Noted: 2019-03-11

## 2019-03-11 LAB
ANION GAP SERPL CALCULATED.3IONS-SCNC: 7 MMOL/L (ref 3–14)
BUN SERPL-MCNC: 20 MG/DL (ref 7–25)
CALCIUM SERPL-MCNC: 9.6 MG/DL (ref 8.6–10.3)
CHLORIDE SERPL-SCNC: 103 MMOL/L (ref 98–107)
CO2 SERPL-SCNC: 31 MMOL/L (ref 21–31)
CREAT SERPL-MCNC: 0.94 MG/DL (ref 0.6–1.2)
ERYTHROCYTE [DISTWIDTH] IN BLOOD BY AUTOMATED COUNT: 13.5 % (ref 10–15)
GFR SERPL CREATININE-BSD FRML MDRD: 59 ML/MIN/{1.73_M2}
GLUCOSE SERPL-MCNC: 111 MG/DL (ref 70–105)
HCT VFR BLD AUTO: 41.7 % (ref 35–47)
HGB BLD-MCNC: 13.6 G/DL (ref 11.7–15.7)
MAGNESIUM SERPL-MCNC: 2.2 MG/DL (ref 1.9–2.7)
MCH RBC QN AUTO: 28.7 PG (ref 26.5–33)
MCHC RBC AUTO-ENTMCNC: 32.6 G/DL (ref 31.5–36.5)
MCV RBC AUTO: 88 FL (ref 78–100)
PLATELET # BLD AUTO: 292 10E9/L (ref 150–450)
POTASSIUM SERPL-SCNC: 3.6 MMOL/L (ref 3.5–5.1)
RBC # BLD AUTO: 4.74 10E12/L (ref 3.8–5.2)
SODIUM SERPL-SCNC: 141 MMOL/L (ref 134–144)
TROPONIN I SERPL-MCNC: 0.05 UG/L (ref 0–0.03)
TROPONIN I SERPL-MCNC: 0.06 UG/L (ref 0–0.03)
TROPONIN I SERPL-MCNC: 0.06 UG/L (ref 0–0.03)
WBC # BLD AUTO: 14.4 10E9/L (ref 4–11)

## 2019-03-11 PROCEDURE — 87081 CULTURE SCREEN ONLY: CPT | Performed by: HOSPITALIST

## 2019-03-11 PROCEDURE — 93306 TTE W/DOPPLER COMPLETE: CPT | Mod: 26 | Performed by: INTERNAL MEDICINE

## 2019-03-11 PROCEDURE — 85027 COMPLETE CBC AUTOMATED: CPT | Performed by: INTERNAL MEDICINE

## 2019-03-11 PROCEDURE — 36415 COLL VENOUS BLD VENIPUNCTURE: CPT | Performed by: HOSPITALIST

## 2019-03-11 PROCEDURE — 83735 ASSAY OF MAGNESIUM: CPT | Performed by: INTERNAL MEDICINE

## 2019-03-11 PROCEDURE — 25000128 H RX IP 250 OP 636: Performed by: HOSPITALIST

## 2019-03-11 PROCEDURE — 80048 BASIC METABOLIC PNL TOTAL CA: CPT | Performed by: INTERNAL MEDICINE

## 2019-03-11 PROCEDURE — 84484 ASSAY OF TROPONIN QUANT: CPT | Performed by: HOSPITALIST

## 2019-03-11 PROCEDURE — 84484 ASSAY OF TROPONIN QUANT: CPT | Performed by: INTERNAL MEDICINE

## 2019-03-11 PROCEDURE — 93010 ELECTROCARDIOGRAM REPORT: CPT | Performed by: INTERNAL MEDICINE

## 2019-03-11 PROCEDURE — 36415 COLL VENOUS BLD VENIPUNCTURE: CPT | Performed by: INTERNAL MEDICINE

## 2019-03-11 PROCEDURE — 93306 TTE W/DOPPLER COMPLETE: CPT

## 2019-03-11 PROCEDURE — 99234 HOSP IP/OBS SM DT SF/LOW 45: CPT | Performed by: HOSPITALIST

## 2019-03-11 RX ORDER — PREDNISONE 20 MG/1
40 TABLET ORAL DAILY
COMMUNITY
End: 2019-03-21

## 2019-03-11 RX ORDER — NALOXONE HYDROCHLORIDE 0.4 MG/ML
.1-.4 INJECTION, SOLUTION INTRAMUSCULAR; INTRAVENOUS; SUBCUTANEOUS
Status: DISCONTINUED | OUTPATIENT
Start: 2019-03-11 | End: 2019-03-11 | Stop reason: HOSPADM

## 2019-03-11 RX ORDER — ALBUTEROL SULFATE 90 UG/1
1-2 AEROSOL, METERED RESPIRATORY (INHALATION) EVERY 6 HOURS PRN
Status: CANCELLED | OUTPATIENT
Start: 2019-03-11

## 2019-03-11 RX ORDER — AMLODIPINE BESYLATE 5 MG/1
5 TABLET ORAL DAILY
Status: CANCELLED | OUTPATIENT
Start: 2019-03-11

## 2019-03-11 RX ORDER — FLUTICASONE PROPIONATE 50 MCG
1 SPRAY, SUSPENSION (ML) NASAL DAILY
Status: CANCELLED | OUTPATIENT
Start: 2019-03-11

## 2019-03-11 RX ORDER — POTASSIUM CHLORIDE 1500 MG/1
20 TABLET, EXTENDED RELEASE ORAL 2 TIMES DAILY WITH MEALS
Status: CANCELLED | OUTPATIENT
Start: 2019-03-11

## 2019-03-11 RX ORDER — NAPROXEN 250 MG/1
500 TABLET ORAL 2 TIMES DAILY WITH MEALS
Status: CANCELLED | OUTPATIENT
Start: 2019-03-11

## 2019-03-11 RX ORDER — NITROGLYCERIN 0.4 MG/1
TABLET SUBLINGUAL
COMMUNITY
End: 2019-03-21

## 2019-03-11 RX ORDER — CHLORAL HYDRATE 500 MG
1 CAPSULE ORAL DAILY
Status: CANCELLED | OUTPATIENT
Start: 2019-03-11

## 2019-03-11 RX ORDER — FLUTICASONE PROPIONATE 50 MCG
1 SPRAY, SUSPENSION (ML) NASAL DAILY PRN
COMMUNITY
End: 2019-07-08

## 2019-03-11 RX ORDER — IPRATROPIUM BROMIDE AND ALBUTEROL SULFATE 2.5; .5 MG/3ML; MG/3ML
1 SOLUTION RESPIRATORY (INHALATION) EVERY 6 HOURS PRN
Status: CANCELLED | OUTPATIENT
Start: 2019-03-11

## 2019-03-11 RX ORDER — CITALOPRAM HYDROBROMIDE 10 MG/1
10 TABLET ORAL EVERY MORNING
Status: CANCELLED | OUTPATIENT
Start: 2019-03-11

## 2019-03-11 RX ORDER — METOPROLOL TARTRATE 100 MG
100 TABLET ORAL 2 TIMES DAILY
Status: CANCELLED | OUTPATIENT
Start: 2019-03-11

## 2019-03-11 RX ORDER — SIMVASTATIN 80 MG
80 TABLET ORAL DAILY
Status: CANCELLED | OUTPATIENT
Start: 2019-03-11

## 2019-03-11 RX ORDER — BUSPIRONE HYDROCHLORIDE 10 MG/1
10 TABLET ORAL DAILY
COMMUNITY
End: 2019-07-08

## 2019-03-11 RX ORDER — ASPIRIN 81 MG/1
162 TABLET, CHEWABLE ORAL 2 TIMES DAILY
COMMUNITY
Start: 2019-03-10 | End: 2019-03-21

## 2019-03-11 RX ORDER — FEXOFENADINE HCL 180 MG/1
180 TABLET ORAL 2 TIMES DAILY
Status: CANCELLED | OUTPATIENT
Start: 2019-03-11

## 2019-03-11 RX ORDER — BENZONATATE 100 MG/1
200 CAPSULE ORAL 3 TIMES DAILY PRN
Status: CANCELLED | OUTPATIENT
Start: 2019-03-11

## 2019-03-11 RX ORDER — IPRATROPIUM BROMIDE AND ALBUTEROL SULFATE 2.5; .5 MG/3ML; MG/3ML
1 SOLUTION RESPIRATORY (INHALATION) EVERY MORNING
COMMUNITY
End: 2019-10-14

## 2019-03-11 RX ORDER — LEVOFLOXACIN 500 MG/1
500 TABLET, FILM COATED ORAL DAILY
COMMUNITY
Start: 2019-02-28 | End: 2019-03-21

## 2019-03-11 RX ORDER — HYDROCHLOROTHIAZIDE 25 MG/1
25 TABLET ORAL DAILY
Status: CANCELLED | OUTPATIENT
Start: 2019-03-11

## 2019-03-11 RX ORDER — GUAIFENESIN AND DEXTROMETHORPHAN HYDROBROMIDE 100; 10 MG/5ML; MG/5ML
5 SOLUTION ORAL EVERY 4 HOURS PRN
COMMUNITY
End: 2019-03-21

## 2019-03-11 RX ADMIN — ENOXAPARIN SODIUM 40 MG: 40 INJECTION SUBCUTANEOUS at 08:34

## 2019-03-11 ASSESSMENT — ACTIVITIES OF DAILY LIVING (ADL)
ADLS_ACUITY_SCORE: 12

## 2019-03-11 ASSESSMENT — MIFFLIN-ST. JEOR: SCORE: 1173.25

## 2019-03-11 NOTE — PHARMACY-ADMISSION MEDICATION HISTORY
"Pharmacy -- Admission Medication Reconciliation    Prior to admission (PTA) medications were reviewed and the patient's PTA medication list was updated.    Sources Consulted: Donnie, Patient interview, [facility] records pending    The reliability of this Medication Reconciliation is: Reliability: Borderline reliable    The following significant changes were made:  -Buspirone updated to 10 mg daily  -Vitamin D updated to 4000 units BID  -Robitussin added  -Flonase updated to prn  -Advair updated to prn. Patient stated \"I didn't know why I was given this, so I only use it when I need it.\"  -Duonebs updated to 1 neb every morning and as needed  -Nitroglycerin tablets added (has both spray and tablets at home)  -Prednisone added; per patient, still has 4 days left of course  -Aspirin added; patient took 162 mg yesterday morning, and 162 mg yesterday evening. She does not take this chronically.    FYI:  -Patient states she has 4 days left of Tamiflu, prescribed 2/19/19  -Patient states she also has an antibiotic at home (likely Levaquin that was prescribed 2/28/19) and has 1 dose of this left    In addition, the patient's allergies were reviewed with the patient and updated as follows:   Allergies: Morphine; No clinical screening - see comments; Propoxyphene n-apap; Varenicline; Codeine; Diphenhydramine; Lisinopril; and Tetracycline    The pharmacist has reviewed with the patient that all personal medications should be removed from the building or locked in the belongings safe.  Patient shall only take medications ordered by the physician and administered by the nursing staff.      Medication barriers identified: Patient does not know which medications are taken in the morning vs. at night at home.     Medication adherence concerns: Not taking Advair as prescribed; please provide additional education upon discharge. Also does not seem to finish antibiotics (see above).  Patient also took #5 nitroglycerin tablets " yesterday before coming into [facility]; could also use additional education on nitroglycerin use.    Understanding of emergency medications: has albuterol inhaler, Duonebs, nitroglycerin spray, and nitroglycerin tablets at home    Milton Carlson [facility], 3/11/2019,  11:02 AM      [facility] records received; no significant discrepancies. Refill history shows Advair last filled 7/2018.  Patient should refill nitroglycerin upon discharge from hospital - neither spray or sublingual tablets have been filled in the last 6 months.    Milton Carlson RP on 3/11/2019 at 2:50 PM

## 2019-03-11 NOTE — PLAN OF CARE
Patient Trop was elevated this am at 0.056. Called Dr. Hein and received an order to recheck Trop at 1200 and repeat ekg at 0800. Patient resting comfortably in bed, denies any pain while in ICU and has been sinus merline since she converted around 0230 with all other vss.

## 2019-03-11 NOTE — DISCHARGE SUMMARY
[facility]  Hospitalist Discharge Summary       Date of Admission:  3/10/2019  Date of Discharge:  3/11/2019  Discharging Provider: Cruz Calixto MD      Discharge Diagnoses   New onset atrial fibrillation with rapid ventricular response:       Elevated troponins:    Smoker:       History of smoking with no official adenosis of COPD:       History of coronary disease:       History of hyperlipidemia:            Follow-ups Needed After Discharge   Is being transferred to [place]    Unresulted Labs Ordered in the Past 30 Days of this Admission     Date and Time Order Name Status Description    3/11/2019 0806 Methicillin resistant staph aureus cult In process           Discharge Disposition   Transferred to [facility] in [place]  Condition at discharge: Stable    Hospital Course   Synopsis of the history of presenting complaint:  Ovidio Luna is a 68 year old female who presented to the emergency department yesterday with the complaints of chest pressure and numbness going to the left arm.  Patient had  associated symptoms of shortness of breath, nausea  And vomiting but no diaphoresis.  She denies any epigastric discomfort.  Pain was nonexertional patient was watching TV.  Pain was 10 out of 10 on pain scale.  Patient does have a history of coronary disease so she took 5 pills of nitroglycerin and 4 baby aspirin with no relief.  She came to the emergency department and was found to be in A. fib with RVR she was started on Cardizem drip and admitted to the ICU for for further evaluation and treatment.  Figueroa biomarkers were trended and first 2 troponins were normal and the third 1 was mildly elevated.  In the intensive care unit patient had a pause on the monitor strip, Cardizem drip was stopped and the patient converted to normal sinus rhythm after that.    Patient was seen in the ICU this morning and at present she denies any chest pain any shortness of breath any nausea  vomiting or any abdominal pain and anxious to go home.  Her last echogram was few years ago.      Hospital course:  Patient troponin trended upwards, also had an episode of SVT at a rate of 150 which was symptomatic.  Since that troponins is trending upward patient has a history of significant coronary disease and the patient had SVT it was decided that it would be better for the patient to be transferred to a facility where there is a cardiology is available.  Spoke with Dr. Mtz the cardiologist and she agreed and accepted the patient then spoke with the hospitalist service at [facility].    Consultations This Hospital Stay   None    Code Status   Full Code During Procedure    Time Spent on this Encounter          Cruz Calixto MD  [facility]  ______________________________________________________________________    Physical Exam   Vital Signs: Temp: 97.7  F (36.5  C) Temp src: Temporal BP: 164/79 Pulse: 57 Heart Rate: 57 Resp: 14 SpO2: 94 % O2 Device: None (Room air)    Weight: 152 lbs 1.88 oz  Constitutional: She is oriented to person, place, and time. She appears well-developed and well-nourished. No distress.   HENT:   Head: Normocephalic and atraumatic.   Eyes: Conjunctivae and EOM are normal. Pupils are equal, round, and reactive to light. No scleral icterus.   Neck: Normal range of motion. Neck supple.   Cardiovascular: Regular rhythm.   No murmur heard.    Regular rate and rhythm  Pulmonary/Chest: Effort normal and breath sounds normal. No respiratory distress.   Abdominal: Soft. Bowel sounds are normal. There is no tenderness.   Musculoskeletal: Normal range of motion. She exhibits no deformity.   Lymphadenopathy:     She has no cervical adenopathy.   Neurological: She is alert and oriented to person, place, and time.   Skin: Skin is warm and dry. No rash noted. She is not diaphoretic.   Psychiatric: She has a normal mood and affect. Her behavior is normal. Judgment and  thought content normal             Primary Care Physician   Glynn Botello    Immunizations       Discharge Orders   No discharge procedures on file.    Significant Results and Procedures       Discharge Medications   Current Discharge Medication List      CONTINUE these medications which have NOT CHANGED    Details   albuterol (PROAIR HFA/PROVENTIL HFA/VENTOLIN HFA) 108 (90 Base) MCG/ACT Inhaler Inhale 1-2 puffs into the lungs every 6 hours as needed for shortness of breath / dyspnea or wheezing  Qty: 1 Inhaler, Refills: 0    Associated Diagnoses: COPD exacerbation (H)      amLODIPine (NORVASC) 5 MG tablet Take 1 tablet (5 mg) by mouth daily  Qty: 90 tablet, Refills: 1    Associated Diagnoses: Essential hypertension      aspirin (ASA) 81 MG chewable tablet Take 162 mg by mouth 2 times daily .  Took 4 tablets on 3/10/19 only. Does not take chronically.      busPIRone (BUSPAR) 10 MG tablet Take 10 mg by mouth daily      Cholecalciferol (VITAMIN D3) 2000 UNITS CAPS Take 4,000 Units by mouth 2 times daily       citalopram (CELEXA) 10 MG tablet Take 1 tablet (10 mg) by mouth every morning  Qty: 90 tablet, Refills: 0    Associated Diagnoses: Anxiety      Dextromethorphan-guaiFENesin  MG/5ML syrup Take 5 mLs by mouth every 4 hours as needed for cough      fexofenadine (ALLEGRA) 180 MG tablet Take 180 mg by mouth 2 times daily       fish oil-omega-3 fatty acids 1000 MG capsule Take 1 capsule by mouth daily      hydrochlorothiazide (HYDRODIURIL) 25 MG tablet Take 1 tablet (25 mg) by mouth daily  Qty: 90 tablet, Refills: 3    Associated Diagnoses: Hypertension, unspecified type      ipratropium - albuterol 0.5 mg/2.5 mg/3 mL (DUONEB) 0.5-2.5 (3) MG/3ML neb solution Take 1 vial by nebulization every morning . May also use 1 neb every 6 hours as needed for shortness of breath.      levofloxacin (LEVAQUIN) 500 MG tablet Take 500 mg by mouth daily For 10 days.      metoprolol tartrate (LOPRESSOR) 100 MG tablet Take 1 tablet  (100 mg) by mouth 2 times daily  Qty: 180 tablet, Refills: 3    Associated Diagnoses: Essential hypertension      nitroGLYcerin (NITROSTAT) 0.4 MG sublingual tablet For chest pain place 1 tablet under the tongue every 5 minutes for 3 doses. If symptoms persist 5 minutes after 1st dose call 911.       potassium chloride SA (K-DUR/KLOR-CON M) 20 MEQ CR tablet Take 1 tablet (20 mEq) by mouth 2 times daily (with meals)  Qty: 60 tablet, Refills: 11    Associated Diagnoses: Hypokalemia      predniSONE (DELTASONE) 20 MG tablet Take 40 mg by mouth daily For 5 days, then 20 mg day for 5 days .    Comments: Please include the quantity of tablets and (strength) per dose in sig.      simvastatin (ZOCOR) 80 MG tablet Take 1 tablet (80 mg) by mouth daily  Qty: 90 tablet, Refills: 3    Associated Diagnoses: Hyperlipidemia, unspecified hyperlipidemia type      benzonatate (TESSALON) 200 MG capsule Take 1 capsule (200 mg) by mouth 3 times daily as needed for cough  Qty: 21 capsule, Refills: 3    Associated Diagnoses: Tobacco abuse; Chronic obstructive pulmonary disease with acute exacerbation (H)      fluticasone (FLONASE) 50 MCG/ACT nasal spray Spray 1 spray into both nostrils daily as needed for rhinitis or allergies      fluticasone-salmeterol (ADVAIR) 250-50 MCG/DOSE inhaler Inhale 1 puff into the lungs daily as needed      naproxen (NAPROSYN) 500 MG tablet Take 1 tablet (500 mg) by mouth 2 times daily (with meals)  Qty: 60 tablet, Refills: 11    Associated Diagnoses: Multiple joint pain      nitroGLYcerin (NITROLINGUAL) 0.4 MG/SPRAY spray Place 1 spray under the tongue every 5 minutes as needed for chest pain .  If symptoms persist 5 minutes after 1st dose call 911      order for [facility] NEBULIZER MACHINE ITEM #[id number] [facility]   Refills: 5      Respiratory Therapy Supplies (NEBULIZER/TUBING/MOUTHPIECE) KIT Dx: COPD with exacerbation. Sig: Use as directed.  Qty: 1 kit, Refills: 0    Associated  Diagnoses: Chronic obstructive pulmonary disease with acute exacerbation (H)           Allergies   Allergies   Allergen Reactions     Morphine      Rapid heart rate  Other reaction(s): Tachycardia  Rapid heart rate     No Clinical Screening - See Comments      Pt states allergies to white/yellow gold.  Sugical steel.  Copper.     Propoxyphene N-Apap Unknown     Other reaction(s): Tremors       Varenicline Nausea and Vomiting     Codeine Palpitations     Diphenhydramine Palpitations and Other (See Comments)     wheezing     Lisinopril Palpitations and Cough     Tetracycline Nausea and Vomiting and Rash

## 2019-03-11 NOTE — ED PROVIDER NOTES
History     Chief Complaint   Patient presents with     Chest Pain     HPI  Ovidio Luna is a 68 year old female who presents to the ED today with a chief complaint of chest pain.  Patient reports that her chest pain began approximately 30 minutes prior to arrival while watching TV.  Patient reports that the pain is located centrally but radiates to her left arm.  Patient does report associated nausea and vomiting,  And shortness of breath.  Patient rates pain as a 10 out of 10.  Patient took 5 nitroglycerin pills as well as 4 baby aspirin.  Patient has had a history of 2 cardiac stents placed.    Allergies:  Allergies   Allergen Reactions     Codeine      Rapid heart rate.  Nauseated  Other reaction(s): Confusion  Rapid heart rate.  Nauseated     Diphenhydramine      Rapid heart Rate  Other reaction(s): Tachycardia  Rapid heart Rate  Other reaction(s): Tachycardia  Rapid heart Rate     Lisinopril Cough     Morphine      Rapid heart rate  Other reaction(s): Tachycardia  Rapid heart rate     No Clinical Screening - See Comments      Pt states allergies to white/yellow gold.  Sugical steel.  Copper.     Propoxyphene N-Apap      unknown  Other reaction(s): Tremors  unknown     Varenicline Nausea and Vomiting     Tetracycline Rash     unknown  Other reaction(s): Tremors  unknown       Problem List:    Patient Active Problem List    Diagnosis Date Noted     Atrial fibrillation (H) 03/10/2019     Priority: Medium     Chronic non-seasonal allergic rhinitis 07/09/2018     Priority: Medium     Chronic obstructive pulmonary disease with acute exacerbation (H) 06/03/2018     Priority: Medium     Atherosclerotic coronary vascular disease 01/17/2018     Priority: Medium     Overview:   Coronary artery disease, PTCA of mid LAD and PTCA of the proximal right   coronary artery 2/28/04, normal left ventricular systolic function       Hemangioma 01/17/2018     Priority: Medium     Overview:   Multiple capillary spider  hemangioma       Hyperlipidemia 01/17/2018     Priority: Medium     Hypertension 01/17/2018     Priority: Medium     Peripheral vascular disease (H) 01/17/2018     Priority: Medium     Other affections of shoulder region, not elsewhere classified 01/17/2018     Priority: Medium     Tobacco abuse 01/17/2018     Priority: Medium     Centrilobular emphysema (H) 12/12/2016     Priority: Medium     Lumbago 08/17/2012     Priority: Medium     Nonallopathic lesion of pelvic region 08/17/2012     Priority: Medium     Nonallopathic lesion of thoracic region 08/17/2012     Priority: Medium     Hemiplegia, late effect of cerebrovascular disease (H) 06/15/2012     Priority: Medium     Meningioma (H) 05/17/2012     Priority: Medium     Accelerated essential hypertension 05/16/2012     Priority: Medium     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  Normal density LS spine.       Gastritis 10/27/2008     Priority: Medium     Overview:   Acute          Past Medical History:    Past Medical History:   Diagnosis Date     Allergy status to other antibiotic agents status      Atherosclerosis      Atherosclerotic heart disease of native coronary artery without angina pectoris      Cerebral infarction (H)      Closed fracture of shaft of left tibia      Personal history of other diseases of the musculoskeletal system and connective tissue      Personal history of other diseases of the nervous system and sense organs      Zoster without complications      Zoster without complications        Past Surgical History:    Past Surgical History:   Procedure Laterality Date     COLONOSCOPY       2004,2 hyperplastic polyps, repeat in 2014     COLONOSCOPY      1992, 2004,Colonoscopy, normal     HEMORRHOID SURGERY      No Comments Provided     HYSTERECTOMY TOTAL ABDOMINAL, BILATERAL SALPINGO-OOPHORECTOMY, COMBINED      1987     OTHER SURGICAL HISTORY      2003,206617,STRESS TEST PHARMACOLOGICAL,dobutamine, normal     OTHER SURGICAL HISTORY      2004,77849.0,MD BYPASS GRAFT AORTOBIFEMORAL     OTHER SURGICAL HISTORY      2004,LOC-1006.05,PTCA,mid LAD, proximal RCA     OTHER SURGICAL HISTORY      2006,206617,STRESS TEST PHARMACOLOGICAL,adenosine, negative     OTHER SURGICAL HISTORY      03/06,574347,NM CARDIAC MPI STRESS TEST,Adenosine Cardiolite stress done and is negative for ischemia or infarction.  Ejection fraction is 54%.     OTHER SURGICAL HISTORY      03/08/16,BKP541,CYSTOSCOPY W/ URETEROSCOPY W/ LITHOTRIPSY,Right,Dr. Mario DC       Family History:    Family History   Problem Relation Age of Onset     Heart Disease Father         Heart Disease     Cancer Father         Cancer,myeloma     Heart Disease Mother         Heart Disease,MI, stenting     Other - See Comments Sister         chronic pain syndrome     Other - See Comments Other         FHx Father's side Coronary artery disease     Other - See Comments Paternal Uncle         Stroke     Cancer Paternal Uncle         Cancer,Bladder x2     Breast Cancer No family hx of         Cancer-breast       Social History:  Marital Status:  Single [1]  Social History     Tobacco Use     Smoking status: Current Every Day Smoker     Packs/day: 1.00     Years: 16.00     Pack years: 16.00     Types: Cigarettes     Smokeless tobacco: Never Used   Substance Use Topics     Alcohol use: No     Drug use: No        Medications:      No current outpatient medications on file.      Review of Systems   Constitutional: Negative for fever.   Respiratory: Negative for shortness of breath.    Cardiovascular: Positive for chest pain.   Gastrointestinal: Positive for nausea  "and vomiting. Negative for abdominal pain.   Neurological: Negative for syncope.   All other systems reviewed and are negative.      Physical Exam   BP: (!) 185/141  Pulse: 124  Heart Rate: 150  Temp: 97.4  F (36.3  C)  Resp: 20  Height: 157.5 cm (5' 2\")  Weight: 69.3 kg (152 lb 12.5 oz)  SpO2: 95 %      Physical Exam   Constitutional: She is oriented to person, place, and time. She appears well-developed and well-nourished. No distress.   HENT:   Head: Normocephalic and atraumatic.   Eyes: Conjunctivae and EOM are normal. Pupils are equal, round, and reactive to light. No scleral icterus.   Neck: Normal range of motion. Neck supple.   Cardiovascular: Regular rhythm.   No murmur heard.  Tachycardic, irregularly irregular   Pulmonary/Chest: Effort normal and breath sounds normal. No respiratory distress.   Abdominal: Soft. Bowel sounds are normal. There is no tenderness.   Musculoskeletal: Normal range of motion. She exhibits no deformity.   Lymphadenopathy:     She has no cervical adenopathy.   Neurological: She is alert and oriented to person, place, and time.   Skin: Skin is warm and dry. No rash noted. She is not diaphoretic.   Psychiatric: She has a normal mood and affect. Her behavior is normal. Judgment and thought content normal.       ED Course        Procedures          EKG read at 1851.  Heart rate 144.  Atrial fibrillation with RVR.  T wave depression in inferior lateral leads.    Critical Care time:  none               Results for orders placed or performed during the hospital encounter of 03/10/19 (from the past 24 hour(s))   CBC with platelets differential   Result Value Ref Range    WBC 11.9 (H) 4.0 - 11.0 10e9/L    RBC Count 4.87 3.8 - 5.2 10e12/L    Hemoglobin 14.0 11.7 - 15.7 g/dL    Hematocrit 42.2 35.0 - 47.0 %    MCV 87 78 - 100 fl    MCH 28.7 26.5 - 33.0 pg    MCHC 33.2 31.5 - 36.5 g/dL    RDW 13.4 10.0 - 15.0 %    Platelet Count 327 150 - 450 10e9/L    Diff Method Automated Method     % " Neutrophils 78.6 %    % Lymphocytes 11.6 %    % Monocytes 8.8 %    % Eosinophils 0.1 %    % Basophils 0.1 %    % Immature Granulocytes 0.8 %    Absolute Neutrophil 9.4 (H) 1.6 - 8.3 10e9/L    Absolute Lymphocytes 1.4 0.8 - 5.3 10e9/L    Absolute Monocytes 1.0 0.0 - 1.3 10e9/L    Absolute Eosinophils 0.0 0.0 - 0.7 10e9/L    Absolute Basophils 0.0 0.0 - 0.2 10e9/L    Abs Immature Granulocytes 0.1 0 - 0.4 10e9/L   Basic metabolic panel   Result Value Ref Range    Sodium 136 134 - 144 mmol/L    Potassium 3.4 (L) 3.5 - 5.1 mmol/L    Chloride 99 98 - 107 mmol/L    Carbon Dioxide 25 21 - 31 mmol/L    Anion Gap 12 3 - 14 mmol/L    Glucose 334 (H) 70 - 105 mg/dL    Urea Nitrogen 28 (H) 7 - 25 mg/dL    Creatinine 1.04 0.60 - 1.20 mg/dL    GFR Estimate 53 (L) >60 mL/min/[1.73_m2]    GFR Estimate If Black 64 >60 mL/min/[1.73_m2]    Calcium 9.6 8.6 - 10.3 mg/dL   Troponin I   Result Value Ref Range    Troponin I ES <0.030 0.000 - 0.034 ug/L   INR   Result Value Ref Range    INR 0.89 0 - 1.3   Partial thromboplastin time   Result Value Ref Range    PTT 25 (L) 26 - 39 sec   Magnesium   Result Value Ref Range    Magnesium 1.5 (L) 1.9 - 2.7 mg/dL   Nt probnp inpatient (BNP)   Result Value Ref Range    N-Terminal Pro BNP Inpatient 130 (H) 0 - 100 pg/mL   D-Dimer (HI,GH)   Result Value Ref Range    D-Dimer ng/mL <200 0 - 230 ng/ml D-DU   Thyrotropin GH   Result Value Ref Range    Thyrotropin 0.95 0.34 - 5.60 IU/mL   XR Chest Port 1 View    Narrative    Procedure:XR CHEST PORT 1 VW    Clinical history:Female, 68 years, chest pain    Technique: Single view was obtained.    Comparison: 2/19/2019    Findings: The cardiac silhouette is normal. The pulmonary vasculature  is normal.    The lungs demonstrate low lung volumes with elevation of the  hemidiaphragms. Mild atelectasis is suggested at the lung bases. Bony  structures are unremarkable.      Impression    Impression:   Mild bibasilar atelectasis and elevation of both the left and  right  hemidiaphragm.    RISHABH DARLING MD   Troponin I (second draw)   Result Value Ref Range    Troponin I ES <0.030 0.000 - 0.034 ug/L       Medications   naloxone (NARCAN) injection 0.1-0.4 mg (not administered)   ondansetron (ZOFRAN-ODT) ODT tab 4 mg (not administered)     Or   ondansetron (ZOFRAN) injection 4 mg (not administered)   prochlorperazine (COMPAZINE) injection 5 mg (not administered)     Or   prochlorperazine (COMPAZINE) tablet 5 mg (not administered)     Or   prochlorperazine (COMPAZINE) Suppository 12.5 mg (not administered)   metoclopramide (REGLAN) tablet 5 mg (not administered)     Or   metoclopramide (REGLAN) injection 5 mg (not administered)   diltiazem (CARDIZEM) 100 MG in 100 mL 5% dextrose infusion (5 mg/hr Intravenous Rate/Dose Verify 3/10/19 2250)   magnesium sulfate 2 g in water intermittent infusion (not administered)   fentaNYL (PF) (SUBLIMAZE) injection 50 mcg (not administered)   fentaNYL (PF) (SUBLIMAZE) injection 50 mcg (50 mcg Intravenous Given 3/10/19 1920)   diltiazem (CARDIZEM) injection 20 mg (20 mg Intravenous Given 3/10/19 1923)   potassium chloride ER (K-DUR/KLOR-CON M) CR tablet 20 mEq (20 mEq Oral Given 3/10/19 2246)       Assessments & Plan (with Medical Decision Making)   Patient seen and examined.  Patient is nontoxic appearing however in slight distress due to discomfort.  Heart sounds irregularly irregular.  Lungs clear, bowel sounds normal.  Abdomen soft nontender palpation, nondistended.  Patient found to be in A. fib with RVR.  Patient does report feeling some chest discomfort 2-3 days prior and is unclear of exactly when patient may have began her atrial fibrillation.  Patient given fentanyl and a diltiazem bolus.  Patient's heart rate did slightly improve to 100 130.  Patient started on a diltiazem drip.  Patient had 2- troponins, negative d-dimer, normal TSH.  Magnesium was slightly low at 1.5 and potassium at 3.4.    Upon reassessment patient reported that  her pain felt much better and now rated about a 1 out of 10.  Patient was admitted to the ICU by Dr. chowdary for further management.  Patient remained stable while in the ED.     Fred Chong PA-C    I have reviewed the nursing notes.    I have reviewed the findings, diagnosis, plan and need for follow up with the patient.          Medication List      There are no discharge medications for this visit.         Final diagnoses:   Atrial fibrillation (H)       3/10/2019   [facility]     Fred Chong PA  03/10/19 4596

## 2019-03-11 NOTE — PROGRESS NOTES
Pt transferred to [facility] in stable condition. No complaints of pain upon transport.  Meds 1 to transport

## 2019-03-11 NOTE — PROGRESS NOTES
"NS ADMISSION NOTE    Patient admitted to room 914 at approximately 2230 via wheel chair from emergency room. Patient was accompanied by nurse.     Verbal SBAR report received from APRIL Pittman prior to patient arrival.     Patient ambulated to bed with stand-by assist. Patient alert and oriented X 3. The patient is not having any pain. 0-10 Pain Scale: 9(chest). Admission vital signs: Blood pressure (!) 140/96, pulse 124, temperature 97.6  F (36.4  C), temperature source Temporal, resp. rate 14, height 1.575 m (5' 2\"), weight 69.3 kg (152 lb 12.5 oz), SpO2 93 %, not currently breastfeeding. Patient was oriented to plan of care, call light, bed controls, tv, telephone, bathroom and visiting hours.     Risk Assessment    The following safety risks were identified during admission: fall. Yellow risk band applied: YES.     Skin Initial Assessment    This writer admitted this patient and completed a full skin assessment and Mik score in the Adult PCS flowsheet. Appropriate interventions initiated as needed.       Mik Risk Assessment  Sensory Perception: 4-->no impairment  Moisture: 4-->rarely moist  Activity: 4-->walks frequently  Mobility: 4-->no limitation  Nutrition: 3-->adequate  Friction and Shear: 3-->no apparent problem  Mik Score: 22    Martina Wakefield    "

## 2019-03-11 NOTE — PROGRESS NOTES
Patient has had several smaller pauses throughout the shift and one that was 3.28 seconds. At 0145=3.8 second pause, 0206= 6 second pause and at 0208=4.2 second pause. Called and spoke to Dr. Hein and received TORB to stop diltiazem drip and if needed again to try at 2.5mg then up to 5mg if needed. Other VSS all stable and wnl. Patient asymptomatic during long pause.

## 2019-03-11 NOTE — PROGRESS NOTES
Patient converted from afib rhythm to sinus merline at 0228. EKG done to confirm that showed sinus merline. Will continue to monitor and intervene as appropriate.

## 2019-03-11 NOTE — H&P
[facility]    History and Physical - Hospitalist Service       Date of Admission:  3/10/2019    Assessment & Plan   Ovidio Luna is a 68 year old female admitted on 3/10/2019. She was admitted to the tensive care unit with chest pain and A. fib with RVR.    New onset atrial fibrillation with rapid ventricular response:  Resolved.  Will check the echocardiogram.  Will trend cardiac biomarkers.    Elevated troponins:  Could be stress ischemia, but considering her history of coronary disease we will trend troponins and will also get echocardiogram I will also discussed the case with cardiologist.    Smoker:  Patient continues to smoke, advised to quit, patient will be started on nicotine patch.    History of smoking with no official adenosis of COPD:  She will be started on albuterol inhaler and Advair.    History of coronary disease:  We will start the patient on metoprolol 100 mg and Norvasc-these are patient home medications.    History of hyperlipidemia:  We will restart the patient on her home medication of Zocor.    .srujit      Diet: Regular Diet Adult    DVT Prophylaxis: Enoxaparin (Lovenox) SQ  Mendez Catheter: not present  Code Status: Full Code During Procedure      Disposition Plan   Expected discharge: Tomorrow, recommended to prior living arrangement once adequate pain management/ tolerating PO medications and Elevated troponins trended further.  Entered: Cruz Calixto MD 03/11/2019, 8:06 AM     The patient's care was discussed with the Patient.    Cruz Calixto MD  [facility]    ______________________________________________________________________    Chief Complaint   Chest pressure with numbness in the left arm    History is obtained from the patient, electronic health record and emergency department physician    History of Present Illness   Ovidio Luna is a 68 year old female who presented to the emergency department yesterday with the complaints of  chest pressure and numbness going to the left arm.  Patient had  associated symptoms of shortness of breath, nausea  And vomiting but no diaphoresis.  She denies any epigastric discomfort.  Pain was nonexertional patient was watching TV.  Pain was 10 out of 10 on pain scale.  Patient does have a history of coronary disease so she took 5 pills of nitroglycerin and 4 baby aspirin with no relief.  She came to the emergency department and was found to be in A. fib with RVR she was started on Cardizem drip and admitted to the ICU for for further evaluation and treatment.  Figueroa biomarkers were trended and first 2 troponins were normal and the third 1 was mildly elevated.  In the intensive care unit patient had a pause on the monitor strip, Cardizem drip was stopped and the patient converted to normal sinus rhythm after that.    Patient was seen in the ICU this morning and at present she denies any chest pain any shortness of breath any nausea vomiting or any abdominal pain and anxious to go home.  Her last echogram was few years ago.    Review of Systems    The 10 point Review of Systems is negative other than noted in the HPI.    Past Medical History    I have reviewed this patient's medical history and updated it with pertinent information if needed.   Past Medical History:   Diagnosis Date     Allergy status to other antibiotic agents status     ERT     Atherosclerosis     History of ASO with claudication     Atherosclerotic heart disease of native coronary artery without angina pectoris     2004,Stent times 2     Cerebral infarction (H)     05/20/2012,CVA with residual left sided weakness, incidental meningioma found     Closed fracture of shaft of left tibia     12/26/01,History of bilateral tibial fx, work related injury     Personal history of other diseases of the musculoskeletal system and connective tissue      03/14/06,Osteopenia, proximal femur.  Normal bone mineral density lumbar spine 03/14/06.  Start  Fosamax 70 mg. weekly (didn't start until 08/07), stopped fosamax.     Personal history of other diseases of the nervous system and sense organs     History of  migraines     Zoster without complications     2008,left shoulder and neck     Zoster without complications     [id number],Herpes zoster, left shoulder and neck       Past Surgical History   I have reviewed this patient's surgical history and updated it with pertinent information if needed.  Past Surgical History:   Procedure Laterality Date     COLONOSCOPY      2004,2 hyperplastic polyps, repeat in 2014     COLONOSCOPY      1992, 2004,Colonoscopy, normal     HEMORRHOID SURGERY      No Comments Provided     HYSTERECTOMY TOTAL ABDOMINAL, BILATERAL SALPINGO-OOPHORECTOMY, COMBINED      1987     OTHER SURGICAL HISTORY      2003,206617,STRESS TEST PHARMACOLOGICAL,dobutamine, normal     OTHER SURGICAL HISTORY      2004,30657.0,SC BYPASS GRAFT AORTOBIFEMORAL     OTHER SURGICAL HISTORY      2004,LOC-1006.05,PTCA,mid LAD, proximal RCA     OTHER SURGICAL HISTORY      2006,206617,STRESS TEST PHARMACOLOGICAL,adenosine, negative     OTHER SURGICAL HISTORY      03/06,341304,NM CARDIAC MPI STRESS TEST,Adenosine Cardiolite stress done and is negative for ischemia or infarction.  Ejection fraction is 54%.     OTHER SURGICAL HISTORY      03/08/16,AWC888,CYSTOSCOPY W/ URETEROSCOPY W/ LITHOTRIPSY,Right,Dr. Mario DC       Social History   I have reviewed this patient's social history and updated it with pertinent information if needed.  Social History     Tobacco Use     Smoking status: Current Every Day Smoker     Packs/day: 1.00     Years: 16.00     Pack years: 16.00     Types: Cigarettes     Smokeless tobacco: Never Used   Substance Use Topics     Alcohol use: No     Drug use: No       Family History   I have reviewed this patient's family history and updated it with pertinent information if needed.   Family History   Problem Relation Age of Onset     Heart Disease Father          Heart Disease     Cancer Father         Cancer,myeloma     Heart Disease Mother         Heart Disease,MI, stenting     Other - See Comments Sister         chronic pain syndrome     Other - See Comments Other         FHx Father's side Coronary artery disease     Other - See Comments Paternal Uncle         Stroke     Cancer Paternal Uncle         Cancer,Bladder x2     Breast Cancer No family hx of         Cancer-breast       Prior to Admission Medications   Prior to Admission Medications   Prescriptions Last Dose Informant Patient Reported? Taking?   Cholecalciferol (VITAMIN D3) 2000 UNITS CAPS 3/10/2019 at Unknown time  Yes Yes   Sig: Take 2,000 Units by mouth daily   Respiratory Therapy Supplies (NEBULIZER/TUBING/MOUTHPIECE) KIT Unknown at Unknown time  No No   Sig: Dx: COPD with exacerbation. Sig: Use as directed.   albuterol (PROAIR HFA/PROVENTIL HFA/VENTOLIN HFA) 108 (90 Base) MCG/ACT Inhaler Past Month at Unknown time  No Yes   Sig: Inhale 1-2 puffs into the lungs every 6 hours as needed for shortness of breath / dyspnea or wheezing   amLODIPine (NORVASC) 5 MG tablet 3/10/2019 at Unknown time  No Yes   Sig: Take 1 tablet (5 mg) by mouth daily   benzonatate (TESSALON) 200 MG capsule More than a month at Unknown time  No No   Sig: Take 1 capsule (200 mg) by mouth 3 times daily as needed for cough   busPIRone (BUSPAR) 10 MG tablet 3/10/2019 at Unknown time  No Yes   Sig: Take 1 tablet (10 mg) by mouth 3 times daily as needed (anxiety)   Patient taking differently: Take 10 mg by mouth 2 times daily    citalopram (CELEXA) 10 MG tablet 3/10/2019 at Unknown time  No Yes   Sig: Take 1 tablet (10 mg) by mouth every morning   fexofenadine (ALLEGRA) 180 MG tablet 3/10/2019 at Unknown time  Yes Yes   Sig: Take 180 mg by mouth 2 times daily    fish oil-omega-3 fatty acids 1000 MG capsule 3/10/2019 at Unknown time  Yes Yes   Sig: Take 1 capsule by mouth daily   fluticasone (FLONASE) 50 MCG/ACT spray More than a month  at Unknown time  No No   Sig: Spray 1 spray into both nostrils daily   fluticasone-salmeterol (ADVAIR) 250-50 MCG/DOSE diskus inhaler Past Week at Unknown time  No Yes   Sig: Inhale 1 puff into the lungs 2 times daily   hydrochlorothiazide (HYDRODIURIL) 25 MG tablet 3/10/2019 at Unknown time  No Yes   Sig: Take 1 tablet (25 mg) by mouth daily   ipratropium - albuterol 0.5 mg/2.5 mg/3 mL (DUONEB) 0.5-2.5 (3) MG/3ML neb solution 3/10/2019 at Unknown time  No Yes   Sig: Take 1 vial (3 mLs) by nebulization every 6 hours as needed for shortness of breath / dyspnea or wheezing   metoprolol tartrate (LOPRESSOR) 100 MG tablet 3/10/2019 at am  No Yes   Sig: Take 1 tablet (100 mg) by mouth 2 times daily   naproxen (NAPROSYN) 500 MG tablet Unknown at Unknown time  No No   Sig: Take 1 tablet (500 mg) by mouth 2 times daily (with meals)   nitroGLYcerin (NITROLINGUAL) 0.4 MG/SPRAY spray 3/10/2019 at Unknown time  Yes Yes   Sig: Place 1 spray under the tongue   order for DME Unknown at Unknown time  Yes No   Sig: [facility] #[id number] DX J44 [facility]    potassium chloride SA (K-DUR/KLOR-CON M) 20 MEQ CR tablet 3/10/2019 at Unknown time  No Yes   Sig: Take 1 tablet (20 mEq) by mouth 2 times daily (with meals)   predniSONE (DELTASONE) 20 MG tablet 3/10/2019 at Unknown time  No Yes   Sig: Take 40 mg by mouth every morning for 5 days, THEN 20 mg every morning for 5 days.   simvastatin (ZOCOR) 80 MG tablet 3/9/2019 at Unknown time  No Yes   Sig: Take 1 tablet (80 mg) by mouth daily      Facility-Administered Medications: None     Allergies   Allergies   Allergen Reactions     Codeine      Rapid heart rate.  Nauseated  Other reaction(s): Confusion  Rapid heart rate.  Nauseated     Diphenhydramine      Rapid heart Rate  Other reaction(s): Tachycardia  Rapid heart Rate  Other reaction(s): Tachycardia  Rapid heart Rate     Lisinopril Cough     Morphine      Rapid heart rate  Other reaction(s):  Tachycardia  Rapid heart rate     No Clinical Screening - See Comments      Pt states allergies to white/yellow gold.  Sugical steel.  Copper.     Propoxyphene N-Apap      unknown  Other reaction(s): Tremors  unknown     Varenicline Nausea and Vomiting     Tetracycline Rash     unknown  Other reaction(s): Tremors  unknown       Physical Exam   Vital Signs: Temp: 97.2  F (36.2  C) Temp src: Temporal BP: 141/83 Pulse: 55 Heart Rate: 55 Resp: 17 SpO2: 94 % O2 Device: None (Room air)    Weight: 152 lbs 1.88 oz        Data   Admission on 02/19/2019, Discharged on 02/19/2019   Component Date Value Ref Range Status     Specimen Description 02/19/2019 Nasopharyngeal   Final     Influenza A PCR 02/19/2019 Positive* NEG^Negative Final    Flu A target RNA detected, presumed POSITIVE for Influenza A.     Influenza B PCR 02/19/2019 Negative  NEG^Negative Final    Comment: Flu B target RNA not detected, presumed negative for Influenza B or the viral   load is below the limit of detection.       Resp Syncytial Virus 02/19/2019 Negative  NEG^Negative Final    Comment: RSV target RNA not detected, presumed negative for Respiratory Syncitial Virus   or the viral load is below the limit of detection.  FDA approved assay performed using [employer] GeneXpert(R) real-time PCR.       WBC 02/19/2019 10.3  4.0 - 11.0 10e9/L Final     RBC Count 02/19/2019 5.03  3.8 - 5.2 10e12/L Final     Hemoglobin 02/19/2019 14.5  11.7 - 15.7 g/dL Final     Hematocrit 02/19/2019 44.3  35.0 - 47.0 % Final     MCV 02/19/2019 88  78 - 100 fl Final     MCH 02/19/2019 28.8  26.5 - 33.0 pg Final     MCHC 02/19/2019 32.7  31.5 - 36.5 g/dL Final     RDW 02/19/2019 13.3  10.0 - 15.0 % Final     Platelet Count 02/19/2019 228  150 - 450 10e9/L Final     Diff Method 02/19/2019 Automated Method   Final     % Neutrophils 02/19/2019 76.8  % Final     % Lymphocytes 02/19/2019 5.9  % Final     % Monocytes 02/19/2019 16.2  % Final     % Eosinophils 02/19/2019 0.2  % Final      % Basophils 02/19/2019 0.6  % Final     % Immature Granulocytes 02/19/2019 0.3  % Final     Absolute Neutrophil 02/19/2019 7.9  1.6 - 8.3 10e9/L Final     Absolute Lymphocytes 02/19/2019 0.6* 0.8 - 5.3 10e9/L Final     Absolute Monocytes 02/19/2019 1.7* 0.0 - 1.3 10e9/L Final     Absolute Eosinophils 02/19/2019 0.0  0.0 - 0.7 10e9/L Final     Absolute Basophils 02/19/2019 0.1  0.0 - 0.2 10e9/L Final     Abs Immature Granulocytes 02/19/2019 0.0  0 - 0.4 10e9/L Final     Sodium 02/19/2019 138  134 - 144 mmol/L Final     Potassium 02/19/2019 3.9  3.5 - 5.1 mmol/L Final     Chloride 02/19/2019 106  98 - 107 mmol/L Final     Carbon Dioxide 02/19/2019 23  21 - 31 mmol/L Final     Anion Gap 02/19/2019 9  3 - 14 mmol/L Final     Glucose 02/19/2019 114* 70 - 105 mg/dL Final     Urea Nitrogen 02/19/2019 17  7 - 25 mg/dL Final     Creatinine 02/19/2019 0.95  0.60 - 1.20 mg/dL Final     GFR Estimate 02/19/2019 59* >60 mL/min/[1.73_m2] Final     GFR Estimate If Black 02/19/2019 71  >60 mL/min/[1.73_m2] Final     Calcium 02/19/2019 9.2  8.6 - 10.3 mg/dL Final         Constitutional: She is oriented to person, place, and time. She appears well-developed and well-nourished. No distress.   HENT:   Head: Normocephalic and atraumatic.   Eyes: Conjunctivae and EOM are normal. Pupils are equal, round, and reactive to light. No scleral icterus.   Neck: Normal range of motion. Neck supple.   Cardiovascular: Regular rhythm.   No murmur heard.    Regular rate and rhythm  Pulmonary/Chest: Effort normal and breath sounds normal. No respiratory distress.   Abdominal: Soft. Bowel sounds are normal. There is no tenderness.   Musculoskeletal: Normal range of motion. She exhibits no deformity.   Lymphadenopathy:     She has no cervical adenopathy.   Neurological: She is alert and oriented to person, place, and time.   Skin: Skin is warm and dry. No rash noted. She is not diaphoretic.   Psychiatric: She has a normal mood and affect. Her behavior is  normal. Judgment and thought content normal

## 2019-03-13 LAB
BACTERIA SPEC CULT: NORMAL
SPECIMEN SOURCE: NORMAL

## 2019-03-20 RX ORDER — POLYETHYLENE GLYCOL 3350 17 G
2 POWDER IN PACKET (EA) ORAL
COMMUNITY
Start: 2019-03-12 | End: 2019-07-08

## 2019-03-20 RX ORDER — ATORVASTATIN CALCIUM 40 MG/1
40 TABLET, FILM COATED ORAL
COMMUNITY
Start: 2019-03-12 | End: 2019-05-07

## 2019-03-21 ENCOUNTER — OFFICE VISIT (OUTPATIENT)
Dept: FAMILY MEDICINE | Facility: OTHER | Age: 69
End: 2019-03-21
Attending: PHYSICIAN ASSISTANT
Payer: COMMERCIAL

## 2019-03-21 VITALS
RESPIRATION RATE: 24 BRPM | BODY MASS INDEX: 28.17 KG/M2 | DIASTOLIC BLOOD PRESSURE: 80 MMHG | HEART RATE: 51 BPM | WEIGHT: 154 LBS | TEMPERATURE: 97.7 F | SYSTOLIC BLOOD PRESSURE: 122 MMHG | OXYGEN SATURATION: 95 %

## 2019-03-21 DIAGNOSIS — I48.91 ATRIAL FIBRILLATION, UNSPECIFIED TYPE (H): ICD-10-CM

## 2019-03-21 DIAGNOSIS — I63.81 ACUTE ISCHEMIC VERTEBROBASILAR ARTERY THALAMIC STROKE, UNSPECIFIED LATERALITY: ICD-10-CM

## 2019-03-21 DIAGNOSIS — I25.10 ATHEROSCLEROSIS OF CORONARY ARTERY, ANGINA PRESENCE UNSPECIFIED, UNSPECIFIED VESSEL OR LESION TYPE, UNSPECIFIED WHETHER NATIVE OR TRANSPLANTED HEART: ICD-10-CM

## 2019-03-21 DIAGNOSIS — I10 HYPERTENSION, UNSPECIFIED TYPE: Primary | ICD-10-CM

## 2019-03-21 DIAGNOSIS — E11.9 TYPE 2 DIABETES MELLITUS WITHOUT COMPLICATION, WITHOUT LONG-TERM CURRENT USE OF INSULIN (H): ICD-10-CM

## 2019-03-21 PROBLEM — I49.5 TACHY-BRADY SYNDROME (H): Status: ACTIVE | Noted: 2019-03-11

## 2019-03-21 PROCEDURE — G0463 HOSPITAL OUTPT CLINIC VISIT: HCPCS

## 2019-03-21 PROCEDURE — 99214 OFFICE O/P EST MOD 30 MIN: CPT | Performed by: PHYSICIAN ASSISTANT

## 2019-03-21 RX ORDER — METOPROLOL TARTRATE 25 MG/1
TABLET, FILM COATED ORAL
COMMUNITY
Start: 2019-03-12 | End: 2019-03-21

## 2019-03-21 RX ORDER — METOPROLOL TARTRATE 25 MG/1
25 TABLET, FILM COATED ORAL 2 TIMES DAILY
Qty: 180 TABLET | Refills: 3 | Status: SHIPPED | OUTPATIENT
Start: 2019-03-21 | End: 2019-03-25

## 2019-03-21 ASSESSMENT — PATIENT HEALTH QUESTIONNAIRE - PHQ9: SUM OF ALL RESPONSES TO PHQ QUESTIONS 1-9: 5

## 2019-03-21 NOTE — NURSING NOTE
Chief Complaint   Patient presents with     Hospital F/U         Medication Reconciliation: complete    Elisabeth Chavira LPN

## 2019-03-21 NOTE — PROGRESS NOTES
Nursing Notes:   Elisabeth Chavira LPN  3/21/2019  1:01 PM  Signed  Chief Complaint   Patient presents with     Hospital F/U         Medication Reconciliation: complete    Elisabeth Chavira LPN      HPI:    Ovidio Luna is a 68 year old female who presents for hospital follow-up.  Patient initially presented on 3/10/2019 with chest pressure and numbness going down the left arm.  Chest pain was nonexertional.  History of coronary disease.  Nitroglycerin and aspirin did not cause relief.  Patient was found to be in A. fib with RVR.  Through the hospital course patient's troponins were trending upward therefore she was transferred to cardiology at [facility].  Patient was at [facility] from 3/11 to 3/12/2019.  Patient had paroxysmal atrial fibrillation.  History of COPD, coronary vascular disease, hypertension.  During the hospitalization patient was started on a lower dose of beta-blockers metoprolol 12.5 mg BID.  Due to increased risk of CVA she was also started on DOAC (xarelto).  Additionally found to have type 2 diabetes mellitus and started on metformin 500 mg daily.  Statin was switched to Lipitor 40 mg.  Had some chest pain on 3/12 when got home.  Settled after a few hours.  Was told not to take nitroglycerin anymore.  Since then she has not had any further chest pain.  Keeping food and fluids down.  No palpitations, racing heart, chest pressure.    Patient has not had any side effects with metformin.  Tolerating medication well.  No acute concerns at this time with medication.    No known side effects with Xarelto.  No acute concerns appreciated at this time.    Patient is confused about her metoprolol dose.  She states that when she was in the hospital she was stable on 12.5 mg twice daily.  Previously she was taking 100 mg twice daily.  Since she has been home patient restarted both the 100 mg twice daily and the 12.5 mg twice daily.  Wondering what dose she should take.  Blood pressures  have been stable.    Past Medical History:   Diagnosis Date     Allergy status to other antibiotic agents status     ERT     Atherosclerosis     History of ASO with claudication     Atherosclerotic heart disease of native coronary artery without angina pectoris     2004,Stent times 2     Cerebral infarction (H)     05/20/2012,CVA with residual left sided weakness, incidental meningioma found     Closed fracture of shaft of left tibia     12/26/01,History of bilateral tibial fx, work related injury     Personal history of other diseases of the musculoskeletal system and connective tissue      03/14/06,Osteopenia, proximal femur.  Normal bone mineral density lumbar spine 03/14/06.  Start Fosamax 70 mg. weekly (didn't start until 08/07), stopped fosamax.     Personal history of other diseases of the nervous system and sense organs     History of  migraines     Zoster without complications     2008,left shoulder and neck     Zoster without complications     209/[id number],Herpes zoster, left shoulder and neck       Past Surgical History:   Procedure Laterality Date     COLONOSCOPY      2004,2 hyperplastic polyps, repeat in 2014     COLONOSCOPY      1992, 2004,Colonoscopy, normal     HEMORRHOID SURGERY      No Comments Provided     HYSTERECTOMY TOTAL ABDOMINAL, BILATERAL SALPINGO-OOPHORECTOMY, COMBINED      1987     OTHER SURGICAL HISTORY      2003,206617,STRESS TEST PHARMACOLOGICAL,dobutamine, normal     OTHER SURGICAL HISTORY      2004,64151.0,LA BYPASS GRAFT AORTOBIFEMORAL     OTHER SURGICAL HISTORY      2004,LOC-1006.05,PTCA,mid LAD, proximal RCA     OTHER SURGICAL HISTORY      2006,206617,STRESS TEST PHARMACOLOGICAL,adenosine, negative     OTHER SURGICAL HISTORY      03/06,288669,NM CARDIAC MPI STRESS TEST,Adenosine Cardiolite stress done and is negative for ischemia or infarction.  Ejection fraction is 54%.     OTHER SURGICAL HISTORY      03/08/16,[id number],CYSTOSCOPY W/ URETEROSCOPY W/ LITHOTRIPSY,Right,Dr. Mario DC        Family History   Problem Relation Age of Onset     Heart Disease Father         Heart Disease     Cancer Father         Cancer,myeloma     Heart Disease Mother         Heart Disease,MI, stenting     Other - See Comments Sister         chronic pain syndrome     Other - See Comments Other         FHx Father's side Coronary artery disease     Other - See Comments Paternal Uncle         Stroke     Cancer Paternal Uncle         Cancer,Bladder x2     Breast Cancer No family hx of         Cancer-breast       Social History     Tobacco Use     Smoking status: Current Every Day Smoker     Packs/day: 0.50     Years: 16.00     Pack years: 8.00     Types: Cigarettes     Smokeless tobacco: Never Used   Substance Use Topics     Alcohol use: No       Current Outpatient Medications   Medication Sig Dispense Refill     albuterol (PROAIR HFA/PROVENTIL HFA/VENTOLIN HFA) 108 (90 Base) MCG/ACT Inhaler Inhale 1-2 puffs into the lungs every 6 hours as needed for shortness of breath / dyspnea or wheezing 1 Inhaler 0     atorvastatin (LIPITOR) 40 MG tablet Take 40 mg by mouth       busPIRone (BUSPAR) 10 MG tablet Take 10 mg by mouth daily       Cholecalciferol (VITAMIN D3) 2000 UNITS CAPS Take 4,000 Units by mouth 2 times daily        citalopram (CELEXA) 10 MG tablet Take 1 tablet (10 mg) by mouth every morning 90 tablet 0     fexofenadine (ALLEGRA) 180 MG tablet Take 180 mg by mouth 2 times daily        fluticasone (FLONASE) 50 MCG/ACT nasal spray Spray 1 spray into both nostrils daily as needed for rhinitis or allergies       fluticasone-salmeterol (ADVAIR) 250-50 MCG/DOSE inhaler Inhale 1 puff into the lungs daily as needed       hydrochlorothiazide (HYDRODIURIL) 25 MG tablet Take 1 tablet (25 mg) by mouth daily 90 tablet 3     ipratropium - albuterol 0.5 mg/2.5 mg/3 mL (DUONEB) 0.5-2.5 (3) MG/3ML neb solution Take 1 vial by nebulization every morning . May also use 1 neb every 6 hours as needed for shortness of breath.        metFORMIN (GLUCOPHAGE) 500 MG tablet Take 1 tablet (500 mg) by mouth daily (with breakfast) 90 tablet 1     nicotine (COMMIT) 2 MG lozenge Take 2 mg by mouth       order for [facility] [employer] NEBULIZER MACHINE ITEM #[id number] DX J44 [facility]   5     potassium chloride SA (K-DUR/KLOR-CON M) 20 MEQ CR tablet Take 1 tablet (20 mEq) by mouth 2 times daily (with meals) 60 tablet 11     Respiratory Therapy Supplies (NEBULIZER/TUBING/MOUTHPIECE) KIT Dx: COPD with exacerbation. Sig: Use as directed. 1 kit 0     rivaroxaban ANTICOAGULANT (XARELTO) 20 MG TABS tablet Take 20 mg by mouth       amLODIPine (NORVASC) 10 MG tablet Take 1 tablet (10 mg) by mouth daily 90 tablet 1     metoprolol tartrate (LOPRESSOR) 50 MG tablet Take 1 tablet (50 mg) by mouth 2 times daily 180 tablet 3       Allergies   Allergen Reactions     Morphine      Rapid heart rate  Other reaction(s): Tachycardia  Rapid heart rate     No Clinical Screening - See Comments      Pt states allergies to white/yellow gold.  Sugical steel.  Copper.     Propoxyphene N-Apap Unknown     Other reaction(s): Tremors       Varenicline Nausea and Vomiting     Codeine Palpitations     Diphenhydramine Palpitations and Other (See Comments)     wheezing     Lisinopril Palpitations and Cough     Tetracycline Nausea and Vomiting and Rash       REVIEW OF SYSTEMS:  Refer to HPI.    EXAM:   Vitals:    /80 (BP Location: Right arm, Patient Position: Sitting, Cuff Size: Adult Regular)   Pulse 51   Temp 97.7  F (36.5  C)   Resp 24   Wt 69.9 kg (154 lb)   SpO2 95%   BMI 28.17 kg/m      General Appearance: Pleasant, alert, appropriate appearance for age. No acute distress  Chest/Respiratory Exam: Normal chest wall and respirations. Clear to auscultation.  Cardiovascular Exam: Regular rate and rhythm. S1, S2, no murmur, click, gallop, or rubs.  Skin: no rash or abnormalities  Psychiatric Exam: Alert and oriented - appropriate affect.    PHQ Depression Screen  PHQ-9 SCORE  6/3/2018 7/9/2018 3/21/2019   PHQ-9 Total Score 17 0 5       ASSESSMENT AND PLAN:      ICD-10-CM    1. Hypertension, unspecified type I10 CARDIOLOGY EVAL ADULT REFERRAL     INTERNAL MEDICINE REFERRAL     DISCONTINUED: metoprolol tartrate (LOPRESSOR) 25 MG tablet   2. Type 2 diabetes mellitus without complication, without long-term current use of insulin (H) E11.9 metFORMIN (GLUCOPHAGE) 500 MG tablet     INTERNAL MEDICINE REFERRAL   3. Acute ischemic vertebrobasilar artery thalamic stroke, unspecified laterality (H) I63.219 CARDIOLOGY EVAL ADULT REFERRAL    I63.22 INTERNAL MEDICINE REFERRAL   4. Atherosclerosis of coronary artery, angina presence unspecified, unspecified vessel or lesion type, unspecified whether native or transplanted heart I25.10 CARDIOLOGY EVAL ADULT REFERRAL     INTERNAL MEDICINE REFERRAL   5. Atrial fibrillation, unspecified type (H) I48.91 CARDIOLOGY EVAL ADULT REFERRAL     INTERNAL MEDICINE REFERRAL         Patient would like to establish care with internal medicine.  Referral placed.    Patient needs to establish care with cardiology.  Referral has been placed.    Diabetes mellitus: No acute concerns at this time.  Continue metformin 500 mg daily.  Encourage good diet and exercise.    Hypertension: Discussed patient's blood pressure medications at length.  Patient was previously well controlled in the hospital with metoprolol 12.5 mg twice daily however patient has currently been taking her previous dose.  We will change the patient's metoprolol to 25 mg twice daily to see if she tolerates this dose.  May need to increase.  Can also discuss increasing amlodipine as the patient's pulse is on the lower end.  Patient will call in the next few days with her blood pressure results.    Greater than 25 minutes were spent in counseling and coordination of care.     Lizet Garcia PA-C..................3/21/2019 8:32 AM

## 2019-03-25 ENCOUNTER — TELEPHONE (OUTPATIENT)
Dept: FAMILY MEDICINE | Facility: OTHER | Age: 69
End: 2019-03-25

## 2019-03-25 ENCOUNTER — NURSE TRIAGE (OUTPATIENT)
Dept: FAMILY MEDICINE | Facility: OTHER | Age: 69
End: 2019-03-25

## 2019-03-25 DIAGNOSIS — I10 HYPERTENSION, UNSPECIFIED TYPE: Primary | ICD-10-CM

## 2019-03-25 DIAGNOSIS — I10 ESSENTIAL HYPERTENSION: ICD-10-CM

## 2019-03-25 PROBLEM — E11.9 TYPE 2 DIABETES MELLITUS WITHOUT COMPLICATION, WITHOUT LONG-TERM CURRENT USE OF INSULIN (H): Status: ACTIVE | Noted: 2019-03-25

## 2019-03-25 RX ORDER — METOPROLOL TARTRATE 50 MG
50 TABLET ORAL 2 TIMES DAILY
Qty: 180 TABLET | Refills: 3 | Status: SHIPPED | OUTPATIENT
Start: 2019-03-25 | End: 2019-03-29

## 2019-03-25 RX ORDER — AMLODIPINE BESYLATE 10 MG/1
10 TABLET ORAL DAILY
Qty: 90 TABLET | Refills: 1 | Status: SHIPPED | OUTPATIENT
Start: 2019-03-25 | End: 2019-07-08

## 2019-03-25 NOTE — TELEPHONE ENCOUNTER
Informed Nonamarie of Lizet Radha's response- she will call in a few days.  Lucero Valladares............................... 3/25/2019 12:20 PM

## 2019-03-25 NOTE — TELEPHONE ENCOUNTER
Pt c/o of BP increasing      Pt was seen in ED 3/10/2019 and transferred to [facility] [place]note 2[place] cardiology due to increasing troponin levels and SVT.  Pt states that [facility] [place][note 2] decreased the metoprolol to 12.5 mg twice daily when she had been taking 100 mg daily.    Pt was seen in OV with Lizet Garcia 3/21/2019.    Pt states BP has been increasing:  3/22/2019  132/86  3/23/2019  147/85  3/24/2019  171/97  This morning 174/97    States she feels pressure in her head. Denies SOB, weakness, numbness.  States that since she has returned home from [facility] [place]note 2[place] she has been experiencing intermittent chest pressure and at times feels lightheaded when she moves too fast.      Pt would like to stay out of the ED if possible and wondering if metoprolol could be increased?    Will route high priority to Lizet Garcia for direction and f/u with pt.    Didi Marks RN  ....................  3/25/2019   9:07 AM

## 2019-03-25 NOTE — TELEPHONE ENCOUNTER
Increase metoprolol to 50 mg twice daily. May need to go up to 100 mg twice daily similar to previous depending on blood pressure results. Continue to monitor and call in the next 2 days with your blood pressure results. Needs to be seen in the clinic or ER if blood pressure is not calming down or if you develop worsening symptoms.   Lizet Garcia PA-C..................3/25/2019 12:16 PM

## 2019-03-26 NOTE — TELEPHONE ENCOUNTER
Patient notified, does not recall taking amlodipine 10 mg in the past, she will  new rx today.  Traci Chavira LPN ...... 3/26/2019 9:05 AM

## 2019-03-26 NOTE — TELEPHONE ENCOUNTER
Has she been up to 10 mg for amlodipine in the past?    Since the patient has a lower pulse rate, instead of going up to metoprolol 100 mg twice daily, I would rather have her increase her amlodipine to 10 mg daily.  This was sent to the pharmacy.    Please take amlodipine 10 mg daily and metoprolol 50 mg twice daily.  Please call on Friday to let us know how your blood pressures are doing.  Lizet Garcia PA-C.......... 3/25/2019 8:56 PM

## 2019-03-29 ENCOUNTER — TELEPHONE (OUTPATIENT)
Dept: FAMILY MEDICINE | Facility: OTHER | Age: 69
End: 2019-03-29

## 2019-03-29 DIAGNOSIS — I10 HYPERTENSION, UNSPECIFIED TYPE: ICD-10-CM

## 2019-03-29 RX ORDER — METOPROLOL TARTRATE 50 MG
50 TABLET ORAL 3 TIMES DAILY
Qty: 180 TABLET | Refills: 3 | COMMUNITY
Start: 2019-03-29 | End: 2019-07-24 | Stop reason: ALTCHOICE

## 2019-03-29 NOTE — TELEPHONE ENCOUNTER
Spoke with patient. She states Lizet JARQUIN has been adjusting BP meds. Was told to call today with BP readings (see phone note from 3/25/19). She is calling to report readings, and to see if there is any new instructions. Lizet JARQUIN is out of clinic, and PCJ advise?    Readings- 3/26/19  144/83  3/27/19 152/81  3/28/19 140/83  3/29/19 160/87

## 2019-03-29 NOTE — TELEPHONE ENCOUNTER
Have her increase metoprolol to 75 mg bid.  I've updated her medication list.  She should schedule follow-up with BRITTON Lundberg next week.  Hawa Mayorga MD

## 2019-04-03 ENCOUNTER — OFFICE VISIT (OUTPATIENT)
Dept: CARDIOLOGY | Facility: OTHER | Age: 69
End: 2019-04-03
Attending: PHYSICIAN ASSISTANT
Payer: COMMERCIAL

## 2019-04-03 VITALS
OXYGEN SATURATION: 96 % | HEIGHT: 62 IN | RESPIRATION RATE: 16 BRPM | HEART RATE: 64 BPM | TEMPERATURE: 97.8 F | DIASTOLIC BLOOD PRESSURE: 60 MMHG | SYSTOLIC BLOOD PRESSURE: 120 MMHG | BODY MASS INDEX: 27.97 KG/M2 | WEIGHT: 152 LBS

## 2019-04-03 DIAGNOSIS — I48.0 PAROXYSMAL ATRIAL FIBRILLATION (H): Primary | ICD-10-CM

## 2019-04-03 DIAGNOSIS — I63.81 ACUTE ISCHEMIC VERTEBROBASILAR ARTERY THALAMIC STROKE, UNSPECIFIED LATERALITY: ICD-10-CM

## 2019-04-03 DIAGNOSIS — E83.42 HYPOMAGNESEMIA: ICD-10-CM

## 2019-04-03 DIAGNOSIS — D32.9 MENINGIOMA (H): ICD-10-CM

## 2019-04-03 DIAGNOSIS — Z95.5 HISTORY OF CORONARY ARTERY STENT PLACEMENT: ICD-10-CM

## 2019-04-03 DIAGNOSIS — Z95.828 H/O AORTO-FEMORAL BYPASS: ICD-10-CM

## 2019-04-03 DIAGNOSIS — R06.83 HABITUAL SNORING: ICD-10-CM

## 2019-04-03 DIAGNOSIS — R79.89 ELEVATED TROPONIN: ICD-10-CM

## 2019-04-03 DIAGNOSIS — Z72.0 TOBACCO ABUSE: ICD-10-CM

## 2019-04-03 DIAGNOSIS — J44.1 CHRONIC OBSTRUCTIVE PULMONARY DISEASE WITH ACUTE EXACERBATION (H): ICD-10-CM

## 2019-04-03 DIAGNOSIS — I25.119 ATHEROSCLEROSIS OF NATIVE CORONARY ARTERY OF NATIVE HEART WITH ANGINA PECTORIS (H): ICD-10-CM

## 2019-04-03 DIAGNOSIS — Z79.899 ENCOUNTER FOR MONITORING STATIN THERAPY: ICD-10-CM

## 2019-04-03 DIAGNOSIS — E78.1 PURE HYPERGLYCERIDEMIA: ICD-10-CM

## 2019-04-03 DIAGNOSIS — I10 ESSENTIAL HYPERTENSION: ICD-10-CM

## 2019-04-03 DIAGNOSIS — Z51.81 ENCOUNTER FOR MONITORING STATIN THERAPY: ICD-10-CM

## 2019-04-03 DIAGNOSIS — R06.09 DOE (DYSPNEA ON EXERTION): ICD-10-CM

## 2019-04-03 DIAGNOSIS — E11.9 TYPE 2 DIABETES MELLITUS WITHOUT COMPLICATION, WITHOUT LONG-TERM CURRENT USE OF INSULIN (H): ICD-10-CM

## 2019-04-03 LAB
ALBUMIN SERPL-MCNC: 4.1 G/DL (ref 3.5–5.7)
ALP SERPL-CCNC: 44 U/L (ref 34–104)
ALT SERPL W P-5'-P-CCNC: 12 U/L (ref 7–52)
ANION GAP SERPL CALCULATED.3IONS-SCNC: 7 MMOL/L (ref 3–14)
AST SERPL W P-5'-P-CCNC: 15 U/L (ref 13–39)
BILIRUB SERPL-MCNC: 0.3 MG/DL (ref 0.3–1)
BUN SERPL-MCNC: 26 MG/DL (ref 7–25)
CALCIUM SERPL-MCNC: 10.1 MG/DL (ref 8.6–10.3)
CHLORIDE SERPL-SCNC: 103 MMOL/L (ref 98–107)
CO2 SERPL-SCNC: 29 MMOL/L (ref 21–31)
CREAT SERPL-MCNC: 0.9 MG/DL (ref 0.6–1.2)
GFR SERPL CREATININE-BSD FRML MDRD: 62 ML/MIN/{1.73_M2}
GLUCOSE SERPL-MCNC: 99 MG/DL (ref 70–105)
MAGNESIUM SERPL-MCNC: 1.8 MG/DL (ref 1.9–2.7)
POTASSIUM SERPL-SCNC: 3.8 MMOL/L (ref 3.5–5.1)
PROT SERPL-MCNC: 7.5 G/DL (ref 6.4–8.9)
SODIUM SERPL-SCNC: 139 MMOL/L (ref 134–144)

## 2019-04-03 PROCEDURE — 36415 COLL VENOUS BLD VENIPUNCTURE: CPT | Performed by: NURSE PRACTITIONER

## 2019-04-03 PROCEDURE — 80053 COMPREHEN METABOLIC PANEL: CPT | Performed by: NURSE PRACTITIONER

## 2019-04-03 PROCEDURE — 99205 OFFICE O/P NEW HI 60 MIN: CPT | Performed by: NURSE PRACTITIONER

## 2019-04-03 PROCEDURE — 83735 ASSAY OF MAGNESIUM: CPT | Performed by: NURSE PRACTITIONER

## 2019-04-03 PROCEDURE — 93000 ELECTROCARDIOGRAM COMPLETE: CPT | Performed by: INTERNAL MEDICINE

## 2019-04-03 ASSESSMENT — PAIN SCALES - GENERAL: PAINLEVEL: NO PAIN (0)

## 2019-04-03 ASSESSMENT — MIFFLIN-ST. JEOR: SCORE: 1179.07

## 2019-04-03 NOTE — PATIENT INSTRUCTIONS
You were seen by  VOLODYMYR Harper CNP      1. Zio Patch, you will be called to schedule this here at [facility].      2. Stress test, you will be called to schedule this as well.     3. Overnight Pulse Oximetry, you will be called this schedule this as well.     4. Labs today.     5. We will call with results of all the above test when they become available.       You will follow up with [facility] in 4 weeks (at least one week after you mail in Zio Patch, sooner if needed.       Please call the cardiology office with problems, questions, or concerns at [phone].    If you experience chest pain, chest pressure, chest tightness, shortness of breath, fainting, lightheadedness, nausea, vomiting, or other concerning symptoms, please report to the Emergency Department or call 911. These symptoms may be emergent, and best treated in the Emergency Department.     PAM QuevedoN, RN  [facility]  [phone]

## 2019-04-03 NOTE — NURSING NOTE
"Chief Complaint   Patient presents with     Consult     hypertension       Initial /60 (BP Location: Right arm, Patient Position: Sitting, Cuff Size: Adult Regular)   Pulse 64   Temp 97.8  F (36.6  C) (Tympanic)   Resp 16   Ht 1.585 m (5' 2.4\")   Wt 68.9 kg (152 lb)   SpO2 96%   BMI 27.45 kg/m   Estimated body mass index is 27.45 kg/m  as calculated from the following:    Height as of this encounter: 1.585 m (5' 2.4\").    Weight as of this encounter: 68.9 kg (152 lb).  Meds Reconciled: complete  Pt is not on Aspirin  Pt is on a Statin  PHQ and/or MIKE reviewed. Pt referred to PCP/MH Provider as appropriate.    Jeri Garibay LPN      "

## 2019-04-03 NOTE — PROGRESS NOTES
[facility] HEART CARE   CARDIOLOGY CONSULT     Ovidio Luna   [address]  [facility] [id number]      Glynn Botello     Chief Complaint   Patient presents with     Consult     hypertension        HPI:   Ms. Luna is a 68 year old female who presents for cardiology evaluation with history of known coronary atherosclerosis, recently diagnosed AF with RVR and HTN.  Patient has a history of aortofemoral bypass in 2004, tachybradycardia syndrome, newly identified atrial fibrillation, CAD, hyperlipidemia, hypertension, PAD, hypertension, DM 2, COPD, tobacco abuse, emphysema, history of CVA and gastritis.    Patient has a known history of coronary artery disease with PTCA of the mLAD and pRCA on 2/28/04 with preserved LVEF. PTCA at [facility] in [place].     She has a history of PAS with aortofemoral bypass of the right LE in 2004.     Patient presented to the ED on 3/10/2019 with complaints of chest pressure and numbness radiating into the left arm.  She reported associated shortness of breath, nausea and vomiting, no diaphoresis.  This was described as a nonexertional pain experienced when watching television.  She had no relief with nitroglycerin.  In the ED she was found to be in A. fib with RVR and she was started on a Cardizem drip and admitted to the ICU.  Her first 2 troponins were normal and the third 1 was mildly elevated.  In the ICU she was noted to have a brief sinus pause on monitoring and therefore Cardizem drip was discontinued and the patient spontaneously converted to normal sinus rhythm following this.  Given her upward troponin trend during her hospitalization patient was transferred to [facility] in [place] for coronary evaluation given her known ischemic heart disease history.    Echocardiogram was performed during her hospitalization at [facility] with preserved LVEF, no significant valvular or morphologic abnormalities.  She had no anginal symptoms on presentation to [facility].  With her newly identified atrial fibrillation she was started on a DOAC for stoke prophylaxis.  Additionally she was found to be a type II diabetic and was also started on metformin.  Her statin was adjusted to high intensity atorvastatin.  She was discharged from [facility] on 3/12/2019.    Today patient denies any recurrence of chest pain or pressure.  She does report increased dyspnea on exertion and occasional palpitations.  No lightheadedness or syncope.  No edema.  She reports that she works the [employer] shift at work and describes an interrupted sleep pattern.  She does report snoring at times, possible apnea.  Given her newly identified atrial fibrillation, it has been recommended overnight sleep study to assess for sleep apnea.      PAST MEDICAL HISTORY:   Past Medical History:   Diagnosis Date     Allergy status to other antibiotic agents status     ERT     Atherosclerosis     History of ASO with claudication     Atherosclerotic heart disease of native coronary artery without angina pectoris     2004,Stent times 2     Cerebral infarction (H)     05/20/2012,CVA with residual left sided weakness, incidental meningioma found     Closed fracture of shaft of left tibia     12/26/01,History of bilateral tibial fx, work related injury     Personal history of other diseases of the musculoskeletal system and connective tissue      03/14/06,Osteopenia, proximal femur.  Normal bone mineral density lumbar spine 03/14/06.  Start Fosamax 70 mg. weekly (didn't start until 08/07), stopped fosamax.     Personal history of other diseases of the nervous system and sense organs     History of  migraines     Zoster without complications     2008,left shoulder and neck     Zoster without complications     209/25008,Herpes zoster, left shoulder and neck          FAMILY HISTORY:   Family History   Problem Relation Age of Onset     Heart Disease Father         Heart Disease     Cancer Father          Cancer,myeloma     Heart Disease Mother         Heart Disease,MI, stenting     Other - See Comments Sister         chronic pain syndrome     Other - See Comments Other         FHx Father's side Coronary artery disease     Other - See Comments Paternal Uncle         Stroke     Cancer Paternal Uncle         Cancer,Bladder x2     Breast Cancer No family hx of         Cancer-breast          PAST SURGICAL HISTORY:   Past Surgical History:   Procedure Laterality Date     COLONOSCOPY      2004,2 hyperplastic polyps, repeat in 2014     COLONOSCOPY      1992, 2004,Colonoscopy, normal     HEMORRHOID SURGERY      No Comments Provided     HYSTERECTOMY TOTAL ABDOMINAL, BILATERAL SALPINGO-OOPHORECTOMY, COMBINED      1987     OTHER SURGICAL HISTORY      2003,206617,STRESS TEST PHARMACOLOGICAL,dobutamine, normal     OTHER SURGICAL HISTORY      2004,80656.0,ND BYPASS GRAFT AORTOBIFEMORAL     OTHER SURGICAL HISTORY      2004,LOC-1006.05,PTCA,mid LAD, proximal RCA     OTHER SURGICAL HISTORY      2006,206617,STRESS TEST PHARMACOLOGICAL,adenosine, negative     OTHER SURGICAL HISTORY      03/06,905354,NM CARDIAC MPI STRESS TEST,Adenosine Cardiolite stress done and is negative for ischemia or infarction.  Ejection fraction is 54%.     OTHER SURGICAL HISTORY      03/08/16,VYL902,CYSTOSCOPY W/ URETEROSCOPY W/ LITHOTRIPSY,Right,Dr. Mario DC          SOCIAL HISTORY:   Social History     Socioeconomic History     Marital status: Single     Spouse name: None     Number of children: None     Years of education: None     Highest education level: None   Occupational History     None   Social Needs     Financial resource strain: None     Food insecurity:     Worry: None     Inability: None     Transportation needs:     Medical: None     Non-medical: None   Tobacco Use     Smoking status: Current Every Day Smoker     Packs/day: 0.50     Years: 16.00     Pack years: 8.00     Types: Cigarettes     Smokeless tobacco: Never Used   Substance and  Sexual Activity     Alcohol use: No     Drug use: No     Sexual activity: Not Currently   Lifestyle     Physical activity:     Days per week: None     Minutes per session: None     Stress: None   Relationships     Social connections:     Talks on phone: None     Gets together: None     Attends Orthodox service: None     Active member of club or organization: None     Attends meetings of clubs or organizations: None     Relationship status: None     Intimate partner violence:     Fear of current or ex partner: None     Emotionally abused: None     Physically abused: None     Forced sexual activity: None   Other Topics Concern     Parent/sibling w/ CABG, MI or angioplasty before 65F 55M? Not Asked   Social History Narrative    ** Merged History Encounter **         Works at [employer]  Melly Spawn Mother  Delma Xiao Sister  One brother          CURRENT MEDICATIONS:   Prior to Admission medications    Medication Sig Start Date End Date Taking? Authorizing Provider   albuterol (PROAIR HFA/PROVENTIL HFA/VENTOLIN HFA) 108 (90 Base) MCG/ACT Inhaler Inhale 1-2 puffs into the lungs every 6 hours as needed for shortness of breath / dyspnea or wheezing 6/7/18  Yes Fely Gregorio APRN CNP   amLODIPine (NORVASC) 10 MG tablet Take 1 tablet (10 mg) by mouth daily 3/25/19  Yes Lizet Garcia PA-C   atorvastatin (LIPITOR) 40 MG tablet Take 40 mg by mouth 3/12/19  Yes Reported, Patient   busPIRone (BUSPAR) 10 MG tablet Take 10 mg by mouth daily   Yes Unknown, Entered By History   Cholecalciferol (VITAMIN D3) 2000 UNITS CAPS Take 4,000 Units by mouth 2 times daily  9/18/12  Yes Reported, Patient   citalopram (CELEXA) 10 MG tablet Take 1 tablet (10 mg) by mouth every morning 11/2/18  Yes Glynn Botello MD   fexofenadine (ALLEGRA) 180 MG tablet Take 180 mg by mouth 2 times daily  9/18/12  Yes Reported, Patient   fluticasone (FLONASE) 50 MCG/ACT nasal spray Spray 1 spray into both nostrils daily  as needed for rhinitis or allergies   Yes Unknown, Entered By History   fluticasone-salmeterol (ADVAIR) 250-50 MCG/DOSE inhaler Inhale 1 puff into the lungs daily as needed   Yes Unknown, Entered By History   hydrochlorothiazide (HYDRODIURIL) 25 MG tablet Take 1 tablet (25 mg) by mouth daily 8/27/18  Yes Glynn Botello MD   ipratropium - albuterol 0.5 mg/2.5 mg/3 mL (DUONEB) 0.5-2.5 (3) MG/3ML neb solution Take 1 vial by nebulization every morning . May also use 1 neb every 6 hours as needed for shortness of breath.   Yes Unknown, Entered By History   metFORMIN (GLUCOPHAGE) 500 MG tablet Take 1 tablet (500 mg) by mouth daily (with breakfast) 3/21/19  Yes Lizte Garcia PA-C   metoprolol tartrate (LOPRESSOR) 50 MG tablet Take 1.5 tablets (75 mg) by mouth 2 times daily 3/29/19  Yes Hawa Grissom MD   nicotine (COMMIT) 2 MG lozenge Take 2 mg by mouth 3/12/19  Yes Reported, Patient   [place] for [facility] [employer] NEBULIZER MACHINE ITEM #[id number] DX J44 [facility]  2/20/19  Yes Reported, Patient   potassium chloride SA (K-DUR/KLOR-CON M) 20 MEQ CR tablet Take 1 tablet (20 mEq) by mouth 2 times daily (with meals) 6/5/18  Yes Glynn Botello MD   Respiratory Therapy Supplies (NEBULIZER/TUBING/MOUTHPIECE) KIT Dx: COPD with exacerbation. Sig: Use as directed. 2/20/19  Yes Lizet Garcia PA-C   rivaroxaban ANTICOAGULANT (XARELTO) 20 MG TABS tablet Take 20 mg by mouth 3/12/19  Yes Reported, Patient          ALLERGIES:   Allergies   Allergen Reactions     Morphine      Rapid heart rate  Other reaction(s): Tachycardia  Rapid heart rate     No Clinical Screening - See Comments      Pt states allergies to white/yellow gold.  Sugical steel.  Copper.     Propoxyphene N-Apap Unknown     Other reaction(s): Tremors       Varenicline Nausea and Vomiting     Codeine Palpitations     Diphenhydramine Palpitations and Other (See Comments)     wheezing     Lisinopril Palpitations and Cough     Tetracycline Nausea  "and Vomiting and Rash          ROS:   CONSTITUTIONAL: No reported fever or chills. No changes in weight.  ENT: No visual disturbance, ear ache, epistaxis or sore throat.   CARDIOVASCULAR: No recurrence of chest pain, chest pressure or chest discomfort.  Positive for occasional palpitations, no lower extremity edema.   RESPIRATORY: Positive for increased shortness of breath and dyspnea upon exertion. No cough, wheezing or hemoptysis.   GI: No reported abdominal pain.   : No reported hematuria or dysuria.   NEUROLOGICAL: No lightheadedness, dizziness, syncope, ataxia, paresthesias or weakness.   HEMATOLOGIC: No history of anemia. No bleeding or excessive bruising. No history of blood clots.   MUSCULOSKELETAL: No reported joint pain or swelling, no muscle pain.  ENDOCRINOLOGIC: No temperature intolerance. No hair or skin changes.  SKIN: No abnormal rashes or sores, no unusual itching.  PSYCHIATRIC: No history of depression or anxiety. No changes in mood, feeling down or anxious. No changes in sleep.      PHYSICAL EXAM:   /60 (BP Location: Right arm, Patient Position: Sitting, Cuff Size: Adult Regular)   Pulse 64   Temp 97.8  F (36.6  C) (Tympanic)   Resp 16   Ht 1.585 m (5' 2.4\")   Wt 68.9 kg (152 lb)   SpO2 96%   BMI 27.45 kg/m    GENERAL: The patient is a well-developed, well-nourished, in no apparent distress.  HEENT: Head is normocephalic and atraumatic. Eyes are symmetrical with normal visual tracking. No icterus, no xanthelasmas. Nares appeared normal without nasal drainage. Mucous membranes are moist, no cyanosis.  NECK: Supple. No cervical bruits, JVP not visible.   CHEST/ LUNGS: Lungs clear to auscultation, no rales, rhonchi or wheezes, no use of accessory muscles, no retractions, respirations unlabored and normal respiratory rate.   CARDIO: Regular rate and rhythm normal with S1 and S2, no S3 or S4 and no murmur, click or rub. Precordium quiet with normal PMI.    ABD: Abdomen is nondistended. "   EXTREMITIES: No clubbing, cyanosis or edema present. Peripheral pulses normal and equal bilaterally.  MUSCULOSKELETAL: No visible joint swelling.   NEUROLOGIC: Alert and oriented X3. Normal speech, gait and affect. No focal neurologic deficits.   SKIN: No jaundice. No rashes or visible skin lesions present. No ecchymosis.       EKG:    NSR, rate 60 bpm    LAB RESULTS:   Office Visit on 01/13/2019   Component Date Value Ref Range Status     Specimen Description 01/13/2019 Nasal   Final     Influenza A PCR 01/13/2019 Negative  NEG^Negative Final     Influenza B PCR 01/13/2019 Negative  NEG^Negative Final     Resp Syncytial Virus 01/13/2019 Negative  NEG^Negative Final     WBC 01/13/2019 11.3* 4.0 - 11.0 10e9/L Final     RBC Count 01/13/2019 4.96  3.8 - 5.2 10e12/L Final     Hemoglobin 01/13/2019 14.4  11.7 - 15.7 g/dL Final     Hematocrit 01/13/2019 44.3  35.0 - 47.0 % Final     MCV 01/13/2019 89  78 - 100 fl Final     MCH 01/13/2019 29.0  26.5 - 33.0 pg Final     MCHC 01/13/2019 32.5  31.5 - 36.5 g/dL Final     RDW 01/13/2019 12.8  10.0 - 15.0 % Final     Platelet Count 01/13/2019 252  150 - 450 10e9/L Final     Diff Method 01/13/2019 Automated Method   Final     % Neutrophils 01/13/2019 59.7  % Final     % Lymphocytes 01/13/2019 23.2  % Final     % Monocytes 01/13/2019 13.6  % Final     % Eosinophils 01/13/2019 2.6  % Final     % Basophils 01/13/2019 0.6  % Final     % Immature Granulocytes 01/13/2019 0.3  % Final     Absolute Neutrophil 01/13/2019 6.8  1.6 - 8.3 10e9/L Final     Absolute Lymphocytes 01/13/2019 2.6  0.8 - 5.3 10e9/L Final     Absolute Monocytes 01/13/2019 1.5* 0.0 - 1.3 10e9/L Final     Absolute Eosinophils 01/13/2019 0.3  0.0 - 0.7 10e9/L Final     Absolute Basophils 01/13/2019 0.1  0.0 - 0.2 10e9/L Final     Abs Immature Granulocytes 01/13/2019 0.0  0 - 0.4 10e9/L Final          ASSESSMENT:   Nonamarie Spawn presents for cardiology evaluation with history of known coronary atherosclerosis,  recently diagnosed AF with RVR and HTN.  Patient has a history of aortofemoral bypass in 2004, tachybradycardia syndrome, newly identified atrial fibrillation, CAD, hyperlipidemia, hypertension, PAD, hypertension, DM 2, COPD, tobacco abuse, emphysema, history of CVA and gastritis.    1. Essential hypertension  2. Acute ischemic vertebrobasilar artery thalamic stroke, unspecified laterality (H) (2012)  3. Atherosclerosis of native coronary artery of native heart with angina pectoris (H)  4. Paroxysmal atrial fibrillation (H)  5. H/O aorto-femoral bypass (2004)  6. History of coronary artery stent placement  7. BRENNER (dyspnea on exertion)  8. Elevated troponin (suspected rate induced secondary to AF with RVR)  9. Meningioma (H)  0. Chronic obstructive pulmonary disease with acute exacerbation (H)  11. Pure hyperglyceridemia  12. Type 2 diabetes mellitus without complication, without long-term current use of insulin (H)  13. Tobacco abuse  14. Habitual snoring  15. Encounter for monitoring statin therapy  16. Hypomagnesemia      PLAN:   1. Newly diagnosed AF with RVR, EKG in NSR today. She has had occasional palpitations and BRENNER. Recommended ZIO to assess burden of PAF. Reviewed echo from [facility] with preserved LVEF and no structural concerns. She will remain on Xarelto oral anticoagulation for stroke prophylaxis with [id number] >2.  She will remain on metoprolol 75 mg BID at this time with rate well controlled.   2. Patient with known ischemic heart disease. PTCA of the mLAD and pRCA on 2/28/04 with preserved LVEF. PTCA at [facility] in [place]. Given her BRENNER which is likely multifactorial with COPD and PAF, along with her elevated CAD risk factors and recently elevated troponin, recommended Lexiscan stress test.   3. She will remain on Atorvastatin 40 mg daily with PAD, DMII and CAD.   4. Recommended tobacco cessation, she will follow-up with primary care on this.   5. Overnight oximetry for concern of possible apnea,  she does understand if overnight apnea events present this would be indication to proceed with overnight sleep study.   6. Labs today with newly identified PAF, see orders.     I spent a total of 50 minutes face-to-face with nonamarie during today's office visit.  Over 50% of this time was spent counseling the patient and coordination of care regarding the above diagnoses.    Thank you for allowing me to participate in the care of your patient. Please do not hesitate to contact me if you have any questions.     Martina Roberto

## 2019-04-08 ENCOUNTER — TELEPHONE (OUTPATIENT)
Dept: CARDIOLOGY | Facility: OTHER | Age: 69
End: 2019-04-08

## 2019-04-08 DIAGNOSIS — I48.92 ATRIAL FLUTTER, UNSPECIFIED TYPE (H): ICD-10-CM

## 2019-04-08 DIAGNOSIS — F41.9 ANXIETY: ICD-10-CM

## 2019-04-08 DIAGNOSIS — I48.0 PAROXYSMAL ATRIAL FIBRILLATION (H): Primary | ICD-10-CM

## 2019-04-08 RX ORDER — CITALOPRAM HYDROBROMIDE 10 MG/1
TABLET ORAL
Qty: 90 TABLET | Refills: 0 | Status: SHIPPED | OUTPATIENT
Start: 2019-04-08 | End: 2019-07-08

## 2019-04-08 NOTE — TELEPHONE ENCOUNTER
"Call to pt who states she needs a new Rx for Xarelto. Original Rx prescribed by provider at [facility] in [place]. Pt requests 90-day supply be sent to [facility].     Per note from , \"Due to increased risk of CVA, she was started on DOAC.\"    No changes made at LOV with [facility] on 04/03/2018. Prescription approved per [facility] Refill Protocol. Cristina Boyd RN on 4/8/2019 at 9:30 AM   "

## 2019-04-08 NOTE — TELEPHONE ENCOUNTER
Refill request for: Citalopram 10 mg tabs  From: [facility]  Rx written date: 11/02/2018 #90 with 0 refills  LOV: 04/03/2019 with Cardiology  Next appt: 04/29/2019 in Cardiology  Protocol: SSRIs Protocol Passed     Rx not prescribed or managed by Cardiology. Last OV with FP 03/21/2019. Prescription approved per [facility] Refill Protocol. Cristina Boyd RN on 4/8/2019 at 1:44 PM

## 2019-04-12 ENCOUNTER — TELEPHONE (OUTPATIENT)
Dept: CARDIOLOGY | Facility: OTHER | Age: 69
End: 2019-04-12

## 2019-04-12 DIAGNOSIS — E83.42 HYPOMAGNESEMIA: Primary | ICD-10-CM

## 2019-04-12 RX ORDER — MAGNESIUM OXIDE 400 MG/1
400 TABLET ORAL DAILY
Qty: 90 TABLET | Refills: 0 | Status: SHIPPED | OUTPATIENT
Start: 2019-04-12 | End: 2019-07-08

## 2019-04-25 ENCOUNTER — TELEPHONE (OUTPATIENT)
Dept: CARDIOLOGY | Facility: OTHER | Age: 69
End: 2019-04-25

## 2019-04-25 NOTE — TELEPHONE ENCOUNTER
Review of chart shows pt has not completed recommended stress testing and cardiac monitoring yet d/t issues with insurance. Pt scheduled for f/u with [facility] on 04/29.Call to pt to suggest rescheduling appt until after testing is complete. Pt is agreeable to this. Rescheduled to allow time for Zio results to be available. Cristina Boyd RN on 4/26/2019 at 8:50 AM

## 2019-05-07 ENCOUNTER — TELEPHONE (OUTPATIENT)
Dept: CARDIOLOGY | Facility: OTHER | Age: 69
End: 2019-05-07

## 2019-05-07 DIAGNOSIS — I25.10 ATHEROSCLEROTIC CORONARY VASCULAR DISEASE: ICD-10-CM

## 2019-05-07 DIAGNOSIS — E78.5 HYPERLIPIDEMIA, UNSPECIFIED HYPERLIPIDEMIA TYPE: Primary | ICD-10-CM

## 2019-05-07 RX ORDER — ATORVASTATIN CALCIUM 40 MG/1
40 TABLET, FILM COATED ORAL AT BEDTIME
Qty: 90 TABLET | Refills: 3 | Status: SHIPPED | OUTPATIENT
Start: 2019-05-07 | End: 2019-07-24

## 2019-05-07 NOTE — TELEPHONE ENCOUNTER
"Call back to pt who states she needs refills for metformin and atorvastatin. Advised that Metformin Rx was sent to [facility] on 03/21/2019 #90 with 1 refill. Advised to call pharmacy and have them fill Rx.     Regarding atorvastatin, at LOV with [facility] on 04/03/2019, \"She will remain on Atorvastatin 40 mg daily with PAD, DMII and CAD.\" Prescription approved per [facility] Refill Protocol. Pt denies further need at this time.  Cristina Boyd RN on 5/7/2019 at 10:50 AM   "

## 2019-05-07 NOTE — TELEPHONE ENCOUNTER
Needs new prescriptions written for meds that were prescribed over at [facility]

## 2019-06-07 DIAGNOSIS — I10 HYPERTENSION, UNSPECIFIED TYPE: ICD-10-CM

## 2019-06-07 DIAGNOSIS — E87.6 HYPOKALEMIA: ICD-10-CM

## 2019-06-10 RX ORDER — HYDROCHLOROTHIAZIDE 25 MG/1
25 TABLET ORAL DAILY
Qty: 90 TABLET | Refills: 0 | Status: SHIPPED | OUTPATIENT
Start: 2019-06-10 | End: 2019-07-08

## 2019-06-10 RX ORDER — POTASSIUM CHLORIDE 1500 MG/1
20 TABLET, EXTENDED RELEASE ORAL 2 TIMES DAILY WITH MEALS
Qty: 90 TABLET | Refills: 0 | Status: SHIPPED | OUTPATIENT
Start: 2019-06-10 | End: 2019-07-08

## 2019-06-10 NOTE — TELEPHONE ENCOUNTER
"Requested Prescriptions   Pending Prescriptions Disp Refills     hydrochlorothiazide (HYDRODIURIL) 25 MG tablet 90 tablet 3     Sig: Take 1 tablet (25 mg) by mouth daily       Diuretics (Including Combos) Protocol Passed - 6/7/2019  3:14 PM        Passed - Blood pressure under 140/90 in past 12 months     BP Readings from Last 3 Encounters:   04/03/19 120/60   03/21/19 122/80   03/11/19 164/79                 Passed - Recent (12 mo) or future (30 days) visit within the authorizing provider's specialty     Patient had office visit in the last 12 months or has a visit in the next 30 days with authorizing provider or within the authorizing provider's specialty.  See \"Patient Info\" tab in inbasket, or \"Choose Columns\" in Meds & Orders section of the refill encounter.              Passed - Medication is active on med list        Passed - Patient is age 18 or older        Passed - No active pregancy on record        Passed - Normal serum creatinine on file in past 12 months     Recent Labs   Lab Test 04/03/19  1103   CR 0.90              Passed - Normal serum potassium on file in past 12 months     Recent Labs   Lab Test 04/03/19  1103   POTASSIUM 3.8                    Passed - Normal serum sodium on file in past 12 months     Recent Labs   Lab Test 04/03/19  1103                 Passed - No positive pregnancy test in past 12 months        potassium chloride ER (K-DUR/KLOR-CON M) 20 MEQ CR tablet 60 tablet 11     Sig: Take 1 tablet (20 mEq) by mouth 2 times daily (with meals)       Potassium Supplements Protocol Passed - 6/7/2019  3:14 PM        Passed - Recent (12 mo) or future (30 days) visit within the authorizing provider's specialty     Patient had office visit in the last 12 months or has a visit in the next 30 days with authorizing provider or within the authorizing provider's specialty.  See \"Patient Info\" tab in inbasket, or \"Choose Columns\" in Meds & Orders section of the refill encounter.              " Passed - Medication is active on med list        Passed - Patient is age 18 or older        Passed - Normal serum potassium in past 12 months     Recent Labs   Lab Test 04/03/19  1103   POTASSIUM 3.8

## 2019-06-18 DIAGNOSIS — F43.20 ADJUSTMENT DISORDER, UNSPECIFIED TYPE: ICD-10-CM

## 2019-06-19 RX ORDER — BUSPIRONE HYDROCHLORIDE 10 MG/1
TABLET ORAL
Qty: 90 TABLET | Refills: 0 | Status: SHIPPED | OUTPATIENT
Start: 2019-06-19 | End: 2019-07-08

## 2019-06-19 NOTE — TELEPHONE ENCOUNTER
Refill request for: Buspirone 10 mg tablets   TAKE 1 TABLET BY MOUTH 3 TIMES DAILY AS NEEDED FOR ANXIETY  From: [facility]  Rx written date: historical  LOV: 04/03/2019 with [facility]  Next appt: none noted in Cardiology  Protocol: Atypical Antidepressants Protocol Passed     Call to pt who reports she is still taking medication. Prescription approved per [facility] Refill Protocol. Cristina Boyd RN on 6/19/2019 at 10:06 AM

## 2019-06-21 DIAGNOSIS — I10 ESSENTIAL HYPERTENSION: ICD-10-CM

## 2019-06-24 RX ORDER — AMLODIPINE BESYLATE 5 MG/1
TABLET ORAL
Qty: 90 TABLET | Refills: 0 | OUTPATIENT
Start: 2019-06-24

## 2019-06-24 NOTE — TELEPHONE ENCOUNTER
Chart review shows that patient with active order on file for norvasc as follows:    Outpatient Medication Detail      Disp Refills Start End DANIELLE   amLODIPine (NORVASC) 10 MG tablet 90 tablet 1 3/25/2019  No   Sig - Route: Take 1 tablet (10 mg) by mouth daily - Oral   Sent to pharmacy as: amLODIPine (NORVASC) 10 MG tablet   Class: E-Prescribe   Order: [id number]   E-Prescribing Status: Receipt confirmed by pharmacy (3/25/2019  8:54 PM CDT)   Printout Tracking     External Result Report   Pharmacy     [id number] - [place], [place] - [address]     Patient also with upcoming appointment scheduled with Dr. Perdomo for 7/8/19. Telephone encounter from [facility] Melara with plan as follows as of 3/25/19:    Lizet Garcia PA-C           8:54 PM   Note      Has she been up to 10 mg for amlodipine in the past?     Since the patient has a lower pulse rate, instead of going up to metoprolol 100 mg twice daily, I would rather have her increase her amlodipine to 10 mg daily.  This was sent to the pharmacy.     Please take amlodipine 10 mg daily and metoprolol 50 mg twice daily.  Please call on Friday to let us know how your blood pressures are doing.  Lizet Garcia PA-C.......... 3/25/2019 8:56 PM         Rx as requested is not the correct Rx. Writer will refuse Rx request in this encounter and will make note of above in Rx refusal to pharmacy.    Unable to complete prescription refill per RN Medication Refill Policy. Ashu Arceo 6/24/2019 1:46 PM

## 2019-07-05 ENCOUNTER — TELEPHONE (OUTPATIENT)
Dept: CARDIOLOGY | Facility: OTHER | Age: 69
End: 2019-07-05

## 2019-07-08 ENCOUNTER — OFFICE VISIT (OUTPATIENT)
Dept: INTERNAL MEDICINE | Facility: OTHER | Age: 69
End: 2019-07-08
Attending: INTERNAL MEDICINE
Payer: COMMERCIAL

## 2019-07-08 VITALS
HEART RATE: 52 BPM | OXYGEN SATURATION: 96 % | HEIGHT: 62 IN | SYSTOLIC BLOOD PRESSURE: 120 MMHG | RESPIRATION RATE: 12 BRPM | DIASTOLIC BLOOD PRESSURE: 62 MMHG | WEIGHT: 147.8 LBS | TEMPERATURE: 98.9 F | BODY MASS INDEX: 27.2 KG/M2

## 2019-07-08 DIAGNOSIS — E87.6 HYPOKALEMIA: ICD-10-CM

## 2019-07-08 DIAGNOSIS — R21 RASH AND NONSPECIFIC SKIN ERUPTION: ICD-10-CM

## 2019-07-08 DIAGNOSIS — I10 ESSENTIAL HYPERTENSION: Primary | ICD-10-CM

## 2019-07-08 DIAGNOSIS — E83.42 HYPOMAGNESEMIA: ICD-10-CM

## 2019-07-08 DIAGNOSIS — Z23 NEED FOR DIPHTHERIA-TETANUS-PERTUSSIS (TDAP) VACCINE: ICD-10-CM

## 2019-07-08 DIAGNOSIS — Z11.59 ENCOUNTER FOR HEPATITIS C SCREENING TEST FOR LOW RISK PATIENT: ICD-10-CM

## 2019-07-08 DIAGNOSIS — Z12.31 ENCOUNTER FOR SCREENING MAMMOGRAM FOR BREAST CANCER: ICD-10-CM

## 2019-07-08 DIAGNOSIS — R10.13 EPIGASTRIC PAIN: ICD-10-CM

## 2019-07-08 DIAGNOSIS — F41.9 ANXIETY: ICD-10-CM

## 2019-07-08 DIAGNOSIS — E11.9 TYPE 2 DIABETES MELLITUS WITHOUT COMPLICATION, WITHOUT LONG-TERM CURRENT USE OF INSULIN (H): ICD-10-CM

## 2019-07-08 DIAGNOSIS — M85.89 OSTEOPENIA OF MULTIPLE SITES: ICD-10-CM

## 2019-07-08 DIAGNOSIS — M54.42 LEFT-SIDED LOW BACK PAIN WITH LEFT-SIDED SCIATICA, UNSPECIFIED CHRONICITY: ICD-10-CM

## 2019-07-08 PROBLEM — I73.9 PERIPHERAL VASCULAR DISEASE (H): Status: RESOLVED | Noted: 2018-01-17 | Resolved: 2019-07-08

## 2019-07-08 PROBLEM — M25.819 OTHER AFFECTIONS OF SHOULDER REGION, NOT ELSEWHERE CLASSIFIED: Status: RESOLVED | Noted: 2018-01-17 | Resolved: 2019-07-08

## 2019-07-08 PROBLEM — Z95.5 HISTORY OF CORONARY ARTERY STENT PLACEMENT: Status: RESOLVED | Noted: 2019-04-03 | Resolved: 2019-07-08

## 2019-07-08 PROBLEM — R79.89 ELEVATED TROPONIN: Status: RESOLVED | Noted: 2019-03-11 | Resolved: 2019-07-08

## 2019-07-08 PROBLEM — J44.1 CHRONIC OBSTRUCTIVE PULMONARY DISEASE WITH ACUTE EXACERBATION (H): Status: RESOLVED | Noted: 2018-06-03 | Resolved: 2019-07-08

## 2019-07-08 LAB
ALBUMIN SERPL-MCNC: 4 G/DL (ref 3.5–5.7)
ALP SERPL-CCNC: 47 U/L (ref 34–104)
ALT SERPL W P-5'-P-CCNC: 13 U/L (ref 7–52)
ANION GAP SERPL CALCULATED.3IONS-SCNC: 9 MMOL/L (ref 3–14)
AST SERPL W P-5'-P-CCNC: 14 U/L (ref 13–39)
BILIRUB SERPL-MCNC: 0.3 MG/DL (ref 0.3–1)
BUN SERPL-MCNC: 21 MG/DL (ref 7–25)
CALCIUM SERPL-MCNC: 9.6 MG/DL (ref 8.6–10.3)
CHLORIDE SERPL-SCNC: 103 MMOL/L (ref 98–107)
CHOLEST SERPL-MCNC: 132 MG/DL
CO2 SERPL-SCNC: 28 MMOL/L (ref 21–31)
CREAT SERPL-MCNC: 1.15 MG/DL (ref 0.6–1.2)
GFR SERPL CREATININE-BSD FRML MDRD: 47 ML/MIN/{1.73_M2}
GLUCOSE SERPL-MCNC: 101 MG/DL (ref 70–105)
HBA1C MFR BLD: 7 % (ref 4–6)
HDLC SERPL-MCNC: 42 MG/DL (ref 23–92)
LDLC SERPL CALC-MCNC: 51 MG/DL
NONHDLC SERPL-MCNC: 90 MG/DL
POTASSIUM SERPL-SCNC: 3.3 MMOL/L (ref 3.5–5.1)
PROT SERPL-MCNC: 7 G/DL (ref 6.4–8.9)
SODIUM SERPL-SCNC: 140 MMOL/L (ref 134–144)
TRIGL SERPL-MCNC: 194 MG/DL
TSH SERPL DL<=0.05 MIU/L-ACNC: 2.69 IU/ML (ref 0.34–5.6)

## 2019-07-08 PROCEDURE — 80061 LIPID PANEL: CPT | Mod: ZL | Performed by: INTERNAL MEDICINE

## 2019-07-08 PROCEDURE — 84443 ASSAY THYROID STIM HORMONE: CPT | Mod: ZL | Performed by: INTERNAL MEDICINE

## 2019-07-08 PROCEDURE — 82043 UR ALBUMIN QUANTITATIVE: CPT | Mod: ZL | Performed by: INTERNAL MEDICINE

## 2019-07-08 PROCEDURE — 83036 HEMOGLOBIN GLYCOSYLATED A1C: CPT | Mod: ZL | Performed by: INTERNAL MEDICINE

## 2019-07-08 PROCEDURE — G0463 HOSPITAL OUTPT CLINIC VISIT: HCPCS

## 2019-07-08 PROCEDURE — 82784 ASSAY IGA/IGD/IGG/IGM EACH: CPT | Mod: ZL | Performed by: INTERNAL MEDICINE

## 2019-07-08 PROCEDURE — 83516 IMMUNOASSAY NONANTIBODY: CPT | Mod: ZL | Performed by: INTERNAL MEDICINE

## 2019-07-08 PROCEDURE — 36415 COLL VENOUS BLD VENIPUNCTURE: CPT | Mod: ZL | Performed by: INTERNAL MEDICINE

## 2019-07-08 PROCEDURE — 83516 IMMUNOASSAY NONANTIBODY: CPT | Mod: ZL,XU | Performed by: INTERNAL MEDICINE

## 2019-07-08 PROCEDURE — 86803 HEPATITIS C AB TEST: CPT | Mod: ZL | Performed by: INTERNAL MEDICINE

## 2019-07-08 PROCEDURE — 99215 OFFICE O/P EST HI 40 MIN: CPT | Performed by: INTERNAL MEDICINE

## 2019-07-08 PROCEDURE — 80053 COMPREHEN METABOLIC PANEL: CPT | Mod: ZL | Performed by: INTERNAL MEDICINE

## 2019-07-08 RX ORDER — POTASSIUM CHLORIDE 1500 MG/1
20 TABLET, EXTENDED RELEASE ORAL 2 TIMES DAILY WITH MEALS
Qty: 90 TABLET | Refills: 1 | Status: SHIPPED | OUTPATIENT
Start: 2019-07-08 | End: 2019-07-24

## 2019-07-08 RX ORDER — HYDROCHLOROTHIAZIDE 25 MG/1
25 TABLET ORAL DAILY
Qty: 90 TABLET | Refills: 1 | Status: SHIPPED | OUTPATIENT
Start: 2019-07-08 | End: 2019-10-14

## 2019-07-08 RX ORDER — MAGNESIUM OXIDE 400 MG/1
400 TABLET ORAL DAILY
Qty: 90 TABLET | Refills: 1 | Status: SHIPPED | OUTPATIENT
Start: 2019-07-08 | End: 2020-01-01

## 2019-07-08 RX ORDER — BUSPIRONE HYDROCHLORIDE 10 MG/1
TABLET ORAL
Qty: 90 TABLET | Refills: 1 | Status: SHIPPED | OUTPATIENT
Start: 2019-07-08 | End: 2020-01-01

## 2019-07-08 RX ORDER — METFORMIN HCL 500 MG
500 TABLET, EXTENDED RELEASE 24 HR ORAL
Qty: 90 TABLET | Refills: 1 | Status: SHIPPED | OUTPATIENT
Start: 2019-07-08 | End: 2019-10-14

## 2019-07-08 RX ORDER — CITALOPRAM HYDROBROMIDE 20 MG/1
20 TABLET ORAL DAILY
Qty: 90 TABLET | Refills: 1 | Status: SHIPPED | OUTPATIENT
Start: 2019-07-08 | End: 2020-01-01

## 2019-07-08 RX ORDER — AMLODIPINE BESYLATE 10 MG/1
10 TABLET ORAL DAILY
Qty: 90 TABLET | Refills: 1 | Status: SHIPPED | OUTPATIENT
Start: 2019-07-08 | End: 2019-10-14

## 2019-07-08 ASSESSMENT — MIFFLIN-ST. JEOR: SCORE: 1153.67

## 2019-07-08 ASSESSMENT — PATIENT HEALTH QUESTIONNAIRE - PHQ9: SUM OF ALL RESPONSES TO PHQ QUESTIONS 1-9: 0

## 2019-07-08 ASSESSMENT — PAIN SCALES - GENERAL: PAINLEVEL: MODERATE PAIN (5)

## 2019-07-08 NOTE — PROGRESS NOTES
Chief Complaint   Patient presents with     [facility]       HPI: Ms. Luna is a 68 year old female who presents today to [facility].  She overall is feeling good.      She has a history of type 2 diabetes mellitus.  She is currently on metformin however has been having some bloating and gas with this.  She denies any symptoms from her blood sugars.  Her last A1c was 7.8%.  She is a current smoker.  She is not on aspirin due to her use of rivaroxaban.  She is on a statin.    She has a history of hypertension.  She is on hydrochlorothiazide and amlodipine.  She also takes metoprolol.  She denies any obvious side effects from the medication.    She has ongoing issues with anxiety.  She is currently on citalopram and BuSpar for this.  She denies any side effects from it.      She is due for a repeat DEXA scan due to history of osteopenia.  She does take vitamin D.  She is on a magnesium supplement due to recent hypomagnesemia.  She has a history of hypokalemia and is on supplementation.  She does need a refill of this.    She also has atrial fibrillation and is on rivaroxaban along with metoprolol for this.  She follows with cardiology regularly.  She was previously having some arm tingling and chest pain however this did improve when she increased her metoprolol to 50 mg 3 times a day recently.    She has a few acute issues today.  She has been having some issues with epigastric pain.  She is not taking any regular medications currently.    This has been going on for approximately 4 months.  She does feel that it is slightly worse.  She has perhaps noted some weakness in her left leg.  She does have some low back pain with this.    She recently had an episode of what she thought was shingles.  She has had this off and on over the last many years.    She is due for mammogram.  She is due for her tetanus shot.  She is due for hep C screening.  She is interested in colon cancer screening with  [facility].    History is discussed on 7/8/2019 with patient and reviewed in previous available records by myself.  It is current to the best of my knowledge as below.    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   Start Fosamax 08/07, stopped fosamax.     Peripheral vascular disease (H) 1/17/2018     Tobacco abuse 1/17/2018     Type 2 diabetes mellitus without complication, without long-term current use of insulin (H) 3/25/2019     Zoster without complications 2008    left shoulder and neck     Zoster without complications     2009/2008,Herpes zoster, left shoulder and neck        Past Surgical History:   Procedure Laterality Date     COLONOSCOPY  2004    2 hyperplastic polyps, repeat in 2014     HEMORRHOID SURGERY       HYSTERECTOMY TOTAL ABDOMINAL, BILATERAL SALPINGO-OOPHORECTOMY, COMBINED  1987    total hyster - BSO     OTHER SURGICAL HISTORY Right 2004    58195.0,OR BYPASS GRAFT AORTOBIFEMORAL     OTHER SURGICAL HISTORY      2004,LOC-1006.05,PTCA,mid LAD, proximal RCA     OTHER SURGICAL HISTORY      03/08/16,HVF278,CYSTOSCOPY W/ URETEROSCOPY W/ LITHOTRIPSY,Right,Dr. Mario DC         Current Outpatient Medications   Medication Sig Dispense Refill     albuterol (PROAIR HFA/PROVENTIL  HFA/VENTOLIN HFA) 108 (90 Base) MCG/ACT Inhaler Inhale 1-2 puffs into the lungs every 6 hours as needed for shortness of breath / dyspnea or wheezing 1 Inhaler 0     amLODIPine (NORVASC) 10 MG tablet Take 1 tablet (10 mg) by mouth daily 90 tablet 1     atorvastatin (LIPITOR) 40 MG tablet Take 1 tablet (40 mg) by mouth At Bedtime 90 tablet 3     busPIRone (BUSPAR) 10 MG tablet TAKE 1 TABLET BY MOUTH 2 TIMES DAILY AS NEEDED FOR ANXIETY 90 tablet 1     Cholecalciferol (VITAMIN D3) 2000 UNITS CAPS Take 4,000 Units by mouth 2 times daily        citalopram (CELEXA) 20 MG tablet Take 1 tablet (20 mg) by mouth daily 90 tablet 1     fexofenadine (ALLEGRA) 180 MG tablet Take 180 mg by mouth 2 times daily        fluticasone-salmeterol (ADVAIR) 250-50 MCG/DOSE inhaler Inhale 1 puff into the lungs daily as needed       hydrochlorothiazide (HYDRODIURIL) 25 MG tablet Take 1 tablet (25 mg) by mouth daily 90 tablet 1     ipratropium - albuterol 0.5 mg/2.5 mg/3 mL (DUONEB) 0.5-2.5 (3) MG/3ML neb solution Take 1 vial by nebulization every morning . May also use 1 neb every 6 hours as needed for shortness of breath.       magnesium oxide (MAG-OX) 400 MG tablet Take 1 tablet (400 mg) by mouth daily 90 tablet 1     metFORMIN (GLUCOPHAGE-XR) 500 MG 24 hr tablet Take 1 tablet (500 mg) by mouth daily (with dinner) To replace short acting metformin 90 tablet 1     metoprolol tartrate (LOPRESSOR) 50 MG tablet Take 50 mg by mouth 3 times daily  180 tablet 3     order for [facility] NEBULIZER MACHINE ITEM #[id number] DX J44 [facility]   5     potassium chloride ER (K-DUR/KLOR-CON M) 20 MEQ CR tablet Take 1 tablet (20 mEq) by mouth 2 times daily (with meals) 90 tablet 1     Respiratory Therapy Supplies (NEBULIZER/TUBING/MOUTHPIECE) KIT Dx: COPD with exacerbation. Sig: Use as directed. 1 kit 0     rivaroxaban ANTICOAGULANT (XARELTO) 20 MG TABS tablet Take 1 tablet (20 mg) by mouth daily 90 tablet 1     Tdap, tetanus-diphtheria-acell  pertussis, (ADACEL) 5-2-15.5 LF-MCG/0.5 injection Inject 0.5 mLs into the muscle once for 1 dose 0.5 mL 0          Allergies   Allergen Reactions     Morphine      Tachycardia       No Clinical Screening - See Comments      Pt states allergies to white/yellow gold.  Sugical steel.  Copper.     Propoxyphene N-Apap Unknown     Tremors       Varenicline Nausea and Vomiting     Codeine Palpitations     Diphenhydramine Palpitations and Other (See Comments)     wheezing     Lisinopril Palpitations and Cough     Tetracycline Nausea and Vomiting and Rash        Family History   Problem Relation Age of Onset     Heart Disease Father         Heart Disease     Cancer Father         Cancer,myeloma     Heart Disease Mother         Heart Disease,MI, stenting     Cerebrovascular Disease Brother      Other - See Comments Brother         sciatica     Other - See Comments Other         FHx Father's side Coronary artery disease     Other - See Comments Paternal Uncle         Stroke     Cancer Paternal Uncle         Cancer,Bladder x2     Other - See Comments Sister         chronic pain syndrome     Breast Cancer No family hx of         Cancer-breast       Family Status   Relation Name Status     Fa       Mo       Bro Duane Alive     OTHER FHx (Not Specified)     PUnc (s) (Not Specified)     Sis Delma Alive     Neg  (Not Specified)         Social History     Tobacco Use     Smoking status: Current Every Day Smoker     Packs/day: 0.50     Years: 40.00     Pack years: 20.00     Types: Cigarettes     Start date: 1966     Smokeless tobacco: Never Used     Tobacco comment: 0.5 - 1 ppd -    Substance Use Topics     Alcohol use: No       Social History     Social History Narrative    Works at [employer]  Melly Spawn Mother  Delma Mario-Zelmer Sister; One daughter, Catie, lives with Francesca; Granddaughter Mary, lives with Francesca also.            ROS  GEN:-fevers/+chills/-night sweats/-wt change  MOUTH/THROAT: -  "sore throat/-dysphagia/-sores  LUNGS: -sob/-cough  CARDIOVASCULAR: -increased cp/-palpitations  GI: -pain/+change in bowels - occasional diarrhea occasional/-bloody stools  : -change in bladder  HEMATOLOGIC/LYMPHATIC: -swollen nodes  SKIN: -rashes/-lesions  MSK/RHEUM: +joint pain/-swelling  NEURO: -weakness/paraesthesias since increasing metoprolol  PSYCH:-anhedonia/-depression/+anxiety       EXAM:   /62 (BP Location: Right arm, Patient Position: Sitting, Cuff Size: Adult Regular)   Pulse 52   Temp 98.9  F (37.2  C) (Tympanic)   Resp 12   Ht 1.575 m (5' 2\")   Wt 67 kg (147 lb 12.8 oz)   SpO2 96%   Breastfeeding? No   BMI 27.03 kg/m      Estimated body mass index is 27.03 kg/m  as calculated from the following:    Height as of this encounter: 1.575 m (5' 2\").    Weight as of this encounter: 67 kg (147 lb 12.8 oz).   GEN: Vitals reviewed.  Healthy appearing. Patient isin no acute distress. Cooperative with exam.  HEENT: Normocephalic atraumatic.  Normal external eye, conjunctiva, lids, cornea with no scleral icterus or conjunctival erythema. Pupils equally round. Oropharynx with no erythema or exudates. Dentition adequate.    NECK: Supple; no thyromegaly or masses noted.  No cervical or supraclavicular lymphadenopathy.  CV:Heart regular in rate and rhythm with no murmur.    LUNGS: Lungs clear to auscultation bilaterally.  Chest rise equal bilaterally.  No accessory muscle use.  ABD:  Soft, epigastric pain to palpation, bowel sounds positive  SKIN: Warm and dry to touch.  No rash on face, arms and legs.  EXT: No clubbing or cyanosis.  Trace to 1+ peripheral edema.  NEURO: Alert and oriented to person, place, and time.  Cranial nerves II-XII grossly intact with no focal or lateralizing deficits.  Muscle tone normal.  Gait normal. No tremor. Sensation intact to light touch bilateral   MSK: ROM of upper ext and right lower ext normal.  LLE weakness in hip flexor.    PSYCH: Mood is good. Affect appropriate. " Speech fluent. Answers questions appropriately and thought process normal.       ASSESSMENT AND PLAN:    Essential hypertension  - Blood pressure today of 120/62   is at the goal of <140/90 with no exacerbation.  - Continue current regimen at this time.  Instructed to check BP at home.  - Cautioned patient to monitor with antibiotics, herbals and any OTC medications  - electrolytes and renal function done and okay  - amLODIPine (NORVASC) 10 MG tablet  Dispense: 90 tablet; Refill: 1    Anxiety  -Stable at this time, medication renewed.  - citalopram (CELEXA) 20 MG tablet  Dispense: 90 tablet; Refill: 1    Hypomagnesemia  -Stable overall, continue magnesium oxide.  If she continues to have bowel issues may consider trying something different  - magnesium oxide (MAG-OX) 400 MG tablet  Dispense: 90 tablet; Refill: 1    Hypokalemia  -Potassium slightly low today.  Continue on current dosing and increase dietary supplementation.  - potassium chloride ER (K-DUR/KLOR-CON M) 20 MEQ CR tablet  Dispense: 90 tablet; Refill: 1    Type 2 diabetes mellitus without complication, without long-term current use of insulin (H)  -At this time given her bowel symptoms from her metformin we will switch to extended release.  Her labs are all updated.  She was encouraged to have an eye exam.  We discussed her diabetes and the fact that her numbers currently indicates she does have diabetes and she needs to watch her diet overall.  She was encouraged to reduce her sugar intake and her coffee drinks.  She will continue on her statin.  She was encouraged to quit smoking.  Aspirin is contraindicated with rivaroxaban at this time.  - Albumin Random Urine Quantitative with Creat Ratio  - metFORMIN (GLUCOPHAGE-XR) 500 MG 24 hr tablet  Dispense: 90 tablet; Refill: 1  - Comprehensive Metabolic Panel  - Lipid Panel  - Hemoglobin A1c  - TSH  - Albumin Random Urine Quantitative with Creat Ratio  - Comprehensive Metabolic Panel    Osteopenia of multiple  sites  -Given her history of osteopenia we will recheck a DEXA scan.  Continue on vitamin D  - DX Hip/Pelvis/Spine    Encounter for hepatitis C screening test for low risk patient  - Hepatitis C antibody    Encounter for screening mammogram for breast cancer  - MA Screen Bilateral w/Nicolás    Need for diphtheria-tetanus-pertussis (Tdap) vaccine  -Rx given  - Tdap, tetanus-diphtheria-acell pertussis, (ADACEL) 5-2-15.5 LF-MCG/0.5 injection  Dispense: 0.5 mL; Refill: 0    Epigastric pain  - Discussed use of medications including Tums and H2 blocker  - Encouraged to avoid caffeine, NSAIDS, chocolate, alcohol, spicy food, red sauce; consider raising the head of bed 10-15 degrees; do not eat within 2-3 hours of bedtime  - Return/call as needed for follow-up should any new symptoms develop, for worsening of current symptoms or if symptoms do not resolve with above plan.    Left-sided low back pain with left-sided sciatica, unspecified chronicity  - Ice, elevation, gentle movement/rest as tolerated  -  Tylenol as needed  - offered PT ,patient is interested at this time  - patient is to call if she has additional problems with this or if new symptoms develop we may need to consider imaging at that time  - PHYSICAL THERAPY REFERRAL    Rash and nonspecific skin eruption  -Celiac testing checked to be sure that this is not herpetiformis dermatitis.  Testing is negative overall.  - IgA  - Tissue transglutaminase Ab IgA and IgG        A total of 46 minutes spent with in face-to-face consultation of this patient with greater than 50% spent in counseling and care coordination of above listed medical problems including diagnosis, treatment options with emphasis on risks and benefits of each,prognosis and importance of compliance for each.       EMILY BENJAMIN DO   7/8/2019 2:34 PM    This document was prepared using voice generated softwear. While every attempt was made for accuracy, grammatical errors may exist.

## 2019-07-08 NOTE — PATIENT INSTRUCTIONS
Patient Education     For stomach pain:    - avoid caffeine, NSAIDS, chocolate, alcohol, spicy food, red sauce; consider raising the head of bed 10-15 degrees; do not eat within 2-3 hours of bedtime    - can trial the following medications:    Ranitidine (Zantac) 75-150mg twice daily 20-30 minutes before eating    Lifestyle Changes for Controlling GERD  When you have GERD, stomach acid feels as if it s backing up toward your mouth. Whether or not you take medicine to control your GERD, your symptoms can often be improved with lifestyle changes. Talk to your healthcare provider about the following suggestions. These suggestions may help you get relief from your symptoms.      Raise your head  Reflux is more likely to strike when you re lying down flat, because stomach fluid can flow backward more easily. Raising the head of your bed 4 to 6 inches can help. To do this:    Slide blocks or books under the legs at the head of your bed. Or, place a wedge under the mattress. Many [employer] can make a suitable wedge for you. The wedge should run from your waist to the top of your head.    Don t just prop your head on several pillows. This increases pressure on your stomach. It can make GERD worse.  Watch your eating habits  Certain foods may increase the acid in your stomach or relax the lower esophageal sphincter. This makes GERD more likely. It s best to avoid the following if they cause you symptoms:    Coffee, tea, and carbonated drinks (with and without caffeine)    Fatty, fried, or spicy food    Mint, chocolate, onions, and tomatoes    Peppermint    Any other foods that seem to irritate your stomach or cause you pain  Relieve the pressure  Tips include the following:    Eat smaller meals, even if you have to eat more often.    Don t lie down right after you eat. Wait a few hours for your stomach to empty.    Avoid tight belts and tight-fitting clothes.    Lose excess weight.  Tobacco and alcohol  Avoid smoking  tobacco and drinking alcohol. They can make GERD symptoms worse.  Date Last Reviewed: 7/1/2016 2000-2017 [employer]. [address] [place] [address], [place] [address]. All rights reserved. This information is not intended as a substitute for professional medical care. Always follow your healthcare professional's instructions.      Healthy Meals for Diabetes     A healthcare provider will help you develop a meal plan that fits your needs.     Ask your healthcare team to help you make a meal plan that fits your needs. Your meal plan tells you when to eat your meals and snacks, what kinds of foods to eat, and how much of each food to eat. You don t have to give up all the foods you like. But you do need to follow some guidelines.  Choose healthy carbohydrates  Starches, sugars, and fiber are all types of carbohydrates. Fiber can help lower your cholesterol and triglycerides. Fiber is also healthy for your heart. You should have 20 to 35 grams of total fiber each day. Fiber-rich foods include:    Whole-grain breads and cereals    Bulgur wheat    Brown rice       Whole-wheat pasta    Fruits and vegetables    Dry beans, and peas   Keep track of the amount of carbohydrates you eat. This can help you keep the right balance of physical activity and medicine. The amount of carbohydrates needed will vary for each person. It depends on many things such as your health, the medicines you take, and how active you are. Your healthcare team will help you figure out the right amount of carbohydrates for you. You may start with around 45 to 60 grams of carbohydrates per meal, depending on your situation.   Here are some examples of foods containing about 15 grams of carbohydrates (1 serving of carbohydrates):    1/2 cup of canned or frozen fruit    A small piece of fresh fruit (4 ounces)    1 slice of bread    1/2 cup of oatmeal    1/3 cup of rice    4 to 6 crackers    1/2 English muffin    1/2 cup of black beans    1/4 of a  large baked potato (3 ounces)    2/3 cup of plain fat-free yogurt    1 cup of soup    1/2 cup of casserole    6 chicken nuggets    2-inch-square brownie or cake without frosting    2 small cookies    1/2 cup of ice cream or sherbet  Choose healthy protein foods  Eating protein that is low in fat can help you control your weight. It also helps keep your heart healthy. Low-fat protein foods include:    Fish    Plant proteins, such as dry beans and peas, nuts, and soy products like tofu and soymilk    Lean meat with all visible fat removed    Poultry with the skin removed    Low-fat or nonfat milk, cheese, and yogurt  Limit unhealthy fats and sugar  Saturated and trans fats are unhealthy for your heart. They raise LDL (bad) cholesterol. Fat is also high in calories, so it can make you gain weight. To cut down on unhealthy fats and sugar, limit these foods:    Butter or margarine    Palm and palm kernel oils and coconut oil    Cream    Cheese    Marshall    Lunch meats       Ice cream    Sweet bakery goods such as pies, muffins, and donuts    Jams and jellies    Candy bars    Regular sodas   How much to eat  The amount of food you eat affects your blood sugar. It also affects your weight. Your healthcare team will tell you how much of each type of food you should eat.    Use measuring cups and spoons and a food scale to measure serving sizes.    Learn what a correct serving size looks like on your plate. This will help when you are away from home and can t measure your servings.    Eat only the number of servings given on your meal plan for each food. Don t take seconds.    Learn to read food labels. Be sure to look at serving size, total carbohydrates, fiber, calories, sugar, and saturated and trans fats. Look for healthier alternatives to foods that have added sugar.    Plan ahead for parties so you can still have a good time without going overboard with unhealthy food choices. Set a good example yourself by bringing a  healthy dish to pot lucks.   Choose healthy snacks  When it comes to snacks, we usually think about foods with added sugar and fats. But there are many other options for healthier snack choices. Here are a few snack ideas to choose from:  Snacks with less than 5 grams of carbohydrates    1 piece of string cheese    3 celery sticks plus 1 tablespoon of peanut butter    5 cherry tomatoes plus 1 tablespoon of ranch dressing    1 hard-boiled egg    1/4 cup of fresh blueberries     5 baby carrots    1 cup of light popcorn    1/2 cup of sugar-free gelatin    15 almonds  Snacks with about 10 to 20 grams of carbohydrates    1/3 cup of hummus plus 1 cup of fresh cut nonstarchy vegetables (carrots, green peppers, broccoli, celery, or a combination)    1/2 cup of fresh or canned fruit plus 1/4 cup of cottage cheese    1/2 cup of tuna salad with 4 crackers    2 rice cakes and a tablespoon of peanut butter    1 small apple or orange    3 cups light popcorn    1/2 of a turkey sandwich (1 slice of whole-wheat bread, 2 ounces of turkey, and mustard)  Portion sizes are important to controlling your blood sugar and staying at a healthy weight. Stock up on healthy snack items so you always have them on hand.  When to eat  Your meal plan will likely include breakfast, lunch, dinner, and some snacks.    Try to eat your meals and snacks at about the same times each day.    Eat all your meals and snacks. Skipping a meal or snack can make your blood sugar drop too low. It can also cause you to eat too much at the next meal or snack. Then your blood sugar could get too high.  Date Last Reviewed: 7/1/2016 2000-2018 [employer]. [address] [place] [address], [place] [address]. All rights reserved. This information is not intended as a substitute for professional medical care. Always follow your healthcare professional's instructions.

## 2019-07-08 NOTE — NURSING NOTE
Patient presents to the clinic for diabetic follow-up.     Previous A1C is at goal of <8  No results found for: A1C    Patient has Type 2 Diabetes  Diabetes medications: Oral Medications: Metformin - Dose: 500 MG, Time: breakfast  Patient is not on a daily aspirin  Patient is on a Statin.  Blood glucose tests per day 0  Urine microalbumin:creatine: DUE  Foot exam DUE  Eye exam DUE    Blood pressure today of 120/62 is at the goal of <139/89 for diabetics.    Tobacco User:   History   Smoking Status     Current Every Day Smoker     Packs/day: 0.50     Years: 40.00     Types: Cigarettes   Smokeless Tobacco     Never Used       Other concerns today: Multiple, establish care    Medication Reconciliation: complete     Eveline Parker LPN on 7/8/2019 at 2:00 PM

## 2019-07-09 ENCOUNTER — HOSPITAL ENCOUNTER (OUTPATIENT)
Dept: NUCLEAR MEDICINE | Facility: OTHER | Age: 69
End: 2019-07-09
Attending: NURSE PRACTITIONER
Payer: COMMERCIAL

## 2019-07-09 ENCOUNTER — HOSPITAL ENCOUNTER (OUTPATIENT)
Dept: NUCLEAR MEDICINE | Facility: OTHER | Age: 69
Discharge: HOME | End: 2019-07-09
Attending: NURSE PRACTITIONER | Admitting: NURSE PRACTITIONER
Payer: COMMERCIAL

## 2019-07-09 VITALS — HEIGHT: 62 IN | WEIGHT: 152 LBS | BODY MASS INDEX: 27.97 KG/M2

## 2019-07-09 DIAGNOSIS — I25.119 ATHEROSCLEROSIS OF NATIVE CORONARY ARTERY OF NATIVE HEART WITH ANGINA PECTORIS (H): ICD-10-CM

## 2019-07-09 DIAGNOSIS — Z95.5 HISTORY OF CORONARY ARTERY STENT PLACEMENT: ICD-10-CM

## 2019-07-09 DIAGNOSIS — R06.09 DOE (DYSPNEA ON EXERTION): ICD-10-CM

## 2019-07-09 DIAGNOSIS — I10 ESSENTIAL HYPERTENSION: ICD-10-CM

## 2019-07-09 DIAGNOSIS — Z72.0 TOBACCO ABUSE: ICD-10-CM

## 2019-07-09 DIAGNOSIS — E11.9 TYPE 2 DIABETES MELLITUS WITHOUT COMPLICATION, WITHOUT LONG-TERM CURRENT USE OF INSULIN (H): ICD-10-CM

## 2019-07-09 DIAGNOSIS — R79.89 ELEVATED TROPONIN: ICD-10-CM

## 2019-07-09 DIAGNOSIS — E78.1 PURE HYPERGLYCERIDEMIA: ICD-10-CM

## 2019-07-09 LAB
CREAT UR-MCNC: 161 MG/DL
MICROALBUMIN UR-MCNC: 28 MG/L
MICROALBUMIN/CREAT UR: 17.45 MG/G CR (ref 0–25)

## 2019-07-09 PROCEDURE — A9500 TC99M SESTAMIBI: HCPCS | Performed by: NURSE PRACTITIONER

## 2019-07-09 PROCEDURE — 93017 CV STRESS TEST TRACING ONLY: CPT

## 2019-07-09 PROCEDURE — 34300033 ZZH RX 343: Performed by: NURSE PRACTITIONER

## 2019-07-09 PROCEDURE — 78452 HT MUSCLE IMAGE SPECT MULT: CPT

## 2019-07-09 PROCEDURE — 93016 CV STRESS TEST SUPVJ ONLY: CPT | Performed by: INTERNAL MEDICINE

## 2019-07-09 PROCEDURE — 25000128 H RX IP 250 OP 636: Performed by: INTERNAL MEDICINE

## 2019-07-09 PROCEDURE — 93018 CV STRESS TEST I&R ONLY: CPT | Performed by: INTERNAL MEDICINE

## 2019-07-09 RX ORDER — REGADENOSON 0.08 MG/ML
0.4 INJECTION, SOLUTION INTRAVENOUS ONCE
Status: COMPLETED | OUTPATIENT
Start: 2019-07-09 | End: 2019-07-09

## 2019-07-09 RX ORDER — AMINOPHYLLINE 25 MG/ML
50 INJECTION, SOLUTION INTRAVENOUS
Status: DISCONTINUED | OUTPATIENT
Start: 2019-07-09 | End: 2019-07-10 | Stop reason: HOSPADM

## 2019-07-09 RX ADMIN — KIT FOR THE PREPARATION OF TECHNETIUM TC99M SESTAMIBI 33.9 MILLICURIE: 1 INJECTION, POWDER, LYOPHILIZED, FOR SOLUTION PARENTERAL at 10:05

## 2019-07-09 RX ADMIN — REGADENOSON 0.4 MG: 0.08 INJECTION, SOLUTION INTRAVENOUS at 10:03

## 2019-07-09 RX ADMIN — KIT FOR THE PREPARATION OF TECHNETIUM TC99M SESTAMIBI 9.25 MILLICURIE: 1 INJECTION, POWDER, LYOPHILIZED, FOR SOLUTION PARENTERAL at 08:20

## 2019-07-09 ASSESSMENT — MIFFLIN-ST. JEOR: SCORE: 1172.72

## 2019-07-09 NOTE — PROGRESS NOTES
0805The patient arrived for a Lexiscan Cardiolite stress test.  The procedure, risks, and benefits were discussed with the patient ,and the consent was signed.  A saline lock was started,and the Cardiolite was injected by x-ray.  The patient was taken to the waiting area, to await resting images at 0820.  0937The patient returned from x-ray and was prepped for the stress test.   Dr. Kidd  arrived, and the patient was administered the Lexiscan per procedure.  The patient tolerated the procedure well.  She was given a snack and taken to x-ray in stable condition for stress images.  The saline lock will be removed by x-ray for proper disposal.  Please see the chart for the complete test results. The patient has a scheduled follow up appointment with  Martina Roberto NP , who ordered the test, on July 24, 2019 to review testing results

## 2019-07-10 DIAGNOSIS — E83.42 HYPOMAGNESEMIA: ICD-10-CM

## 2019-07-10 LAB
HCV AB SERPL QL IA: NONREACTIVE
IGA SERPL-MCNC: 222 MG/DL (ref 70–380)
TTG IGA SER-ACNC: 1 U/ML
TTG IGG SER-ACNC: <1 U/ML

## 2019-07-10 NOTE — TELEPHONE ENCOUNTER
Refill request for: Magnesium oxide 400 mg tablets   From: [facility]   Rx written date: 07/08/2019 #90 with 1 refill  LOV: 07/08/2019 with Dr. Perdomo, 04/03/2019 with [facility]  Next appt: 07/24/2019 with Dr. Perdomo, 07/24/2019 with ED  Protocol: none available     Redundant refill request. Rx addressed at LOV with Dr. Perdomo on 07/08/2019. Rx declined. Cristina Boyd RN on 7/10/2019 at 10:04 AM

## 2019-07-11 NOTE — TELEPHONE ENCOUNTER
Received refill request from [facility] pharmacy for Naproxen 500mg tablets. Patient was seen on 7/8/19 with Dr. Perdomo. The notes state to avoid NSAIDs for stomach pain. Wondering if the patient is requesting the medication or if it is an automatic refill request as it is off the patients med list. I was left on hold with [facility].     Diana Johnston LPN on 7/11/2019 at 12:14 PM

## 2019-07-15 ENCOUNTER — TRANSFERRED RECORDS (OUTPATIENT)
Dept: HEALTH INFORMATION MANAGEMENT | Facility: OTHER | Age: 69
End: 2019-07-15

## 2019-07-17 ENCOUNTER — OFFICE VISIT (OUTPATIENT)
Dept: INTERNAL MEDICINE | Facility: OTHER | Age: 69
End: 2019-07-17
Attending: INTERNAL MEDICINE
Payer: COMMERCIAL

## 2019-07-17 VITALS
HEART RATE: 46 BPM | TEMPERATURE: 96.2 F | SYSTOLIC BLOOD PRESSURE: 122 MMHG | DIASTOLIC BLOOD PRESSURE: 66 MMHG | RESPIRATION RATE: 16 BRPM | BODY MASS INDEX: 26.68 KG/M2 | OXYGEN SATURATION: 97 % | WEIGHT: 145 LBS | HEIGHT: 62 IN

## 2019-07-17 DIAGNOSIS — L82.0 INFLAMED SEBORRHEIC KERATOSIS: Primary | ICD-10-CM

## 2019-07-17 DIAGNOSIS — M25.50 MULTIPLE JOINT PAIN: ICD-10-CM

## 2019-07-17 PROCEDURE — 17110 DESTRUCTION B9 LES UP TO 14: CPT | Performed by: INTERNAL MEDICINE

## 2019-07-17 ASSESSMENT — PAIN SCALES - GENERAL: PAINLEVEL: NO PAIN (0)

## 2019-07-17 ASSESSMENT — MIFFLIN-ST. JEOR: SCORE: 1140.97

## 2019-07-17 NOTE — NURSING NOTE
Patient presents to the clinic for concerning skin spots.     Medication Reconciliation: complete   Eveline Parker LPN............. July 17, 2019 8:31 AM

## 2019-07-17 NOTE — PROGRESS NOTES
Chief Complaint   Patient presents with     Derm Problem          Subjective:   Ms. Luna is a 68 year old female  seen for the acute concern today of skin lesions.    She has several skin lesions that she would like looked at today.  They do cause irritation for her.  She catches them on clothing and her nails.  She has not noted any of them changing.  They have been there over the last few years.  She has had a treatment with liquid nitrogen in the past of the lesions and is curious if this can be done today.    She  reports that she has been smoking cigarettes.  She started smoking about 53 years ago. She has a 20.00 pack-year smoking history. She has never used smokeless tobacco.    Past medical history reviewed as below:     Past Medical History:   Diagnosis Date     Allergy status to other antibiotic agents status      Atherosclerosis     History of ASO with claudication     Atherosclerotic heart disease of native coronary artery without angina pectoris 2004    Stent times 2     Atrial fibrillation (H) 3/10/2019     Centrilobular emphysema (H) 12/12/2016     Cerebral infarction (H) 05/20/2012    CVA with residual left sided weakness, incidental meningioma found     Closed fracture of shaft of left tibia 12/26/2001    History of bilateral tibial fx, work related injury     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   Start Fosamax 08/07, stopped fosamax.     Peripheral vascular disease (H) 1/17/2018     Tobacco abuse 1/17/2018     Type 2 diabetes mellitus without complication, without long-term current use of insulin (H) 3/25/2019     Zoster without complications 2008    left shoulder and neck     Zoster without complications     2009/2008,Herpes zoster, left shoulder and neck   .      ROS:   Pertinent  ROS was performed and was  "negative, including for fevers, chills. No other concerns, with exception of HPI above.      Objective:    /66 (BP Location: Right arm, Patient Position: Sitting, Cuff Size: Adult Regular)   Pulse (!) 46   Temp 96.2  F (35.7  C) (Tympanic)   Resp 16   Ht 1.575 m (5' 2\")   Wt 65.8 kg (145 lb)   SpO2 97%   Breastfeeding? No   BMI 26.52 kg/m    GEN: Vitals reviewed.  Patient is in no acute distress. Cooperative with exam.  HEENT: Normocephalic atraumatic.  Pupils equally round.  No scleral icterus, no conjunctival erythema.   SKIN: Warm and dry to touch.  No rash on face, arms and legs.  Multiple lesions are reviewed.  A 4 to 5 mm flesh-colored lesion is present on the left chest.  On the right abdomen there are 2 lesions a smaller flesh-colored one 6 x 4 mm and a larger hyperpigmented 110 x 8 mm.  Both of these with a stuck on appearance.  Mild inflammatory changes are present.  A few areas of excoriation are present.  Right flank with one lesion approximately 4 x 7 mm across, slightly hyperpigmented.  Additional lesion 4 x 5 mm at the midline low back.  All of these appear to be consistent with inflamed/irritated seborrheic keratoses     Assessment/Plan:   Inflamed seborrheic keratosis  -Reassurance given.  At this time given inflammation and irritation with these we will treat with liquid nitrogen.  Liquid nitrogen applied to 5 lesions on the left chest, right abdomen, right flank and midline back with 3 freeze thaw cycles.  No immediate complications.  She is to call with any concerns.  - DESTRUCT BENIGN LESION, UP TO 14      - Return/call as needed for follow-up should any new symptoms develop, for worsening of current symptoms or if symptoms do not resolve with above plan.    Return in about 3 months (around 10/17/2019) for as scheduled.     EMILY BENJAMIN DO   7/17/2019 8:49 AM    This document was prepared using voice generated softwear. While every attempt was made for accuracy, grammatical errors " may exist.

## 2019-07-22 RX ORDER — NAPROXEN 500 MG/1
TABLET ORAL
Qty: 60 TABLET | Refills: 0 | OUTPATIENT
Start: 2019-07-22

## 2019-07-22 NOTE — TELEPHONE ENCOUNTER
Refill request from naproxen from [facility]  Medication discontinued 3/21/19.  Reason: therapy completed  Medication not on active medication list.  Refill request refused  Unable to complete prescription refill per RNMedication Refill Policy.................... Daniella Glaser ....................  7/22/2019   1:46 PM

## 2019-07-24 ENCOUNTER — HOSPITAL ENCOUNTER (OUTPATIENT)
Dept: BONE DENSITY | Facility: OTHER | Age: 69
End: 2019-07-24
Attending: INTERNAL MEDICINE
Payer: COMMERCIAL

## 2019-07-24 ENCOUNTER — HOSPITAL ENCOUNTER (OUTPATIENT)
Dept: MAMMOGRAPHY | Facility: OTHER | Age: 69
Discharge: HOME | End: 2019-07-24
Attending: INTERNAL MEDICINE | Admitting: INTERNAL MEDICINE
Payer: COMMERCIAL

## 2019-07-24 ENCOUNTER — OFFICE VISIT (OUTPATIENT)
Dept: CARDIOLOGY | Facility: OTHER | Age: 69
End: 2019-07-24
Attending: NURSE PRACTITIONER
Payer: COMMERCIAL

## 2019-07-24 VITALS
SYSTOLIC BLOOD PRESSURE: 120 MMHG | RESPIRATION RATE: 16 BRPM | DIASTOLIC BLOOD PRESSURE: 62 MMHG | TEMPERATURE: 97.6 F | WEIGHT: 147 LBS | BODY MASS INDEX: 27.05 KG/M2 | HEIGHT: 62 IN | HEART RATE: 56 BPM

## 2019-07-24 DIAGNOSIS — Z95.828 H/O AORTO-FEMORAL BYPASS: ICD-10-CM

## 2019-07-24 DIAGNOSIS — M85.89 OSTEOPENIA OF MULTIPLE SITES: ICD-10-CM

## 2019-07-24 DIAGNOSIS — E78.2 MIXED HYPERLIPIDEMIA: ICD-10-CM

## 2019-07-24 DIAGNOSIS — Z12.31 ENCOUNTER FOR SCREENING MAMMOGRAM FOR BREAST CANCER: ICD-10-CM

## 2019-07-24 DIAGNOSIS — J43.2 CENTRILOBULAR EMPHYSEMA (H): ICD-10-CM

## 2019-07-24 DIAGNOSIS — E83.42 HYPOMAGNESEMIA: ICD-10-CM

## 2019-07-24 DIAGNOSIS — I70.90 ASVD (ARTERIOSCLEROTIC VASCULAR DISEASE): ICD-10-CM

## 2019-07-24 DIAGNOSIS — I10 ESSENTIAL HYPERTENSION: ICD-10-CM

## 2019-07-24 DIAGNOSIS — I25.10 ATHEROSCLEROSIS OF NATIVE CORONARY ARTERY OF NATIVE HEART WITHOUT ANGINA PECTORIS: ICD-10-CM

## 2019-07-24 DIAGNOSIS — R06.02 CHRONIC SHORTNESS OF BREATH: ICD-10-CM

## 2019-07-24 DIAGNOSIS — Z72.0 TOBACCO ABUSE: ICD-10-CM

## 2019-07-24 DIAGNOSIS — E87.6 HYPOKALEMIA: ICD-10-CM

## 2019-07-24 DIAGNOSIS — I48.0 PAF (PAROXYSMAL ATRIAL FIBRILLATION) (H): Primary | ICD-10-CM

## 2019-07-24 DIAGNOSIS — I73.9 INTERMITTENT CLAUDICATION (H): ICD-10-CM

## 2019-07-24 DIAGNOSIS — Z86.73 HISTORY OF CVA (CEREBROVASCULAR ACCIDENT): ICD-10-CM

## 2019-07-24 DIAGNOSIS — R60.0 BILATERAL LOWER EXTREMITY EDEMA: ICD-10-CM

## 2019-07-24 DIAGNOSIS — E11.9 TYPE 2 DIABETES MELLITUS WITHOUT COMPLICATION, WITHOUT LONG-TERM CURRENT USE OF INSULIN (H): ICD-10-CM

## 2019-07-24 PROCEDURE — 99214 OFFICE O/P EST MOD 30 MIN: CPT | Performed by: NURSE PRACTITIONER

## 2019-07-24 PROCEDURE — 77080 DXA BONE DENSITY AXIAL: CPT

## 2019-07-24 PROCEDURE — 77063 BREAST TOMOSYNTHESIS BI: CPT

## 2019-07-24 RX ORDER — ASPIRIN 81 MG/1
81 TABLET, CHEWABLE ORAL DAILY
Qty: 90 TABLET | Refills: 1 | Status: ON HOLD | OUTPATIENT
Start: 2019-07-24 | End: 2019-09-23

## 2019-07-24 RX ORDER — FUROSEMIDE 20 MG
20 TABLET ORAL EVERY OTHER DAY
Qty: 45 TABLET | Refills: 1 | Status: SHIPPED | OUTPATIENT
Start: 2019-07-24 | End: 2019-09-19

## 2019-07-24 RX ORDER — METOPROLOL SUCCINATE 100 MG/1
150 TABLET, EXTENDED RELEASE ORAL DAILY
Qty: 135 TABLET | Refills: 1 | Status: SHIPPED | OUTPATIENT
Start: 2019-07-24 | End: 2019-10-14

## 2019-07-24 RX ORDER — ATORVASTATIN CALCIUM 40 MG/1
40 TABLET, FILM COATED ORAL AT BEDTIME
Qty: 90 TABLET | Refills: 3 | Status: SHIPPED | OUTPATIENT
Start: 2019-07-24 | End: 2020-01-01

## 2019-07-24 RX ORDER — POTASSIUM CHLORIDE 1500 MG/1
TABLET, EXTENDED RELEASE ORAL
Qty: 270 TABLET | Refills: 1 | Status: SHIPPED | OUTPATIENT
Start: 2019-07-24 | End: 2020-01-01

## 2019-07-24 ASSESSMENT — ANXIETY QUESTIONNAIRES
GAD7 TOTAL SCORE: 5
6. BECOMING EASILY ANNOYED OR IRRITABLE: SEVERAL DAYS
3. WORRYING TOO MUCH ABOUT DIFFERENT THINGS: NOT AT ALL
7. FEELING AFRAID AS IF SOMETHING AWFUL MIGHT HAPPEN: NOT AT ALL
1. FEELING NERVOUS, ANXIOUS, OR ON EDGE: NOT AT ALL
2. NOT BEING ABLE TO STOP OR CONTROL WORRYING: NOT AT ALL
IF YOU CHECKED OFF ANY PROBLEMS ON THIS QUESTIONNAIRE, HOW DIFFICULT HAVE THESE PROBLEMS MADE IT FOR YOU TO DO YOUR WORK, TAKE CARE OF THINGS AT HOME, OR GET ALONG WITH OTHER PEOPLE: SOMEWHAT DIFFICULT
5. BEING SO RESTLESS THAT IT IS HARD TO SIT STILL: NEARLY EVERY DAY

## 2019-07-24 ASSESSMENT — PATIENT HEALTH QUESTIONNAIRE - PHQ9
5. POOR APPETITE OR OVEREATING: SEVERAL DAYS
SUM OF ALL RESPONSES TO PHQ QUESTIONS 1-9: 9

## 2019-07-24 ASSESSMENT — PAIN SCALES - GENERAL: PAINLEVEL: SEVERE PAIN (6)

## 2019-07-24 ASSESSMENT — MIFFLIN-ST. JEOR: SCORE: 1150.04

## 2019-07-24 NOTE — PATIENT INSTRUCTIONS
You were seen by  VOLODYMYR Harper CNP      1. Laboratory blood work has been ordered to be completed in 2 weeks. You can call [phone] to schedule a lab-only appointment.  You will be notified by phone call or [employer] message when the results are available.      2. START taking Aspirin 81 mg once daily.     3. START Lasix 20 mg every other day.     4. STOP taking Metoprolol TARTRATE.     5. START taking Metoprolol SUCCINATE 150 mg once daily.    6. INCREASE Potassium to 40 mg in the morning with a meal, and 20 mg in the evening with a meal.     7. An RUTH Ultrasound has been ordered. You will be called to schedule this. You will receive instructions for testing at that time.      8. Continue to work on smoking cessation.       You will follow up with [facility] in 6 months, sooner if needed.      Please call the cardiology office with problems, questions, or concerns at [phone].    If you experience chest pain, chest pressure, chest tightness, shortness of breath, fainting, lightheadedness, nausea, vomiting, or other concerning symptoms, please report to the Emergency Department or call 911. These symptoms may be emergent, and best treated in the Emergency Department.     Cardiology Nurses  DEION Quevedo, RN  BRAYAN Boyer LPN  [facility] (Unit 3C)  [phone]

## 2019-07-24 NOTE — NURSING NOTE
"Chief Complaint   Patient presents with     Follow Up       Initial /62 (BP Location: Right arm, Patient Position: Sitting, Cuff Size: Adult Regular)   Pulse 56   Temp 97.6  F (36.4  C) (Tympanic)   Resp 16   Ht 1.575 m (5' 2\")   Wt 66.7 kg (147 lb)   BMI 26.89 kg/m   Estimated body mass index is 26.89 kg/m  as calculated from the following:    Height as of this encounter: 1.575 m (5' 2\").    Weight as of this encounter: 66.7 kg (147 lb).  Meds Reconciled: complete  Pt is not on Aspirin  Pt is on a Statin  PHQ and/or MIKE reviewed. Pt referred to PCP/MH Provider as appropriate.    Jeri Garibay LPN      "

## 2019-07-24 NOTE — PROGRESS NOTES
[facility] HEART CARE   CARDIOLOGY CONSULT     Ovidio Luna   [id number]  [facility] [address]      Glynn Botello     Chief Complaint   Patient presents with     Follow Up        HPI:   Ms. Luna is a 68 year old female who presents for 3 month cardiology follow-up with history of known coronary atherosclerosis, recently diagnosed early this year with AF with RVR and HTN.  Patient has a history of aortofemoral bypass in 2004, tachybradycardia syndrome, newly identified atrial fibrillation, CAD, hyperlipidemia, hypertension, PAD, hypertension, DM 2, COPD, tobacco abuse, emphysema, history of CVA and gastritis.    Patient has a known history of coronary artery disease with PTCA of the mLAD and pRCA on 2/28/04 with preserved LVEF. PTCA at [facility] in [place].     She has a history of PAS with aortofemoral bypass of the right LE in 2004.     Patient presented to the ED on 3/10/2019 with complaints of chest pressure and numbness radiating into the left arm.  She reported associated shortness of breath, nausea and vomiting, no diaphoresis.  This was described as a nonexertional pain experienced when watching television.  She had no relief with nitroglycerin.  In the ED she was found to be in A. fib with RVR and she was started on a Cardizem drip and admitted to the ICU.  Her first 2 troponins were normal and the third 1 was mildly elevated.  In the ICU she was noted to have a brief sinus pause on monitoring and therefore Cardizem drip was discontinued and the patient spontaneously converted to normal sinus rhythm following this.  Given her upward troponin trend during her hospitalization patient was transferred to [facility] in [place] for coronary evaluation given her known ischemic heart disease history.    Echocardiogram was performed during her hospitalization at [place] with preserved LVEF, no significant valvular or morphologic abnormalities.  She had no anginal symptoms on presentation  to [facility].  With her newly identified atrial fibrillation she was started on a DOAC for stoke prophylaxis.  Additionally she was found to be a type II diabetic and was also started on metformin.  Her statin was adjusted to high intensity atorvastatin.  She was discharged from [facility] on 3/12/2019.    Today patient denies any recurrence of chest pain or pressure.  She does report increased dyspnea on exertion and occasional palpitations.  No lightheadedness or syncope.  No edema.  She reports that she works the [employer] shift at work and describes an interrupted sleep pattern.  She does report snoring at times, possible apnea.  Given her newly identified atrial fibrillation, it has been recommended overnight sleep study to assess for sleep apnea which was declined.     At her last visit we reviewed results of stable TTE with no systolic or diastolic failure, no concerning valvular abnormalities and no identified pulmonary HTN. She completed a Lexiscan stress test on 7/9/2019 with no stress induced ischemia, there is a small fixed defect which is again present at the cardiac apex.  This is unchanged.  Myocardial motility was preserved with an EF of 66%.  There was no EKG changes.    A Zio was previously recommended to assess her atrial fibrillation burden as she was symptomatic at her last visit.  She never did schedule to have this monitor placed.  Admits that she took independent discussion to adjust her beta-blocker to 150 mg twice daily and feels that her palpitations completely resolved with this and her blood pressure is much improved.  She describes chronic shortness of breath today which is unchanged.  She admits that her chest pressure also improved when she increased her beta-blocker.  She has not experienced any lightheadedness or syncope.    Today she does report increased lower extremity edema over the past few weeks.  She works 12-hour night shifts for which she is  up all night on her feet, this does worsen her edema.  She declines wearing compression stockings that she does not like her toes being squeezed tight.  She does wear a tall sock just below the knee when working that does help some.    Patient continues to smoke half ppd, admits that she is still working on cutting back.      PAST MEDICAL HISTORY:   Past Medical History:   Diagnosis Date     Allergy status to other antibiotic agents status      Atherosclerosis     History of ASO with claudication     Atherosclerotic heart disease of native coronary artery without angina pectoris 2004    Stent times 2     Atrial fibrillation (H) 3/10/2019     Centrilobular emphysema (H) 12/12/2016     Cerebral infarction (H) 05/20/2012    CVA with residual left sided weakness, incidental meningioma found     Closed fracture of shaft of left tibia 12/26/2001    History of bilateral tibial fx, work related injury     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   Start Fosamax 08/07, stopped fosamax.     Peripheral vascular disease (H) 1/17/2018     Tobacco abuse 1/17/2018     Type 2 diabetes mellitus without complication, without long-term current use of insulin (H) 3/25/2019     Zoster without complications 2008    left shoulder and neck     Zoster without complications     2009/2008,Herpes zoster, left shoulder and neck          FAMILY HISTORY:   Family History   Problem Relation Age of Onset     Heart Disease Father         Heart Disease     Cancer Father         Cancer,myeloma     Heart Disease Mother         Heart Disease,MI, stenting     Cerebrovascular Disease Brother      Other - See Comments Brother         sciatica     Other - See Comments Other         FHx Father's side Coronary artery disease     Other - See Comments Paternal Uncle         Stroke      Cancer Paternal Uncle         Cancer,Bladder x2     Other - See Comments Sister         chronic pain syndrome     Breast Cancer No family hx of         Cancer-breast          PAST SURGICAL HISTORY:   Past Surgical History:   Procedure Laterality Date     COLONOSCOPY  2004    2 hyperplastic polyps, repeat in 2014     HEMORRHOID SURGERY       HYSTERECTOMY TOTAL ABDOMINAL, BILATERAL SALPINGO-OOPHORECTOMY, COMBINED  1987    total hyster - BSO     OTHER SURGICAL HISTORY Right 2004    53525.0,NE BYPASS GRAFT AORTOBIFEMORAL     OTHER SURGICAL HISTORY      2004,LOC-1006.05,PTCA,mid LAD, proximal RCA     OTHER SURGICAL HISTORY      03/08/16,DUR096,CYSTOSCOPY W/ URETEROSCOPY W/ LITHOTRIPSY,Right,Dr. Mario DC          SOCIAL HISTORY:   Social History     Socioeconomic History     Marital status: Single     Spouse name: None     Number of children: None     Years of education: None     Highest education level: None   Occupational History     None   Social Needs     Financial resource strain: None     Food insecurity:     Worry: None     Inability: None     Transportation needs:     Medical: None     Non-medical: None   Tobacco Use     Smoking status: Current Every Day Smoker     Packs/day: 0.50     Years: 16.00     Pack years: 8.00     Types: Cigarettes     Smokeless tobacco: Never Used   Substance and Sexual Activity     Alcohol use: No     Drug use: No     Sexual activity: Not Currently   Lifestyle     Physical activity:     Days per week: None     Minutes per session: None     Stress: None   Relationships     Social connections:     Talks on phone: None     Gets together: None     Attends Mosque service: None     Active member of club or organization: None     Attends meetings of clubs or organizations: None     Relationship status: None     Intimate partner violence:     Fear of current or ex partner: None     Emotionally abused: None     Physically abused: None     Forced sexual activity: None   Other Topics  Concern     Parent/sibling w/ CABG, MI or angioplasty before 65F 55M? Not Asked   Social History Narrative    ** Merged History Encounter **         Works at [employer]  Melly Spawn Mother  Delma MarioSondra Sister  One brother          CURRENT MEDICATIONS:   Prior to Admission medications    Medication Sig Start Date End Date Taking? Authorizing Provider   albuterol (PROAIR HFA/PROVENTIL HFA/VENTOLIN HFA) 108 (90 Base) MCG/ACT Inhaler Inhale 1-2 puffs into the lungs every 6 hours as needed for shortness of breath / dyspnea or wheezing 6/7/18  Yes Fely Gregorio APRN CNP   amLODIPine (NORVASC) 10 MG tablet Take 1 tablet (10 mg) by mouth daily 3/25/19  Yes Lizet Garcia PA-C   atorvastatin (LIPITOR) 40 MG tablet Take 40 mg by mouth 3/12/19  Yes Reported, Patient   busPIRone (BUSPAR) 10 MG tablet Take 10 mg by mouth daily   Yes Unknown, Entered By History   Cholecalciferol (VITAMIN D3) 2000 UNITS CAPS Take 4,000 Units by mouth 2 times daily  9/18/12  Yes Reported, Patient   citalopram (CELEXA) 10 MG tablet Take 1 tablet (10 mg) by mouth every morning 11/2/18  Yes Glynn Botello MD   fexofenadine (ALLEGRA) 180 MG tablet Take 180 mg by mouth 2 times daily  9/18/12  Yes Reported, Patient   fluticasone (FLONASE) 50 MCG/ACT nasal spray Spray 1 spray into both nostrils daily as needed for rhinitis or allergies   Yes Unknown, Entered By History   fluticasone-salmeterol (ADVAIR) 250-50 MCG/DOSE inhaler Inhale 1 puff into the lungs daily as needed   Yes Unknown, Entered By History   hydrochlorothiazide (HYDRODIURIL) 25 MG tablet Take 1 tablet (25 mg) by mouth daily 8/27/18  Yes Glynn Botello MD   ipratropium - albuterol 0.5 mg/2.5 mg/3 mL (DUONEB) 0.5-2.5 (3) MG/3ML neb solution Take 1 vial by nebulization every morning . May also use 1 neb every 6 hours as needed for shortness of breath.   Yes Unknown, Entered By History   metFORMIN (GLUCOPHAGE) 500 MG tablet Take 1 tablet (500 mg) by mouth  daily (with breakfast) 3/21/19  Yes Lizet Garcia PA-C   metoprolol tartrate (LOPRESSOR) 50 MG tablet Take 1.5 tablets (75 mg) by mouth 2 times daily 3/29/19  Yes Hawa Grissom MD   nicotine (COMMIT) 2 MG lozenge Take 2 mg by mouth 3/12/19  Yes Reported, Patient   order for [place] NEBULIZER MACHINE ITEM #[id number] [facility]  2/20/19  Yes Reported, Patient   potassium chloride SA (K-DUR/KLOR-CON M) 20 MEQ CR tablet Take 1 tablet (20 mEq) by mouth 2 times daily (with meals) 6/5/18  Yes Glynn Botello MD   Respiratory Therapy Supplies (NEBULIZER/TUBING/MOUTHPIECE) KIT Dx: COPD with exacerbation. Sig: Use as directed. 2/20/19  Yes Lizet Garcia PA-C   rivaroxaban ANTICOAGULANT (XARELTO) 20 MG TABS tablet Take 20 mg by mouth 3/12/19  Yes Reported, Patient          ALLERGIES:   Allergies   Allergen Reactions     Morphine      Tachycardia       No Clinical Screening - See Comments      Pt states allergies to white/yellow gold.  Sugical steel.  Copper.     Propoxyphene N-Apap Unknown     Tremors       Varenicline Nausea and Vomiting     Codeine Palpitations     Diphenhydramine Palpitations and Other (See Comments)     wheezing     Lisinopril Palpitations and Cough     Tetracycline Nausea and Vomiting and Rash          ROS:   CONSTITUTIONAL: No reported fever or chills. No changes in weight.  ENT: No visual disturbance, ear ache, epistaxis or sore throat.   CARDIOVASCULAR: No recurrence of chest pain, chest pressure or chest discomfort.  Palpitations improved with increased dose of beta-blocker, increased lower extremity edema.   RESPIRATORY: Positive for chronic shortness of breath and dyspnea upon exertion which she feels is unchanged. No cough, wheezing or hemoptysis.  No orthopnea or PND.  GI: No reported abdominal pain, no melena or hematochezia.  : No reported hematuria or dysuria.   NEUROLOGICAL: No lightheadedness, dizziness, syncope, ataxia, paresthesias or  "weakness.   HEMATOLOGIC: No history of anemia. No bleeding or excessive bruising. No history of blood clots.   MUSCULOSKELETAL: No reported joint pain or swelling, no muscle pain.  ENDOCRINOLOGIC: No temperature intolerance. No hair or skin changes.  SKIN: No abnormal rashes or sores, no unusual itching.  PSYCHIATRIC: No history of depression or anxiety. No changes in mood, feeling down or anxious. No changes in sleep.      PHYSICAL EXAM:   /62 (BP Location: Right arm, Patient Position: Sitting, Cuff Size: Adult Regular)   Pulse 56   Temp 97.6  F (36.4  C) (Tympanic)   Resp 16   Ht 1.575 m (5' 2\")   Wt 66.7 kg (147 lb)   BMI 26.89 kg/m    GENERAL: The patient is a well-developed, well-nourished, in no apparent distress.  HEENT: Head is normocephalic and atraumatic. Eyes are symmetrical with normal visual tracking. No icterus, no xanthelasmas. Nares appeared normal without nasal drainage. Mucous membranes are moist, no cyanosis.  NECK: Supple. No cervical bruits heard.  CHEST/ LUNGS: Lungs clear to auscultation, no rales, rhonchi or wheezes, no use of accessory muscles, no retractions, respirations unlabored and normal respiratory rate.   CARDIO: Irregular rate and rhythm normal with S1 and S2, no murmur, click or rub.   ABD: Abdomen is nondistended.   EXTREMITIES: 1+ bilateral lower extremity edema present.   MUSCULOSKELETAL: No visible joint swelling.   NEUROLOGIC: Alert and oriented X3. Normal speech, gait and affect. No focal neurologic deficits.   SKIN: No jaundice. No rashes or visible skin lesions present. No ecchymosis.     LAB RESULTS:   Office Visit on 01/13/2019   Component Date Value Ref Range Status     Specimen Description 01/13/2019 Nasal   Final     Influenza A PCR 01/13/2019 Negative  NEG^Negative Final     Influenza B PCR 01/13/2019 Negative  NEG^Negative Final     Resp Syncytial Virus 01/13/2019 Negative  NEG^Negative Final     WBC 01/13/2019 11.3* 4.0 - 11.0 10e9/L Final     RBC Count " 01/13/2019 4.96  3.8 - 5.2 10e12/L Final     Hemoglobin 01/13/2019 14.4  11.7 - 15.7 g/dL Final     Hematocrit 01/13/2019 44.3  35.0 - 47.0 % Final     MCV 01/13/2019 89  78 - 100 fl Final     MCH 01/13/2019 29.0  26.5 - 33.0 pg Final     MCHC 01/13/2019 32.5  31.5 - 36.5 g/dL Final     RDW 01/13/2019 12.8  10.0 - 15.0 % Final     Platelet Count 01/13/2019 252  150 - 450 10e9/L Final     Diff Method 01/13/2019 Automated Method   Final     % Neutrophils 01/13/2019 59.7  % Final     % Lymphocytes 01/13/2019 23.2  % Final     % Monocytes 01/13/2019 13.6  % Final     % Eosinophils 01/13/2019 2.6  % Final     % Basophils 01/13/2019 0.6  % Final     % Immature Granulocytes 01/13/2019 0.3  % Final     Absolute Neutrophil 01/13/2019 6.8  1.6 - 8.3 10e9/L Final     Absolute Lymphocytes 01/13/2019 2.6  0.8 - 5.3 10e9/L Final     Absolute Monocytes 01/13/2019 1.5* 0.0 - 1.3 10e9/L Final     Absolute Eosinophils 01/13/2019 0.3  0.0 - 0.7 10e9/L Final     Absolute Basophils 01/13/2019 0.1  0.0 - 0.2 10e9/L Final     Abs Immature Granulocytes 01/13/2019 0.0  0 - 0.4 10e9/L Final          ASSESSMENT:   Madelineamarikathy Luna presents for 3 month cardiology follow-up with history of known coronary atherosclerosis, recently diagnosed early this year with AF with RVR and HTN.  Patient has a history of aortofemoral bypass in 2004, tachybradycardia syndrome, newly identified atrial fibrillation, CAD, hyperlipidemia, hypertension, PAD, hypertension, DM 2, COPD, tobacco abuse, emphysema, history of CVA and gastritis.  At her last visit we reviewed results of stable TTE with no systolic or diastolic failure, no concerning valvular abnormalities and no identified pulmonary HTN. She completed a Lexiscan stress test on 7/9/2019 with no stress induced ischemia, there is a small fixed defect which is again present at the cardiac apex.  This is unchanged.  Myocardial motility was preserved with an EF of 66%.  There was no EKG changes.  JOSE Diaz was  previously recommended to assess her atrial fibrillation burden as she was symptomatic at her last visit.  She never did schedule to have this monitor placed.  Admits that she took independent discussion to adjust her beta-blocker to 150 mg twice daily and feels that her palpitations completely resolved with this and her blood pressure is much improved.  She describes chronic shortness of breath today which is unchanged.  She admits that her chest pressure also improved when she increased her beta-blocker.  She has not experienced any lightheadedness or syncope.  Today she does report increased lower extremity edema over the past few weeks.  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  She declines wearing compression stockings that she does not like her toes being squeezed tight.  She does wear a tall sock just below the knee when working that does help some.    PLAN:  1. PAF (paroxysmal atrial fibrillation) (H)  Newly diagnosed AF with RVR, symptoms improved when she increased her beta blocker to 150 mg. Describes herself as largely asymptomatic at this time.  She did not complete ZIO to assess burden, as symptoms are improve and she is currently on xarelto for stroke prophylaxis and rate well controlled on beta blocker we may hold on this.  She will remain on Xarelto oral anticoagulation for stroke prophylaxis with [id number] >2.    We will adjust her beta blocker to long acting with history of stable angina and for rate control with PAF. She will begin toprol  mg daily in place of her metoprolol tartrate.     - metoprolol succinate ER (TOPROL-XL) 100 MG 24 hr tablet; Take 1.5 tablets (150 mg) by mouth daily  Dispense: 135 tablet; Refill: 1    2. Mixed hyperlipidemia  Continue on Atorvastatin 40 mg before her bedtime.    - atorvastatin (LIPITOR) 40 MG tablet; Take 1 tablet (40 mg) by mouth At Bedtime  Dispense: 90 tablet; Refill: 3    3.  Atherosclerosis of native coronary artery of native heart without angina pectoris  Known CAD, denies any anginal symptoms currently.   Begin ASA 81 mg daily with CAD history and past PCI, she will take this in addition to her Xarelto and watch for any s/s of bleeding. If increased GERD/ symptoms of gastritis consider starting H2 RB/ PPI.    - atorvastatin (LIPITOR) 40 MG tablet; Take 1 tablet (40 mg) by mouth At Bedtime  Dispense: 90 tablet; Refill: 3  - aspirin (ASA) 81 MG chewable tablet; Take 1 tablet (81 mg) by mouth daily  Dispense: 90 tablet; Refill: 1    4. Type 2 diabetes mellitus without complication, without long-term current use of insulin (H)  Continued management by PCP.    5. Essential hypertension  BP well controlled.    6. H/O aorto-femoral bypass (2004)  - aspirin (ASA) 81 MG chewable tablet; Take 1 tablet (81 mg) by mouth daily  Dispense: 90 tablet; Refill: 1    7. Centrilobular emphysema (H)  Continued management by PCP, strongly encouraged tobacco cessation.    8. Tobacco abuse  See above.    9. History of CVA (cerebrovascular accident)  - aspirin (ASA) 81 MG chewable tablet; Take 1 tablet (81 mg) by mouth daily  Dispense: 90 tablet; Refill: 1    10. Hypokalemia  - Basic metabolic panel; Future  - potassium chloride ER (K-DUR/KLOR-CON M) 20 MEQ CR tablet; Take 2 tabs (40 mg) in the morning with a meal, and take 1 tab (20 mg) in the evening with a meal every day.  Dispense: 270 tablet; Refill: 1    11. Hypomagnesemia  - Magnesium; Future    12. Chronic shortness of breath  stable    13. Bilateral lower extremity edema  Suspect venous stasis edema.   TTE with no findings of CHF or concerning valvular disease.   Start Lasix 20 mg every other day.    14. ASVD (arteriosclerotic vascular disease)  - US RUTH Doppler No Exercise; Future      I spent a total of 50 minutes face-to-face with nonamarie during today's office visit.  Over 50% of this time was spent counseling the patient and coordination of  care regarding the above diagnoses.    Thank you for allowing me to participate in the care of your patient. Please do not hesitate to contact me if you have any questions.     Martina Roberto

## 2019-07-25 ASSESSMENT — ANXIETY QUESTIONNAIRES: GAD7 TOTAL SCORE: 5

## 2019-07-26 ENCOUNTER — TELEPHONE (OUTPATIENT)
Dept: INTERNAL MEDICINE | Facility: OTHER | Age: 69
End: 2019-07-26

## 2019-07-26 ENCOUNTER — TELEPHONE (OUTPATIENT)
Dept: FAMILY MEDICINE | Facility: OTHER | Age: 69
End: 2019-07-26

## 2019-07-26 NOTE — TELEPHONE ENCOUNTER
Provider team aware of abnormal result. Patient is also aware.       Eveline Parker LPN............. July 26, 2019 3:00 PM

## 2019-07-26 NOTE — TELEPHONE ENCOUNTER
"Called and spoke with patient after proper verification. Informed patient that the results of her cologuard are positive and the next step would be to have a colonoscopy completed. Patient states, \"I don't really want to have that done, but I guess i'll think about it and let you know what I decide\". This writer informed patient to call back whenever she decides.     Eveline Parker LPN............. July 26, 2019 2:15 PM     "

## 2019-08-05 ENCOUNTER — TELEPHONE (OUTPATIENT)
Dept: FAMILY MEDICINE | Facility: OTHER | Age: 69
End: 2019-08-05

## 2019-08-05 NOTE — TELEPHONE ENCOUNTER
Called and spoke with patient after proper verification. Patient states she wants to be seen for cologuard follow-up. Patient placed in schedule.     Eveline Parker LPN............. August 5, 2019 1:58 PM

## 2019-08-08 ENCOUNTER — OFFICE VISIT (OUTPATIENT)
Dept: INTERNAL MEDICINE | Facility: OTHER | Age: 69
End: 2019-08-08
Attending: INTERNAL MEDICINE
Payer: COMMERCIAL

## 2019-08-08 ENCOUNTER — TELEPHONE (OUTPATIENT)
Dept: INTERNAL MEDICINE | Facility: OTHER | Age: 69
End: 2019-08-08

## 2019-08-08 VITALS
RESPIRATION RATE: 16 BRPM | HEIGHT: 62 IN | TEMPERATURE: 99.6 F | BODY MASS INDEX: 27.05 KG/M2 | WEIGHT: 147 LBS | OXYGEN SATURATION: 99 % | HEART RATE: 68 BPM

## 2019-08-08 DIAGNOSIS — M85.89 OSTEOPENIA OF MULTIPLE SITES: ICD-10-CM

## 2019-08-08 DIAGNOSIS — R11.2 NON-INTRACTABLE VOMITING WITH NAUSEA, UNSPECIFIED VOMITING TYPE: ICD-10-CM

## 2019-08-08 DIAGNOSIS — R52 BODY ACHES: ICD-10-CM

## 2019-08-08 DIAGNOSIS — R19.5 POSITIVE COLORECTAL CANCER SCREENING USING COLOGUARD TEST: ICD-10-CM

## 2019-08-08 DIAGNOSIS — R50.9 FEVER, UNSPECIFIED FEVER CAUSE: Primary | ICD-10-CM

## 2019-08-08 DIAGNOSIS — D64.9 ANEMIA, UNSPECIFIED TYPE: ICD-10-CM

## 2019-08-08 LAB
ALBUMIN SERPL-MCNC: 4.1 G/DL (ref 3.5–5.7)
ALP SERPL-CCNC: 65 U/L (ref 34–104)
ALT SERPL W P-5'-P-CCNC: 10 U/L (ref 7–52)
ANION GAP SERPL CALCULATED.3IONS-SCNC: 11 MMOL/L (ref 3–14)
AST SERPL W P-5'-P-CCNC: 13 U/L (ref 13–39)
BASOPHILS # BLD AUTO: 0.1 10E9/L (ref 0–0.2)
BASOPHILS NFR BLD AUTO: 0.6 %
BILIRUB SERPL-MCNC: 0.4 MG/DL (ref 0.3–1)
BUN SERPL-MCNC: 12 MG/DL (ref 7–25)
CALCIUM SERPL-MCNC: 10.1 MG/DL (ref 8.6–10.3)
CHLORIDE SERPL-SCNC: 98 MMOL/L (ref 98–107)
CO2 SERPL-SCNC: 28 MMOL/L (ref 21–31)
CREAT SERPL-MCNC: 1.13 MG/DL (ref 0.6–1.2)
DIFFERENTIAL METHOD BLD: ABNORMAL
EOSINOPHIL # BLD AUTO: 0.2 10E9/L (ref 0–0.7)
EOSINOPHIL NFR BLD AUTO: 1.8 %
ERYTHROCYTE [DISTWIDTH] IN BLOOD BY AUTOMATED COUNT: 16.7 % (ref 10–15)
FERRITIN SERPL-MCNC: 13 NG/ML (ref 23.9–336.2)
FLUAV+FLUBV RNA SPEC QL NAA+PROBE: NEGATIVE
FLUAV+FLUBV RNA SPEC QL NAA+PROBE: NEGATIVE
GFR SERPL CREATININE-BSD FRML MDRD: 48 ML/MIN/{1.73_M2}
GLUCOSE SERPL-MCNC: 127 MG/DL (ref 70–105)
HCT VFR BLD AUTO: 34.6 % (ref 35–47)
HGB BLD-MCNC: 10.2 G/DL (ref 11.7–15.7)
IMM GRANULOCYTES # BLD: 0.1 10E9/L (ref 0–0.4)
IMM GRANULOCYTES NFR BLD: 0.4 %
IRON SATN MFR SERPL: 3 % (ref 20–55)
IRON SERPL-MCNC: 13 UG/DL (ref 50–212)
LYMPHOCYTES # BLD AUTO: 1.7 10E9/L (ref 0.8–5.3)
LYMPHOCYTES NFR BLD AUTO: 13.2 %
MCH RBC QN AUTO: 21.1 PG (ref 26.5–33)
MCHC RBC AUTO-ENTMCNC: 29.5 G/DL (ref 31.5–36.5)
MCV RBC AUTO: 72 FL (ref 78–100)
MONOCYTES # BLD AUTO: 1.7 10E9/L (ref 0–1.3)
MONOCYTES NFR BLD AUTO: 13.1 %
NEUTROPHILS # BLD AUTO: 9.2 10E9/L (ref 1.6–8.3)
NEUTROPHILS NFR BLD AUTO: 70.9 %
PLATELET # BLD AUTO: 525 10E9/L (ref 150–450)
POTASSIUM SERPL-SCNC: 3.4 MMOL/L (ref 3.5–5.1)
PROT SERPL-MCNC: 7.9 G/DL (ref 6.4–8.9)
RBC # BLD AUTO: 4.84 10E12/L (ref 3.8–5.2)
RSV RNA SPEC NAA+PROBE: NEGATIVE
SODIUM SERPL-SCNC: 137 MMOL/L (ref 134–144)
SPECIMEN SOURCE: NORMAL
TIBC SERPL-MCNC: 502.6 UG/DL (ref 245–400)
UIBC (UNSATURATED): 489.6 MG/DL
WBC # BLD AUTO: 12.9 10E9/L (ref 4–11)

## 2019-08-08 PROCEDURE — 87631 RESP VIRUS 3-5 TARGETS: CPT | Mod: ZL | Performed by: INTERNAL MEDICINE

## 2019-08-08 PROCEDURE — 85025 COMPLETE CBC W/AUTO DIFF WBC: CPT | Mod: ZL | Performed by: INTERNAL MEDICINE

## 2019-08-08 PROCEDURE — 82728 ASSAY OF FERRITIN: CPT | Mod: ZL | Performed by: INTERNAL MEDICINE

## 2019-08-08 PROCEDURE — 87804 INFLUENZA ASSAY W/OPTIC: CPT | Mod: ZL | Performed by: INTERNAL MEDICINE

## 2019-08-08 PROCEDURE — 36415 COLL VENOUS BLD VENIPUNCTURE: CPT | Mod: ZL | Performed by: INTERNAL MEDICINE

## 2019-08-08 PROCEDURE — 80053 COMPREHEN METABOLIC PANEL: CPT | Mod: ZL | Performed by: INTERNAL MEDICINE

## 2019-08-08 PROCEDURE — 83540 ASSAY OF IRON: CPT | Mod: ZL | Performed by: INTERNAL MEDICINE

## 2019-08-08 PROCEDURE — 99214 OFFICE O/P EST MOD 30 MIN: CPT | Performed by: INTERNAL MEDICINE

## 2019-08-08 PROCEDURE — 83550 IRON BINDING TEST: CPT | Mod: ZL | Performed by: INTERNAL MEDICINE

## 2019-08-08 RX ORDER — AMLODIPINE BESYLATE 10 MG/1
10 TABLET ORAL DAILY
Qty: 90 TABLET | Refills: 3 | Status: CANCELLED | OUTPATIENT
Start: 2019-08-08

## 2019-08-08 RX ORDER — METOPROLOL TARTRATE 50 MG
TABLET ORAL
COMMUNITY
Start: 2019-07-17 | End: 2019-08-08

## 2019-08-08 ASSESSMENT — MIFFLIN-ST. JEOR: SCORE: 1150.04

## 2019-08-08 ASSESSMENT — PAIN SCALES - GENERAL: PAINLEVEL: SEVERE PAIN (6)

## 2019-08-08 NOTE — PROGRESS NOTES
Chief Complaint   Patient presents with     RECHECK         HPI: Ms. Luna is a 68 year old female who presents today for follow up of recent testing.    She recently underwent Cologuard testing.  It did come back positive.  She has had a history of polyps in the past.  She is hesitant about colonoscopy because she has had issues with GoLYTELY prep in the past.  She reports that she would need something different if she were to repeat her colonoscopy.  She is willing to do this if some other prep is available.    She also recent had a DEXA scan.  It did show osteopenia with high fracture scores.  She is hesitant to start medication at this time.  She is on vitamin D.  She does recognize that she needs to increase her weightbearing exercise.  Does continue to smoke.  She  reports that she has been smoking cigarettes.  She started smoking about 53 years ago. She has a 20.00 pack-year smoking history. She has never used smokeless tobacco.    She reports this week she has been not been feeling well.  She has had fevers and chills along with nausea, vomiting and diarrhea.  She is able to keep down fluids however has had a hard time keeping down food.  She does have some generalized weakness.  She is concerned that she has the flu.  She has not had significant breathing issues.  She does feel that this is very similar to how she felt back in February when she was flu positive.    Past medical history reviewed as below:     Past Medical History:   Diagnosis Date     Allergy status to other antibiotic agents status      Atherosclerosis     History of ASO with claudication     Atherosclerotic heart disease of native coronary artery without angina pectoris 2004    Stent times 2     Atrial fibrillation (H) 3/10/2019     Centrilobular emphysema (H) 12/12/2016     Cerebral infarction (H) 05/20/2012    CVA with residual left sided weakness, incidental meningioma found     Closed fracture of shaft of left tibia 12/26/2001     "History of bilateral tibial fx, work related injury     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   Start Fosamax 08/07, stopped fosamax.     Peripheral vascular disease (H) 1/17/2018     Tobacco abuse 1/17/2018     Type 2 diabetes mellitus without complication, without long-term current use of insulin (H) 3/25/2019     Zoster without complications 2008    left shoulder and neck     Zoster without complications     2009/2008,Herpes zoster, left shoulder and neck   .      ROS  Pertinent ROS was performed and was negative, including for chest pain, increased shortness of breath, increased lower extremity edema, changes in bladder, blood in the stool, difficulty swallowing, sores in the mouth. No other concerns, with exception of HPI above.      EXAM:   Pulse 68   Temp 99.6  F (37.6  C) (Tympanic)   Resp 16   Ht 1.575 m (5' 2\")   Wt 66.7 kg (147 lb)   SpO2 99%   Breastfeeding? No   BMI 26.89 kg/m      Estimated body mass index is 26.89 kg/m  as calculated from the following:    Height as of this encounter: 1.575 m (5' 2\").    Weight as of this encounter: 66.7 kg (147 lb).      GEN: Vitals reviewed. Healthy appearing. Patient is in no acute distress. Cooperative with exam.  HEENT: Normocephalic atraumatic.  Pupils equally round.  No scleral icterus, no conjunctival erythema.   CV: Heart regular in rate and rhythm with no murmur.    LUNGS: Lungs with scattered rhonchi.  Chest rise equal bilaterally.  No accessory muscle use.  ABD:  nondistended  SKIN: Warm and dry to touch.  No rash on face, arms and legs.  EXT: No clubbing or cyanosis.  No peripheral edema.  PSYCH: Mood is good.  Affect appropriate. Speech fluent. Answers questions appropriately and thought process normal.     ASSESSMENT AND PLAN:  Fever, unspecified fever cause  We " discussed that it is less likely to have the flu in the summer however given the similarity of her symptoms to when she had the flu over the winter and her concern regarding this we will obtain a flu swab.  If positive we will treat with Tamiflu given her comorbidities.  We also discussed that tickborne illness is quite possible in the summer.  We will check basic labs to see if there is any indication of this.  - Comprehensive Metabolic Panel  - CBC and Differential  - Influenza A and B and RSV PCR    Non-intractable vomiting with nausea, unspecified vomiting type  She was encouraged to continue to work on keeping down fluids and if she is unable to at any point she does need to let us know and we need to consider more aggressive treatment.    Body aches  See above  - Comprehensive Metabolic Panel  - CBC and Differential    Positive colorectal cancer screening using Cologuard test  -At this time we discussed the pros and cons of going forward with colonoscopy.  She is willing to do this if she can use a different prep.  Order placed with this information.  - GASTROENTEROLOGY ADULT REF PROCEDURE ONLY Other; ([facility])    Osteopenia of multiple sites  -We discussed medications today.  She would like to hold on treatment currently.  We will plan for repeat DEXA scan in 2 to 3 years.  She will continue with vitamin D, calcium in the diet and weightbearing exercise.     Return in about 2 months (around 10/8/2019) for as scheduled.      EMILY BENJAMIN, DO   8/8/2019 1:24 PM    This document was prepared using voice generated softwear. While every attempt was made for accuracy, grammatical errors may exist.       ADDENDUM:  8/8/2019   5:25 PM    Labs resulted today with hemoglobin that has dropped from 13-10.  MCV is on the low side.  Iron studies are added on.  If patient is encouraged to take it easy through the weekend and come in to be seen next week if her acute symptoms have not resolved.  She does need to undergo  colonoscopy as above.  Consideration for add on EGD given anemia now.  We will plan for repeat CBC in approximately 1 to 2 weeks.    Ana Perdomo

## 2019-08-08 NOTE — TELEPHONE ENCOUNTER
Called and spoke with patient after proper verification. Informed patient of below message. Patient stated understanding and no further questions at this time.     Eveline Parker LPN............. August 8, 2019 4:59 PM

## 2019-08-08 NOTE — LETTER
August 8, 2019      Ovidio Luna  [address]  [facility] [address]        To Whom It May Concern:    Ovidio Luna  was seen on 8/8/2019.  Due to acute illness she requires time off work until her until 08/10/2019 due to on going symptoms.        Sincerely,        Ana Perdomo, DO

## 2019-08-08 NOTE — TELEPHONE ENCOUNTER
Please notify patient that her influenza is negative.  Her kidney function is stable.  Liver is normal.  She does have several abnormalities of her blood counts including anemia and a mild increase in her white blood cell count.  I would recommend that she take it easy over the weekend and if she does not feel better by Monday she should come in to be seen in follow-up again.  I would also like her to have repeat labs done in approximately 1 to 2 weeks.  She can do this with a lab only appointment if she is feeling better.

## 2019-08-08 NOTE — LETTER
August 8, 2019      Ovidio Luna  [address] [place] [address]        Dear ,    We are writing to inform you of your test results.  As discussed in the phone call that you received your kidney and liver testing overall is stable.  Potassium is stable although slightly low.  Increase dietary potassium which can be done with spinach, beans, yogurt, avocados, bananas and potatoes.    Your white blood cell count is up slightly which is likely secondary to acute infection.  This will need to be monitored closely particularly if your symptoms do not improve.  Your hemoglobin at this time has not decreased which is a sign of blood loss.  Your iron is very low because of this.  I would recommend that you undergo colonoscopy sooner than later and additionally I have ordered an upper scope of the stomach and esophagus to be sure that you are not losing blood in this region.  This will all be done at the same time.  I also would like you to recheck your blood counts with a lab only appointment in 1 to 2 weeks.    Influenza is negative.  Again if you continue to have symptoms I would recommend being seen.      Resulted Orders   Comprehensive Metabolic Panel   Result Value Ref Range    Sodium 137 134 - 144 mmol/L    Potassium 3.4 (L) 3.5 - 5.1 mmol/L    Chloride 98 98 - 107 mmol/L    Carbon Dioxide 28 21 - 31 mmol/L    Anion Gap 11 3 - 14 mmol/L    Glucose 127 (H) 70 - 105 mg/dL    Urea Nitrogen 12 7 - 25 mg/dL    Creatinine 1.13 0.60 - 1.20 mg/dL    GFR Estimate 48 (L) >60 mL/min/[1.73_m2]    GFR Estimate If Black 58 (L) >60 mL/min/[1.73_m2]    Calcium 10.1 8.6 - 10.3 mg/dL    Bilirubin Total 0.4 0.3 - 1.0 mg/dL    Albumin 4.1 3.5 - 5.7 g/dL    Protein Total 7.9 6.4 - 8.9 g/dL    Alkaline Phosphatase 65 34 - 104 U/L    ALT 10 7 - 52 U/L    AST 13 13 - 39 U/L   CBC and Differential   Result Value Ref Range    WBC 12.9 (H) 4.0 - 11.0 10e9/L    RBC Count 4.84 3.8 - 5.2 10e12/L    Hemoglobin 10.2 (L) 11.7 - 15.7  g/dL    Hematocrit 34.6 (L) 35.0 - 47.0 %    MCV 72 (L) 78 - 100 fl    MCH 21.1 (L) 26.5 - 33.0 pg    MCHC 29.5 (L) 31.5 - 36.5 g/dL    RDW 16.7 (H) 10.0 - 15.0 %    Platelet Count 525 (H) 150 - 450 10e9/L    Diff Method Automated Method     % Neutrophils 70.9 %    % Lymphocytes 13.2 %    % Monocytes 13.1 %    % Eosinophils 1.8 %    % Basophils 0.6 %    % Immature Granulocytes 0.4 %    Absolute Neutrophil 9.2 (H) 1.6 - 8.3 10e9/L    Absolute Lymphocytes 1.7 0.8 - 5.3 10e9/L    Absolute Monocytes 1.7 (H) 0.0 - 1.3 10e9/L    Absolute Eosinophils 0.2 0.0 - 0.7 10e9/L    Absolute Basophils 0.1 0.0 - 0.2 10e9/L    Abs Immature Granulocytes 0.1 0 - 0.4 10e9/L   Influenza A and B and RSV PCR   Result Value Ref Range    Specimen Description Nasopharyngeal     Influenza A PCR Negative NEG^Negative      Comment:      Flu A target RNA not detected, presumed negative for Influenza A or the viral   load is below the limit of detection.      Influenza B PCR Negative NEG^Negative      Comment:      Flu B target RNA not detected, presumed negative for Influenza B or the viral   load is below the limit of detection.      Resp Syncytial Virus Negative NEG^Negative      Comment:      RSV target RNA not detected, presumed negative for Respiratory Syncitial Virus   or the viral load is below the limit of detection.  FDA approved assay performed using [employer] GeneXpert(R) real-time PCR.     Iron Binding Panel   Result Value Ref Range    Iron 13 (L) 50 - 212 ug/dL    UIBC (Unsaturated) 489.60 mg/dL    Iron Binding Capacity 502.60 (H) 245.00 - 400.00 ug/dL    Iron Saturation 3 (L) 20 - 55 %       If you have any questions or concerns, please call the clinic at the number listed above.       Sincerely,        Ana Perdomo, DO

## 2019-08-08 NOTE — NURSING NOTE
Patient presents to the clinic for follow-up cologuard and dexa scan.     Medication Reconciliation: complete   Eveline Parker LPN............. August 8, 2019 12:50 PM

## 2019-08-08 NOTE — PATIENT INSTRUCTIONS
Recommend taking Vitamin D 1000 - 5000 IU daily.    Also, Calcium 1200-1500mg daily from diet.    Recommend daily exercise with a focus on low-weight strengthening.  Yoga is a great way to do this.    Please let me know if you have any questions regarding this.       IndexSpanish Related topics   Calcium in the Diet   ________________________________________________________________________  KEY POINTS    Calcium is a mineral that is important for your heart, bones, teeth, and muscles to stayhealthy.    Eating or drinking certain things can cause you to lose calcium.    You can get calcium from dairy products, calcium-fortified foods,or from supplements. If you can get enough calcium in your diet, you do not need to take calcium supplements.  ________________________________________________________________________  What is calcium?   Calcium is a mineral that is very important for:    Heart health    Bone health    Teeth    Nerves    Muscles    Blood clotting    How much calcium do I need?   Many food products list the amount of calcium per serving on the label. Foodlabels list calcium as a % of the Daily Value (DV) based on 1,000 mg of calcium per day. Look for foods that provide 10% or more of the daily value for calcium.   The total amount of calcium you need, preferably fromdairy foods, depends on your age and gender:      Group           Calcium/Day  --------------------------------------  Adults 19 to 50  1000 mg  Women 51 to 70   1200 mg  Men 51 to 70     1000 mg  Adults over 70   1200 mg  --------------------------------------  * mg =milligrams    What keeps me from getting enough calcium?   Here are some things that can make itharder for your body to get enough calcium:    Not gettingenough vitamin D. Vitamin D increases the amount of calcium absorbed by your body. It s important to get enough sunlight to help your body make vitamin D and to choosefoods that contain vitamin D. Milk contains vitamin D and  some brands of cheese, yogurt, juice, and margarine have added vitamin D. Check labels for the amount of vitamin D per serving. Fish such as salmon, mackerel, andtuna are good natural sources of vitamin D. If your provider recommends that you take a calcium supplement, there are some that include vitamin D.    Too much fiber in the diet. This is more of a concern if you have low amounts of calcium in your diet. Take calcium supplements or eat calcium-fortified foods 2 hours before orafter eating 100% bran products. Soaking beans in water and discarding the liquid before cooking can also help.    Softdrinks, energy drinks, tea, and coffee. People who drink these products instead of milk often don't get enough calcium.    Taking some medicines. Medicines such as some antibiotics, heartburn medicines that decrease stomach acid production, and antacids that contain aluminum can make it harder for your body to absorbcalcium.  These things can cause you to lose calcium:    Eating a lot of protein foods, such as meats, poultry, and eggs. The more protein you eat, the more calcium you lose. As long as your diet is balancedand contains enough calcium, this should not be a problem.    Eating a lot of salt. The more salt in your diet,the more calcium you lose. Limit the salt in your diet. Cutting back on salt and getting enough calcium can help lower blood pressure and help prevent fluid retention.  Milk products are one of the best sources of calcium. Calcium is also in many other foods such as vegetables, beans, nuts, seeds, and soy. However, the calcium in these foods is not absorbed as well as the calcium inmilk products. Calcium has been added to some foods (fortified), which makes it easier to meet daily calcium needs, but it still can be hard for your body to get enough calcium if dairy foods are not a part of your diet.    Do I need a calcium supplement?   If you can get enough calcium in your diet, you do not need to  take calcium supplements. People who gettoo much calcium have a higher risk for kidney stones and stroke. Men who take calcium supplements are at higher risk for a heart attack. Ask your healthcare provider if you should take calcium supplements, and which kindyou should take.   Calcium supplements of 1000 mg or less per day do not prevent fractures in postmenopausal women. However, there may be some benefit from higher doses of calcium supplements. If you are a postmenopausalwoman and you have never had a fracture, ask your healthcare provider if you should take calcium supplements.  You may need a supplement if you:    Have digestive problems that prevent you from absorbing calcium.    Avoid dairy products due to allergic or other reactions (such aslactose intolerance or milk allergy).    Don't eat any animal products.    Do not get enough calcium in your diet.    Are pregnant or breast-feeding.    Have osteoporosis or osteopenia (weakened bones).    Have a vitamin D deficiency.  There are many kinds of calcium supplements. The most common are calcium carbonate and calcium citrate.     Calcium carbonate is best absorbed with a meal.    Calcium citrate can be taken on a full or empty stomach. Calcium citrate maybe a better choice for older adults or younger people who have low levels of stomach acid.    Calcium phosphate, lactate, and gluconate are also well absorbed. However, the amount ofcalcium per pill is lower, so you may need to take many pills a day to meet your needs.  Your body absorbs calcium best if you take no more than 500 mg at a time,and take it two or more times per day as recommended by your healthcare provider. Look for calcium supplements that have the USP or Consumer Lab symbol on the label. Products with these labels have been tested to make surethey are absorbed by the body.    How can I eat the right amount of calcium?  Eat more calcium-rich foods. Here are some ideas for addingcalcium to  your diet.    Have low-fat or nonfat milk, cottage cheese with fruit, or yogurt for snacks.    Eat calcium-fortified breakfast cereals with rice, almond, or soy milk, or have calcium-fortified waffles or pancakes.    Cook hot cerealswith milk instead of water.    Serve yogurt or milk smoothies instead of juice.    Add yogurt to lunches or use a dip made with yogurt when havinga fruit snack.    Add lean shredded cheese to baked potatoes, vegetables, soups, and salads.    Use milk when making cream soups instead of water.    Serve flavored milk or hot chocolate for an evening treat.    Use Parmesan cheese topping for Italian dishes. A 2 tablespoon serving addsabout 140 mg of calcium.    Serve a healthy vegetable pizza made with low-fat cheese.    Serve lean mozzarella string cheese with crackers andfruit for a snack.    Make puddings with milk.    Get plenty of exercise. Walk a mile a day if you can and do strength training exercise a fewtimes a week. Exercise helps your body to use calcium to strengthen your bones.  Some people cannot digest milk products because their bodies lack the enzyme neededto break down milk sugar. This problem is called lactose intolerance. If you have this problem, you can buy products such as Lactaid or Dairy Ease. These products contain lactase, which can help you digest milk products.    For more information contact:    The Academy of Nutrition and Dietetics  311.896.5750  http://www.eatright.org  Developed by [employer].  Adult Advisor 2016.3 published by [employer].  Last modified: 2015-04-17  Last reviewed: 2015-03-25  This content is reviewed periodically and is subject to change as newhealth information becomes available. The information is intended to inform and educate and is not a replacement for medical evaluation, advice, diagnosis or treatment by a healthcare professional.  References   Adult Advisor 2016.3Index    Copyright   2016 [facility], a division of [employer] [facility]  [employer] All rights reserved.

## 2019-08-09 ENCOUNTER — TELEPHONE (OUTPATIENT)
Dept: SURGERY | Facility: OTHER | Age: 69
End: 2019-08-09

## 2019-08-09 DIAGNOSIS — R19.5 POSITIVE FIT (FECAL IMMUNOCHEMICAL TEST): Primary | ICD-10-CM

## 2019-08-09 NOTE — TELEPHONE ENCOUNTER
Patient scheduled for a colonoscopy on 09/23 and is on Xarelto ,  Need to know when she will need to hold before procedure. Please advise.  She also stated that she cannot tolerate Golytely and [facility]   Thank you. Elsi Mario on 8/9/2019 at 2:30 PM

## 2019-08-09 NOTE — TELEPHONE ENCOUNTER
Schedule colonoscopy for positive Cologuard. Hold Xarelto for two days before surgery. Will order Prepopik for bowel prep.

## 2019-08-09 NOTE — TELEPHONE ENCOUNTER
Screening Questions for the Scheduling of Screening Colonoscopies   (If Colonoscopy is diagnostic, Provider should review the chart before scheduling.)  Are you younger than 50 or older than 80?  NO  Do you take aspirin or fish oil?  YES - ASPIRIN    (if yes, tell patient to stop 1 week prior to Colonoscopy)  Do you take warfarin (Coumadin), clopidogrel (Plavix), apixaban (Eliquis), dabigatram (Pradaxa), rivaroxaban (Xarelto) or any blood thinner? YES  - XARELTO    Do you use oxygen at home?  NO   Do you have kidney disease? NO   Are you on dialysis? NO   Have you had a stroke or heart attack in the last year? NO   Have you had a stent in your heart or any blood vessel in the last year? NO   Have you had a transplant of any organ? NO   Have you had a colonoscopy or upper endoscopy (EGD) before? YES          When?  OVER 10 YRS   Date of scheduled Colonoscopy. 09/23/2019  Provider JIM FUNES

## 2019-08-12 RX ORDER — BISACODYL 5 MG
TABLET, DELAYED RELEASE (ENTERIC COATED) ORAL
Qty: 2 TABLET | Refills: 0 | Status: SHIPPED | OUTPATIENT
Start: 2019-08-12 | End: 2019-11-13

## 2019-08-12 RX ORDER — POLYETHYLENE GLYCOL 3350, SODIUM CHLORIDE, SODIUM BICARBONATE, POTASSIUM CHLORIDE 420; 11.2; 5.72; 1.48 G/4L; G/4L; G/4L; G/4L
4000 POWDER, FOR SOLUTION ORAL ONCE
Qty: 4000 ML | Refills: 0 | Status: SHIPPED | OUTPATIENT
Start: 2019-08-12 | End: 2019-10-28

## 2019-08-15 ENCOUNTER — HOSPITAL ENCOUNTER (OUTPATIENT)
Dept: ULTRASOUND IMAGING | Facility: OTHER | Age: 69
Discharge: HOME | End: 2019-08-15
Attending: NURSE PRACTITIONER | Admitting: NURSE PRACTITIONER
Payer: COMMERCIAL

## 2019-08-15 DIAGNOSIS — E78.2 MIXED HYPERLIPIDEMIA: ICD-10-CM

## 2019-08-15 DIAGNOSIS — I73.9 INTERMITTENT CLAUDICATION (H): ICD-10-CM

## 2019-08-15 DIAGNOSIS — Z72.0 TOBACCO ABUSE: ICD-10-CM

## 2019-08-15 DIAGNOSIS — I70.90 ASVD (ARTERIOSCLEROTIC VASCULAR DISEASE): ICD-10-CM

## 2019-08-15 DIAGNOSIS — E11.9 TYPE 2 DIABETES MELLITUS WITHOUT COMPLICATION, WITHOUT LONG-TERM CURRENT USE OF INSULIN (H): ICD-10-CM

## 2019-08-15 DIAGNOSIS — R60.0 BILATERAL LOWER EXTREMITY EDEMA: ICD-10-CM

## 2019-08-15 PROCEDURE — 93922 UPR/L XTREMITY ART 2 LEVELS: CPT

## 2019-08-22 ENCOUNTER — TELEPHONE (OUTPATIENT)
Dept: INTERNAL MEDICINE | Facility: OTHER | Age: 69
End: 2019-08-22

## 2019-08-22 NOTE — TELEPHONE ENCOUNTER
Has Colonoscopy in Sept 23rd would like to have nurse call wanting to have endoscopy same time if possible - got a letter saying needed one.

## 2019-08-30 ENCOUNTER — TELEPHONE (OUTPATIENT)
Dept: INTERNAL MEDICINE | Facility: OTHER | Age: 69
End: 2019-08-30

## 2019-08-30 NOTE — TELEPHONE ENCOUNTER
Called and spoke with patient after proper verification.  Patient states that she missed a call from [facility] and was returning this call, did not catch who it was from. This writer states that her primary did not call her and to listen to the  again. No further questions.     Eveline Parker LPN............. August 30, 2019 2:36 PM

## 2019-09-03 DIAGNOSIS — F17.200 TOBACCO DEPENDENCE: ICD-10-CM

## 2019-09-03 DIAGNOSIS — I25.10 ASCVD (ARTERIOSCLEROTIC CARDIOVASCULAR DISEASE): ICD-10-CM

## 2019-09-03 DIAGNOSIS — I73.9 INTERMITTENT CLAUDICATION (H): ICD-10-CM

## 2019-09-03 DIAGNOSIS — E78.2 MIXED HYPERLIPIDEMIA: ICD-10-CM

## 2019-09-03 DIAGNOSIS — E11.9 TYPE 2 DIABETES MELLITUS WITHOUT COMPLICATION, WITHOUT LONG-TERM CURRENT USE OF INSULIN (H): ICD-10-CM

## 2019-09-03 DIAGNOSIS — R60.0 BILATERAL LOWER EXTREMITY EDEMA: Primary | ICD-10-CM

## 2019-09-16 ENCOUNTER — HOSPITAL ENCOUNTER (OUTPATIENT)
Dept: CT IMAGING | Facility: OTHER | Age: 69
Discharge: HOME | End: 2019-09-16
Attending: NURSE PRACTITIONER | Admitting: NURSE PRACTITIONER
Payer: COMMERCIAL

## 2019-09-16 DIAGNOSIS — E87.6 HYPOKALEMIA: ICD-10-CM

## 2019-09-16 DIAGNOSIS — E11.9 TYPE 2 DIABETES MELLITUS WITHOUT COMPLICATION, WITHOUT LONG-TERM CURRENT USE OF INSULIN (H): ICD-10-CM

## 2019-09-16 DIAGNOSIS — F17.200 TOBACCO DEPENDENCE: ICD-10-CM

## 2019-09-16 DIAGNOSIS — E83.42 HYPOMAGNESEMIA: ICD-10-CM

## 2019-09-16 DIAGNOSIS — D64.9 ANEMIA, UNSPECIFIED TYPE: ICD-10-CM

## 2019-09-16 DIAGNOSIS — I73.9 INTERMITTENT CLAUDICATION (H): ICD-10-CM

## 2019-09-16 DIAGNOSIS — I25.10 ASCVD (ARTERIOSCLEROTIC CARDIOVASCULAR DISEASE): ICD-10-CM

## 2019-09-16 DIAGNOSIS — R60.0 BILATERAL LOWER EXTREMITY EDEMA: ICD-10-CM

## 2019-09-16 DIAGNOSIS — E78.2 MIXED HYPERLIPIDEMIA: ICD-10-CM

## 2019-09-16 DIAGNOSIS — I25.10 ATHEROSCLEROSIS OF NATIVE CORONARY ARTERY OF NATIVE HEART WITHOUT ANGINA PECTORIS: ICD-10-CM

## 2019-09-16 LAB
ANION GAP SERPL CALCULATED.3IONS-SCNC: 10 MMOL/L (ref 3–14)
BASOPHILS # BLD AUTO: 0.1 10E9/L (ref 0–0.2)
BASOPHILS NFR BLD AUTO: 0.6 %
BUN SERPL-MCNC: 21 MG/DL (ref 7–25)
CALCIUM SERPL-MCNC: 9.5 MG/DL (ref 8.6–10.3)
CHLORIDE SERPL-SCNC: 98 MMOL/L (ref 98–107)
CO2 SERPL-SCNC: 29 MMOL/L (ref 21–31)
CREAT SERPL-MCNC: 1.04 MG/DL (ref 0.6–1.2)
DIFFERENTIAL METHOD BLD: ABNORMAL
EOSINOPHIL # BLD AUTO: 0.3 10E9/L (ref 0–0.7)
EOSINOPHIL NFR BLD AUTO: 3 %
ERYTHROCYTE [DISTWIDTH] IN BLOOD BY AUTOMATED COUNT: 19 % (ref 10–15)
GFR SERPL CREATININE-BSD FRML MDRD: 53 ML/MIN/{1.73_M2}
GLUCOSE SERPL-MCNC: 105 MG/DL (ref 70–105)
HCT VFR BLD AUTO: 30.9 % (ref 35–47)
HGB BLD-MCNC: 9 G/DL (ref 11.7–15.7)
IMM GRANULOCYTES # BLD: 0.1 10E9/L (ref 0–0.4)
IMM GRANULOCYTES NFR BLD: 0.6 %
LYMPHOCYTES # BLD AUTO: 2.2 10E9/L (ref 0.8–5.3)
LYMPHOCYTES NFR BLD AUTO: 20.3 %
MAGNESIUM SERPL-MCNC: 1.8 MG/DL (ref 1.9–2.7)
MCH RBC QN AUTO: 20.4 PG (ref 26.5–33)
MCHC RBC AUTO-ENTMCNC: 29.1 G/DL (ref 31.5–36.5)
MCV RBC AUTO: 70 FL (ref 78–100)
MONOCYTES # BLD AUTO: 1.2 10E9/L (ref 0–1.3)
MONOCYTES NFR BLD AUTO: 11.2 %
NEUTROPHILS # BLD AUTO: 7 10E9/L (ref 1.6–8.3)
NEUTROPHILS NFR BLD AUTO: 64.3 %
PLATELET # BLD AUTO: 438 10E9/L (ref 150–450)
POTASSIUM SERPL-SCNC: 3.2 MMOL/L (ref 3.5–5.1)
RBC # BLD AUTO: 4.42 10E12/L (ref 3.8–5.2)
SODIUM SERPL-SCNC: 137 MMOL/L (ref 134–144)
WBC # BLD AUTO: 10.9 10E9/L (ref 4–11)

## 2019-09-16 PROCEDURE — 85025 COMPLETE CBC W/AUTO DIFF WBC: CPT | Mod: ZL | Performed by: INTERNAL MEDICINE

## 2019-09-16 PROCEDURE — 80048 BASIC METABOLIC PNL TOTAL CA: CPT | Mod: ZL | Performed by: INTERNAL MEDICINE

## 2019-09-16 PROCEDURE — 25500064 ZZH RX 255 OP 636: Performed by: NURSE PRACTITIONER

## 2019-09-16 PROCEDURE — 83735 ASSAY OF MAGNESIUM: CPT | Mod: ZL | Performed by: INTERNAL MEDICINE

## 2019-09-16 PROCEDURE — 36415 COLL VENOUS BLD VENIPUNCTURE: CPT | Mod: ZL | Performed by: INTERNAL MEDICINE

## 2019-09-16 PROCEDURE — 73706 CT ANGIO LWR EXTR W/O&W/DYE: CPT | Mod: 50

## 2019-09-16 RX ORDER — IODIXANOL 320 MG/ML
100 INJECTION, SOLUTION INTRAVASCULAR ONCE
Status: COMPLETED | OUTPATIENT
Start: 2019-09-16 | End: 2019-09-16

## 2019-09-16 RX ADMIN — IODIXANOL 100 ML: 320 INJECTION, SOLUTION INTRAVASCULAR at 14:17

## 2019-09-16 NOTE — PROGRESS NOTES
IV Contrast- Discharge Instructions After Your CT Scan      The IV contrast you received today will be filtered from your bloodstream by your kidneys during the next 24 hours and pass from the body in urine.  You will not be aware of this process and your urine will not change in color.  To help this process you should drink at least 4 additional glasses of water or juice today.  This reduces stress on your kidneys.    Most contrast reactions are immediate.  Should you develop symptoms of concern after discharge, contact the department at the number below.  After hours you should contact your personal physician.  If you develop breathing distress or wheezing, call 911.      1.  Has the patient had a previous reaction to IV contrast? n    2.  Does the patient have kidney disease? n    3.  Is the patient on dialysis? n    If YES to any of these questions, exam will be reviewed with a Radiologist before administering contrast.      1.  Is patient currently taking metformin? y     If NO: Technologist will give the patient normal post CT instructions.     If YES: Technologist will obtain GFR from Lab.    2.  Is GFR is greater than 60? n     If YES: Technologist will give the patient normal post CT instructions.     If NO: Technologist will give the patient METFORMIN post CT instructions.

## 2019-09-18 DIAGNOSIS — I77.1 ARTERY STENOSIS (H): ICD-10-CM

## 2019-09-18 DIAGNOSIS — I10 ESSENTIAL HYPERTENSION: ICD-10-CM

## 2019-09-18 DIAGNOSIS — R60.0 BILATERAL LOWER EXTREMITY EDEMA: ICD-10-CM

## 2019-09-18 DIAGNOSIS — Z95.828 H/O AORTO-FEMORAL BYPASS: Primary | ICD-10-CM

## 2019-09-18 DIAGNOSIS — I73.9 PAD (PERIPHERAL ARTERY DISEASE) (H): ICD-10-CM

## 2019-09-18 RX ORDER — FUROSEMIDE 20 MG
20 TABLET ORAL DAILY
Qty: 90 TABLET | Refills: 1 | OUTPATIENT
Start: 2019-09-18

## 2019-09-18 NOTE — TELEPHONE ENCOUNTER
"Call to pt to advise of results per [facility]:    \"1) Please notify patient of results from CT. Her previous aortofemoral bypass graft is open. She has moderate to severe narrowing at junction of superficial femoral and popliteal artery. She also has severe narrowing of the mid popliteal artery. She needs to be scheduled to see vascular surgery.     2) I have talked to her PCP in regards to EGD with drop in HGB. She should currently hold her Xarelto and continue on baby ASA.     3) Has patient been taking her potassium ordered. If she has, I would like her to increase potassium to 40 mew BID.   Thanks,   Martina Roberto, APRN CNP\"    1) Pt is agreeable to vascular surgery referral at  in [place]. Referral pended for [facility].      2) Pt verbalizes understanding regarding Xarelto. States she was told by surgeon to hold ASA prior to procedure as well. [facility] notified and agreeable. Pt to restart ASA after EGD.     3) Pt has only been taking Potassium 20 meq in the morning and 20 meq in the evening instead of as Rx is written \"Take 2 tabs (40 mg) in the morning with a meal, and take 1 tab (20 mg) in the evening with a meal every day.\"  Pt will start taking as written.      4) Pt further states she was told at one point to only take Furosemide every other day. States LE edema was too great with this and went back to taking every day. Pt is now out of refills and is requesting every day dosing. [facility] agreeable to everyday dosing. Pt to have BMP in 1 week. Pt agreeable to plan. BMP and Furosemide 20 mg once daily ordered per [facility].    Routing to [facility] for review and sign. Cristina Boyd RN on 9/18/2019 at 4:30 PM    "

## 2019-09-19 PROBLEM — R10.13 EPIGASTRIC PAIN: Status: ACTIVE | Noted: 2019-09-19

## 2019-09-19 RX ORDER — FUROSEMIDE 20 MG
20 TABLET ORAL DAILY
Qty: 90 TABLET | Refills: 1 | Status: SHIPPED | OUTPATIENT
Start: 2019-09-19 | End: 2020-01-01

## 2019-09-23 ENCOUNTER — ANESTHESIA EVENT (OUTPATIENT)
Dept: SURGERY | Facility: OTHER | Age: 69
End: 2019-09-23
Payer: COMMERCIAL

## 2019-09-23 ENCOUNTER — ANESTHESIA (OUTPATIENT)
Dept: SURGERY | Facility: OTHER | Age: 69
End: 2019-09-23
Payer: COMMERCIAL

## 2019-09-23 ENCOUNTER — HOSPITAL ENCOUNTER (OUTPATIENT)
Facility: OTHER | Age: 69
Discharge: HOME | End: 2019-09-23
Attending: SURGERY | Admitting: SURGERY
Payer: COMMERCIAL

## 2019-09-23 ENCOUNTER — TRANSFERRED RECORDS (OUTPATIENT)
Dept: MULTI SPECIALTY CLINIC | Facility: CLINIC | Age: 69
End: 2019-09-23

## 2019-09-23 VITALS
DIASTOLIC BLOOD PRESSURE: 64 MMHG | RESPIRATION RATE: 16 BRPM | OXYGEN SATURATION: 98 % | TEMPERATURE: 96.8 F | SYSTOLIC BLOOD PRESSURE: 130 MMHG | HEART RATE: 56 BPM

## 2019-09-23 DIAGNOSIS — R60.0 BILATERAL LOWER EXTREMITY EDEMA: ICD-10-CM

## 2019-09-23 DIAGNOSIS — K25.3 ACUTE ULCER OF PYLORIC ANTRUM: Primary | ICD-10-CM

## 2019-09-23 PROBLEM — D64.9 ANEMIA: Status: ACTIVE | Noted: 2019-09-19

## 2019-09-23 PROBLEM — K63.5 COLON POLYPS: Status: ACTIVE | Noted: 2019-09-23

## 2019-09-23 LAB
ANION GAP SERPL CALCULATED.3IONS-SCNC: 9 MMOL/L (ref 3–14)
BUN SERPL-MCNC: 16 MG/DL (ref 7–25)
CALCIUM SERPL-MCNC: 9.6 MG/DL (ref 8.6–10.3)
CHLORIDE SERPL-SCNC: 104 MMOL/L (ref 98–107)
CO2 SERPL-SCNC: 25 MMOL/L (ref 21–31)
CREAT SERPL-MCNC: 1.03 MG/DL (ref 0.6–1.2)
GFR SERPL CREATININE-BSD FRML MDRD: 53 ML/MIN/{1.73_M2}
GLUCOSE SERPL-MCNC: 91 MG/DL (ref 70–105)
POTASSIUM SERPL-SCNC: 4.3 MMOL/L (ref 3.5–5.1)
SODIUM SERPL-SCNC: 138 MMOL/L (ref 134–144)

## 2019-09-23 PROCEDURE — 43239 EGD BIOPSY SINGLE/MULTIPLE: CPT | Performed by: SURGERY

## 2019-09-23 PROCEDURE — 43239 EGD BIOPSY SINGLE/MULTIPLE: CPT | Mod: 51 | Performed by: SURGERY

## 2019-09-23 PROCEDURE — 43239 EGD BIOPSY SINGLE/MULTIPLE: CPT | Performed by: NURSE ANESTHETIST, CERTIFIED REGISTERED

## 2019-09-23 PROCEDURE — 45380 COLONOSCOPY AND BIOPSY: CPT | Performed by: SURGERY

## 2019-09-23 PROCEDURE — 45384 COLONOSCOPY W/LESION REMOVAL: CPT | Mod: XU | Performed by: SURGERY

## 2019-09-23 PROCEDURE — 40000010 ZZH STATISTIC ANES STAT CODE-CRNA PER MINUTE: Performed by: SURGERY

## 2019-09-23 PROCEDURE — 25000125 ZZHC RX 250: Performed by: SURGERY

## 2019-09-23 PROCEDURE — 80048 BASIC METABOLIC PNL TOTAL CA: CPT | Mod: ZL | Performed by: NURSE PRACTITIONER

## 2019-09-23 PROCEDURE — 25000128 H RX IP 250 OP 636: Performed by: NURSE ANESTHETIST, CERTIFIED REGISTERED

## 2019-09-23 PROCEDURE — 88305 TISSUE EXAM BY PATHOLOGIST: CPT

## 2019-09-23 PROCEDURE — 45385 COLONOSCOPY W/LESION REMOVAL: CPT

## 2019-09-23 PROCEDURE — 36415 COLL VENOUS BLD VENIPUNCTURE: CPT | Mod: ZL | Performed by: NURSE PRACTITIONER

## 2019-09-23 PROCEDURE — 45385 COLONOSCOPY W/LESION REMOVAL: CPT | Performed by: SURGERY

## 2019-09-23 PROCEDURE — 25000125 ZZHC RX 250: Performed by: NURSE ANESTHETIST, CERTIFIED REGISTERED

## 2019-09-23 PROCEDURE — 25800030 ZZH RX IP 258 OP 636: Performed by: SURGERY

## 2019-09-23 RX ORDER — FLUMAZENIL 0.1 MG/ML
0.2 INJECTION, SOLUTION INTRAVENOUS
Status: DISCONTINUED | OUTPATIENT
Start: 2019-09-23 | End: 2019-09-23 | Stop reason: HOSPADM

## 2019-09-23 RX ORDER — PROPOFOL 10 MG/ML
INJECTION, EMULSION INTRAVENOUS CONTINUOUS PRN
Status: DISCONTINUED | OUTPATIENT
Start: 2019-09-23 | End: 2019-09-23

## 2019-09-23 RX ORDER — SODIUM CHLORIDE, SODIUM LACTATE, POTASSIUM CHLORIDE, CALCIUM CHLORIDE 600; 310; 30; 20 MG/100ML; MG/100ML; MG/100ML; MG/100ML
INJECTION, SOLUTION INTRAVENOUS CONTINUOUS
Status: DISCONTINUED | OUTPATIENT
Start: 2019-09-23 | End: 2019-09-23 | Stop reason: HOSPADM

## 2019-09-23 RX ORDER — LIDOCAINE 40 MG/G
CREAM TOPICAL
Status: DISCONTINUED | OUTPATIENT
Start: 2019-09-23 | End: 2019-09-23 | Stop reason: HOSPADM

## 2019-09-23 RX ORDER — LIDOCAINE HYDROCHLORIDE 20 MG/ML
INJECTION, SOLUTION INFILTRATION; PERINEURAL PRN
Status: DISCONTINUED | OUTPATIENT
Start: 2019-09-23 | End: 2019-09-23

## 2019-09-23 RX ORDER — OMEPRAZOLE 40 MG/1
40 CAPSULE, DELAYED RELEASE ORAL DAILY
Qty: 30 CAPSULE | Refills: 1 | Status: SHIPPED | OUTPATIENT
Start: 2019-09-23 | End: 2019-10-14

## 2019-09-23 RX ORDER — NALOXONE HYDROCHLORIDE 0.4 MG/ML
.1-.4 INJECTION, SOLUTION INTRAMUSCULAR; INTRAVENOUS; SUBCUTANEOUS
Status: DISCONTINUED | OUTPATIENT
Start: 2019-09-23 | End: 2019-09-23 | Stop reason: HOSPADM

## 2019-09-23 RX ORDER — ONDANSETRON 2 MG/ML
4 INJECTION INTRAMUSCULAR; INTRAVENOUS
Status: DISCONTINUED | OUTPATIENT
Start: 2019-09-23 | End: 2019-09-23 | Stop reason: HOSPADM

## 2019-09-23 RX ORDER — PROPOFOL 10 MG/ML
INJECTION, EMULSION INTRAVENOUS PRN
Status: DISCONTINUED | OUTPATIENT
Start: 2019-09-23 | End: 2019-09-23

## 2019-09-23 RX ADMIN — SODIUM CHLORIDE, POTASSIUM CHLORIDE, SODIUM LACTATE AND CALCIUM CHLORIDE: 600; 310; 30; 20 INJECTION, SOLUTION INTRAVENOUS at 09:50

## 2019-09-23 RX ADMIN — PROPOFOL 140 MCG/KG/MIN: 10 INJECTION, EMULSION INTRAVENOUS at 09:58

## 2019-09-23 RX ADMIN — LIDOCAINE HYDROCHLORIDE 60 MG: 20 INJECTION, SOLUTION INFILTRATION; PERINEURAL at 09:58

## 2019-09-23 RX ADMIN — PROPOFOL 60 MG: 10 INJECTION, EMULSION INTRAVENOUS at 09:58

## 2019-09-23 ASSESSMENT — ENCOUNTER SYMPTOMS: DYSRHYTHMIAS: 1

## 2019-09-23 NOTE — ANESTHESIA POSTPROCEDURE EVALUATION
Patient: Nonamarie Spawn    Procedure(s):  COLONOSCOPY, WITH POLYPECTOMY AND BIOPSY  ESOPHAGOGASTRODUODENOSCOPY, WITH BIOPSY    Diagnosis:positive cologuard  Diagnosis Additional Information: No value filed.    Anesthesia Type:  MAC    Note:  Anesthesia Post Evaluation    Patient location during evaluation: Phase 2  Patient participation: Able to fully participate in evaluation  Level of consciousness: awake and alert  Pain management: adequate  Airway patency: patent  Cardiovascular status: acceptable  Respiratory status: acceptable  Hydration status: euvolemic  PONV: none             Last vitals:  Vitals:    09/23/19 0928 09/23/19 1042 09/23/19 1045   BP: 139/79 101/42 101/42   Pulse:  55 55   Resp: 18 16    Temp: 97.9  F (36.6  C) 96.7  F (35.9  C)    SpO2: 99% 93% 93%         Electronically Signed By: David Kellerman, APRN CRNA  September 23, 2019  11:36 AM

## 2019-09-23 NOTE — ANESTHESIA CARE TRANSFER NOTE
Patient: Nonamarie Spawn    Procedure(s):  COLONOSCOPY, WITH POLYPECTOMY AND BIOPSY  ESOPHAGOGASTRODUODENOSCOPY, WITH BIOPSY    Diagnosis: positive cologuard  Diagnosis Additional Information: No value filed.    Anesthesia Type:   MAC     Note:  Airway :Room Air  Patient transferred to:Phase II  Handoff Report: Identifed the Patient, Identified the Reponsible Provider, Reviewed the pertinent medical history, Discussed the surgical course, Reviewed Intra-OP anesthesia mangement and issues during anesthesia, Set expectations for post-procedure period and Allowed opportunity for questions and acknowledgement of understanding      Vitals: (Last set prior to Anesthesia Care Transfer)    CRNA VITALS  9/23/2019 1009 - 9/23/2019 1039      9/23/2019             Resp Rate (set):  10                Electronically Signed By: VOLODYMYR Ocampo CRNA  September 23, 2019  10:39 AM

## 2019-09-23 NOTE — OP NOTE
PROCEDURE NOTE    DATE OF SERVICE: 9/23/2019    SURGEON: Abdullahi Reis MD    PRE-OP DIAGNOSIS:    Iron Deficiency anemia  Positive FIT      POST-OP DIAGNOSIS:  Same  Erosive gastritis with antral ulcer  Polyps at AC, proximal and distal TC    PROCEDURE:   EGD/Colonoscopy with snare polypectomy, hot biopsy        ANESTHESIA:  MOHAN Mendoza CRNA    INDICATION FOR THE PROCEDURE: The patient is a 68 year old female with anemia and positive cologuard . The patient has no other complaints  . After explaining the risks to include bleeding, perforation, potential inability toreach the cecum, the patient wished to proceed.    PROCEDURE:After adequate sedation, the patient was in the left lateral decubitus position.      The esophagoscope was passed under direct vision into the esophagus and onto the second portion of the duodenum.  The duodenum was unremarkable and biopsied.  The antrum was with an ulcer which was photographed. There were also numerous erosions.  Biopsies were taken from the antrum to look for occult H. Pylori.  The scope was retroflexed.  The GE junction and fundus were unremarkaable .  Scope was straightened and pulled back.  The distal esophagus was with minimally irregular z-line .  Biopsies were taken from the distal esophagus.  The remaining esophagus was unremarkable . The scope was removed.    Rectal exam was performed.  There was normal tone and no palpable masses .  The colonoscope was introduced into the rectum and advanced to the cecum with Moderate difficulty.  The patient's prep was excellent.  The terminal cecum was reached.  The cecum, ascending, transverse, descending and sigmoid colon was with small under 1 cm polyps at AC, proximal TC and distal TC that were completely removed by snare or hot biopsy . There were ecchymotic areas at SF, sigmoid and upper rectum . The scope was retroflexed in the rectum.  The rectum was  remarkable for granularity .  The scope was straightened and removed.   The patient tolerated the procedure well.     ESTIMATED BLOOD LOSS: none    COMPLICATIONS:  None    TISSUE REMOVED:  Yes    RECOMMEND:    No NSAID's  PPI  Follow-up pending pathology      Abdullahi Reis MD FACS

## 2019-09-23 NOTE — H&P
History and Physical    CHIEF COMPLAINT / REASON FOR PROCEDURE:  Anemia and positive Cologuard    PERTINENT HISTORY   Patient is a 68 year old female who presents today for colonoscopy and upper endoscopy for anemia and positive Cologuard.   Last colonoscopy 2004.  Patient has no complaints.    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   Start Fosamax 08/07, stopped fosamax.     Peripheral vascular disease (H) 1/17/2018     Tobacco abuse 1/17/2018     Type 2 diabetes mellitus without complication, without long-term current use of insulin (H) 3/25/2019     Zoster without complications 2008    left shoulder and neck     Zoster without complications     2009/2008,Herpes zoster, left shoulder and neck     Past Surgical History:   Procedure Laterality Date     COLONOSCOPY  2004    2 hyperplastic polyps, repeat in 2014     HEMORRHOID SURGERY       HYSTERECTOMY TOTAL ABDOMINAL, BILATERAL SALPINGO-OOPHORECTOMY, COMBINED  1987    total hyster - BSO     OTHER SURGICAL HISTORY Right 2004    23064.0,[place] BYPASS GRAFT AORTOBIFEMORAL     OTHER SURGICAL HISTORY      2004,LOC-1006.05,PTCA,mid LAD, proximal RCA     OTHER SURGICAL HISTORY      03/08/16,[id number],CYSTOSCOPY W/ URETEROSCOPY W/ LITHOTRIPSY,Right,  [facility]       Bleeding tendencies:  No    ALLERGIES/SENSITIVITIES:   Allergies   Allergen Reactions     Morphine      Tachycardia       Propoxyphene N-Apap Unknown     Tremors       Varenicline Nausea and Vomiting     Codeine Palpitations     Diphenhydramine Palpitations and Other (See Comments)     wheezing     Lisinopril Palpitations and Cough     No Clinical Screening - See Comments Rash     Pt states allergies to white/yellow gold.  Sugical steel.  Copper./ developed infection     Tetracycline Nausea and Vomiting and Rash        CURRENT MEDICATIONS:    Prior to Admission medications    Medication Sig Start Date End Date Taking? Authorizing Provider   albuterol (PROAIR HFA/PROVENTIL HFA/VENTOLIN HFA) 108 (90 Base) MCG/ACT Inhaler Inhale 1-2 puffs into the lungs every 6 hours as needed for shortness of breath / dyspnea or wheezing 6/7/18   Fely Gregorio APRN CNP   amLODIPine (NORVASC) 10 MG tablet Take 1 tablet (10 mg) by mouth daily 7/8/19   Ana Perdomo DO   aspirin (ASA) 81 MG chewable tablet Take 1 tablet (81 mg) by mouth daily 7/24/19   Martina Roberto APRN CNP   atorvastatin (LIPITOR) 40 MG tablet Take 1 tablet (40 mg) by mouth At Bedtime 7/24/19   Martina Roberto APRN CNP   bisacodyl (DULCOLAX) 5 MG EC tablet Use as directed per colonoscopy prep. 8/12/19   Abdullahi Reis MD   busPIRone (BUSPAR) 10 MG tablet TAKE 1 TABLET BY MOUTH 2 TIMES DAILY AS NEEDED FOR ANXIETY 7/8/19   Ana Perdomo DO   Cholecalciferol (VITAMIN D3) 2000 UNITS CAPS Take 4,000 Units by mouth 2 times daily  9/18/12   Reported, Patient   citalopram (CELEXA) 20 MG tablet Take 1 tablet (20 mg) by mouth daily 7/8/19   Ana Perdomo DO   fexofenadine (ALLEGRA) 180 MG tablet Take 180 mg by mouth 2 times daily  9/18/12   Reported, Patient   fluticasone-salmeterol (ADVAIR) 250-50 MCG/DOSE inhaler Inhale 1 puff into the lungs daily as needed    Unknown, Entered By History   furosemide (LASIX) 20 MG tablet Take 1  tablet (20 mg) by mouth daily 9/19/19   Martina Roberto APRN CNP   hydrochlorothiazide (HYDRODIURIL) 25 MG tablet Take 1 tablet (25 mg) by mouth daily 7/8/19   Ana Perdomo DO   ipratropium - albuterol 0.5 mg/2.5 mg/3 mL (DUONEB) 0.5-2.5 (3) MG/3ML neb solution Take 1 vial by nebulization every morning . May also use 1 neb every 6 hours as needed for shortness of breath.    Unknown, Entered By History   magnesium oxide (MAG-OX) 400 MG tablet Take 1 tablet (400 mg) by mouth daily 7/8/19   Ana Perdomo DO   metFORMIN (GLUCOPHAGE-XR) 500 MG 24 hr tablet Take 1 tablet (500 mg) by mouth daily (with dinner) To replace short acting metformin  Patient taking differently: Take 1,500 mg by mouth 2 times daily (with meals) To replace short acting metformin 7/8/19   Ana Perdomo DO   metoprolol succinate ER (TOPROL-XL) 100 MG 24 hr tablet Take 1.5 tablets (150 mg) by mouth daily 7/24/19   Martina Roberto APRN CNP   naproxen (NAPROSYN) 500 MG tablet Take 1 tablet (500 mg) by mouth 2 times daily (with meals) 8/9/19   Glynn Botello MD   order for [facility] [employer] NEBULIZER MACHINE ITEM #[id number] [facility]  2/20/19   Reported, Patient   polyethylene glycol-electrolytes (NULYTELY) 420 g solution Take 4,000 mLs by mouth once for 1 dose 8/12/19 8/12/19  Abdullahi Reis MD   potassium chloride ER (K-DUR/KLOR-CON M) 20 MEQ CR tablet Take 2 tabs (40 mg) in the morning with a meal, and take 1 tab (20 mg) in the evening with a meal every day. 7/24/19   Martina Roberto APRN CNP   Respiratory Therapy Supplies (NEBULIZER/TUBING/MOUTHPIECE) KIT Dx: COPD with exacerbation. Sig: Use as directed. 2/20/19   Lizet Garcia PA-C   rivaroxaban ANTICOAGULANT (XARELTO) 20 MG TABS tablet Take 1 tablet (20 mg) by mouth daily 4/8/19   Martina Roberto, VOLODYMYR CNP   Sod Picosulfate-Mag Ox-Cit Acd (PREPOPIK) 10-3.5-12 MG-GM-GM PACK Take 1 packet by mouth 2 times daily for 1 day Twice the day before colonoscopy as  instructed. 8/9/19 8/10/19  Abdullahi Reis MD     Patient Active Problem List   Diagnosis     Centrilobular emphysema (H)     Atherosclerotic coronary vascular disease     Hemangioma     Hyperlipidemia     Hypertension     Tobacco abuse     Chronic non-seasonal allergic rhinitis     Meningioma (H)     Tobacco dependence     Atrial fibrillation (H)     Tachy-merline syndrome (H)     Type 2 diabetes mellitus without complication, without long-term current use of insulin (H)     H/O aorto-femoral bypass (2004)     Positive FIT (fecal immunochemical test)     Epigastric pain         Physical Exam:  There were no vitals taken for this visit.  EXAM:  Chest/Respiratory Exam: Normal - Clear to auscultation without rales, rhonchi, or wheezing.  Cardiovascular Exam: irregularly irregular      PLAN: COLONOSCOPY and upper endoscopy .  Patient understands risks of bleeding, perforation, potential inability to reach cecum, aspiration and wishes to proceed. MAC needed for ASA III.

## 2019-09-23 NOTE — DISCHARGE INSTRUCTIONS
[facility]  Adult Discharge Orders & Instructions    ________________________________________________________________          For 12 hours after surgery  1. Get plenty of rest.  A responsible adult must stay with you for at least 12 hours after you leave the hospital.   2. You may feel lightheaded.  IF so, sit for a few minutes before standing.  Have someone help you get up.   3. You may have a slight fever. Call the doctor if your fever is over 101 F (38.3 C) (taken under the tongue) or lasts longer than 24 hours.  4. You may have a dry mouth, a sore throat, muscle aches or trouble sleeping.  These should go away after 24 hours.  5. Do not make important or legal decisions.  6.   Do not drive or use heavy equipment.  If you have weakness or tingling, don't drive or use heavy equipment until this feeling goes away.    To contact a doctor, call   [phone]_______________________

## 2019-09-23 NOTE — ANESTHESIA PREPROCEDURE EVALUATION
Anesthesia Pre-Procedure Evaluation    Patient: Ovidio Luna   MRN: [id number] : 1950          Preoperative Diagnosis: positive cologuard    Procedure(s):  COLONOSCOPY  ESOPHAGOGASTRODUODENOSCOPY (EGD)    Past Medical History:   Diagnosis Date     Allergy status to other antibiotic agents status      Atherosclerosis     History of ASO with claudication     Atherosclerotic heart disease of native coronary artery without angina pectoris     Stent times 2     Atrial fibrillation (H) 3/10/2019     Centrilobular emphysema (H) 2016     Cerebral infarction (H) 2012    CVA with residual left sided weakness, incidental meningioma found     Closed fracture of shaft of left tibia 2001    History of bilateral tibial fx, work related injury     H/O aorto-femoral bypass () 4/3/2019     Hyperlipidemia 2018     Hypertension 2018     Meningioma (H) 2012     Migraine     History of  migraines     Osteopenia 2006    Osteopenia, proximal femur.   Start Fosamax , stopped fosamax.     Peripheral vascular disease (H) 2018     Tobacco abuse 2018     Type 2 diabetes mellitus without complication, without long-term current use of insulin (H) 3/25/2019     Zoster without complications     left shoulder and neck     Zoster without complications     ,Herpes zoster, left shoulder and neck     Past Surgical History:   Procedure Laterality Date     COLONOSCOPY      2 hyperplastic polyps, repeat in      HEMORRHOID SURGERY       HYSTERECTOMY TOTAL ABDOMINAL, BILATERAL SALPINGO-OOPHORECTOMY, COMBINED      total hyster - BSO     OTHER SURGICAL HISTORY Right     13889.0,[place] BYPASS GRAFT AORTOBIFEMORAL     OTHER SURGICAL HISTORY      ,LOC-1006.05,PTCA,mid LAD, proximal RCA     OTHER SURGICAL HISTORY      16,[id number],CYSTOSCOPY W/ URETEROSCOPY W/ LITHOTRIPSY,Right,Dr. Mario HOGUE       Anesthesia Evaluation     . Pt has had prior anesthetic.  Type: General and MAC    History of anesthetic complications    difficulty waking up for one surgery      ROS/MED HX    ENT/Pulmonary:       Neurologic:       Cardiovascular: Comment: Mainly has to stop due to pain in her left leg    (+) ----. : . . BRENNER, . :. dysrhythmias a-fib, .       METS/Exercise Tolerance:  3 - Able to walk 1-2 blocks without stopping   Hematologic:  - neg hematologic  ROS       Musculoskeletal:  - neg musculoskeletal ROS       GI/Hepatic:  - neg GI/hepatic ROS       Renal/Genitourinary:  - ROS Renal section negative       Endo:  - neg endo ROS       Psychiatric:         Infectious Disease:  - neg infectious disease ROS       Malignancy:      - no malignancy   Other:    - neg other ROS                      Physical Exam  Normal systems: pulmonary and dental    Airway   Mallampati: II  TM distance: >3 FB  Neck ROM: full    Dental   (+) missing and partials  Comment: upper    Cardiovascular   Rhythm and rate: irregular and normal      Pulmonary    breath sounds clear to auscultation            Lab Results   Component Value Date    WBC 10.9 09/16/2019    HGB 9.0 (L) 09/16/2019    HCT 30.9 (L) 09/16/2019     09/16/2019     09/16/2019    POTASSIUM 3.2 (L) 09/16/2019    CHLORIDE 98 09/16/2019    CO2 29 09/16/2019    BUN 21 09/16/2019    CR 1.04 09/16/2019     09/16/2019    DADA 9.5 09/16/2019    MAG 1.8 (L) 09/16/2019    ALBUMIN 4.1 08/08/2019    PROTTOTAL 7.9 08/08/2019    ALT 10 08/08/2019    AST 13 08/08/2019    ALKPHOS 65 08/08/2019    BILITOTAL 0.4 08/08/2019    PTT 25 (L) 03/10/2019    INR 0.89 03/10/2019       Preop Vitals  BP Readings from Last 3 Encounters:   07/24/19 120/62   07/17/19 122/66   07/08/19 120/62    Pulse Readings from Last 3 Encounters:   08/08/19 68   07/24/19 56   07/17/19 (!) 46      Resp Readings from Last 3 Encounters:   08/08/19 16   07/24/19 16   07/17/19 16    SpO2 Readings from Last 3 Encounters:   08/08/19 99%   07/17/19 97%   07/08/19 96%     "  Temp Readings from Last 1 Encounters:   08/08/19 99.6  F (37.6  C) (Tympanic)    Ht Readings from Last 1 Encounters:   08/08/19 1.575 m (5' 2\")      Wt Readings from Last 1 Encounters:   08/08/19 66.7 kg (147 lb)    Estimated body mass index is 26.89 kg/m  as calculated from the following:    Height as of 8/8/19: 1.575 m (5' 2\").    Weight as of 8/8/19: 66.7 kg (147 lb).       Anesthesia Plan      History & Physical Review      ASA Status:  3 .    NPO Status:  > 8 hours    Plan for MAC with Propofol and Intravenous induction. Maintenance will be Balanced.  Reason for MAC:  Chronic cardiopulmonary disease (G9)         Postoperative Care      Consents  Anesthetic plan, risks, benefits and alternatives discussed with:  Patient..                 David Kellerman, APRN CRNA  "

## 2019-09-24 DIAGNOSIS — I10 ESSENTIAL HYPERTENSION: ICD-10-CM

## 2019-09-25 RX ORDER — AMLODIPINE BESYLATE 10 MG/1
TABLET ORAL
Qty: 90 TABLET | Refills: 3 | Status: SHIPPED | OUTPATIENT
Start: 2019-09-25 | End: 2020-01-01

## 2019-09-25 NOTE — TELEPHONE ENCOUNTER
"Requested Prescriptions   Pending Prescriptions Disp Refills     amLODIPine (NORVASC) 10 MG tablet [Pharmacy Med Name: AMLODIPINE BESYLATE 10MG TABLETS] 90 tablet 0     Sig: TAKE 1 TABLET(10 MG) BY MOUTH DAILY       Calcium Channel Blockers Protocol  Passed - 9/24/2019  3:57 AM        Passed - Blood pressure under 140/90 in past 12 months     BP Readings from Last 3 Encounters:   09/23/19 130/64   07/24/19 120/62   07/17/19 122/66                 Passed - Recent (12 mo) or future (30 days) visit within the authorizing provider's specialty     Patient had office visit in the last 12 months or has a visit in the next 30 days with authorizing provider or within the authorizing provider's specialty.  See \"Patient Info\" tab in inbasket, or \"Choose Columns\" in Meds & Orders section of the refill encounter.              Passed - Medication is active on med list        Passed - Patient is age 18 or older        Passed - No active pregnancy on record        Passed - Normal serum creatinine on file in past 12 months     Recent Labs   Lab Test 09/23/19  1134   CR 1.03             Passed - No positive pregnancy test in past 12 months      LOV 8/8/2019 with no changes in this medication.  Prescription refilled per RN Medication RefillPolicy.................... Homa Carson RN ....................  9/25/2019   9:09 AM        "

## 2019-09-26 PROBLEM — Z86.0101 H/O ADENOMATOUS POLYP OF COLON: Status: ACTIVE | Noted: 2019-09-23

## 2019-09-26 PROBLEM — R19.5 POSITIVE FIT (FECAL IMMUNOCHEMICAL TEST): Status: RESOLVED | Noted: 2019-08-09 | Resolved: 2019-09-26

## 2019-10-08 DIAGNOSIS — I48.92 ATRIAL FLUTTER, UNSPECIFIED TYPE (H): ICD-10-CM

## 2019-10-08 DIAGNOSIS — I48.0 PAROXYSMAL ATRIAL FIBRILLATION (H): ICD-10-CM

## 2019-10-09 NOTE — TELEPHONE ENCOUNTER
Not on protocol, please advise.    rivaroxaban ANTICOAGULANT (XARELTO) 20 MG TABS tablet      Last Written Prescription Date:  4/8/19  Last Fill Quantity: 90,   # refills: 1  Last Office Visit: 7/24/19  Future Office visit:    Next 5 appointments (look out 90 days)    Oct 14, 2019  9:40 AM CDT  Office Visit with Ana Perdomo DO  [facility] [address]  [place] [id number] [phone]           Routing refill request to provider for review/approval because:  Drug not on the [facility], [facility] or  Health refill protocol or controlled substance

## 2019-10-10 RX ORDER — RIVAROXABAN 20 MG/1
TABLET, FILM COATED ORAL
Qty: 90 TABLET | Refills: 0 | Status: SHIPPED | OUTPATIENT
Start: 2019-10-10 | End: 2019-10-14

## 2019-10-14 ENCOUNTER — OFFICE VISIT (OUTPATIENT)
Dept: INTERNAL MEDICINE | Facility: OTHER | Age: 69
End: 2019-10-14
Attending: INTERNAL MEDICINE
Payer: COMMERCIAL

## 2019-10-14 VITALS
BODY MASS INDEX: 25.4 KG/M2 | OXYGEN SATURATION: 98 % | HEIGHT: 62 IN | SYSTOLIC BLOOD PRESSURE: 132 MMHG | RESPIRATION RATE: 16 BRPM | WEIGHT: 138 LBS | HEART RATE: 66 BPM | DIASTOLIC BLOOD PRESSURE: 68 MMHG | TEMPERATURE: 96.8 F

## 2019-10-14 DIAGNOSIS — J43.2 CENTRILOBULAR EMPHYSEMA (H): ICD-10-CM

## 2019-10-14 DIAGNOSIS — K25.3 ACUTE ULCER OF PYLORIC ANTRUM: ICD-10-CM

## 2019-10-14 DIAGNOSIS — E11.9 TYPE 2 DIABETES MELLITUS WITHOUT COMPLICATION, WITHOUT LONG-TERM CURRENT USE OF INSULIN (H): Primary | ICD-10-CM

## 2019-10-14 DIAGNOSIS — N17.9 ACUTE KIDNEY INJURY (H): ICD-10-CM

## 2019-10-14 DIAGNOSIS — R42 POSTURAL DIZZINESS WITH PRESYNCOPE: ICD-10-CM

## 2019-10-14 DIAGNOSIS — R55 POSTURAL DIZZINESS WITH PRESYNCOPE: ICD-10-CM

## 2019-10-14 DIAGNOSIS — Z23 NEED FOR IMMUNIZATION AGAINST INFLUENZA: ICD-10-CM

## 2019-10-14 DIAGNOSIS — E87.6 HYPOKALEMIA: ICD-10-CM

## 2019-10-14 DIAGNOSIS — I48.0 PAF (PAROXYSMAL ATRIAL FIBRILLATION) (H): ICD-10-CM

## 2019-10-14 LAB
ANION GAP SERPL CALCULATED.3IONS-SCNC: 10 MMOL/L (ref 3–14)
BUN SERPL-MCNC: 23 MG/DL (ref 7–25)
CALCIUM SERPL-MCNC: 10 MG/DL (ref 8.6–10.3)
CHLORIDE SERPL-SCNC: 94 MMOL/L (ref 98–107)
CO2 SERPL-SCNC: 35 MMOL/L (ref 21–31)
CREAT SERPL-MCNC: 1.31 MG/DL (ref 0.6–1.2)
ERYTHROCYTE [DISTWIDTH] IN BLOOD BY AUTOMATED COUNT: 19.1 % (ref 10–15)
GFR SERPL CREATININE-BSD FRML MDRD: 40 ML/MIN/{1.73_M2}
GLUCOSE SERPL-MCNC: 170 MG/DL (ref 70–105)
HBA1C MFR BLD: 7 % (ref 4–6)
HCT VFR BLD AUTO: 34.3 % (ref 35–47)
HGB BLD-MCNC: 9.7 G/DL (ref 11.7–15.7)
MCH RBC QN AUTO: 19.7 PG (ref 26.5–33)
MCHC RBC AUTO-ENTMCNC: 28.3 G/DL (ref 31.5–36.5)
MCV RBC AUTO: 70 FL (ref 78–100)
PLATELET # BLD AUTO: 547 10E9/L (ref 150–450)
POTASSIUM SERPL-SCNC: 2.8 MMOL/L (ref 3.5–5.1)
RBC # BLD AUTO: 4.93 10E12/L (ref 3.8–5.2)
SODIUM SERPL-SCNC: 139 MMOL/L (ref 134–144)
WBC # BLD AUTO: 11.5 10E9/L (ref 4–11)

## 2019-10-14 PROCEDURE — 80048 BASIC METABOLIC PNL TOTAL CA: CPT | Mod: ZL | Performed by: INTERNAL MEDICINE

## 2019-10-14 PROCEDURE — 85027 COMPLETE CBC AUTOMATED: CPT | Mod: ZL | Performed by: INTERNAL MEDICINE

## 2019-10-14 PROCEDURE — 83036 HEMOGLOBIN GLYCOSYLATED A1C: CPT | Mod: ZL | Performed by: INTERNAL MEDICINE

## 2019-10-14 PROCEDURE — 36415 COLL VENOUS BLD VENIPUNCTURE: CPT | Mod: ZL | Performed by: INTERNAL MEDICINE

## 2019-10-14 PROCEDURE — 90471 IMMUNIZATION ADMIN: CPT | Performed by: INTERNAL MEDICINE

## 2019-10-14 PROCEDURE — 99214 OFFICE O/P EST MOD 30 MIN: CPT | Mod: 25 | Performed by: INTERNAL MEDICINE

## 2019-10-14 PROCEDURE — 90662 IIV NO PRSV INCREASED AG IM: CPT | Performed by: INTERNAL MEDICINE

## 2019-10-14 RX ORDER — IPRATROPIUM BROMIDE AND ALBUTEROL SULFATE 2.5; .5 MG/3ML; MG/3ML
1 SOLUTION RESPIRATORY (INHALATION) EVERY MORNING
Qty: 1 BOX | Refills: 3 | Status: SHIPPED | OUTPATIENT
Start: 2019-10-14

## 2019-10-14 RX ORDER — METFORMIN HCL 500 MG
500 TABLET, EXTENDED RELEASE 24 HR ORAL
Qty: 90 TABLET | Refills: 1 | COMMUNITY
Start: 2019-10-14 | End: 2020-01-01

## 2019-10-14 RX ORDER — METOPROLOL SUCCINATE 100 MG/1
100 TABLET, EXTENDED RELEASE ORAL DAILY
Qty: 135 TABLET | Refills: 1 | COMMUNITY
Start: 2019-10-14 | End: 2020-01-01

## 2019-10-14 RX ORDER — OMEPRAZOLE 40 MG/1
40 CAPSULE, DELAYED RELEASE ORAL 2 TIMES DAILY
Qty: 180 CAPSULE | Refills: 1 | Status: SHIPPED | OUTPATIENT
Start: 2019-10-14 | End: 2020-01-01

## 2019-10-14 RX ORDER — AMLODIPINE BESYLATE 5 MG/1
TABLET ORAL
COMMUNITY
Start: 2019-03-25 | End: 2019-10-14

## 2019-10-14 RX ORDER — OMEPRAZOLE 40 MG/1
40 CAPSULE, DELAYED RELEASE ORAL 2 TIMES DAILY
Qty: 30 CAPSULE | Refills: 1 | Status: SHIPPED | OUTPATIENT
Start: 2019-10-14 | End: 2019-10-14

## 2019-10-14 RX ORDER — ASPIRIN 81 MG/1
81 TABLET, CHEWABLE ORAL DAILY
COMMUNITY
End: 2019-10-21

## 2019-10-14 ASSESSMENT — PAIN SCALES - GENERAL: PAINLEVEL: NO PAIN (0)

## 2019-10-14 ASSESSMENT — MIFFLIN-ST. JEOR: SCORE: 1109.21

## 2019-10-14 NOTE — NURSING NOTE
Patient presents to the clinic for diabetic follow-up.     Previous A1C is at goal of <8    Lab Results   Component Value Date    A1C 7.0 07/08/2019       Patient has Type 2 Diabetes  Diabetes medications: Oral Medications: Metformin - Dose: 500 MG, Time: breakfast  Patient is on a daily aspirin  Patient is on a Statin.  Blood glucose tests per day Does not test at this time  Urine microalbumin:creatine: 07/18/2019  Foot exam DUE  Eye exam DUE    Blood pressure today of 132/68 is at the goal of <139/89 for diabetics.    Tobacco User:   History   Smoking Status     Current Every Day Smoker     Packs/day: 0.50     Years: 40.00     Types: Cigarettes     Start date: 1/1/1966   Smokeless Tobacco     Never Used     Comment: 0.5 - 1 ppd -        Other concerns today: Heart [place]    Medication Reconciliation: complete     Eveline Parker LPN on 10/14/2019 at 10:04 AM

## 2019-10-14 NOTE — PROGRESS NOTES
Nursing Notes:   Eveline Parker LPN  10/14/2019 10:13 AM  Signed  Patient presents to the clinic for diabetic follow-up.     Previous A1C is at goal of <8    Lab Results   Component Value Date    A1C 7.0 07/08/2019       Patient has Type 2 Diabetes  Diabetes medications: Oral Medications: Metformin - Dose: 500 MG, Time: breakfast  Patient is on a daily aspirin  Patient is on a Statin.  Blood glucose tests per day Does not test at this time  Urine microalbumin:creatine: 07/18/2019  Foot exam DUE  Eye exam DUE    Blood pressure today of 132/68 is at the goal of <139/89 for diabetics.    Tobacco User:   History   Smoking Status     Current Every Day Smoker     Packs/day: 0.50     Years: 40.00     Types: Cigarettes     Start date: 1/1/1966   Smokeless Tobacco     Never Used     Comment: 0.5 - 1 ppd -        Other concerns today: Heart Conerns    Medication Reconciliation: complete     Eveline Parker LPN on 10/14/2019 at 10:04 AM    Chief Complaint   Patient presents with     Diabetes         HPI: Ms. Luna is a 68 year old female who presents today for follow up of diabetes mellitus.    Nursing note reviewed with patient.  Accurracy and completeness verified.      Patients diabetes is controlled.  She currently denies significant lows.  She has not been checking her sugars.  She is on Metformin Xr 500mg daily.  She denies any side effects    She has been having lightheadedness for 3-4 weeks.  She has noted it if she stands up quickly or moves quickly.  No actual syncope.  BP has been 120/70 on average.  No recent low BP.  She does get palpitations when this happens.  She is on Amlodipine 10mg daily, metoprolol 100mg daily (she did not increase to 150mg daily), hydrochlorothiazide 25mg daily and Furosemide 20mg once daily after furosemide was recently added.    She was recently diagnosed with ulceration.  Hemoglobin in mid sept was 9.  She has not had any blood in stool or black stools.  She was started on  omeprazole 40 mg daily.  She has a history of atrial fibrillation and vascular disease.  She was taken off her Xarelto temporarily due to significant blood loss and ulceration.  She was however placed on aspirin in the meantime and has continued to take this.  She does have a follow-up visit with vascular surgery in a couple weeks.    She would like her flu vaccine.  She does need a refill of her DuoNeb's  For her emphysema.    Past medical history reviewed as below:     Past Medical History:   Diagnosis Date     Allergy status to other antibiotic agents status      Atherosclerosis     History of ASO with claudication     Atherosclerotic heart disease of native coronary artery without angina pectoris 2004    Stent times 2     Atrial fibrillation (H) 3/10/2019     Centrilobular emphysema (H) 12/12/2016     Cerebral infarction (H) 05/20/2012    CVA with residual left sided weakness, incidental meningioma found     Closed fracture of shaft of left tibia 12/26/2001    History of bilateral tibial fx, work related injury     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   Start Fosamax 08/07, stopped fosamax.     Peripheral vascular disease (H) 1/17/2018     Tobacco abuse 1/17/2018     Type 2 diabetes mellitus without complication, without long-term current use of insulin (H) 3/25/2019     Zoster without complications 2008    left shoulder and neck     Zoster without complications     2009/2008,Herpes zoster, left shoulder and neck   .      ROS  Pertinent ROS was performed and was negative, including for fever, chills, chest pain, shortness of breath, increased lower extremity edema, changes in bowel or bladder, blood in the stool, difficulty swallowing, sores in the mouth. No other concerns, with exception of HPI above.      EXAM:   /68 (BP  "Location: Right arm, Patient Position: Sitting, Cuff Size: Adult Regular)   Pulse 66   Temp 96.8  F (36  C) (Tympanic)   Resp 16   Ht 1.575 m (5' 2\")   Wt 62.6 kg (138 lb)   SpO2 98%   Breastfeeding? No   BMI 25.24 kg/m      Estimated body mass index is 25.24 kg/m  as calculated from the following:    Height as of this encounter: 1.575 m (5' 2\").    Weight as of this encounter: 62.6 kg (138 lb).      GEN: Vitals reviewed. Healthy appearing. Patient is in no acute distress. Cooperative with exam.  HEENT: Normocephalic atraumatic.  Pupils equally round.  No scleral icterus, no conjunctival erythema. Oropharynx with no erythema or exudates. Dentition adequate.  NECK: Supple; no thyromegaly.  No neck, cervical LAD.   CV: Heart regular in rate and rhythm with no murmur.    LUNGS: Lungs clear to auscultation bilaterally.  Chest rise equal bilaterally.  No accessory muscle use.  ABD:  Nondistended  SKIN: Warm and dry to touch.  No rash on face, arms and legs.  EXT: No clubbing or cyanosis.  No peripheral edema.  PSYCH: Mood is good.  Affect appropriate. Speech fluent. Answers questions appropriately and thought process normal.    LABS: Personally ordered/reviewed  Results for orders placed or performed in visit on 10/14/19   Basic Metabolic Panel   Result Value Ref Range    Sodium 139 134 - 144 mmol/L    Potassium 2.8 (L) 3.5 - 5.1 mmol/L    Chloride 94 (L) 98 - 107 mmol/L    Carbon Dioxide 35 (H) 21 - 31 mmol/L    Anion Gap 10 3 - 14 mmol/L    Glucose 170 (H) 70 - 105 mg/dL    Urea Nitrogen 23 7 - 25 mg/dL    Creatinine 1.31 (H) 0.60 - 1.20 mg/dL    GFR Estimate 40 (L) >60 mL/min/[1.73_m2]    GFR Estimate If Black 49 (L) >60 mL/min/[1.73_m2]    Calcium 10.0 8.6 - 10.3 mg/dL   CBC W PLT No Diff   Result Value Ref Range    WBC 11.5 (H) 4.0 - 11.0 10e9/L    RBC Count 4.93 3.8 - 5.2 10e12/L    Hemoglobin 9.7 (L) 11.7 - 15.7 g/dL    Hematocrit 34.3 (L) 35.0 - 47.0 %    MCV 70 (L) 78 - 100 fl    MCH 19.7 (L) 26.5 - 33.0 " pg    MCHC 28.3 (L) 31.5 - 36.5 g/dL    RDW 19.1 (H) 10.0 - 15.0 %    Platelet Count 547 (H) 150 - 450 10e9/L   Hemoglobin A1c   Result Value Ref Range    Hemoglobin A1C 7.0 (H) 4.0 - 6.0 %           ASSESSMENT AND PLAN:  Type 2 diabetes mellitus without complication, without long-term current use of insulin (H)  - A1c stable overall.  Continue on metformin.  We will need to continue to monitor renal function with medication.  - metFORMIN (GLUCOPHAGE-XR) 500 MG 24 hr tablet  Dispense: 90 tablet; Refill: 1  - Hemoglobin A1c    Need for immunization against influenza  - HC FLU VACCINE, INCREASED ANTIGEN, PRESV FREE    PAF (paroxysmal atrial fibrillation) (H)  -Continue on metoprolol at this time.  She is currently off anticoagulation however we will need to discuss this in the future.  - metoprolol succinate ER (TOPROL-XL) 100 MG 24 hr tablet  Dispense: 135 tablet; Refill: 1    Postural dizziness with presyncope  - I suspect with worsening renal function and hypokalemia that this is secondary to volume depletion in the setting of 2 diuretics hemoglobin has remained stable from September..  She was instructed to discontinue her hydrochlorothiazide.  She will see me back in 1 week with repeat BMP and CBC at that time.  - Basic Metabolic Panel  - CBC W PLT No Diff    Acute ulcer of pyloric antrum  - At this time given her significant ulcerative disease along with need for anticoagulation we will increase her proton pump inhibitor twice daily for approximately 1 month.  She is to call if any issues with the increased dose.  - omeprazole (PRILOSEC) 40 MG DR capsule  Dispense: 180 capsule; Refill: 1    Centrilobular emphysema (H)  - ipratropium - albuterol 0.5 mg/2.5 mg/3 mL (DUONEB) 0.5-2.5 (3) MG/3ML neb solution  Dispense: 1 Box; Refill: 3           Return in about 1 week (around 10/21/2019) for Recheck.      EMILY BENJAMIN DO   10/14/2019 10:13 AM    This document was prepared using voice generated softwear. While every  attempt was made for accuracy, grammatical errors may exist.         h

## 2019-10-14 NOTE — LETTER
October 14, 2019      Ovidio Luna  [id number] [facility] [address]        To Whom It May Concern:    Ovidio Luna  was seen on 10/14/2019.  Please excuse her from work due to this.      Sincerely,        Ana Perdomo, DO

## 2019-10-16 ENCOUNTER — TELEPHONE (OUTPATIENT)
Dept: INTERNAL MEDICINE | Facility: OTHER | Age: 69
End: 2019-10-16

## 2019-10-16 NOTE — TELEPHONE ENCOUNTER
Called and spoke with patient after proper verification.  Informed patient, per previous note, that letter is at the unit 3 check in window.     Eveline Parker LPN............. October 16, 2019 10:35 AM

## 2019-10-21 ENCOUNTER — OFFICE VISIT (OUTPATIENT)
Dept: INTERNAL MEDICINE | Facility: OTHER | Age: 69
End: 2019-10-21
Attending: INTERNAL MEDICINE
Payer: COMMERCIAL

## 2019-10-21 VITALS
BODY MASS INDEX: 26.35 KG/M2 | OXYGEN SATURATION: 96 % | RESPIRATION RATE: 25 BRPM | HEART RATE: 60 BPM | HEIGHT: 62 IN | TEMPERATURE: 98.6 F | WEIGHT: 143.2 LBS | SYSTOLIC BLOOD PRESSURE: 102 MMHG | DIASTOLIC BLOOD PRESSURE: 62 MMHG

## 2019-10-21 DIAGNOSIS — N17.9 ACUTE KIDNEY INJURY (H): ICD-10-CM

## 2019-10-21 DIAGNOSIS — R60.0 BILATERAL LOWER EXTREMITY EDEMA: ICD-10-CM

## 2019-10-21 DIAGNOSIS — Z23 NEED FOR PNEUMOCOCCAL VACCINATION: ICD-10-CM

## 2019-10-21 DIAGNOSIS — I48.0 PAF (PAROXYSMAL ATRIAL FIBRILLATION) (H): ICD-10-CM

## 2019-10-21 DIAGNOSIS — E87.6 HYPOKALEMIA: Primary | ICD-10-CM

## 2019-10-21 DIAGNOSIS — Z79.899 HIGH RISK MEDICATION USE: ICD-10-CM

## 2019-10-21 DIAGNOSIS — D64.9 ANEMIA, UNSPECIFIED TYPE: ICD-10-CM

## 2019-10-21 DIAGNOSIS — I10 ESSENTIAL HYPERTENSION: ICD-10-CM

## 2019-10-21 LAB
ANION GAP SERPL CALCULATED.3IONS-SCNC: 8 MMOL/L (ref 3–14)
BUN SERPL-MCNC: 15 MG/DL (ref 7–25)
CALCIUM SERPL-MCNC: 8.9 MG/DL (ref 8.6–10.3)
CHLORIDE SERPL-SCNC: 105 MMOL/L (ref 98–107)
CO2 SERPL-SCNC: 25 MMOL/L (ref 21–31)
CREAT SERPL-MCNC: 0.97 MG/DL (ref 0.6–1.2)
ERYTHROCYTE [DISTWIDTH] IN BLOOD BY AUTOMATED COUNT: 19.1 % (ref 10–15)
GFR SERPL CREATININE-BSD FRML MDRD: 57 ML/MIN/{1.73_M2}
GLUCOSE SERPL-MCNC: 113 MG/DL (ref 70–105)
HCT VFR BLD AUTO: 29 % (ref 35–47)
HGB BLD-MCNC: 8.2 G/DL (ref 11.7–15.7)
MCH RBC QN AUTO: 19.6 PG (ref 26.5–33)
MCHC RBC AUTO-ENTMCNC: 28.3 G/DL (ref 31.5–36.5)
MCV RBC AUTO: 69 FL (ref 78–100)
PLATELET # BLD AUTO: 450 10E9/L (ref 150–450)
POTASSIUM SERPL-SCNC: 4.4 MMOL/L (ref 3.5–5.1)
RBC # BLD AUTO: 4.18 10E12/L (ref 3.8–5.2)
SODIUM SERPL-SCNC: 138 MMOL/L (ref 134–144)
WBC # BLD AUTO: 9 10E9/L (ref 4–11)

## 2019-10-21 PROCEDURE — 90471 IMMUNIZATION ADMIN: CPT | Performed by: INTERNAL MEDICINE

## 2019-10-21 PROCEDURE — 99214 OFFICE O/P EST MOD 30 MIN: CPT | Mod: 25 | Performed by: INTERNAL MEDICINE

## 2019-10-21 PROCEDURE — 85027 COMPLETE CBC AUTOMATED: CPT | Mod: ZL | Performed by: INTERNAL MEDICINE

## 2019-10-21 PROCEDURE — 80048 BASIC METABOLIC PNL TOTAL CA: CPT | Mod: ZL | Performed by: INTERNAL MEDICINE

## 2019-10-21 PROCEDURE — 90732 PPSV23 VACC 2 YRS+ SUBQ/IM: CPT | Performed by: INTERNAL MEDICINE

## 2019-10-21 PROCEDURE — 36415 COLL VENOUS BLD VENIPUNCTURE: CPT | Mod: ZL | Performed by: INTERNAL MEDICINE

## 2019-10-21 ASSESSMENT — MIFFLIN-ST. JEOR: SCORE: 1132.8

## 2019-10-21 ASSESSMENT — PAIN SCALES - GENERAL: PAINLEVEL: NO PAIN (0)

## 2019-10-21 NOTE — NURSING NOTE
Immunization Documentation    Prior to Immunization administration, verified patients identity using patient's name and date of birth. Please see IMMUNIZATIONS  and order for additional information.  Patient / Parent instructed to remain in clinic for 15 minutes and report any adverse reaction to staff immediately.    Was entire vial of medication used? Yes  Vial/Syringe: Single dose vial    Eveline Parker LPN  10/21/2019   4:26 PM

## 2019-10-21 NOTE — PATIENT INSTRUCTIONS
Stop aspirin    Stop Amlodipine    Decrease potassium to 20meq twice daily (1 tab twice daily)    Call in 2-3 days if the swelling has not improved.      Patient Education     Low-Salt Diet  This diet removes foods that are high in salt. It also limits the amount of salt you use when cooking. It is most often used for people with high blood pressure, edema (fluid retention), and kidney, liver, or heart disease.  Table salt contains the mineral sodium. Your body needs sodium to work normally. But too much sodium can make your health problems worse. Your healthcare provider is recommending a low-salt (also called low-sodium) diet for you. Your total daily allowance of salt is 1,500 to 2,300 milligrams (mg). It is less than 1 teaspoon of table salt. This means you can have only about 500 to 700 mg of sodium at each meal. People with certain health problems should limit salt intake to the lower end of the recommended range.    When you cook, don t add much salt. If you can cook without using salt, even better. Don t add salt to your food at the table.  When shopping, read food labels. Salt is often called sodium on the label. Choose foods that are salt-free, low salt, or very low salt. Note that foods with reduced salt may not lower your salt intake enough.    Beans, potatoes, and pasta  Ok: Dry beans, split peas, lentils, potatoes, rice, macaroni, pasta, spaghetti without added salt  Avoid: Potato chips, tortilla chips, and similar products  Breads and cereals  Ok: Low-sodium breads, rolls, cereals, and cakes; low-salt crackers, matzo crackers  Avoid: Salted crackers, pretzels, popcorn, Ukrainian toast, pancakes, muffins  Dairy  Ok: Milk, chocolate milk, hot chocolate mix, low-salt cheeses, and yogurt  Avoid: Processed cheese and cheese spreads; Roquefort, Camembert, and cottage cheese; buttermilk, instant breakfast drink  Desserts  Ok: Ice cream, frozen yogurt, juice bars, gelatin, cookies and pies, sugar, honey, jelly,  hard candy  Avoid: Most pies, cakes and cookies prepared or processed with salt; instant pudding  Drinks  Ok: Tea, coffee, fizzy (carbonated) drinks, juices  Avoid: Flavored coffees, electrolyte replacement drinks, sports drinks  Meats  Ok: All fresh meat, fish, poultry, low-salt tuna, eggs, egg substitute  Avoid: Smoked, pickled, brine-cured, or salted meats and fish. This includes lincoln, chipped beef, corned beef, hot dogs, deli meats, ham, kosher meats, salt pork, sausage, canned tuna, salted codfish, smoked salmon, herring, sardines, or anchovies.  Seasonings and spices  Ok: Most seasonings are okay. Good substitutes for salt include: fresh herb blends, hot sauce, lemon, garlic, coker, vinegar, dry mustard, parsley, cilantro, horseradish, tomato paste, regular margarine, mayonnaise, unsalted butter, cream cheese, vegetable oil, cream, low-salt salad dressing and gravy.  Avoid: Regular ketchup, relishes, pickles, soy sauce, teriyaki sauce, Worcestershire sauce, BBQ sauce, tartar sauce, meat tenderizer, chili sauce, regular gravy, regular salad dressing, salted butter  Soups  Ok: Low-salt soups and broths made with allowed foods  Avoid: Bouillon cubes, soups with smoked or salted meats, regular soup and broth  Vegetables  Ok: Most vegetables are okay; also low-salt tomato and vegetable juices  Avoid: Sauerkraut and other brine-soaked vegetables; pickles and other pickled vegetables; tomato juice, olives  Date Last Reviewed: 8/1/2016 2000-2018 [employer]. [address] [place] [address], [place] [address]. All rights reserved. This information is not intended as a substitute for professional medical care. Always follow your healthcare professional's instructions.             Patient Education     Low-Salt Choices  Eating salt (sodium) can make your body retain too much water. Excess water makes your heart work harder. Canned, packaged, and frozen foods are easy to prepare. But they are often high in  sodium. Here are some ideas for low-salt foods you can easily make yourself.  For breakfast    Fruit or 100% fruit juice    Whole-wheat bread or an English muffin. Look for sodium content on Nutrition Facts labels.    Low-fat milk or yogurt    Unsalted eggs    Shredded wheat    Corn tortillas    Unsalted steamed rice    Regular (not instant) hot cereal, made without salt  Stay away from:    Sausage, lincoln, and ham    Flour tortillas    Packaged muffins, pancakes, and biscuits    Instant hot cereals    Cottage cheese  For lunch and dinner    Fresh fish, chicken, turkey, or meat--baked, broiled, or roasted without salt    Dry beans, cooked without salt    Tofu, stir-fried without salt    Unsalted fresh fruit and vegetables, or frozen or canned fruit and vegetables with no added salt  Stay away from:    Lunch or deli meat that is cured or smoked    Cheese    Tomato juice and ketchup    Canned vegetables, soups, and fish not labeled as no-salt-added or reduced sodium    Packaged gravies and sauces    Olives, pickles, and relish    Bottled salad dressings  For snacks and desserts    Yogurt    Unsalted, air-popped popcorn    Unsalted nuts or seeds  Stay away from:    Pies and cakes    Packaged dessert mixes    Pizza    Canned and packaged puddings    Pretzels, chips, crackers, and nuts--unless the label says unsalted  Date Last Reviewed: 6/1/2017 2000-2018 [employer]. [address] [place] [address], [place] [address]. All rights reserved. This information is not intended as a substitute for professional medical care. Always follow your healthcare professional's instructions.

## 2019-10-21 NOTE — PROGRESS NOTES
mp  Chief Complaint   Patient presents with     Clinic Care Coordination - Follow-up     labs         HPI: Ms. Luna is a 68 year old female who presents today for follow up of multiple lab abnormalities.    She was seen last week with episodes of dizziness, hypokalemia, acute kidney injury.  It was felt to be secondary to dual diuresis with furosemide and hydrochlorothiazide.  Her hydrochlorothiazide was stopped.  She has been taking Lasix 20 mg daily.  She does feel she gets good urine output with this however has noted some increased lower extremity edema.  She reports that it increases pretty significantly when she is at work on her feet.  She has had resolution of her dizziness and presyncope.Her blood pressure overall remains on the low side despite discontinuing her hydrochlorothiazide.    She has had persistent anemia.  She is still on aspirin given her vascular disease and A. fib.  She was taken off of her Xarelto given her dropping hemoglobin in the setting of multiple ulcerations.  Her proton pump inhibitor was increased to twice daily.  She has had some improvement in her stomach pain with the increase in her Protonix.    She has been having some episodes of atrial fibrillation.  She does get some chest pain with this.  It is primarily after she is at work on a daily basis.  She follows with cardiology regularly.  She continues to smoke.  She  reports that she has been smoking cigarettes. She started smoking about 53 years ago. She has a 20.00 pack-year smoking history. She has never used smokeless tobacco.    She does need a pneumonia vaccine and would like it today.    Past medical history reviewed as below:     Past Medical History:   Diagnosis Date     Allergy status to other antibiotic agents status      Atherosclerosis     History of ASO with claudication     Atherosclerotic heart disease of native coronary artery without angina pectoris 2004    Stent times 2     Atrial fibrillation (H) 3/10/2019  "    Centrilobular emphysema (H) 12/12/2016     Cerebral infarction (H) 05/20/2012    CVA with residual left sided weakness, incidental meningioma found     Closed fracture of shaft of left tibia 12/26/2001    History of bilateral tibial fx, work related injury     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   Start Fosamax 08/07, stopped fosamax.     Peripheral vascular disease (H) 1/17/2018     Tobacco abuse 1/17/2018     Type 2 diabetes mellitus without complication, without long-term current use of insulin (H) 3/25/2019     Zoster without complications 2008    left shoulder and neck     Zoster without complications     2009/2008,Herpes zoster, left shoulder and neck   .      ROS  Pertinent ROS was performed and was negative, including for fever, chills, shortness of breath, changes in bowel or bladder, blood in the stool or black stools, difficulty swallowing, sores in the mouth. No other concerns, with exception of HPI above.      EXAM:   /62 (BP Location: Right arm, Patient Position: Sitting, Cuff Size: Adult Regular)   Pulse 60   Temp 98.6  F (37  C) (Tympanic)   Resp 25   Ht 1.575 m (5' 2\")   Wt 65 kg (143 lb 3.2 oz)   SpO2 96%   BMI 26.19 kg/m      Estimated body mass index is 26.19 kg/m  as calculated from the following:    Height as of this encounter: 1.575 m (5' 2\").    Weight as of this encounter: 65 kg (143 lb 3.2 oz).      GEN: Vitals reviewed. Healthy appearing. Patient is in no acute distress. Cooperative with exam.  HEENT: Normocephalic atraumatic.  Pupils equally round.  No scleral icterus, no conjunctival erythema. Oropharynx with no erythema or exudates. Dentition adequate.  CV: Heart regular in rate and rhythm with no murmur.    LUNGS: Lungs clear to auscultation bilaterally.  Chest rise equal bilaterally.  " No accessory muscle use.  ABD: Nondistended.  SKIN: Warm and dry to touch.  No rash on face, arms and legs.  EXT: No clubbing or cyanosis.   1+ bilateral lower extremity peripheral edema.  PSYCH: Mood is good.  Affect appropriate. Speech fluent. Answers questions appropriately and thought process normal.     ASSESSMENT AND PLAN:    Hypokalemia  -Resolved at this time however with her significant increase from 2.8-4.4 we will decrease her potassium supplementation to 20 mg twice daily.  - Basic Metabolic Panel    Acute kidney injury (H)  Improved at this time.  Continue to monitor.  - Basic Metabolic Panel    Anemia, unspecified type  -hemoglobin has dropped further to 8.2.  This may be partially due to increased fluid volume.  At this time given her decrease in hemoglobin, known ulcerations I do think she needs to be off of her aspirin for at least a short period of time.  My recommendation would be to stay off of this for a week or 2 in order to give the ulcers a chance to heal and then restart her on Plavix versus anticoagulation and monitor closely.  We will plan to recheck hemoglobin in a week.  - CBC W PLT No Diff    Essential hypertension  Blood pressures on the low normal side.  We will plan to stop her amlodipine to help with this.  She is to call with any issues.    Bilateral lower extremity edema  -At this time we will try compression stockings.  We will stop her amlodipine given her low blood pressures and swelling  - COMPRESSION STOCKINGS  Dispense: 1 each; Refill: 0    Need for pneumococcal vaccination  Vaccine given today    PAF (paroxysmal atrial fibrillation) (H)  She does need to follow-up with cardiology.  Arrangements are made for her to see them within the week.                 Return in about 1 week (around 10/28/2019) for Recheck.      EMILY BENJAMIN DO   10/21/2019 2:59 PM    This document was prepared using voice generated softwear. While every attempt was made for accuracy, grammatical errors  may exist.

## 2019-10-21 NOTE — NURSING NOTE
Patient has been measured for Compression socks during visit. Measurements are as follows:     Knee High Socks:  Right Ankle 8.5 inches  Right Calf 12.5 inches    Left Ankle 8.5 Inches  Left Calf 12.5 Inches    Rx and form have been faxed to [facility].     Eveline Parker LPN............. October 21, 2019 4:32 PM

## 2019-10-28 ENCOUNTER — OFFICE VISIT (OUTPATIENT)
Dept: INTERNAL MEDICINE | Facility: OTHER | Age: 69
End: 2019-10-28
Attending: INTERNAL MEDICINE
Payer: COMMERCIAL

## 2019-10-28 ENCOUNTER — OFFICE VISIT (OUTPATIENT)
Dept: CARDIOLOGY | Facility: OTHER | Age: 69
End: 2019-10-28
Attending: NURSE PRACTITIONER
Payer: COMMERCIAL

## 2019-10-28 VITALS
TEMPERATURE: 98 F | SYSTOLIC BLOOD PRESSURE: 122 MMHG | WEIGHT: 141 LBS | RESPIRATION RATE: 16 BRPM | HEART RATE: 64 BPM | BODY MASS INDEX: 25.95 KG/M2 | HEIGHT: 62 IN | DIASTOLIC BLOOD PRESSURE: 60 MMHG | OXYGEN SATURATION: 99 %

## 2019-10-28 VITALS
WEIGHT: 141 LBS | HEART RATE: 64 BPM | OXYGEN SATURATION: 99 % | HEIGHT: 62 IN | RESPIRATION RATE: 16 BRPM | DIASTOLIC BLOOD PRESSURE: 60 MMHG | BODY MASS INDEX: 25.95 KG/M2 | SYSTOLIC BLOOD PRESSURE: 122 MMHG | TEMPERATURE: 98 F

## 2019-10-28 DIAGNOSIS — R07.9 CHEST PAIN, UNSPECIFIED TYPE: Primary | ICD-10-CM

## 2019-10-28 DIAGNOSIS — D64.9 ANEMIA, UNSPECIFIED TYPE: ICD-10-CM

## 2019-10-28 DIAGNOSIS — I10 ESSENTIAL HYPERTENSION: ICD-10-CM

## 2019-10-28 DIAGNOSIS — I70.209 FEMORAL ARTERY STENOSIS (H): ICD-10-CM

## 2019-10-28 DIAGNOSIS — I70.209: ICD-10-CM

## 2019-10-28 DIAGNOSIS — K25.3 ACUTE ULCER OF PYLORIC ANTRUM: ICD-10-CM

## 2019-10-28 DIAGNOSIS — Z86.73 HISTORY OF CVA (CEREBROVASCULAR ACCIDENT): ICD-10-CM

## 2019-10-28 DIAGNOSIS — Z72.0 TOBACCO ABUSE: ICD-10-CM

## 2019-10-28 DIAGNOSIS — E87.6 HYPOKALEMIA: ICD-10-CM

## 2019-10-28 DIAGNOSIS — E11.9 TYPE 2 DIABETES MELLITUS WITHOUT COMPLICATION, WITHOUT LONG-TERM CURRENT USE OF INSULIN (H): ICD-10-CM

## 2019-10-28 DIAGNOSIS — I48.0 PAF (PAROXYSMAL ATRIAL FIBRILLATION) (H): ICD-10-CM

## 2019-10-28 DIAGNOSIS — Z95.828 H/O AORTO-FEMORAL BYPASS: ICD-10-CM

## 2019-10-28 DIAGNOSIS — R60.0 BILATERAL LOWER EXTREMITY EDEMA: Primary | ICD-10-CM

## 2019-10-28 DIAGNOSIS — E78.2 MIXED HYPERLIPIDEMIA: ICD-10-CM

## 2019-10-28 DIAGNOSIS — J43.2 CENTRILOBULAR EMPHYSEMA (H): ICD-10-CM

## 2019-10-28 DIAGNOSIS — I25.10 CORONARY ARTERY DISEASE INVOLVING NATIVE CORONARY ARTERY OF NATIVE HEART, ANGINA PRESENCE UNSPECIFIED: ICD-10-CM

## 2019-10-28 DIAGNOSIS — N17.9 ACUTE KIDNEY INJURY (H): ICD-10-CM

## 2019-10-28 LAB
ANION GAP SERPL CALCULATED.3IONS-SCNC: 8 MMOL/L (ref 3–14)
BUN SERPL-MCNC: 18 MG/DL (ref 7–25)
CALCIUM SERPL-MCNC: 9.8 MG/DL (ref 8.6–10.3)
CHLORIDE SERPL-SCNC: 106 MMOL/L (ref 98–107)
CO2 SERPL-SCNC: 25 MMOL/L (ref 21–31)
CREAT SERPL-MCNC: 1.03 MG/DL (ref 0.6–1.2)
GFR SERPL CREATININE-BSD FRML MDRD: 53 ML/MIN/{1.73_M2}
GLUCOSE SERPL-MCNC: 115 MG/DL (ref 70–105)
HGB BLD-MCNC: 8.2 G/DL (ref 11.7–15.7)
POTASSIUM SERPL-SCNC: 4.1 MMOL/L (ref 3.5–5.1)
SODIUM SERPL-SCNC: 139 MMOL/L (ref 134–144)
TROPONIN I SERPL-MCNC: 3.7 PG/ML

## 2019-10-28 PROCEDURE — 99215 OFFICE O/P EST HI 40 MIN: CPT | Performed by: NURSE PRACTITIONER

## 2019-10-28 PROCEDURE — 85018 HEMOGLOBIN: CPT | Mod: ZL | Performed by: NURSE PRACTITIONER

## 2019-10-28 PROCEDURE — 99214 OFFICE O/P EST MOD 30 MIN: CPT | Performed by: INTERNAL MEDICINE

## 2019-10-28 PROCEDURE — 36415 COLL VENOUS BLD VENIPUNCTURE: CPT | Mod: ZL | Performed by: NURSE PRACTITIONER

## 2019-10-28 PROCEDURE — 80048 BASIC METABOLIC PNL TOTAL CA: CPT | Mod: ZL | Performed by: NURSE PRACTITIONER

## 2019-10-28 PROCEDURE — 84484 ASSAY OF TROPONIN QUANT: CPT | Mod: ZL | Performed by: NURSE PRACTITIONER

## 2019-10-28 PROCEDURE — 93000 ELECTROCARDIOGRAM COMPLETE: CPT | Performed by: INTERNAL MEDICINE

## 2019-10-28 ASSESSMENT — MIFFLIN-ST. JEOR
SCORE: 1122.82
SCORE: 1122.82

## 2019-10-28 ASSESSMENT — PAIN SCALES - GENERAL
PAINLEVEL: NO PAIN (0)
PAINLEVEL: NO PAIN (0)

## 2019-10-28 NOTE — PATIENT INSTRUCTIONS
You were seen by  VOLODYMYR Harper CNP      1. Laboratory blood work has been ordered.  You will be notified by phone call or [email or web address] message when the results are available.     2. No changes.       You will follow up with [facility] in 2 weeks, sooner if needed.       Please call the cardiology office with problems, questions, or concerns at [phone].    If you experience chest pain, chest pressure, chest tightness, shortness of breath, fainting, lightheadedness, nausea, vomiting, or other concerning symptoms, please report to the Emergency Department or call 911. These symptoms may be emergent, and best treated in the Emergency Department.     Cardiology Nurses  DEION Quevedo, RN  Norma CHAVARRIA, BRAYAN HOLDEN LPN  [facility] ([address])  [phone]

## 2019-10-28 NOTE — LETTER
October 28, 2019      [address]vidio Luna  [address] [facility] [facility] [address]        To Whom It May Concern:    Ovidio Luna  was seen on 10/28/2019.  Please excuse her until 10/31/2019 due to illness and health concerns.    If you have any questions, please feel free to contact me and I will assist as able.      Sincerely,        Ana Perdomo, DO

## 2019-10-28 NOTE — PROGRESS NOTES
Chief Complaint   Patient presents with     RECHECK         HPI: Ms. Luna is a 68 year old female who presents today for follow up of edema and anemia.    She has been having issues with lower extremity edema.  These are worse overall when she works and is on her feet for prolonged periods of time.  Recently they have been better because she has not been working.  She is scheduled to work the next few days.  She has been using compression stockings.  She is on furosemide which she takes daily.  She also is on amlodipine 10 mg daily.    She has a recent diagnosis of acute pyloric ulcer.  She was having persistent decrease in hemoglobin with use of Xarelto which she takes for atrial fibrillation.  She was then taken off of this by cardiology and continued on aspirin because of her history of significant vascular disease.  Even with this she had further decrease in her hemoglobin to 8.2.  She did have a recheck today that was stable at 8.2.  She denies any blood in the stool or black stools.  She does feel her lightheadedness has improved.    Renal function today has remained stable from prior.  She did have acute decrease in her GFR due to use of hydrochlorothiazide plus furosemide.  She has now been off the hydrochlorothiazide for couple weeks.    She  reports that she has been smoking cigarettes. She started smoking about 53 years ago. She has a 20.00 pack-year smoking history. She has never used smokeless tobacco.    Past medical history reviewed as below:     Past Medical History:   Diagnosis Date     Allergy status to other antibiotic agents status      Atherosclerosis     History of ASO with claudication     Atherosclerotic heart disease of native coronary artery without angina pectoris 2004    Stent times 2     Atrial fibrillation (H) 3/10/2019     Centrilobular emphysema (H) 12/12/2016     Cerebral infarction (H) 05/20/2012    CVA with residual left sided weakness, incidental meningioma found     Closed  "fracture of shaft of left tibia 12/26/2001    History of bilateral tibial fx, work related injury     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   Start Fosamax 08/07, stopped fosamax.     Peripheral vascular disease (H) 1/17/2018     Tobacco abuse 1/17/2018     Type 2 diabetes mellitus without complication, without long-term current use of insulin (H) 3/25/2019     Zoster without complications 2008    left shoulder and neck     Zoster without complications     2009/2008,Herpes zoster, left shoulder and neck   .      ROS  Pertinent ROS was performed and was negative, including for fever, chills, shortness of breath, increased lower extremity edema, changes in bowel or bladder, blood in the stool, difficulty swallowing, sores in the mouth. No other concerns, with exception of HPI above.      EXAM:   /60 (BP Location: Right arm, Patient Position: Sitting, Cuff Size: Adult Regular)   Pulse 64   Temp 98  F (36.7  C) (Tympanic)   Resp 16   Ht 1.575 m (5' 2\")   Wt 64 kg (141 lb)   SpO2 99%   Breastfeeding? No   BMI 25.79 kg/m      Estimated body mass index is 25.79 kg/m  as calculated from the following:    Height as of this encounter: 1.575 m (5' 2\").    Weight as of this encounter: 64 kg (141 lb).      GEN: Vitals reviewed. Healthy appearing. Patient is in no acute distress. Cooperative with exam.  HEENT: Normocephalic atraumatic.  Pupils equally round.  No scleral icterus, no conjunctival erythema. Oropharynx with no erythema or exudates. Dentition adequate.  NECK: Supple; no thyromegaly.  No neck, cervical LAD.   CV: Heart regular in rate and rhythm with no murmur.    LUNGS: Lungs clear to auscultation bilaterally.  Chest rise equal bilaterally.  No accessory muscle use.  ABD:  Nondistended  SKIN: Warm and dry to touch.  No rash " on face, arms and legs.  EXT: No clubbing or cyanosis.  Trace lower ext peripheral edema.  PSYCH: Mood is good.  Affect appropriate. Speech fluent. Answers questions appropriately and thought process normal.     ASSESSMENT AND PLAN:    Bilateral lower extremity edema  - Continue at this time with compression stockings and furosemide.  She is provided a work note for the next couple days to avoid any complications from increased fluid retention.  We may need to consider decreasing her amlodipine in the future.    Acute ulcer of pyloric antrum  - At this time she will continue on twice daily proton pump inhibitors.  Try to avoid anticoagulation if possible.    Anemia, unspecified type  -At this time we will continue off of anticoagulation and antiplatelet therapy.  She sees vascular surgery in a couple days and further decisions regarding treatment of her vascular disease will determined.  She may tentatively be able to go back on Plavix versus Xarelto in 10 to 14 days however she will need close monitoring of her hemoglobin.  -Her case was discussed personally with cardiology who agrees with this plan.    Acute kidney injury (H)  -Stable at this time, appears to have returned to baseline.,          Return in about 2 weeks (around 11/11/2019) for Recheck.      EMILY BENJAMIN DO   10/28/2019 3:40 PM    This document was prepared using voice generated softwear. While every attempt was made for accuracy, grammatical errors may exist.

## 2019-10-28 NOTE — PROGRESS NOTES
[facility] HEART CARE   CARDIOLOGY PROGRESS NOTE    Ovidio Luna   [address] [facility] [address]      Glynn Botello     Chief Complaint   Patient presents with     Follow Up     follow up Atrial fib per Dr. Perdomo        HPI:   Ms. Luna is a 68 year old female who presents for cardiology follow-up to visit on 7/24/19 with history of known coronary atherosclerosis, recently diagnosed early this year with AF with RVR and HTN.  Patient has a history of aortofemoral bypass in 2004, tachybradycardia syndrome, newly identified atrial fibrillation, CAD, hyperlipidemia, hypertension, PAD, hypertension, DM 2, COPD, tobacco abuse, emphysema, history of CVA and gastritis.    Patient has a known history of coronary artery disease with PTCA of the mLAD and pRCA on 2/28/04 with preserved LVEF. PTCA at [facility] in [place].     She has a history of PAS with aortofemoral bypass of the right LE in 2004.     Patient presented to the ED on 3/10/2019 with complaints of chest pressure and numbness radiating into the left arm.  She reported associated shortness of breath, nausea and vomiting, no diaphoresis.  This was described as a nonexertional pain experienced when watching television.  She had no relief with nitroglycerin.  In the ED she was found to be in A. fib with RVR and she was started on a Cardizem drip and admitted to the ICU.  Her first 2 troponins were normal and the third 1 was mildly elevated.  In the ICU she was noted to have a brief sinus pause on monitoring and therefore Cardizem drip was discontinued and the patient spontaneously converted to normal sinus rhythm following this.  Given her upward troponin trend during her hospitalization patient was transferred to [facility] in [place] for coronary evaluation given her known ischemic heart disease history.    Echocardiogram was performed during her hospitalization at [place] with preserved LVEF, no significant valvular or morphologic  abnormalities.  She had no anginal symptoms on presentation to [facility].  With her newly identified atrial fibrillation she was started on a DOAC for stoke prophylaxis.  Additionally she was found to be a type II diabetic and was also started on metformin.  Her statin was adjusted to high intensity atorvastatin.  She was discharged from [facility] on 3/12/2019.    At her last visit we reviewed results of stable TTE with no systolic or diastolic failure, no concerning valvular abnormalities and no identified pulmonary HTN. She completed a Lexiscan stress test on 7/9/2019 with no stress induced ischemia, there is a small fixed defect which is again present at the cardiac apex.  This is unchanged.  Myocardial motility was preserved with an EF of 66%.  There was no EKG changes.    A Zio was previously recommended to assess her atrial fibrillation burden as she was symptomatic at her last visit.  She never did schedule to have this monitor placed. Since her last visit, patient admits that she took independent discression to adjust her beta-blocker to 150 mg twice daily and feels that her palpitations completely resolved with this and her blood pressure is much improved.  She described chronic shortness of breath unchanged.  She admitted that her chest pressure also improved when she increased her beta-blocker.  She had not experienced any lightheadedness or syncope.    Additionally, patient recently reported increased lower extremity edema.  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  She declines wearing compression stockings that she does not like her toes being squeezed tight.  She does wear a tall sock just below the knee when working that does help some. She was started on Lasix 20 mg every other day.    Today we reviewed results of her CTA study of lower extremities. She was found to have patent aortobifemoral bypass graft,  moderate to severe stenosis at the junction of the left superficial femoral artery and popliteal artery. Severe stenosis versus short occlusion of the mid popliteal artery just above the level of the tibiofemoral articulation. She was referred back to vascular surgery as a result of these findings. She is scheduled to see vascular surgery at [facility] on 10/30.    PAST MEDICAL HISTORY:   Past Medical History:   Diagnosis Date     Allergy status to other antibiotic agents status      Atherosclerosis     History of ASO with claudication     Atherosclerotic heart disease of native coronary artery without angina pectoris 2004    Stent times 2     Atrial fibrillation (H) 3/10/2019     Centrilobular emphysema (H) 12/12/2016     Cerebral infarction (H) 05/20/2012    CVA with residual left sided weakness, incidental meningioma found     Closed fracture of shaft of left tibia 12/26/2001    History of bilateral tibial fx, work related injury     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   Start Fosamax 08/07, stopped fosamax.     Peripheral vascular disease (H) 1/17/2018     Tobacco abuse 1/17/2018     Type 2 diabetes mellitus without complication, without long-term current use of insulin (H) 3/25/2019     Zoster without complications 2008    left shoulder and neck     Zoster without complications     2009/2008,Herpes zoster, left shoulder and neck          FAMILY HISTORY:   Family History   Problem Relation Age of Onset     Heart Disease Father         Heart Disease     Cancer Father         Cancer,myeloma     Heart Disease Mother         Heart Disease,MI, stenting     Cerebrovascular Disease Brother      Other - See Comments Brother         sciatica     Other - See Comments Other         FHx Father's side Coronary artery disease     Other - See  Comments Paternal Uncle         Stroke     Cancer Paternal Uncle         Cancer,Bladder x2     Other - See Comments Sister         chronic pain syndrome     Breast Cancer No family hx of         Cancer-breast          PAST SURGICAL HISTORY:   Past Surgical History:   Procedure Laterality Date     COLONOSCOPY  2004    2 hyperplastic polyps, repeat in 2014     COLONOSCOPY N/A 9/23/2019    F/U 2024 adenomatous polyps     ESOPHAGOSCOPY, GASTROSCOPY, DUODENOSCOPY (EGD), COMBINED N/A 9/23/2019    Antral ulcer     HEMORRHOID SURGERY       HYSTERECTOMY TOTAL ABDOMINAL, BILATERAL SALPINGO-OOPHORECTOMY, COMBINED  1987    total hyster - BSO     OTHER SURGICAL HISTORY Right 2004    86091.0,WA BYPASS GRAFT AORTOBIFEMORAL     OTHER SURGICAL HISTORY      2004,LOC-1006.05,PTCA,mid LAD, proximal RCA     OTHER SURGICAL HISTORY      03/08/16,[id number],CYSTOSCOPY W/ URETEROSCOPY W/ LITHOTRIPSY,Right,Dr. Mario DC          SOCIAL HISTORY:   Social History     Socioeconomic History     Marital status: Single     Spouse name: None     Number of children: None     Years of education: None     Highest education level: None   Occupational History     None   Social Needs     Financial resource strain: None     Food insecurity:     Worry: None     Inability: None     Transportation needs:     Medical: None     Non-medical: None   Tobacco Use     Smoking status: Current Every Day Smoker     Packs/day: 0.50     Years: 16.00     Pack years: 8.00     Types: Cigarettes     Smokeless tobacco: Never Used   Substance and Sexual Activity     Alcohol use: No     Drug use: No     Sexual activity: Not Currently   Lifestyle     Physical activity:     Days per week: None     Minutes per session: None     Stress: None   Relationships     Social connections:     Talks on phone: None     Gets together: None     Attends Hindu service: None     Active member of club or organization: None     Attends meetings of clubs or organizations: None     Relationship  status: None     Intimate partner violence:     Fear of current or ex partner: None     Emotionally abused: None     Physically abused: None     Forced sexual activity: None   Other Topics Concern     Parent/sibling w/ CABG, MI or angioplasty before 65F 55M? Not Asked   Social History Narrative    ** Merged History Encounter **         Works at [employer]  Melly Spawn Mother  Delma Mario-Sondra Sister  One brother          CURRENT MEDICATIONS:   Prior to Admission medications    Medication Sig Start Date End Date Taking? Authorizing Provider   albuterol (PROAIR HFA/PROVENTIL HFA/VENTOLIN HFA) 108 (90 Base) MCG/ACT Inhaler Inhale 1-2 puffs into the lungs every 6 hours as needed for shortness of breath / dyspnea or wheezing 6/7/18  Yes Fely Gregorio APRN CNP   amLODIPine (NORVASC) 10 MG tablet Take 1 tablet (10 mg) by mouth daily 3/25/19  Yes Lizet Garcia PA-C   atorvastatin (LIPITOR) 40 MG tablet Take 40 mg by mouth 3/12/19  Yes Reported, Patient   busPIRone (BUSPAR) 10 MG tablet Take 10 mg by mouth daily   Yes Unknown, Entered By History   Cholecalciferol (VITAMIN D3) 2000 UNITS CAPS Take 4,000 Units by mouth 2 times daily  9/18/12  Yes Reported, Patient   citalopram (CELEXA) 10 MG tablet Take 1 tablet (10 mg) by mouth every morning 11/2/18  Yes Glynn Botello MD   fexofenadine (ALLEGRA) 180 MG tablet Take 180 mg by mouth 2 times daily  9/18/12  Yes Reported, Patient   fluticasone (FLONASE) 50 MCG/ACT nasal spray Spray 1 spray into both nostrils daily as needed for rhinitis or allergies   Yes Unknown, Entered By History   fluticasone-salmeterol (ADVAIR) 250-50 MCG/DOSE inhaler Inhale 1 puff into the lungs daily as needed   Yes Unknown, Entered By History   hydrochlorothiazide (HYDRODIURIL) 25 MG tablet Take 1 tablet (25 mg) by mouth daily 8/27/18  Yes Glynn Botello MD   ipratropium - albuterol 0.5 mg/2.5 mg/3 mL (DUONEB) 0.5-2.5 (3) MG/3ML neb solution Take 1 vial by nebulization  every morning . May also use 1 neb every 6 hours as needed for shortness of breath.   Yes Unknown, Entered By History   metFORMIN (GLUCOPHAGE) 500 MG tablet Take 1 tablet (500 mg) by mouth daily (with breakfast) 3/21/19  Yes Lizet Garcia PA-C   metoprolol tartrate (LOPRESSOR) 50 MG tablet Take 1.5 tablets (75 mg) by mouth 2 times daily 3/29/19  Yes Hawa Grissom MD   nicotine (COMMIT) 2 MG lozenge Take 2 mg by mouth 3/12/19  Yes Reported, Patient   order for [employer] NEBULIZER MACHINE ITEM #[id number] DX J44 formerly [facility]  2/20/19  Yes Reported, Patient   potassium chloride SA (K-DUR/KLOR-CON M) 20 MEQ CR tablet Take 1 tablet (20 mEq) by mouth 2 times daily (with meals) 6/5/18  Yes Glynn Botello MD   Respiratory Therapy Supplies (NEBULIZER/TUBING/MOUTHPIECE) KIT Dx: COPD with exacerbation. Sig: Use as directed. 2/20/19  Yes Lizet Garcia PA-C   rivaroxaban ANTICOAGULANT (XARELTO) 20 MG TABS tablet Take 20 mg by mouth 3/12/19  Yes Reported, Patient          ALLERGIES:   Allergies   Allergen Reactions     Morphine      Tachycardia       Propoxyphene N-Apap Unknown     Tremors       Varenicline Nausea and Vomiting     Codeine Palpitations     Diphenhydramine Palpitations and Other (See Comments)     wheezing     Lisinopril Palpitations and Cough     No Clinical Screening - See Comments Rash     Pt states allergies to white/yellow gold.  Sugical steel.  Copper./ developed infection     Tetracycline Nausea and Vomiting and Rash          ROS:   CONSTITUTIONAL: No reported fever or chills. No changes in weight.  ENT: No visual disturbance, ear ache, epistaxis or sore throat.   CARDIOVASCULAR: No recurrence of chest pain, chest pressure or chest discomfort.  Palpitations improved with increased dose of beta-blocker, increased lower extremity edema.   RESPIRATORY: Positive for chronic shortness of breath and dyspnea upon exertion which she feels is unchanged. No cough, wheezing or  "hemoptysis.  No orthopnea or PND.  GI: No reported abdominal pain, no melena or hematochezia.  : No reported hematuria or dysuria.   NEUROLOGICAL: No lightheadedness, dizziness, syncope, ataxia, paresthesias or weakness.   HEMATOLOGIC: No history of anemia. No bleeding or excessive bruising. No history of blood clots.   MUSCULOSKELETAL: No reported joint pain or swelling, no muscle pain.  ENDOCRINOLOGIC: No temperature intolerance. No hair or skin changes.  SKIN: No abnormal rashes or sores, no unusual itching.  PSYCHIATRIC: No history of depression or anxiety. No changes in mood, feeling down or anxious. No changes in sleep.      PHYSICAL EXAM:   /60 (BP Location: Right arm, Patient Position: Sitting, Cuff Size: Adult Regular)   Pulse 64   Temp 98  F (36.7  C) (Tympanic)   Resp 16   Ht 1.575 m (5' 2\")   Wt 64 kg (141 lb)   SpO2 99%   BMI 25.79 kg/m    GENERAL: The patient appears malnourished, in no apparent distress.  HEENT: Head is normocephalic and atraumatic. Eyes are symmetrical with normal visual tracking. No icterus, no xanthelasmas. Nares appeared normal without nasal drainage. Mucous membranes are moist, no cyanosis.  NECK: Supple.   CHEST/ LUNGS: Lungs clear to auscultation, no rales, rhonchi or wheezes, no use of accessory muscles, no retractions, respirations unlabored and normal respiratory rate.   CARDIO: Irregular rate and rhythm normal with S1 and S2, no murmur, click or rub.   ABD: Abdomen is nondistended.   EXTREMITIES: 1+ bilateral lower extremity edema present.   MUSCULOSKELETAL: No visible joint swelling.   NEUROLOGIC: Alert and oriented X3. Normal speech, gait and affect. No focal neurologic deficits.   SKIN: No jaundice. No rashes or visible skin lesions present. No ecchymosis.     LAB RESULTS:   Office Visit on 01/13/2019   Component Date Value Ref Range Status     Specimen Description 01/13/2019 Nasal   Final     Influenza A PCR 01/13/2019 Negative  NEG^Negative Final     " Influenza B PCR 01/13/2019 Negative  NEG^Negative Final     Resp Syncytial Virus 01/13/2019 Negative  NEG^Negative Final     WBC 01/13/2019 11.3* 4.0 - 11.0 10e9/L Final     RBC Count 01/13/2019 4.96  3.8 - 5.2 10e12/L Final     Hemoglobin 01/13/2019 14.4  11.7 - 15.7 g/dL Final     Hematocrit 01/13/2019 44.3  35.0 - 47.0 % Final     MCV 01/13/2019 89  78 - 100 fl Final     MCH 01/13/2019 29.0  26.5 - 33.0 pg Final     MCHC 01/13/2019 32.5  31.5 - 36.5 g/dL Final     RDW 01/13/2019 12.8  10.0 - 15.0 % Final     Platelet Count 01/13/2019 252  150 - 450 10e9/L Final     Diff Method 01/13/2019 Automated Method   Final     % Neutrophils 01/13/2019 59.7  % Final     % Lymphocytes 01/13/2019 23.2  % Final     % Monocytes 01/13/2019 13.6  % Final     % Eosinophils 01/13/2019 2.6  % Final     % Basophils 01/13/2019 0.6  % Final     % Immature Granulocytes 01/13/2019 0.3  % Final     Absolute Neutrophil 01/13/2019 6.8  1.6 - 8.3 10e9/L Final     Absolute Lymphocytes 01/13/2019 2.6  0.8 - 5.3 10e9/L Final     Absolute Monocytes 01/13/2019 1.5* 0.0 - 1.3 10e9/L Final     Absolute Eosinophils 01/13/2019 0.3  0.0 - 0.7 10e9/L Final     Absolute Basophils 01/13/2019 0.1  0.0 - 0.2 10e9/L Final     Abs Immature Granulocytes 01/13/2019 0.0  0 - 0.4 10e9/L Final          ASSESSMENT:   Ovidio Luna presents for cardiology follow-up to visit on 7/24/19 with history of known coronary atherosclerosis, recently diagnosed early this year with AF with RVR and HTN.  Patient has a history of aortofemoral bypass in 2004, tachybradycardia syndrome, newly identified atrial fibrillation, CAD, hyperlipidemia, hypertension, PAD, hypertension, DM 2, COPD, tobacco abuse, emphysema, history of CVA and gastritis.  Today we reviewed results of her CTA study of lower extremities. She was found to have patent aortobifemoral bypass graft, moderate to severe stenosis at the junction of the left superficial femoral artery and popliteal artery. Severe  stenosis versus short occlusion of the mid popliteal artery just above the level of the tibiofemoral articulation. She was referred back to vascular surgery as a result of these findings. She is scheduled to see vascular surgery at [facility] on 10/30.    1. Chest pain, unspecified type  2. Hypokalemia  3. Anemia, unspecified type  4. PAF (paroxysmal atrial fibrillation) (H)  5. Mixed hyperlipidemia  6. Coronary artery disease involving native coronary artery of native heart, angina presence unspecified  7. Type 2 diabetes mellitus without complication, without long-term current use of insulin (H)  8. Essential hypertension  9. H/O aorto-femoral bypass (2004)  10. Tobacco abuse  11. Centrilobular emphysema (H)  12. History of CVA (cerebrovascular accident)  13. Femoral artery stenosis (H)  14. Occlusion of popliteal artery (H)    PLAN:  1. Patient with known CAD, PTCA of the mLAD and pRCA on 2/28/04 with preserved LVEF. PTCA at [facility] in [place]. Describes atypical chest pain at times in the AM. No elevation in troponin with symptoms yesterday and this AM.  Lexiscan stress test completed on 7/9/19 with no evidence of stress induced ischemia.   2. She will remain on daily ASA.  3. Newly diagnosed AF with RVR, symptoms improved when she increased her beta blocker to 150 mg. Describes herself as largely asymptomatic at this time. Consider ZIO if increasingly symptomatic with paroxysms of AF.   4. She will remain on Xarelto oral anticoagulation for stroke prophylaxis with [id number] >2.    5. Reviewed results of LE CTA showing severe femoral stenosis and suspected popliteal occlusion. Referral was placed for vascular surgery, she is scheduled to see vascular surgery at [facility] on 10/30.  6. Continue with Atorvastatin 40 mg before her bedtime.  7. BP well controlled on current medication regimen.   8. Continued to encourage tobacco cessation.      Thank you for allowing me to participate in the care of your  patient. Please do not hesitate to contact me if you have any questions.     Martina Roberto

## 2019-10-28 NOTE — NURSING NOTE
"Chief Complaint   Patient presents with     Follow Up     follow up Atrial fib per Dr. Perdomo       Initial /60 (BP Location: Right arm, Patient Position: Sitting, Cuff Size: Adult Regular)   Pulse 64   Temp 98  F (36.7  C) (Tympanic)   Resp 16   Ht 1.575 m (5' 2\")   Wt 64 kg (141 lb)   SpO2 99%   BMI 25.79 kg/m   Estimated body mass index is 25.79 kg/m  as calculated from the following:    Height as of this encounter: 1.575 m (5' 2\").    Weight as of this encounter: 64 kg (141 lb).  Meds Reconciled: complete  Pt is not on Aspirin  Pt is on a Statin  PHQ and/or MIKE reviewed. Pt referred to PCP/MH Provider as appropriate.    Jeri Garibay LPN      "

## 2019-10-28 NOTE — NURSING NOTE
Patient presents to the clinic for follow-up.     Medication Reconciliation: complete   Eveline Parker LPN............. October 28, 2019 3:23 PM

## 2019-11-04 ENCOUNTER — TELEPHONE (OUTPATIENT)
Dept: CARDIOLOGY | Facility: OTHER | Age: 69
End: 2019-11-04

## 2019-11-04 DIAGNOSIS — I48.0 PAROXYSMAL ATRIAL FIBRILLATION (H): Primary | ICD-10-CM

## 2019-11-04 NOTE — TELEPHONE ENCOUNTER
Patient states over the last 2 days she has been having increased episodes of A Fib.  Yesterday at work patient states had to go to her car she was having chest pressure and some discomfort, and bilateral arm pain.  She states blood pressure was elevated at this time than did come down.  She states that this happened three more times last evening.  Patient denies syncope or near syncope.  States did have a severe headache after the episode then subsided. Patient denies any symptoms at this time. Patient does have a follow up appointment on 11/13/2019 with VOLODYMYR Harper CNP.  Routing to provider to review and any recommendations.  Kenya Rivera RN on 11/4/2019 at 9:33 AM    Martina Roberto APRN CNP Green, Glenda M, RN   Caller: Unspecified (Yesterday,  9:06 AM)             Has patient had Zio placed? She needs to have this completed for further assessment.   Thanks,   VOLODYMYR Redd CNP      Talked with patient transferred to x-ray scheduling to schedule Zio patch placement.  Patient will call if any concerns.  Kenya Rivera RN on 11/5/2019 at 9:51 AM

## 2019-11-07 ENCOUNTER — HOSPITAL ENCOUNTER (OUTPATIENT)
Dept: CARDIOLOGY | Facility: OTHER | Age: 69
Discharge: HOME | End: 2019-11-07
Attending: NURSE PRACTITIONER | Admitting: NURSE PRACTITIONER
Payer: COMMERCIAL

## 2019-11-07 DIAGNOSIS — I48.0 PAROXYSMAL ATRIAL FIBRILLATION (H): ICD-10-CM

## 2019-11-07 PROBLEM — I70.209: Status: ACTIVE | Noted: 2019-11-07

## 2019-11-07 PROCEDURE — 0296T ZIO PATCH HOLTER ADULT PEDIATRIC GREATER THAN 48 HRS: CPT

## 2019-11-07 PROCEDURE — 0298T ZIO PATCH HOLTER ADULT PEDIATRIC GREATER THAN 48 HRS: CPT | Performed by: INTERNAL MEDICINE

## 2019-11-07 NOTE — PATIENT INSTRUCTIONS
Patient instructed not to:   -take baths, swim, sauna   -remove device prior to 14 days   -use electric blankets   -shower or sweat excessively within first 24 hours of device application  Patient instructed to:   -shower as needed   -be carefull when toweling off and dressing   -press button when cardiac symptoms occur   -document symptoms in log book   -remove and return device (send via mail to [employer])   -Call [employer] with any questions

## 2019-11-07 NOTE — PROGRESS NOTES
Patient instructed not to:   -take baths, swim, sauna   -remove device prior to 14 days   -use electric blankets   -shower or sweat excessively within first 24 hours of device application  Patient instructed to:   -shower as needed   -be carefull when toweling off and dressing   -press button when cardiac symptoms occur   -document symptoms in log book   -remove and return device (send via mail to [employer])   -Call [employer] with any questions

## 2019-11-13 ENCOUNTER — OFFICE VISIT (OUTPATIENT)
Dept: INTERNAL MEDICINE | Facility: OTHER | Age: 69
End: 2019-11-13
Attending: INTERNAL MEDICINE
Payer: COMMERCIAL

## 2019-11-13 ENCOUNTER — OFFICE VISIT (OUTPATIENT)
Dept: CARDIOLOGY | Facility: OTHER | Age: 69
End: 2019-11-13
Attending: NURSE PRACTITIONER
Payer: COMMERCIAL

## 2019-11-13 VITALS
BODY MASS INDEX: 25.61 KG/M2 | DIASTOLIC BLOOD PRESSURE: 68 MMHG | TEMPERATURE: 98.1 F | WEIGHT: 140 LBS | SYSTOLIC BLOOD PRESSURE: 138 MMHG | RESPIRATION RATE: 16 BRPM | OXYGEN SATURATION: 98 % | HEART RATE: 56 BPM

## 2019-11-13 VITALS
BODY MASS INDEX: 25.76 KG/M2 | SYSTOLIC BLOOD PRESSURE: 138 MMHG | HEIGHT: 62 IN | WEIGHT: 140 LBS | TEMPERATURE: 98.1 F | HEART RATE: 56 BPM | OXYGEN SATURATION: 98 % | RESPIRATION RATE: 16 BRPM | DIASTOLIC BLOOD PRESSURE: 68 MMHG

## 2019-11-13 DIAGNOSIS — I10 ESSENTIAL HYPERTENSION: ICD-10-CM

## 2019-11-13 DIAGNOSIS — K25.3 ACUTE ULCER OF PYLORIC ANTRUM: Primary | ICD-10-CM

## 2019-11-13 DIAGNOSIS — R09.89 CAROTID BRUIT, UNSPECIFIED LATERALITY: ICD-10-CM

## 2019-11-13 DIAGNOSIS — J43.2 CENTRILOBULAR EMPHYSEMA (H): ICD-10-CM

## 2019-11-13 DIAGNOSIS — I48.0 PAF (PAROXYSMAL ATRIAL FIBRILLATION) (H): Primary | ICD-10-CM

## 2019-11-13 DIAGNOSIS — D64.9 ANEMIA, UNSPECIFIED TYPE: ICD-10-CM

## 2019-11-13 DIAGNOSIS — I73.9 PAD (PERIPHERAL ARTERY DISEASE) (H): ICD-10-CM

## 2019-11-13 DIAGNOSIS — E11.9 TYPE 2 DIABETES MELLITUS WITHOUT COMPLICATION, WITHOUT LONG-TERM CURRENT USE OF INSULIN (H): ICD-10-CM

## 2019-11-13 DIAGNOSIS — E78.2 MIXED HYPERLIPIDEMIA: ICD-10-CM

## 2019-11-13 DIAGNOSIS — F17.200 TOBACCO DEPENDENCE: ICD-10-CM

## 2019-11-13 DIAGNOSIS — I25.10 ASCVD (ARTERIOSCLEROTIC CARDIOVASCULAR DISEASE): ICD-10-CM

## 2019-11-13 LAB — HGB BLD-MCNC: 8.9 G/DL (ref 11.7–15.7)

## 2019-11-13 PROCEDURE — 99213 OFFICE O/P EST LOW 20 MIN: CPT | Performed by: INTERNAL MEDICINE

## 2019-11-13 PROCEDURE — 85018 HEMOGLOBIN: CPT | Mod: ZL | Performed by: INTERNAL MEDICINE

## 2019-11-13 PROCEDURE — 36415 COLL VENOUS BLD VENIPUNCTURE: CPT | Mod: ZL | Performed by: INTERNAL MEDICINE

## 2019-11-13 PROCEDURE — 99214 OFFICE O/P EST MOD 30 MIN: CPT | Performed by: NURSE PRACTITIONER

## 2019-11-13 RX ORDER — DILTIAZEM HYDROCHLORIDE 30 MG/1
30 TABLET, FILM COATED ORAL 3 TIMES DAILY PRN
Qty: 30 TABLET | Refills: 1 | Status: SHIPPED | OUTPATIENT
Start: 2019-11-13 | End: 2020-01-01

## 2019-11-13 RX ORDER — NAPROXEN 500 MG/1
1 TABLET ORAL DAILY
COMMUNITY
Start: 2019-11-06 | End: 2019-11-13

## 2019-11-13 ASSESSMENT — MIFFLIN-ST. JEOR: SCORE: 1118.29

## 2019-11-13 ASSESSMENT — PAIN SCALES - GENERAL
PAINLEVEL: MILD PAIN (3)
PAINLEVEL: MILD PAIN (3)

## 2019-11-13 NOTE — PROGRESS NOTES
[facility] HEART CARE   CARDIOLOGY PROGRESS NOTE    Ovidio Luna   [id number]  [facility] [address]      Glynn Botello     Chief Complaint   Patient presents with     Follow Up     2 week follow up        HPI:   Ms. Luna is a 68 year old female who presents for cardiology follow-up to visit on 7/24/19 with history of known coronary atherosclerosis, recently diagnosed early this year with AF with RVR and HTN.  Patient has a history of aortofemoral bypass in 2004, tachybradycardia syndrome, newly identified atrial fibrillation, CAD, hyperlipidemia, hypertension, PAD, hypertension, DM 2, COPD, tobacco abuse, emphysema, history of CVA and gastritis.    Patient has a known history of coronary artery disease with PTCA of the mLAD and pRCA on 2/28/04 with preserved LVEF. PTCA at [facility] in [place].     She has a history of PAS with aortofemoral bypass of the right LE in 2004.     Patient presented to the ED on 3/10/2019 with complaints of chest pressure and numbness radiating into the left arm.  She reported associated shortness of breath, nausea and vomiting, no diaphoresis.  This was described as a nonexertional pain experienced when watching television.  She had no relief with nitroglycerin.  In the ED she was found to be in A. fib with RVR and she was started on a Cardizem drip and admitted to the ICU.  Her first 2 troponins were normal and the third 1 was mildly elevated.  In the ICU she was noted to have a brief sinus pause on monitoring and therefore Cardizem drip was discontinued and the patient spontaneously converted to normal sinus rhythm following this.  Given her upward troponin trend during her hospitalization patient was transferred to [facility] in [place] for coronary evaluation given her known ischemic heart disease history.    Echocardiogram was performed during her hospitalization at [facility] with preserved LVEF, no significant valvular or morphologic abnormalities.  She  had no anginal symptoms on presentation to [facility].  With her newly identified atrial fibrillation she was started on a DOAC for stoke prophylaxis.  Additionally she was found to be a type II diabetic and was also started on metformin.  Her statin was adjusted to high intensity atorvastatin.  She was discharged from [facility] on 3/12/2019.    At her last visit we reviewed results of stable TTE with no systolic or diastolic failure, no concerning valvular abnormalities and no identified pulmonary HTN. She completed a Lexiscan stress test on 7/9/2019 with no stress induced ischemia, there is a small fixed defect which is again present at the cardiac apex.  This is unchanged.  Myocardial motility was preserved with an EF of 66%.  There was no EKG changes.    A Zio was previously recommended to assess her atrial fibrillation burden as she was symptomatic at her last visit.  She never did schedule to have this monitor placed. Since her last visit, patient admits that she took independent discression to adjust her beta-blocker to 150 mg twice daily and feels that her palpitations completely resolved with this and her blood pressure is much improved.  She described chronic shortness of breath unchanged.  She admitted that her chest pressure also improved when she increased her beta-blocker.  She had not experienced any lightheadedness or syncope.    Additionally, patient recently reported increased lower extremity edema.  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  She declines wearing compression stockings that she does not like her toes being squeezed tight.  She does wear a tall sock just below the knee when working that does help some. She was started on Lasix 20 mg every other day.    Today we reviewed results of her CTA study of lower extremities. She was found to have patent aortobifemoral bypass graft, moderate to severe  stenosis at the junction of the left superficial femoral artery and popliteal artery. Severe stenosis versus short occlusion of the mid popliteal artery just above the level of the tibiofemoral articulation. She was referred back to vascular surgery as a result of these findings. She is scheduled to see vascular surgery at [facility] on 10/30.    Since her last visit patient was seen by vascular surgeon Dr. Dukes on 10/30/19. In review of her recent CTA there was concern that her irregular infrarenal aortic thrombus may place her at risk for occlusion of her aortobifemoral graft. Favored treatment with endovascular technique which may include placement of aortic cuffs from her renal artery origins into the main body of the graft. This would be performed outpatient through a right proximal superficial femoral artery cutdown.    Additionally, since last visit patient reported increased symptoms with AF, increased palpitations. She has felt increase palpitations with this and describes HA after symptoms improve. She has a normal lexiscan stress test in April 2019 with no stress induced ischemia. No changes from study in 2016. A ZIO was initiated, due to be taken off on November 21st. Admits that episodes have improved since she started wearing the ZIO.    PAST MEDICAL HISTORY:   Past Medical History:   Diagnosis Date     Allergy status to other antibiotic agents status      Atherosclerosis     History of ASO with claudication     Atherosclerotic heart disease of native coronary artery without angina pectoris 2004    Stent times 2     Atrial fibrillation (H) 3/10/2019     Centrilobular emphysema (H) 12/12/2016     Cerebral infarction (H) 05/20/2012    CVA with residual left sided weakness, incidental meningioma found     Closed fracture of shaft of left tibia 12/26/2001    History of bilateral tibial fx, work related injury     H/O aorto-femoral bypass (2004) 4/3/2019     Hyperlipidemia 1/17/2018     Hypertension  1/17/2018     Meningioma (H) 5/17/2012     Migraine     History of  migraines     Osteopenia 03/14/2006    Osteopenia, proximal femur.   Start Fosamax 08/07, stopped fosamax.     Peripheral vascular disease (H) 1/17/2018     Tobacco abuse 1/17/2018     Type 2 diabetes mellitus without complication, without long-term current use of insulin (H) 3/25/2019     Zoster without complications 2008    left shoulder and neck     Zoster without complications     2009/2008,Herpes zoster, left shoulder and neck          FAMILY HISTORY:   Family History   Problem Relation Age of Onset     Heart Disease Father         Heart Disease     Cancer Father         Cancer,myeloma     Heart Disease Mother         Heart Disease,MI, stenting     Cerebrovascular Disease Brother      Other - See Comments Brother         sciatica     Other - See Comments Other         FHx Father's side Coronary artery disease     Other - See Comments Paternal Uncle         Stroke     Cancer Paternal Uncle         Cancer,Bladder x2     Other - See Comments Sister         chronic pain syndrome     Breast Cancer No family hx of         Cancer-breast          PAST SURGICAL HISTORY:   Past Surgical History:   Procedure Laterality Date     COLONOSCOPY  2004    2 hyperplastic polyps, repeat in 2014     COLONOSCOPY N/A 9/23/2019    F/U 2024 adenomatous polyps     ESOPHAGOSCOPY, GASTROSCOPY, DUODENOSCOPY (EGD), COMBINED N/A 9/23/2019    Antral ulcer     HEMORRHOID SURGERY       HYSTERECTOMY TOTAL ABDOMINAL, BILATERAL SALPINGO-OOPHORECTOMY, COMBINED  1987    total hyster - BSO     OTHER SURGICAL HISTORY Right 2004    97939.0,MA BYPASS GRAFT AORTOBIFEMORAL     OTHER SURGICAL HISTORY      2004,LOC-1006.05,PTCA,mid LAD, proximal RCA     OTHER SURGICAL HISTORY      03/08/16,CXK024,CYSTOSCOPY W/ URETEROSCOPY W/ LITHOTRIPSY,Right,Dr. Mario HOGUE          SOCIAL HISTORY:   Social History     Socioeconomic History     Marital status: Single     Spouse name: None     Number of  children: None     Years of education: None     Highest education level: None   Occupational History     None   Social Needs     Financial resource strain: None     Food insecurity:     Worry: None     Inability: None     Transportation needs:     Medical: None     Non-medical: None   Tobacco Use     Smoking status: Current Every Day Smoker     Packs/day: 0.50     Years: 16.00     Pack years: 8.00     Types: Cigarettes     Smokeless tobacco: Never Used   Substance and Sexual Activity     Alcohol use: No     Drug use: No     Sexual activity: Not Currently   Lifestyle     Physical activity:     Days per week: None     Minutes per session: None     Stress: None   Relationships     Social connections:     Talks on phone: None     Gets together: None     Attends Evangelical service: None     Active member of club or organization: None     Attends meetings of clubs or organizations: None     Relationship status: None     Intimate partner violence:     Fear of current or ex partner: None     Emotionally abused: None     Physically abused: None     Forced sexual activity: None   Other Topics Concern     Parent/sibling w/ CABG, MI or angioplasty before 65F 55M? Not Asked   Social History Narrative    ** Merged History Encounter **         Works at [employer]  Melly Spawn Mother  [facility] Sister  One brother          CURRENT MEDICATIONS:   Prior to Admission medications    Medication Sig Start Date End Date Taking? Authorizing Provider   albuterol (PROAIR HFA/PROVENTIL HFA/VENTOLIN HFA) 108 (90 Base) MCG/ACT Inhaler Inhale 1-2 puffs into the lungs every 6 hours as needed for shortness of breath / dyspnea or wheezing 6/7/18  Yes Fely Gregorio APRN CNP   amLODIPine (NORVASC) 10 MG tablet Take 1 tablet (10 mg) by mouth daily 3/25/19  Yes Lizet Garcia PA-C   atorvastatin (LIPITOR) 40 MG tablet Take 40 mg by mouth 3/12/19  Yes Reported, Patient   busPIRone (BUSPAR) 10 MG tablet Take 10 mg  by mouth daily   Yes Unknown, Entered By History   Cholecalciferol (VITAMIN D3) 2000 UNITS CAPS Take 4,000 Units by mouth 2 times daily  9/18/12  Yes Reported, Patient   citalopram (CELEXA) 10 MG tablet Take 1 tablet (10 mg) by mouth every morning 11/2/18  Yes Glynn Botello MD   fexofenadine (ALLEGRA) 180 MG tablet Take 180 mg by mouth 2 times daily  9/18/12  Yes Reported, Patient   fluticasone (FLONASE) 50 MCG/ACT nasal spray Spray 1 spray into both nostrils daily as needed for rhinitis or allergies   Yes Unknown, Entered By History   fluticasone-salmeterol (ADVAIR) 250-50 MCG/DOSE inhaler Inhale 1 puff into the lungs daily as needed   Yes Unknown, Entered By History   hydrochlorothiazide (HYDRODIURIL) 25 MG tablet Take 1 tablet (25 mg) by mouth daily 8/27/18  Yes Glynn Botello MD   ipratropium - albuterol 0.5 mg/2.5 mg/3 mL (DUONEB) 0.5-2.5 (3) MG/3ML neb solution Take 1 vial by nebulization every morning . May also use 1 neb every 6 hours as needed for shortness of breath.   Yes Unknown, Entered By History   metFORMIN (GLUCOPHAGE) 500 MG tablet Take 1 tablet (500 mg) by mouth daily (with breakfast) 3/21/19  Yes Lizet Garcia PA-C   metoprolol tartrate (LOPRESSOR) 50 MG tablet Take 1.5 tablets (75 mg) by mouth 2 times daily 3/29/19  Yes Hawa Grissom MD   nicotine (COMMIT) 2 MG lozenge Take 2 mg by mouth 3/12/19  Yes Reported, Patient   order for [facility] NEBULIZER MACHINE ITEM #[id number] DX [id number] [facility]  2/20/19  Yes Reported, Patient   potassium chloride SA (K-DUR/KLOR-CON M) 20 MEQ CR tablet Take 1 tablet (20 mEq) by mouth 2 times daily (with meals) 6/5/18  Yes Glynn Botello MD   Respiratory Therapy Supplies (NEBULIZER/TUBING/MOUTHPIECE) KIT Dx: COPD with exacerbation. Sig: Use as directed. 2/20/19  Yes Lizet Garcia PA-C   rivaroxaban ANTICOAGULANT (XARELTO) 20 MG TABS tablet Take 20 mg by mouth 3/12/19  Yes Reported, Patient          ALLERGIES:   Allergies  "  Allergen Reactions     Morphine      Tachycardia       Propoxyphene N-Apap Unknown     Tremors       Varenicline Nausea and Vomiting     Codeine Palpitations     Diphenhydramine Palpitations and Other (See Comments)     wheezing     Lisinopril Palpitations and Cough     No Clinical Screening - See Comments Rash     Pt states allergies to white/yellow gold.  Sugical steel.  Copper./ developed infection     Tetracycline Nausea and Vomiting and Rash          ROS:   CONSTITUTIONAL: No reported fever or chills. No changes in weight.  ENT: No visual disturbance, ear ache, epistaxis or sore throat.   CARDIOVASCULAR: No recurrence of chest pain, chest pressure or chest discomfort.  Palpitations improved with increased dose of beta-blocker, increased lower extremity edema.   RESPIRATORY: Positive for chronic shortness of breath and dyspnea upon exertion which she feels is unchanged. No cough, wheezing or hemoptysis.  No orthopnea or PND.  GI: No reported abdominal pain, no melena or hematochezia.  : No reported hematuria or dysuria.   NEUROLOGICAL: No lightheadedness, dizziness, syncope, ataxia, paresthesias or weakness.   HEMATOLOGIC: No history of anemia. No bleeding or excessive bruising. No history of blood clots.   MUSCULOSKELETAL: No reported joint pain or swelling, no muscle pain.  ENDOCRINOLOGIC: No temperature intolerance. No hair or skin changes.  SKIN: No abnormal rashes or sores, no unusual itching.  PSYCHIATRIC: No history of depression or anxiety. No changes in mood, feeling down or anxious. No changes in sleep.      PHYSICAL EXAM:   /68 (BP Location: Right arm, Patient Position: Sitting, Cuff Size: Adult Regular)   Pulse 56   Temp 98.1  F (36.7  C) (Tympanic)   Resp 16   Ht 1.575 m (5' 2\")   Wt 63.5 kg (140 lb)   SpO2 98%   BMI 25.61 kg/m    GENERAL: The patient appears malnourished, in no apparent distress.  HEENT: Head is normocephalic and atraumatic. Eyes are symmetrical with normal visual " tracking. No icterus, no xanthelasmas. Nares appeared normal without nasal drainage. Mucous membranes are moist, no cyanosis.  NECK: Supple.   CHEST/ LUNGS: Lungs clear to auscultation, no rales, rhonchi or wheezes, no use of accessory muscles, no retractions, respirations unlabored and normal respiratory rate.   CARDIO: Irregular rate and rhythm normal with S1 and S2, no murmur, click or rub.   ABD: Abdomen is nondistended.   EXTREMITIES: 1+ bilateral lower extremity edema present.   MUSCULOSKELETAL: No visible joint swelling.   NEUROLOGIC: Alert and oriented X3. Normal speech, gait and affect. No focal neurologic deficits.   SKIN: No jaundice. No rashes or visible skin lesions present. No ecchymosis.     LAB RESULTS:   Office Visit on 01/13/2019   Component Date Value Ref Range Status     Specimen Description 01/13/2019 Nasal   Final     Influenza A PCR 01/13/2019 Negative  NEG^Negative Final     Influenza B PCR 01/13/2019 Negative  NEG^Negative Final     Resp Syncytial Virus 01/13/2019 Negative  NEG^Negative Final     WBC 01/13/2019 11.3* 4.0 - 11.0 10e9/L Final     RBC Count 01/13/2019 4.96  3.8 - 5.2 10e12/L Final     Hemoglobin 01/13/2019 14.4  11.7 - 15.7 g/dL Final     Hematocrit 01/13/2019 44.3  35.0 - 47.0 % Final     MCV 01/13/2019 89  78 - 100 fl Final     MCH 01/13/2019 29.0  26.5 - 33.0 pg Final     MCHC 01/13/2019 32.5  31.5 - 36.5 g/dL Final     RDW 01/13/2019 12.8  10.0 - 15.0 % Final     Platelet Count 01/13/2019 252  150 - 450 10e9/L Final     Diff Method 01/13/2019 Automated Method   Final     % Neutrophils 01/13/2019 59.7  % Final     % Lymphocytes 01/13/2019 23.2  % Final     % Monocytes 01/13/2019 13.6  % Final     % Eosinophils 01/13/2019 2.6  % Final     % Basophils 01/13/2019 0.6  % Final     % Immature Granulocytes 01/13/2019 0.3  % Final     Absolute Neutrophil 01/13/2019 6.8  1.6 - 8.3 10e9/L Final     Absolute Lymphocytes 01/13/2019 2.6  0.8 - 5.3 10e9/L Final     Absolute Monocytes  01/13/2019 1.5* 0.0 - 1.3 10e9/L Final     Absolute Eosinophils 01/13/2019 0.3  0.0 - 0.7 10e9/L Final     Absolute Basophils 01/13/2019 0.1  0.0 - 0.2 10e9/L Final     Abs Immature Granulocytes 01/13/2019 0.0  0 - 0.4 10e9/L Final          ASSESSMENT:   Nonamarie Jeremy presents for cardiology follow-up to visit on 7/24/19 with history of known coronary atherosclerosis, recently diagnosed early this year with AF with RVR and HTN.  Patient has a history of aortofemoral bypass in 2004, tachybradycardia syndrome, newly identified atrial fibrillation, CAD, hyperlipidemia, hypertension, PAD, hypertension, DM 2, COPD, tobacco abuse, emphysema, history of CVA and gastritis.  Since her last visit patient was seen by vascular surgeon Dr. Dukes on 10/30/19. In review of her recent CTA there was concern that her irregular infrarenal aortic thrombus may place her at risk for occlusion of her aortobifemoral graft. Favored treatment with endovascular technique which may include placement of aortic cuffs from her renal artery origins into the main body of the graft. This would be performed outpatient through a right proximal superficial femoral artery cutdown.  Additionally, since last visit patient reported increased symptoms with AF, increased palpitations. She has felt increase palpitations with this and describes HA after symptoms improve. She has a normal lexiscan stress test in April 2019 with no stress induced ischemia. No changes from study in 2016. A ZIO was initiated, due to be taken off on November 21st. Admits that episodes have improved since she started wearing the ZIO.    1. PAF (paroxysmal atrial fibrillation) (H)  2. PAD (peripheral artery disease) (H)  3. Tobacco dependence  4. Centrilobular emphysema (H)  5. Type 2 diabetes mellitus without complication, without long-term current use of insulin (H)  6. Mixed hyperlipidemia  7. Essential hypertension  8. ASCVD (arteriosclerotic cardiovascular  disease)      PLAN:  1. Patient with known CAD, PTCA of the mLAD and pRCA on 2/28/04 with preserved LVEF. PTCA at [facility] in [place]. Describes atypical chest pain at times in the AM. Lexiscan stress test completed on 7/9/19 with no evidence of stress induced ischemia.   2. Episodes of palpitations with chest tightness radiating into throat and HA following event. Suspected this is likely related to PAF episodes with RVR. She is currently wearing ZIO. She will continue with Toprol  mg daily as previous. Order placed for as needed 30 mg Cardizem for episodes.   3. She is not on oral anticoagulation with bleeding complications, she does have [id number] >2. Vascular surgery recommended she stay on ASA 81 mg once daily. This will not protect patient from possible embolic event with AF.  4. Vascular surgery recommended outtpatient placement of aortic cuffs from her renal artery origins into the main body of the graft via a right proximal superficial femoral artery cutdown.  5. She will continue with Atorvastatin 40 mg before her bedtime.  6. Continued encouragement for tobacco cessation.  7. Return to cardiology clinic in 4 weeks to review results of ZIO.      Thank you for allowing me to participate in the care of your patient. Please do not hesitate to contact me if you have any questions.     Martina Roberto

## 2019-11-13 NOTE — LETTER
November 13, 2019      Ovidio Luna  [address]  [facility] [address]        To Whom It May Concern:    Ovidio Luna  was seen on 11/13/2019 after acute illness.  She had an episode of acute illness on 11/10/2019 and was unable to work.  She is able to return to work at this time.    Sincerely,        Ana Perdomo, DO

## 2019-11-13 NOTE — NURSING NOTE
"Chief Complaint   Patient presents with     Follow Up     2 week follow up       Initial /68 (BP Location: Right arm, Patient Position: Sitting, Cuff Size: Adult Regular)   Pulse 56   Temp 98.1  F (36.7  C) (Tympanic)   Resp 16   Ht 1.575 m (5' 2\")   Wt 63.5 kg (140 lb)   SpO2 98%   BMI 25.61 kg/m   Estimated body mass index is 25.61 kg/m  as calculated from the following:    Height as of this encounter: 1.575 m (5' 2\").    Weight as of this encounter: 63.5 kg (140 lb).  Meds Reconciled: complete  Pt is not on Aspirin  Pt is on a Statin  PHQ and/or MIKE reviewed. Pt referred to PCP/MH Provider as appropriate.    Jeri Garibay LPN      "

## 2019-11-13 NOTE — NURSING NOTE
"Chief Complaint   Patient presents with     RECHECK     anemia       Initial /68   Pulse 56   Temp 98.1  F (36.7  C) (Tympanic)   Resp 16   Wt 63.5 kg (140 lb)   SpO2 98%   Breastfeeding No   BMI 25.61 kg/m   Estimated body mass index is 25.61 kg/m  as calculated from the following:    Height as of an earlier encounter on 11/13/19: 1.575 m (5' 2\").    Weight as of this encounter: 63.5 kg (140 lb).  Medication Reconciliation: complete    Flor Malcolm LPN  "

## 2019-11-13 NOTE — PATIENT INSTRUCTIONS
You were seen by  VOLODYMYR Harper CNP       1. To use  Diltiazem (Cardizem)  Take 1 tablet (30 mg) by mouth 3 times daily as needed (racing heart, palpitations) Afib.    2. No other changes.        You will follow up with [facility] in 4 weeks, sooner if needed.       Please call the cardiology office with problems, questions, or concerns at [phone].    If you experience chest pain, chest pressure, chest tightness, shortness of breath, fainting, lightheadedness, nausea, vomiting, or other concerning symptoms, please report to the Emergency Department or call 911. These symptoms may be emergent, and best treated in the Emergency Department.     Cardiology Nurses  Kenya Rivera, APRIL CHAVARRIA, BRAYAN HOLDEN LPN  [facility] (Unit [address])  [phone]

## 2019-11-13 NOTE — PATIENT INSTRUCTIONS
Decrease omeprazole to once daily starting in December.      Stop Naproxen    Caution with NSAIDS  (ibuprofen, aspirin, naproxen, aleve, advil) due to risk for increased blood pressure, stomach pain/nausea/ulcers and kidney damage; use minimal amount necessary    For pain: 1000-1300mg Acetaminophen up to three times daily as needed for pain

## 2019-11-13 NOTE — LETTER
November 13, 2019      Ovidio Luna  [address] [place] [address]        Dear ,    We are writing to inform you of your test results.    Hemoglobin has improved to 8.9.  We will plan to recheck this when you see cardiology in December however this is better than prior.  Continue with omeprazole twice daily until the end of the month as we discussed and then decrease to once daily.  Stop use of naproxen.      Resulted Orders   Hemoglobin   Result Value Ref Range    Hemoglobin 8.9 (L) 11.7 - 15.7 g/dL       If you have any questions or concerns, please call the clinic at the number listed above.       Sincerely,        Ana Perdomo, DO

## 2019-11-19 ENCOUNTER — HOSPITAL ENCOUNTER (OUTPATIENT)
Dept: ULTRASOUND IMAGING | Facility: OTHER | Age: 69
Discharge: HOME | End: 2019-11-19
Attending: NURSE PRACTITIONER | Admitting: NURSE PRACTITIONER
Payer: COMMERCIAL

## 2019-11-19 DIAGNOSIS — I73.9 PAD (PERIPHERAL ARTERY DISEASE) (H): ICD-10-CM

## 2019-11-19 DIAGNOSIS — I25.10 ASCVD (ARTERIOSCLEROTIC CARDIOVASCULAR DISEASE): ICD-10-CM

## 2019-11-19 DIAGNOSIS — R09.89 CAROTID BRUIT, UNSPECIFIED LATERALITY: ICD-10-CM

## 2019-11-19 PROCEDURE — 93880 EXTRACRANIAL BILAT STUDY: CPT

## 2019-12-02 ENCOUNTER — TELEPHONE (OUTPATIENT)
Dept: CARDIOLOGY | Facility: OTHER | Age: 69
End: 2019-12-02

## 2019-12-02 NOTE — TELEPHONE ENCOUNTER
Called an talked with patient this am.  Patient states yesterday at about 3:00am had a episode of A-Fib that lasted about 15 minutes with chest pain and pressure and discomfort down her arm, broke out in a sweat had shortness of breath then all subsided she was at work. Blood pressure at 0755 240/106 pulse 91 and at 0810 BP was 205/84 pulse 75 yesterday am.  Patient states did not take the Diltiazem did not have them with her at work.  Discussed with patient with this is important to take medication with racing heart and palpations she states that medication does not work.  Discussed with patient with episode with chest pain pressure, SOB, and arm pain she should of went to ER.  Patient states does not want to go to [facility] again this is why she did not go to the ER.    Will send to VOLODYMYR Harper CNP to review and further recommendations.  Kenya Rivera RN on 12/2/2019 at 9:43 AM

## 2019-12-02 NOTE — TELEPHONE ENCOUNTER
Patient left message on voicemail requesting a call back from Martina's nurse.  She would like to discuss her A-fib.  She is scheduled for a follow-up appointment with Martina on 12.11.2019.  Please call patient to discuss and advise.  Keena De La Fuente on 12/2/2019 at 8:50 AM

## 2019-12-04 NOTE — TELEPHONE ENCOUNTER
Called patient with result of monitor she states is still having symptom of the palpations and chest discomfort this is not happening all the time.  Patient denies shortness of breath, dizziness, syncope or near syncope.  Patient states does take the diltiazem up to three times as needed, is also taking the Toprol XL 100mg daily as prescribed and is still having above symptoms at times.  Patient stated if symptoms continue she will go to ER.  Kenya Rivera RN on 12/4/2019 at 9:23 AM

## 2019-12-09 DIAGNOSIS — D64.9 ANEMIA, UNSPECIFIED TYPE: Primary | ICD-10-CM

## 2019-12-11 ENCOUNTER — TELEPHONE (OUTPATIENT)
Dept: INTERNAL MEDICINE | Facility: OTHER | Age: 69
End: 2019-12-11

## 2019-12-11 ENCOUNTER — OFFICE VISIT (OUTPATIENT)
Dept: CARDIOLOGY | Facility: OTHER | Age: 69
End: 2019-12-11
Attending: NURSE PRACTITIONER
Payer: COMMERCIAL

## 2019-12-11 VITALS
TEMPERATURE: 96.6 F | HEIGHT: 62 IN | SYSTOLIC BLOOD PRESSURE: 120 MMHG | OXYGEN SATURATION: 98 % | WEIGHT: 137 LBS | RESPIRATION RATE: 16 BRPM | HEART RATE: 56 BPM | DIASTOLIC BLOOD PRESSURE: 72 MMHG | BODY MASS INDEX: 25.21 KG/M2

## 2019-12-11 DIAGNOSIS — N18.30 CKD (CHRONIC KIDNEY DISEASE) STAGE 3, GFR 30-59 ML/MIN (H): ICD-10-CM

## 2019-12-11 DIAGNOSIS — I48.0 PAF (PAROXYSMAL ATRIAL FIBRILLATION) (H): ICD-10-CM

## 2019-12-11 DIAGNOSIS — R00.2 PALPITATIONS: ICD-10-CM

## 2019-12-11 DIAGNOSIS — D50.0 IRON DEFICIENCY ANEMIA DUE TO CHRONIC BLOOD LOSS: ICD-10-CM

## 2019-12-11 DIAGNOSIS — R07.9 EXERTIONAL CHEST PAIN: Primary | ICD-10-CM

## 2019-12-11 DIAGNOSIS — I10 ESSENTIAL HYPERTENSION: ICD-10-CM

## 2019-12-11 DIAGNOSIS — R06.09 DOE (DYSPNEA ON EXERTION): ICD-10-CM

## 2019-12-11 PROCEDURE — 99214 OFFICE O/P EST MOD 30 MIN: CPT | Performed by: NURSE PRACTITIONER

## 2019-12-11 RX ORDER — HEPARIN SODIUM (PORCINE) LOCK FLUSH IV SOLN 100 UNIT/ML 100 UNIT/ML
5 SOLUTION INTRAVENOUS
Status: CANCELLED | OUTPATIENT
Start: 2019-12-11

## 2019-12-11 RX ORDER — HEPARIN SODIUM,PORCINE 10 UNIT/ML
5 VIAL (ML) INTRAVENOUS
Status: CANCELLED | OUTPATIENT
Start: 2019-12-11

## 2019-12-11 RX ORDER — ASPIRIN 81 MG/1
1 TABLET ORAL DAILY
COMMUNITY
Start: 2019-11-27 | End: 2020-01-01

## 2019-12-11 RX ORDER — ISOSORBIDE MONONITRATE 30 MG/1
30 TABLET, EXTENDED RELEASE ORAL DAILY
Qty: 90 TABLET | Refills: 1 | Status: SHIPPED | OUTPATIENT
Start: 2019-12-11 | End: 2019-12-23

## 2019-12-11 RX ORDER — LOSARTAN POTASSIUM 25 MG/1
25 TABLET ORAL DAILY
Qty: 90 TABLET | Refills: 3 | Status: SHIPPED | OUTPATIENT
Start: 2019-12-11 | End: 2020-01-01

## 2019-12-11 ASSESSMENT — ANXIETY QUESTIONNAIRES
3. WORRYING TOO MUCH ABOUT DIFFERENT THINGS: NOT AT ALL
GAD7 TOTAL SCORE: 5
5. BEING SO RESTLESS THAT IT IS HARD TO SIT STILL: NEARLY EVERY DAY
IF YOU CHECKED OFF ANY PROBLEMS ON THIS QUESTIONNAIRE, HOW DIFFICULT HAVE THESE PROBLEMS MADE IT FOR YOU TO DO YOUR WORK, TAKE CARE OF THINGS AT HOME, OR GET ALONG WITH OTHER PEOPLE: VERY DIFFICULT
2. NOT BEING ABLE TO STOP OR CONTROL WORRYING: NOT AT ALL
6. BECOMING EASILY ANNOYED OR IRRITABLE: MORE THAN HALF THE DAYS
1. FEELING NERVOUS, ANXIOUS, OR ON EDGE: NOT AT ALL
7. FEELING AFRAID AS IF SOMETHING AWFUL MIGHT HAPPEN: NOT AT ALL

## 2019-12-11 ASSESSMENT — MIFFLIN-ST. JEOR: SCORE: 1099.81

## 2019-12-11 ASSESSMENT — PATIENT HEALTH QUESTIONNAIRE - PHQ9: 5. POOR APPETITE OR OVEREATING: NOT AT ALL

## 2019-12-11 ASSESSMENT — PAIN SCALES - GENERAL: PAINLEVEL: MODERATE PAIN (4)

## 2019-12-11 NOTE — NURSING NOTE
"Chief Complaint   Patient presents with     RECHECK     4 week follow up       Initial /72 (BP Location: Right arm, Patient Position: Sitting, Cuff Size: Adult Regular)   Pulse 56   Temp 96.6  F (35.9  C) (Tympanic)   Resp 16   Ht 1.575 m (5' 2.01\")   Wt 62.1 kg (137 lb)   SpO2 98%   BMI 25.05 kg/m   Estimated body mass index is 25.05 kg/m  as calculated from the following:    Height as of this encounter: 1.575 m (5' 2.01\").    Weight as of this encounter: 62.1 kg (137 lb).  Meds Reconciled: complete  Pt is on Aspirin  Pt is on a Statin  PHQ and/or MIKE reviewed. Pt referred to PCP/MH Provider as appropriate.    Jeri Garibay LPN      "

## 2019-12-11 NOTE — PATIENT INSTRUCTIONS
You were seen by  VOLODYMYR Harper CNP       1. Start iron infusions.     2. Start Imdur 30mg 1 tablet (30mg) by mouth once daily.    3. Start Losartan 25mg 1 tablet (25mg) by mouth once daily.    4. A Renal ultrasound has been ordered. You will be called to schedule this. You will receive instructions for testing at that time. You will be contacted with results.      5. A CT angiogram coronary artery  has been ordered. You will be called to schedule this. You will receive instructions for testing at that time. You will be contacted with results.    6.  Prescription sent to you pharmacy.     7. No other changes.    You will follow up with [facility] in 2 weeks, sooner if needed.       Please call the cardiology office with problems, questions, or concerns at [phone].    If you experience chest pain, chest pressure, chest tightness, shortness of breath, fainting, lightheadedness, nausea, vomiting, or other concerning symptoms, please report to the Emergency Department or call 911. These symptoms may be emergent, and best treated in the Emergency Department.     Cardiology Nurses  APRIL Alvarez, BRAYAN HOLDEN LPN  [facility] ([facility])  [phone]

## 2019-12-11 NOTE — TELEPHONE ENCOUNTER
After talking with cardiology, patient will need chronic iron infusions. Due to credentialing, plan for infusions still needs to be placed.     Eveline Parker LPN............. December 11, 2019 10:39 AM

## 2019-12-11 NOTE — PROGRESS NOTES
[facility] HEART CARE   CARDIOLOGY PROGRESS NOTE    Ovidio Luna   [address]  [facility] [address]      Glynn Botello     Chief Complaint   Patient presents with     RECHECK     4 week follow up        HPI:   Ms. Luna is a 69 year old female who presents for cardiology follow-up to visit on 11/13/19 with history of known coronary atherosclerosis, recently diagnosed early this year with AF with RVR and HTN.  Patient has a history of aortofemoral bypass in 2004, tachybradycardia syndrome, newly identified atrial fibrillation, CAD, hyperlipidemia, hypertension, PAD, hypertension, DM 2, COPD, tobacco abuse, emphysema, history of CVA and gastritis.    Patient has a known history of coronary artery disease with PTCA of the mLAD and pRCA on 2/28/04 with preserved LVEF. PTCA at [facility] in [place].     She has a history of PAS with aortofemoral bypass of the right LE in 2004.     Patient presented to the ED on 3/10/2019 with complaints of chest pressure and numbness radiating into the left arm.  She reported associated shortness of breath, nausea and vomiting, no diaphoresis.  This was described as a nonexertional pain experienced when watching television.  She had no relief with nitroglycerin.  In the ED she was found to be in A. fib with RVR and she was started on a Cardizem drip and admitted to the ICU.  Her first 2 troponins were normal and the third 1 was mildly elevated.  In the ICU she was noted to have a brief sinus pause on monitoring and therefore Cardizem drip was discontinued and the patient spontaneously converted to normal sinus rhythm following this.  Given her upward troponin trend during her hospitalization patient was transferred to [facility] in [place] for coronary evaluation given her known ischemic heart disease history.    Echocardiogram was performed during her hospitalization at [place] with preserved LVEF, no significant valvular or morphologic abnormalities.  She had  no anginal symptoms on presentation to [place].  With her newly identified atrial fibrillation she was started on a DOAC for stoke prophylaxis.  Additionally she was found to be a type II diabetic and was also started on metformin.  Her statin was adjusted to high intensity atorvastatin.  She was discharged from [place] on 3/12/2019.    At her last visit we reviewed results of stable TTE with no systolic or diastolic failure, no concerning valvular abnormalities and no identified pulmonary HTN. She completed a Lexiscan stress test on 7/9/2019 with no stress induced ischemia, there is a small fixed defect which is again present at the cardiac apex.  This is unchanged.  Myocardial motility was preserved with an EF of 66%.  There was no EKG changes.    Today we reviewed results of her CTA study of lower extremities. She was found to have patent aortobifemoral bypass graft, moderate to severe stenosis at the junction of the left superficial femoral artery and popliteal artery. Severe stenosis versus short occlusion of the mid popliteal artery just above the level of the tibiofemoral articulation. She was referred back to vascular surgery as a result of these findings. She is scheduled to see vascular surgery at [facility] on 10/30.    Since her last visit patient was seen by vascular surgeon Dr. Dukes on 10/30/19. In review of her recent CTA there was concern that her irregular infrarenal aortic thrombus may place her at risk for occlusion of her aortobifemoral graft. Favored treatment with endovascular technique which may include placement of aortic cuffs from her renal artery origins into the main body of the graft. This would be performed outpatient through a right proximal superficial femoral artery cutdown.    Additionally, since last visit patient reported increased symptoms with AF, increased palpitations. She has felt increase palpitations with this and describes HA after  symptoms improve. She has a normal lexiscan stress test in April 2019 with no stress induced ischemia. No changes from study in 2016. A ZIO was initiated. We have reviewed results of ZIO today with no presence of AF. No VT, pauses or AV block. She did have presence of nonsustained runs of PSVT for which she was not symptomatic. She was symptomatic with occasional ventricular ectopy (rare). Otherwise, symptoms with normal sinus rhythm.     Today patient reports that chest pain/ pressure is worsened and is associated with all exertional activity, feels that this is progressively worsening. Positive for palpitations. Has noted BP at home with symptoms to be significantly elevated around 200/100. She denies any lightheadedness or syncope. No increased edema. Positive for BRENNER.     PAST MEDICAL HISTORY:   Past Medical History:   Diagnosis Date     Allergy status to other antibiotic agents status      Atherosclerosis     History of ASO with claudication     Atherosclerotic heart disease of native coronary artery without angina pectoris 2004    Stent times 2     Atrial fibrillation (H) 3/10/2019     Centrilobular emphysema (H) 12/12/2016     Cerebral infarction (H) 05/20/2012    CVA with residual left sided weakness, incidental meningioma found     Closed fracture of shaft of left tibia 12/26/2001    History of bilateral tibial fx, work related injury     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   Start Fosamax 08/07, stopped fosamax.     Peripheral vascular disease (H) 1/17/2018     Tobacco abuse 1/17/2018     Type 2 diabetes mellitus without complication, without long-term current use of insulin (H) 3/25/2019     Zoster without complications 2008    left shoulder and neck     Zoster without complications     2009/2008,Herpes zoster, left  shoulder and neck          FAMILY HISTORY:   Family History   Problem Relation Age of Onset     Heart Disease Father         Heart Disease     Cancer Father         Cancer,myeloma     Heart Disease Mother         Heart Disease,MI, stenting     Cerebrovascular Disease Brother      Other - See Comments Brother         sciatica     Other - See Comments Other         FHx Father's side Coronary artery disease     Other - See Comments Paternal Uncle         Stroke     Cancer Paternal Uncle         Cancer,Bladder x2     Other - See Comments Sister         chronic pain syndrome     Breast Cancer No family hx of         Cancer-breast          PAST SURGICAL HISTORY:   Past Surgical History:   Procedure Laterality Date     COLONOSCOPY  2004    2 hyperplastic polyps, repeat in 2014     COLONOSCOPY N/A 9/23/2019    F/U 2024 adenomatous polyps     ESOPHAGOSCOPY, GASTROSCOPY, DUODENOSCOPY (EGD), COMBINED N/A 9/23/2019    Antral ulcer     HEMORRHOID SURGERY       HYSTERECTOMY TOTAL ABDOMINAL, BILATERAL SALPINGO-OOPHORECTOMY, COMBINED  1987    total hyster - BSO     OTHER SURGICAL HISTORY Right 2004    28078.0,AK BYPASS GRAFT AORTOBIFEMORAL     OTHER SURGICAL HISTORY      2004,LOC-1006.05,PTCA,mid LAD, proximal RCA     OTHER SURGICAL HISTORY      03/08/16,JSA438,CYSTOSCOPY W/ URETEROSCOPY W/ LITHOTRIPSY,Right,Dr. Mario HOGUE          SOCIAL HISTORY:   Social History     Socioeconomic History     Marital status: Single     Spouse name: None     Number of children: None     Years of education: None     Highest education level: None   Occupational History     None   Social Needs     Financial resource strain: None     Food insecurity:     Worry: None     Inability: None     Transportation needs:     Medical: None     Non-medical: None   Tobacco Use     Smoking status: Current Every Day Smoker     Packs/day: 0.50     Years: 16.00     Pack years: 8.00     Types: Cigarettes     Smokeless tobacco: Never Used   Substance and Sexual  Activity     Alcohol use: No     Drug use: No     Sexual activity: Not Currently   Lifestyle     Physical activity:     Days per week: None     Minutes per session: None     Stress: None   Relationships     Social connections:     Talks on phone: None     Gets together: None     Attends [employer]al service: None     Active member of club or organization: None     Attends meetings of clubs or organizations: None     Relationship status: None     Intimate partner violence:     Fear of current or ex partner: None     Emotionally abused: None     Physically abused: None     Forced sexual activity: None   Other Topics Concern     Parent/sibling w/ CABG, MI or angioplasty before 65F 55M? Not Asked   Social History Narrative    ** Merged History Encounter **         Works at [employer]  Melly Spawn Mother  Delma MarioDanielle Sister  One brother          CURRENT MEDICATIONS:   Prior to Admission medications    Medication Sig Start Date End Date Taking? Authorizing Provider   albuterol (PROAIR HFA/PROVENTIL HFA/VENTOLIN HFA) 108 (90 Base) MCG/ACT Inhaler Inhale 1-2 puffs into the lungs every 6 hours as needed for shortness of breath / dyspnea or wheezing 6/7/18  Yes Fely Gregorio APRN CNP   amLODIPine (NORVASC) 10 MG tablet Take 1 tablet (10 mg) by mouth daily 3/25/19  Yes Lizet Garcia PA-C   atorvastatin (LIPITOR) 40 MG tablet Take 40 mg by mouth 3/12/19  Yes Reported, Patient   busPIRone (BUSPAR) 10 MG tablet Take 10 mg by mouth daily   Yes Unknown, Entered By History   Cholecalciferol (VITAMIN D3) 2000 UNITS CAPS Take 4,000 Units by mouth 2 times daily  9/18/12  Yes Reported, Patient   citalopram (CELEXA) 10 MG tablet Take 1 tablet (10 mg) by mouth every morning 11/2/18  Yes Glynn Botello MD   fexofenadine (ALLEGRA) 180 MG tablet Take 180 mg by mouth 2 times daily  9/18/12  Yes Reported, Patient   fluticasone (FLONASE) 50 MCG/ACT nasal spray Spray 1 spray into both nostrils daily as  needed for rhinitis or allergies   Yes Unknown, Entered By History   fluticasone-salmeterol (ADVAIR) 250-50 MCG/DOSE inhaler Inhale 1 puff into the lungs daily as needed   Yes Unknown, Entered By History   hydrochlorothiazide (HYDRODIURIL) 25 MG tablet Take 1 tablet (25 mg) by mouth daily 8/27/18  Yes Glynn Botello MD   ipratropium - albuterol 0.5 mg/2.5 mg/3 mL (DUONEB) 0.5-2.5 (3) MG/3ML neb solution Take 1 vial by nebulization every morning . May also use 1 neb every 6 hours as needed for shortness of breath.   Yes Unknown, Entered By History   metFORMIN (GLUCOPHAGE) 500 MG tablet Take 1 tablet (500 mg) by mouth daily (with breakfast) 3/21/19  Yes Lizet Garcia PA-C   metoprolol tartrate (LOPRESSOR) 50 MG tablet Take 1.5 tablets (75 mg) by mouth 2 times daily 3/29/19  Yes Hawa Grissom MD   nicotine (COMMIT) 2 MG lozenge Take 2 mg by mouth 3/12/19  Yes Reported, Patient   order for [employer] NEBULIZER MACHINE ITEM #[id number] [facility]  2/20/19  Yes Reported, Patient   potassium chloride SA (K-DUR/KLOR-CON M) 20 MEQ CR tablet Take 1 tablet (20 mEq) by mouth 2 times daily (with meals) 6/5/18  Yes lGynn Botello MD   Respiratory Therapy Supplies (NEBULIZER/TUBING/MOUTHPIECE) KIT Dx: COPD with exacerbation. Sig: Use as directed. 2/20/19  Yes Lizet Garcia PA-C   rivaroxaban ANTICOAGULANT (XARELTO) 20 MG TABS tablet Take 20 mg by mouth 3/12/19  Yes Reported, Patient          ALLERGIES:   Allergies   Allergen Reactions     Diphenhydramine Palpitations and Other (See Comments)     wheezing  Rapid heart Rate     Morphine      Tachycardia       Propoxyphene N-Apap Unknown     Tremors       Varenicline Nausea and Vomiting     Codeine Palpitations     Lisinopril Palpitations and Cough     No Clinical Screening - See Comments Rash     Pt states allergies to white/yellow gold.  Sugical steel.  Copper./ developed infection     Tetracycline Nausea and Vomiting and Rash     "      ROS:   CONSTITUTIONAL: No reported fever or chills. No changes in weight.  ENT: No visual disturbance, ear ache, epistaxis or sore throat.   CARDIOVASCULAR: Positive for exertional chest pain and increased palpitations, no increased lower extremity edema.   RESPIRATORY: Positive for chronic shortness of breath and dyspnea upon exertion which she feels is unchanged. No cough, wheezing or hemoptysis.  No orthopnea or PND.  GI: No reported abdominal pain, no melena or hematochezia.  : No reported hematuria or dysuria.   NEUROLOGICAL: No lightheadedness, dizziness, syncope, ataxia, paresthesias or weakness.   HEMATOLOGIC: No history of anemia. No bleeding or excessive bruising. No history of blood clots.   MUSCULOSKELETAL: No reported new joint pain or swelling, no muscle pain.  ENDOCRINOLOGIC: No temperature intolerance. No hair or skin changes.  SKIN: No abnormal rashes or sores, no unusual itching.  PSYCHIATRIC: No history of depression or anxiety. No changes in mood, feeling down or anxious. No changes in sleep.      PHYSICAL EXAM:   /72 (BP Location: Right arm, Patient Position: Sitting, Cuff Size: Adult Regular)   Pulse 56   Temp 96.6  F (35.9  C) (Tympanic)   Resp 16   Ht 1.575 m (5' 2.01\")   Wt 62.1 kg (137 lb)   SpO2 98%   BMI 25.05 kg/m    GENERAL: The patient appears malnourished, in no apparent distress.  HEENT: Head is normocephalic and atraumatic. Eyes are symmetrical with normal visual tracking. No icterus, no xanthelasmas. Nares appeared normal without nasal drainage. Mucous membranes are moist, no cyanosis.  NECK: Supple.   CHEST/ LUNGS: Lungs clear to auscultation, no rales, rhonchi or wheezes, no use of accessory muscles, no retractions, respirations unlabored and normal respiratory rate.   CARDIO: Irregular rate and rhythm normal with S1 and S2, no murmur, click or rub.   ABD: Abdomen is nondistended.   EXTREMITIES: Trace lower extremity edema present.   MUSCULOSKELETAL: No " visible joint swelling.   NEUROLOGIC: Alert and oriented X3. Normal speech, gait and affect. No focal neurologic deficits.   SKIN: No jaundice. No rashes or visible skin lesions present. No ecchymosis.     LAB RESULTS:   Office Visit on 01/13/2019   Component Date Value Ref Range Status     Specimen Description 01/13/2019 Nasal   Final     Influenza A PCR 01/13/2019 Negative  NEG^Negative Final     Influenza B PCR 01/13/2019 Negative  NEG^Negative Final     Resp Syncytial Virus 01/13/2019 Negative  NEG^Negative Final     WBC 01/13/2019 11.3* 4.0 - 11.0 10e9/L Final     RBC Count 01/13/2019 4.96  3.8 - 5.2 10e12/L Final     Hemoglobin 01/13/2019 14.4  11.7 - 15.7 g/dL Final     Hematocrit 01/13/2019 44.3  35.0 - 47.0 % Final     MCV 01/13/2019 89  78 - 100 fl Final     MCH 01/13/2019 29.0  26.5 - 33.0 pg Final     MCHC 01/13/2019 32.5  31.5 - 36.5 g/dL Final     RDW 01/13/2019 12.8  10.0 - 15.0 % Final     Platelet Count 01/13/2019 252  150 - 450 10e9/L Final     Diff Method 01/13/2019 Automated Method   Final     % Neutrophils 01/13/2019 59.7  % Final     % Lymphocytes 01/13/2019 23.2  % Final     % Monocytes 01/13/2019 13.6  % Final     % Eosinophils 01/13/2019 2.6  % Final     % Basophils 01/13/2019 0.6  % Final     % Immature Granulocytes 01/13/2019 0.3  % Final     Absolute Neutrophil 01/13/2019 6.8  1.6 - 8.3 10e9/L Final     Absolute Lymphocytes 01/13/2019 2.6  0.8 - 5.3 10e9/L Final     Absolute Monocytes 01/13/2019 1.5* 0.0 - 1.3 10e9/L Final     Absolute Eosinophils 01/13/2019 0.3  0.0 - 0.7 10e9/L Final     Absolute Basophils 01/13/2019 0.1  0.0 - 0.2 10e9/L Final     Abs Immature Granulocytes 01/13/2019 0.0  0 - 0.4 10e9/L Final          ASSESSMENT:   Nonamarie Spawn presents for cardiology follow-up to visit on 11/13/19 with history of known coronary atherosclerosis, recently diagnosed early this year with AF with RVR and HTN.  Patient has a history of aortofemoral bypass in 2004, tachybradycardia  syndrome, newly identified atrial fibrillation, CAD, hyperlipidemia, hypertension, PAD, hypertension, DM 2, COPD, tobacco abuse, emphysema, history of CVA and gastritis.    Additionally, since last visit patient reported increased symptoms with AF, increased palpitations. She has felt increase palpitations with this and describes HA after symptoms improve. She has a normal lexiscan stress test in April 2019 with no stress induced ischemia. No changes from study in 2016. A ZIO was initiated. We have reviewed results of ZIO today with no presence of AF. No VT, pauses or AV block. She did have presence of nonsustained runs of PSVT for which she was not symptomatic. She was symptomatic with occasional ventricular ectopy (rare). Otherwise, symptoms with normal sinus rhythm.     Today patient reports that chest pain/ pressure is worsened and is associated with all exertional activity, feels that this is progressively worsening. Positive for palpitations. Has noted BP at home with symptoms to be significantly elevated around 200/100. She denies any lightheadedness or syncope. No increased edema. Positive for BRENNER.     1. Essential hypertension  2. PAF (paroxysmal atrial fibrillation) (H)  3. Exertional chest pain  4. BRENNER (dyspnea on exertion)  5. Palpitations  6. CKD (chronic kidney disease) stage 3, GFR 30-59 ml/min (H)    PLAN:  1. Patient with known CAD, PTCA of the mLAD and pRCA on 2/28/04 with preserved LVEF. PTCA at [facility] in [place]. Lexiscan stress test completed on 7/9/19 with no evidence of stress induced ischemia. Given progression of exertional chest pain, increased BRENNER and palpitations, consider coronary angiography. She is to start Imdur 30 mg once daily.   2. Episodes of palpitations. ZIO with no evidence of AF. Symptoms associated to NSR and rare ventricular ectopy. She will continue with Toprol  mg daily as previous.   3. Suspected palpitations also secondary to chronic iron deficiency anemia.  Plan for iron infusions with JOSE and CKD.  4. Vascular surgery recommended outtpatient placement of aortic cuffs from her renal artery origins into the main body of the graft via a right proximal superficial femoral artery cutdown. This will be scheduled at [facility]. She will remain on ASA 81 mg daily.  5. She will continue with Atorvastatin 40 mg before her bedtime.  6. Continued encouragement for tobacco cessation.  7. Uncontrolled HTN, severely elevated pressures at home with symptoms. Recommended she start Losartan 25 mg daily and Imdur 30 mg daily with up titration as needed.   8. Renal doppler US to assess for AZEEM with uncontrolled HTN.      Thank you for allowing me to participate in the care of your patient. Please do not hesitate to contact me if you have any questions.     Martina Roberto

## 2019-12-12 ASSESSMENT — ANXIETY QUESTIONNAIRES: GAD7 TOTAL SCORE: 5

## 2019-12-13 ENCOUNTER — HOSPITAL ENCOUNTER (OUTPATIENT)
Dept: ULTRASOUND IMAGING | Facility: OTHER | Age: 69
Discharge: HOME | End: 2019-12-13
Attending: NURSE PRACTITIONER | Admitting: NURSE PRACTITIONER
Payer: COMMERCIAL

## 2019-12-13 DIAGNOSIS — N18.30 CKD (CHRONIC KIDNEY DISEASE) STAGE 3, GFR 30-59 ML/MIN (H): ICD-10-CM

## 2019-12-13 DIAGNOSIS — I10 ESSENTIAL HYPERTENSION: ICD-10-CM

## 2019-12-13 PROCEDURE — 93975 VASCULAR STUDY: CPT | Mod: TC

## 2019-12-23 ENCOUNTER — OFFICE VISIT (OUTPATIENT)
Dept: CARDIOLOGY | Facility: OTHER | Age: 69
End: 2019-12-23
Attending: NURSE PRACTITIONER
Payer: COMMERCIAL

## 2019-12-23 VITALS
HEART RATE: 56 BPM | OXYGEN SATURATION: 94 % | DIASTOLIC BLOOD PRESSURE: 72 MMHG | WEIGHT: 137 LBS | RESPIRATION RATE: 16 BRPM | SYSTOLIC BLOOD PRESSURE: 128 MMHG | TEMPERATURE: 98.7 F | BODY MASS INDEX: 25.21 KG/M2 | HEIGHT: 62 IN

## 2019-12-23 DIAGNOSIS — Z72.0 TOBACCO ABUSE: ICD-10-CM

## 2019-12-23 DIAGNOSIS — I70.209 FEMORAL ARTERY STENOSIS (H): ICD-10-CM

## 2019-12-23 DIAGNOSIS — Z95.828 H/O AORTO-FEMORAL BYPASS: ICD-10-CM

## 2019-12-23 DIAGNOSIS — N18.30 CKD (CHRONIC KIDNEY DISEASE) STAGE 3, GFR 30-59 ML/MIN (H): ICD-10-CM

## 2019-12-23 DIAGNOSIS — E78.2 MIXED HYPERLIPIDEMIA: ICD-10-CM

## 2019-12-23 DIAGNOSIS — I15.0 RENOVASCULAR HYPERTENSION: ICD-10-CM

## 2019-12-23 DIAGNOSIS — I70.1 RIGHT RENAL ARTERY STENOSIS (H): Primary | ICD-10-CM

## 2019-12-23 DIAGNOSIS — E11.9 TYPE 2 DIABETES MELLITUS WITHOUT COMPLICATION, WITHOUT LONG-TERM CURRENT USE OF INSULIN (H): ICD-10-CM

## 2019-12-23 DIAGNOSIS — I73.9 PAD (PERIPHERAL ARTERY DISEASE) (H): ICD-10-CM

## 2019-12-23 DIAGNOSIS — I10 ESSENTIAL HYPERTENSION: ICD-10-CM

## 2019-12-23 DIAGNOSIS — J43.2 CENTRILOBULAR EMPHYSEMA (H): ICD-10-CM

## 2019-12-23 DIAGNOSIS — F17.200 TOBACCO DEPENDENCE: ICD-10-CM

## 2019-12-23 PROCEDURE — 99214 OFFICE O/P EST MOD 30 MIN: CPT | Performed by: NURSE PRACTITIONER

## 2019-12-23 RX ORDER — HYDROCHLOROTHIAZIDE 12.5 MG/1
12.5 TABLET ORAL DAILY
Qty: 90 TABLET | Refills: 3 | Status: SHIPPED | OUTPATIENT
Start: 2019-12-23 | End: 2019-01-01

## 2019-12-23 RX ORDER — ISOSORBIDE MONONITRATE 30 MG/1
60 TABLET, EXTENDED RELEASE ORAL DAILY
Qty: 90 TABLET | Refills: 1 | Status: SHIPPED | OUTPATIENT
Start: 2019-12-23 | End: 2020-01-01

## 2019-12-23 RX ORDER — HYDROCHLOROTHIAZIDE 12.5 MG/1
25 TABLET ORAL DAILY
Qty: 60 TABLET | Refills: 3 | Status: SHIPPED | OUTPATIENT
Start: 2019-12-23 | End: 2019-12-23

## 2019-12-23 ASSESSMENT — ANXIETY QUESTIONNAIRES
6. BECOMING EASILY ANNOYED OR IRRITABLE: SEVERAL DAYS
5. BEING SO RESTLESS THAT IT IS HARD TO SIT STILL: SEVERAL DAYS
2. NOT BEING ABLE TO STOP OR CONTROL WORRYING: NOT AT ALL
7. FEELING AFRAID AS IF SOMETHING AWFUL MIGHT HAPPEN: NOT AT ALL
3. WORRYING TOO MUCH ABOUT DIFFERENT THINGS: NOT AT ALL
GAD7 TOTAL SCORE: 4
IF YOU CHECKED OFF ANY PROBLEMS ON THIS QUESTIONNAIRE, HOW DIFFICULT HAVE THESE PROBLEMS MADE IT FOR YOU TO DO YOUR WORK, TAKE CARE OF THINGS AT HOME, OR GET ALONG WITH OTHER PEOPLE: NOT DIFFICULT AT ALL
1. FEELING NERVOUS, ANXIOUS, OR ON EDGE: MORE THAN HALF THE DAYS

## 2019-12-23 ASSESSMENT — PAIN SCALES - GENERAL: PAINLEVEL: SEVERE PAIN (7)

## 2019-12-23 ASSESSMENT — PATIENT HEALTH QUESTIONNAIRE - PHQ9
SUM OF ALL RESPONSES TO PHQ QUESTIONS 1-9: 11
5. POOR APPETITE OR OVEREATING: NOT AT ALL

## 2019-12-23 ASSESSMENT — MIFFLIN-ST. JEOR: SCORE: 1099.81

## 2019-12-23 NOTE — PATIENT INSTRUCTIONS
You were seen by  VOLODYMYR Harper CNP       1. Start  hydrochlorothiazide (HYDRODIURIL) 12.5 MG tablet Take 1 tablet (12.5 mg) by mouth daily     2. Increase Imdur to 60 mg daily.    3. Laboratory blood work has been ordered for 1 week.  You will be notified by phone call or [employer] message when the results are available.      4. Recheck a blood pressure in one week nurse only visit.    5. No other changes.    6. Prescriptions sent to your pharmacy.          You will follow up with [facility] in 3 months, sooner if needed.       Please call the cardiology office with problems, questions, or concerns at [phone].    If you experience chest pain, chest pressure, chest tightness, shortness of breath, fainting, lightheadedness, nausea, vomiting, or other concerning symptoms, please report to the Emergency Department or call 911. These symptoms may be emergent, and best treated in the Emergency Department.     Cardiology Nurses  APRIL Alvarez, BRAYAN HOLDEN, BRAYAN  [facility] (Unit 3[address])  [phone]

## 2019-12-23 NOTE — NURSING NOTE
"Chief Complaint   Patient presents with     Follow Up     2 week follow up       Initial BP (!) 148/78 (BP Location: Right arm, Patient Position: Sitting, Cuff Size: Adult Regular)   Pulse 56   Temp 98.7  F (37.1  C) (Tympanic)   Resp 16   Ht 1.575 m (5' 2.01\")   Wt 62.1 kg (137 lb)   SpO2 94%   BMI 25.05 kg/m   Estimated body mass index is 25.05 kg/m  as calculated from the following:    Height as of this encounter: 1.575 m (5' 2.01\").    Weight as of this encounter: 62.1 kg (137 lb).  Meds Reconciled: complete  Pt is on Aspirin  Pt is on a Statin  PHQ and/or MIKE reviewed. Pt referred to PCP/MH Provider as appropriate.    Jeri Garibay LPN      "

## 2019-12-23 NOTE — PROGRESS NOTES
[facility] HEART CARE   CARDIOLOGY PROGRESS NOTE    Ovidio Luna   [id number]  [facility] [id number]      Glynn Botello     Chief Complaint   Patient presents with     Follow Up     2 week follow up        HPI:   Ms. Luna is a 69 year old female who presents for cardiology follow-up to visit on 12/11/2019 with history of known coronary atherosclerosis, recently diagnosed early this year with AF with RVR and HTN.  Patient has a history of aortofemoral bypass in 2004, tachybradycardia syndrome, newly identified atrial fibrillation, CAD, hyperlipidemia, hypertension, PAD, hypertension, DM 2, COPD, tobacco abuse, emphysema, history of CVA and gastritis.    Patient has a known history of coronary artery disease with PTCA of the mLAD and pRCA on 2/28/04 with preserved LVEF. PTCA at [facility] in [place].     She has a history of PAS with aortofemoral bypass of the right LE in 2004.     Patient presented to the ED on 3/10/2019 with complaints of chest pressure and numbness radiating into the left arm.  She reported associated shortness of breath, nausea and vomiting, no diaphoresis.  This was described as a nonexertional pain experienced when watching television.  She had no relief with nitroglycerin.  In the ED she was found to be in A. fib with RVR and she was started on a Cardizem drip and admitted to the ICU.  Her first 2 troponins were normal and the third 1 was mildly elevated.  In the ICU she was noted to have a brief sinus pause on monitoring and therefore Cardizem drip was discontinued and the patient spontaneously converted to normal sinus rhythm following this.  Given her upward troponin trend during her hospitalization patient was transferred to [facility] in [place] for coronary evaluation given her known ischemic heart disease history.    Echocardiogram was performed during her hospitalization at [facility] with preserved LVEF, no significant valvular or morphologic abnormalities.  She  had no anginal symptoms on presentation to [facility].  With her newly identified atrial fibrillation she was started on a DOAC for stoke prophylaxis.  Additionally she was found to be a type II diabetic and was also started on metformin.  Her statin was adjusted to high intensity atorvastatin.  She was discharged from [facility] on 3/12/2019.    At her last visit we reviewed results of stable TTE with no systolic or diastolic failure, no concerning valvular abnormalities and no identified pulmonary HTN. She completed a Lexiscan stress test on 7/9/2019 with no stress induced ischemia, there is a small fixed defect which is again present at the cardiac apex.  This is unchanged.  Myocardial motility was preserved with an EF of 66%.  There was no EKG changes.    Previously reviewed results of her CTA study of lower extremities. She was found to have patent aortobifemoral bypass graft, moderate to severe stenosis at the junction of the left superficial femoral artery and popliteal artery. Severe stenosis versus short occlusion of the mid popliteal artery just above the level of the tibiofemoral articulation. She was referred back to vascular surgery as a result of these findings. She is scheduled to see vascular surgery at [facility] on 10/30.    Patient was seen by vascular surgeon Dr. Dukes on 10/30/19. In review of her recent CTA there was concern that her irregular infrarenal aortic thrombus may place her at risk for occlusion of her aortobifemoral graft. Favored treatment with endovascular technique which may include placement of aortic cuffs from her renal artery origins into the main body of the graft. This would be performed outpatient through a right proximal superficial femoral artery cutdown.    Additionally,  patient has reported increased symptoms with AF, increased palpitations. She has felt increase palpitations with this and describes HA after symptoms improve. She has a  normal lexiscan stress test in April 2019 with no stress induced ischemia. No changes from study in 2016. A ZIO was initiated. We have reviewed results of ZIO today with no presence of AF. No VT, pauses or AV block. She did have presence of nonsustained runs of PSVT for which she was not symptomatic. She was symptomatic with occasional ventricular ectopy (rare). Otherwise, symptoms with normal sinus rhythm.     Today patient reports that chest pain/ pressure is worsened and is associated with all exertional activity, feels that this is progressively worsening. Positive for palpitations. Has noted BP at home with symptoms to be significantly elevated around 200/100. She denies any lightheadedness or syncope. No increased edema. Positive for BRENNER.     PAST MEDICAL HISTORY:   Past Medical History:   Diagnosis Date     Allergy status to other antibiotic agents status      Atherosclerosis     History of ASO with claudication     Atherosclerotic heart disease of native coronary artery without angina pectoris 2004    Stent times 2     Atrial fibrillation (H) 3/10/2019     Centrilobular emphysema (H) 12/12/2016     Cerebral infarction (H) 05/20/2012    CVA with residual left sided weakness, incidental meningioma found     Closed fracture of shaft of left tibia 12/26/2001    History of bilateral tibial fx, work related injury     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   Start Fosamax 08/07, stopped fosamax.     Peripheral vascular disease (H) 1/17/2018     Tobacco abuse 1/17/2018     Type 2 diabetes mellitus without complication, without long-term current use of insulin (H) 3/25/2019     Zoster without complications 2008    left shoulder and neck     Zoster without complications     2009/2008,Herpes zoster, left shoulder and neck          FAMILY  HISTORY:   Family History   Problem Relation Age of Onset     Heart Disease Father         Heart Disease     Cancer Father         Cancer,myeloma     Heart Disease Mother         Heart Disease,MI, stenting     Cerebrovascular Disease Brother      Other - See Comments Brother         sciatica     Other - See Comments Other         FHx Father's side Coronary artery disease     Other - See Comments Paternal Uncle         Stroke     Cancer Paternal Uncle         Cancer,Bladder x2     Other - See Comments Sister         chronic pain syndrome     Breast Cancer No family hx of         Cancer-breast          PAST SURGICAL HISTORY:   Past Surgical History:   Procedure Laterality Date     COLONOSCOPY  2004    2 hyperplastic polyps, repeat in 2014     COLONOSCOPY N/A 9/23/2019    F/U 2024 adenomatous polyps     ESOPHAGOSCOPY, GASTROSCOPY, DUODENOSCOPY (EGD), COMBINED N/A 9/23/2019    Antral ulcer     HEMORRHOID SURGERY       HYSTERECTOMY TOTAL ABDOMINAL, BILATERAL SALPINGO-OOPHORECTOMY, COMBINED  1987    total hyster - BSO     OTHER SURGICAL HISTORY Right 2004    92764.0,NC BYPASS GRAFT AORTOBIFEMORAL     OTHER SURGICAL HISTORY      2004,LOC-1006.05,PTCA,mid LAD, proximal RCA     OTHER SURGICAL HISTORY      03/08/16,[id number],CYSTOSCOPY W/ URETEROSCOPY W/ LITHOTRIPSY,Right,Dr. Mario HOGUE          SOCIAL HISTORY:   Social History     Socioeconomic History     Marital status: Single     Spouse name: None     Number of children: None     Years of education: None     Highest education level: None   Occupational History     None   Social Needs     Financial resource strain: None     Food insecurity:     Worry: None     Inability: None     Transportation needs:     Medical: None     Non-medical: None   Tobacco Use     Smoking status: Current Every Day Smoker     Packs/day: 0.50     Years: 16.00     Pack years: 8.00     Types: Cigarettes     Smokeless tobacco: Never Used   Substance and Sexual Activity     Alcohol use: No     Drug  use: No     Sexual activity: Not Currently   Lifestyle     Physical activity:     Days per week: None     Minutes per session: None     Stress: None   Relationships     Social connections:     Talks on phone: None     Gets together: None     Attends Denominational service: None     Active member of club or organization: None     Attends meetings of clubs or organizations: None     Relationship status: None     Intimate partner violence:     Fear of current or ex partner: None     Emotionally abused: None     Physically abused: None     Forced sexual activity: None   Other Topics Concern     Parent/sibling w/ CABG, MI or angioplasty before 65F 55M? Not Asked   Social History Narrative    ** Merged History Encounter **         Works at [employer]  Melly Spawn Mother  [facility] Sister  One brother          CURRENT MEDICATIONS:   Prior to Admission medications    Medication Sig Start Date End Date Taking? Authorizing Provider   albuterol (PROAIR HFA/PROVENTIL HFA/VENTOLIN HFA) 108 (90 Base) MCG/ACT Inhaler Inhale 1-2 puffs into the lungs every 6 hours as needed for shortness of breath / dyspnea or wheezing 6/7/18  Yes Fely Gregorio APRN CNP   amLODIPine (NORVASC) 10 MG tablet Take 1 tablet (10 mg) by mouth daily 3/25/19  Yes Lizet Garcia PA-C   atorvastatin (LIPITOR) 40 MG tablet Take 40 mg by mouth 3/12/19  Yes Reported, Patient   busPIRone (BUSPAR) 10 MG tablet Take 10 mg by mouth daily   Yes Unknown, Entered By History   Cholecalciferol (VITAMIN D3) 2000 UNITS CAPS Take 4,000 Units by mouth 2 times daily  9/18/12  Yes Reported, Patient   citalopram (CELEXA) 10 MG tablet Take 1 tablet (10 mg) by mouth every morning 11/2/18  Yes Glynn Botello MD   fexofenadine (ALLEGRA) 180 MG tablet Take 180 mg by mouth 2 times daily  9/18/12  Yes Reported, Patient   fluticasone (FLONASE) 50 MCG/ACT nasal spray Spray 1 spray into both nostrils daily as needed for rhinitis or allergies   Yes  Unknown, Entered By History   fluticasone-salmeterol (ADVAIR) 250-50 MCG/DOSE inhaler Inhale 1 puff into the lungs daily as needed   Yes Unknown, Entered By History   hydrochlorothiazide (HYDRODIURIL) 25 MG tablet Take 1 tablet (25 mg) by mouth daily 8/27/18  Yes Glynn Botello MD   ipratropium - albuterol 0.5 mg/2.5 mg/3 mL (DUONEB) 0.5-2.5 (3) MG/3ML neb solution Take 1 vial by nebulization every morning . May also use 1 neb every 6 hours as needed for shortness of breath.   Yes Unknown, Entered By History   metFORMIN (GLUCOPHAGE) 500 MG tablet Take 1 tablet (500 mg) by mouth daily (with breakfast) 3/21/19  Yes Lizet Garcia PA-C   metoprolol tartrate (LOPRESSOR) 50 MG tablet Take 1.5 tablets (75 mg) by mouth 2 times daily 3/29/19  Yes Hawa Grissom MD   nicotine (COMMIT) 2 MG lozenge Take 2 mg by mouth 3/12/19  Yes Reported, Patient   [place] [facility] [place]" NEBULIZER MACHINE ITEM #[id number] [facility]  2/20/19  Yes Reported, Patient   potassium chloride SA (K-DUR/KLOR-CON M) 20 MEQ CR tablet Take 1 tablet (20 mEq) by mouth 2 times daily (with meals) 6/5/18  Yes Glynn Botello MD   Respiratory Therapy Supplies (NEBULIZER/TUBING/MOUTHPIECE) KIT Dx: COPD with exacerbation. Sig: Use as directed. 2/20/19  Yes Lizet Garcia PA-C   rivaroxaban ANTICOAGULANT (XARELTO) 20 MG TABS tablet Take 20 mg by mouth 3/12/19  Yes Reported, Patient          ALLERGIES:   Allergies   Allergen Reactions     Diphenhydramine Palpitations and Other (See Comments)     wheezing  Rapid heart Rate     Morphine      Tachycardia       Propoxyphene N-Apap Unknown     Tremors       Varenicline Nausea and Vomiting     Codeine Palpitations     Lisinopril Palpitations and Cough     No Clinical Screening - See Comments Rash     Pt states allergies to white/yellow gold.  Sugical steel.  Copper./ developed infection     Tetracycline Nausea and Vomiting and Rash          ROS:   CONSTITUTIONAL: No reported fever  "or chills. No changes in weight.  ENT: No visual disturbance, ear ache, epistaxis or sore throat.   CARDIOVASCULAR: Positive for exertional chest pain and increased palpitations, no increased lower extremity edema.   RESPIRATORY: Positive for chronic shortness of breath and dyspnea upon exertion which she feels is unchanged. No cough, wheezing or hemoptysis.  No orthopnea or PND.  GI: No reported abdominal pain, no melena or hematochezia.  : No reported hematuria or dysuria.   NEUROLOGICAL: No lightheadedness, dizziness, syncope, ataxia, paresthesias or weakness.   HEMATOLOGIC: No history of anemia. No bleeding or excessive bruising. No history of blood clots.   MUSCULOSKELETAL: No reported new joint pain or swelling, no muscle pain.  ENDOCRINOLOGIC: No temperature intolerance. No hair or skin changes.  SKIN: No abnormal rashes or sores, no unusual itching.  PSYCHIATRIC: No history of depression or anxiety. No changes in mood, feeling down or anxious. No changes in sleep.      PHYSICAL EXAM:   BP (!) 148/78 (BP Location: Right arm, Patient Position: Sitting, Cuff Size: Adult Regular)   Pulse 56   Temp 98.7  F (37.1  C) (Tympanic)   Resp 16   Ht 1.575 m (5' 2.01\")   Wt 62.1 kg (137 lb)   SpO2 94%   BMI 25.05 kg/m    GENERAL: The patient appears malnourished, in no apparent distress.  HEENT: Head is normocephalic and atraumatic. Eyes are symmetrical with normal visual tracking. No icterus, no xanthelasmas. Nares appeared normal without nasal drainage. Mucous membranes are moist, no cyanosis.  NECK: Supple.   CHEST/ LUNGS: Lungs clear to auscultation, no rales, rhonchi or wheezes, no use of accessory muscles, no retractions, respirations unlabored and normal respiratory rate.   CARDIO: Irregular rate and rhythm normal with S1 and S2, no murmur, click or rub.   ABD: Abdomen is nondistended.   EXTREMITIES: Trace lower extremity edema present.   MUSCULOSKELETAL: No visible joint swelling.   NEUROLOGIC: Alert and " oriented X3. Normal speech, gait and affect. No focal neurologic deficits.   SKIN: No jaundice. No rashes or visible skin lesions present. No ecchymosis.     LAB RESULTS:   Office Visit on 01/13/2019   Component Date Value Ref Range Status     Specimen Description 01/13/2019 Nasal   Final     Influenza A PCR 01/13/2019 Negative  NEG^Negative Final     Influenza B PCR 01/13/2019 Negative  NEG^Negative Final     Resp Syncytial Virus 01/13/2019 Negative  NEG^Negative Final     WBC 01/13/2019 11.3* 4.0 - 11.0 10e9/L Final     RBC Count 01/13/2019 4.96  3.8 - 5.2 10e12/L Final     Hemoglobin 01/13/2019 14.4  11.7 - 15.7 g/dL Final     Hematocrit 01/13/2019 44.3  35.0 - 47.0 % Final     MCV 01/13/2019 89  78 - 100 fl Final     MCH 01/13/2019 29.0  26.5 - 33.0 pg Final     MCHC 01/13/2019 32.5  31.5 - 36.5 g/dL Final     RDW 01/13/2019 12.8  10.0 - 15.0 % Final     Platelet Count 01/13/2019 252  150 - 450 10e9/L Final     Diff Method 01/13/2019 Automated Method   Final     % Neutrophils 01/13/2019 59.7  % Final     % Lymphocytes 01/13/2019 23.2  % Final     % Monocytes 01/13/2019 13.6  % Final     % Eosinophils 01/13/2019 2.6  % Final     % Basophils 01/13/2019 0.6  % Final     % Immature Granulocytes 01/13/2019 0.3  % Final     Absolute Neutrophil 01/13/2019 6.8  1.6 - 8.3 10e9/L Final     Absolute Lymphocytes 01/13/2019 2.6  0.8 - 5.3 10e9/L Final     Absolute Monocytes 01/13/2019 1.5* 0.0 - 1.3 10e9/L Final     Absolute Eosinophils 01/13/2019 0.3  0.0 - 0.7 10e9/L Final     Absolute Basophils 01/13/2019 0.1  0.0 - 0.2 10e9/L Final     Abs Immature Granulocytes 01/13/2019 0.0  0 - 0.4 10e9/L Final          ASSESSMENT:   Nonamarie Spawn presents for cardiology follow-up to visit on 12/11/2019 with history of known coronary atherosclerosis, recently diagnosed early this year with AF with RVR and HTN.  Patient has a history of aortofemoral bypass in 2004, tachybradycardia syndrome, newly identified atrial fibrillation, CAD,  hyperlipidemia, hypertension, PAD, hypertension, DM 2, COPD, tobacco abuse, emphysema, history of CVA and gastritis.  Additionally,  patient has reported increased symptoms with AF, increased palpitations. She has felt increase palpitations with this and describes HA after symptoms improve. She has a normal lexiscan stress test in April 2019 with no stress induced ischemia. No changes from study in 2016. A ZIO was initiated. We have reviewed results of ZIO today with no presence of AF. No VT, pauses or AV block. She did have presence of nonsustained runs of PSVT for which she was not symptomatic. She was symptomatic with occasional ventricular ectopy (rare). Otherwise, symptoms with normal sinus rhythm.   Today patient reports that chest pain/ pressure is worsened and is associated with all exertional activity, feels that this is progressively worsening. Positive for palpitations. Has noted BP at home with symptoms to be significantly elevated around 200/100. She denies any lightheadedness or syncope. No increased edema. Positive for BRENNER.     1. Right renal artery stenosis (H)  2. Renovascular hypertension  3. Tobacco dependence  4. Centrilobular emphysema (H)  5. Mixed hyperlipidemia  6. Type 2 diabetes mellitus without complication, without long-term current use of insulin (H)  7. CKD (chronic kidney disease) stage 3, GFR 30-59 ml/min (H)  8. H/O aorto-femoral bypass (2004)  9. Tobacco abuse  10. Femoral artery stenosis (H)  11. PAD (peripheral artery disease) (H)  12. Essential hypertension      PLAN:  1. Patient with known CAD, PTCA of the mLAD and pRCA on 2/28/04 with preserved LVEF. PTCA at [facility] in [place]. Lexiscan stress test completed on 7/9/19 with no evidence of stress induced ischemia. Given progression of exertional chest pain, increased BRENNER and palpitations, previously dicussed considering coronary angiography if medical management not effective. She will increase Imdur to 60 mg once daily.    2. Episodes of palpitations. ZIO with no evidence of AF. Symptoms associated to NSR and rare ventricular ectopy. She will continue with Toprol  mg daily as previous. CCB not effective.   3. Suspected palpitations also secondary to chronic iron deficiency anemia. Plan for iron infusions with JOSE and CKD. She is to follow-up with PCP in regards to this.  4. Vascular surgery recommended outtpatient placement of aortic cuffs from her renal artery origins into the main body of the graft via a right proximal superficial femoral artery cutdown. This will be scheduled at [facility]. She will remain on ASA 81 mg daily. She is scheduled to follow-up with vascular and also recommended vascular surgery to review AZEEM with renovascular hypertension and likely need for treatment as well.   5. She will start hydrochlorothiazide 12.5 mg daily and return for BMP in one week. Up titration as needed.  6. She will continue with Atorvastatin 40 mg before her bedtime.  7. Continued encouragement for tobacco cessation.        Thank you for allowing me to participate in the care of your patient. Please do not hesitate to contact me if you have any questions.     Martina Roberto

## 2019-12-24 ASSESSMENT — ANXIETY QUESTIONNAIRES: GAD7 TOTAL SCORE: 4

## 2019-12-30 NOTE — NURSING NOTE
Patient arrived today for a blood pressure and heart rate check and labs.  Patient has been taking her blood pressures at home. Patient has been taking BP right after taking BP medication without waiting 30-45 minute.  Patient was given today instructions on how to take BP today.  Kenya Rivera RN on 12/30/2019 at 10:28 AM    Blood Pressure readings from 12-24-19 through 12-30-19:  12-24-19 0858  /71 pulse 81  12/26/19  BP  223/117 [id number]  12/26/19  0807  BP  185/114 pulse 117  12/26/19  0901  BP  165/75 pulse 121  12/27/19  0723  BP  177/69 pulse 79  12/28/19  0724  BP  191/83 pulse 75  12/29/19  0851  BP  155/79 pulse 91  12/2919   1418  BP  124/65 pulse 96  12/30/19  0602  BP  182/88 pulse 108  12/30/19  0730  BP  150/84 pulse 106  Discussed with VOLODYMYR Harper CNP  Patient states she was doing laundry and vacuuming this am and had some chest pressure increased heart rate with dizziness.  After sitting down and resting all symptoms subside. Per VOLODYMYR Harper CNP  Will increase hydrochlorothiazide (HYDRODIURIL) 12.5 MG tablet from 12.5mg once daily, to take 2 tablets (25 mg) by mouth daily.  Patient is aware of these changes. Will call patient with lab results when available.  Patient will call if any concerns.  Kenya Rivera RN on 12/30/2019 at 10:57 AM

## 2019-12-30 NOTE — PATIENT INSTRUCTIONS
Taking Your Blood Pressure  Blood pressure is the force of blood against the artery wall as it moves from the heart through the blood vessels. You can take your own blood pressure reading using a digital monitor. Take your readings the same each time, using the same arm. Take readings as often as your healthcare provider instructs.  About blood pressure monitors  Blood pressure monitors are designed for certain ages and cases. You can find monitors for older adults, for pregnant women, and for children. Make sure the one you choose is the right one for your age and situation.  The American [employer] Association recommends an automatic cuff monitor that fits on your upper arm (bicep). The cuff should fit your arm size. A cuff that s too large or too small will not give an accurate reading. Measure around your upper arm to find your size.  Monitors that attach to your finger or wrist are not as accurate as monitors for your upper arm.  Ask your healthcare provider for help in choosing a monitor. Bring your monitor to your next provider visit if you need help in using it the correct way.  The steps below are general instructions for using an automatic digital monitor.  Step 1. Relax      Take your blood pressure at the same time every day, such as in the morning or evening, or at the time your healthcare provider recommends.    Wait at least a half-hour after smoking, eating, or exercising. Don't drink coffee, tea, soda, or other caffeinated beverages before checking your blood pressure.    Sit comfortably at a table with both feet on the floor. Do not cross your legs or feet. Place the monitor near you.    Rest for a few minutes before you begin.  Step 2. Wrap the cuff      Place your arm on the table, palm up. Your arm should be at the level of your heart. Wrap the cuff around your upper arm, just above your elbow. It s best done on bare skin, not over clothing. Most cuffs will indicate where the brachial artery (the  blood vessel in the middle of the arm at the inner side of the elbow) should line up with the cuff. Look in your monitor's instruction booklet for an illustration. You can also bring your cuff to your healthcare provider and have them show you how to correctly place the cuff.  Step 3. Inflate the cuff      Push the button that starts the pump.    The cuff will tighten, then loosen.    The numbers will change. When they stop changing, your blood pressure reading will appear.    Take 2 or 3 readings one minute apart.  Step 4. Write down the results of each reading      Write down your blood pressure numbers for each reading. Note the date and time. Keep your results in one place, such as a notebook. Even if your monitor has a built-in memory, keep a hard copy of the readings.    Remove the cuff from your arm. Turn off the machine.    Bring your blood pressure records with your healthcare providers at each visit.    If you start a new blood pressure medicine, note the day you started the new medicine. Also note the day if you change the dose of your medicine. This information goes on your blood pressure recording sheet. This will help your healthcare provider monitor how well the medicine changes are working.    Ask your healthcare provider what numbers should prompt you to call him or her. Also ask what numbers should prompt you to get help right away.  Date Last Reviewed: 11/1/2016 2000-2018 [employer]. [address] [place] [address], [place] [address]. All rights reserved. This information is not intended as a substitute for professional medical care. Always follow your healthcare professional's instructions.     1. Increase hydrochlorothiazide (HYDRODIURIL) 12.5 MG tablet Take 2 tablets (25 mg) by mouth daily - Oral

## 2020-01-01 ENCOUNTER — VIRTUAL VISIT (OUTPATIENT)
Dept: INTERNAL MEDICINE | Facility: OTHER | Age: 70
End: 2020-01-01
Attending: NURSE PRACTITIONER
Payer: COMMERCIAL

## 2020-01-01 ENCOUNTER — TELEPHONE (OUTPATIENT)
Dept: INTERNAL MEDICINE | Facility: OTHER | Age: 70
End: 2020-01-01

## 2020-01-01 ENCOUNTER — HOSPITAL ENCOUNTER (OUTPATIENT)
Dept: MRI IMAGING | Facility: OTHER | Age: 70
End: 2020-11-19
Attending: INTERNAL MEDICINE
Payer: COMMERCIAL

## 2020-01-01 ENCOUNTER — OFFICE VISIT (OUTPATIENT)
Dept: INTERNAL MEDICINE | Facility: OTHER | Age: 70
End: 2020-01-01
Attending: INTERNAL MEDICINE
Payer: COMMERCIAL

## 2020-01-01 ENCOUNTER — APPOINTMENT (OUTPATIENT)
Dept: GENERAL RADIOLOGY | Facility: OTHER | Age: 70
End: 2020-01-01
Attending: FAMILY MEDICINE
Payer: COMMERCIAL

## 2020-01-01 ENCOUNTER — PATIENT OUTREACH (OUTPATIENT)
Dept: CARE COORDINATION | Facility: CLINIC | Age: 70
End: 2020-01-01

## 2020-01-01 ENCOUNTER — OFFICE VISIT (OUTPATIENT)
Dept: AUDIOLOGY | Facility: OTHER | Age: 70
End: 2020-01-01
Attending: PHYSICIAN ASSISTANT
Payer: COMMERCIAL

## 2020-01-01 ENCOUNTER — HOSPITAL ENCOUNTER (OUTPATIENT)
Dept: RESPIRATORY THERAPY | Facility: OTHER | Age: 70
Discharge: HOME | End: 2020-01-30
Attending: INTERNAL MEDICINE | Admitting: INTERNAL MEDICINE
Payer: COMMERCIAL

## 2020-01-01 ENCOUNTER — HOSPITAL ENCOUNTER (EMERGENCY)
Facility: OTHER | Age: 70
Discharge: ANOTHER HOSPITAL | End: 2020-12-16
Attending: PHYSICIAN ASSISTANT | Admitting: PHYSICIAN ASSISTANT
Payer: COMMERCIAL

## 2020-01-01 ENCOUNTER — OFFICE VISIT (OUTPATIENT)
Dept: CARDIOLOGY | Facility: OTHER | Age: 70
End: 2020-01-01
Attending: NURSE PRACTITIONER
Payer: COMMERCIAL

## 2020-01-01 ENCOUNTER — HOSPITAL ENCOUNTER (OUTPATIENT)
Dept: INFUSION THERAPY | Facility: OTHER | Age: 70
Discharge: HOME | End: 2020-01-16
Attending: INTERNAL MEDICINE | Admitting: INTERNAL MEDICINE
Payer: COMMERCIAL

## 2020-01-01 ENCOUNTER — HOSPITAL ENCOUNTER (OUTPATIENT)
Dept: INFUSION THERAPY | Facility: OTHER | Age: 70
Discharge: HOME | End: 2020-01-20
Attending: INTERNAL MEDICINE | Admitting: INTERNAL MEDICINE
Payer: COMMERCIAL

## 2020-01-01 ENCOUNTER — HOSPITAL ENCOUNTER (OUTPATIENT)
Dept: CT IMAGING | Facility: OTHER | Age: 70
End: 2020-08-17
Attending: PHYSICIAN ASSISTANT
Payer: COMMERCIAL

## 2020-01-01 ENCOUNTER — OFFICE VISIT (OUTPATIENT)
Dept: OTOLARYNGOLOGY | Facility: OTHER | Age: 70
End: 2020-01-01
Attending: PHYSICIAN ASSISTANT
Payer: COMMERCIAL

## 2020-01-01 ENCOUNTER — APPOINTMENT (OUTPATIENT)
Dept: GENERAL RADIOLOGY | Facility: OTHER | Age: 70
End: 2020-01-01
Attending: PHYSICIAN ASSISTANT
Payer: COMMERCIAL

## 2020-01-01 ENCOUNTER — HOSPITAL ENCOUNTER (OUTPATIENT)
Dept: INFUSION THERAPY | Facility: OTHER | Age: 70
Discharge: HOME | End: 2020-01-24
Attending: INTERNAL MEDICINE | Admitting: INTERNAL MEDICINE
Payer: COMMERCIAL

## 2020-01-01 ENCOUNTER — HOSPITAL ENCOUNTER (OUTPATIENT)
Dept: INFUSION THERAPY | Facility: OTHER | Age: 70
Discharge: HOME | End: 2020-01-22
Attending: INTERNAL MEDICINE | Admitting: INTERNAL MEDICINE
Payer: COMMERCIAL

## 2020-01-01 ENCOUNTER — APPOINTMENT (OUTPATIENT)
Dept: CT IMAGING | Facility: OTHER | Age: 70
End: 2020-01-01
Attending: FAMILY MEDICINE
Payer: COMMERCIAL

## 2020-01-01 ENCOUNTER — HOSPITAL ENCOUNTER (OUTPATIENT)
Dept: INFUSION THERAPY | Facility: OTHER | Age: 70
Discharge: HOME | End: 2020-01-14
Attending: INTERNAL MEDICINE | Admitting: INTERNAL MEDICINE
Payer: COMMERCIAL

## 2020-01-01 ENCOUNTER — HOSPITAL ENCOUNTER (EMERGENCY)
Facility: OTHER | Age: 70
Discharge: HOME | End: 2020-01-02
Attending: FAMILY MEDICINE | Admitting: FAMILY MEDICINE
Payer: COMMERCIAL

## 2020-01-01 ENCOUNTER — HOSPITAL ENCOUNTER (OUTPATIENT)
Dept: ULTRASOUND IMAGING | Facility: OTHER | Age: 70
End: 2020-11-20
Attending: NURSE PRACTITIONER
Payer: COMMERCIAL

## 2020-01-01 ENCOUNTER — TRANSFERRED RECORDS (OUTPATIENT)
Dept: HEALTH INFORMATION MANAGEMENT | Facility: OTHER | Age: 70
End: 2020-01-01

## 2020-01-01 ENCOUNTER — OFFICE VISIT (OUTPATIENT)
Dept: FAMILY MEDICINE | Facility: OTHER | Age: 70
End: 2020-01-01
Attending: PHYSICIAN ASSISTANT
Payer: COMMERCIAL

## 2020-01-01 VITALS
OXYGEN SATURATION: 98 % | SYSTOLIC BLOOD PRESSURE: 110 MMHG | TEMPERATURE: 96.5 F | RESPIRATION RATE: 16 BRPM | BODY MASS INDEX: 23.22 KG/M2 | WEIGHT: 136 LBS | HEART RATE: 80 BPM | HEIGHT: 64 IN | DIASTOLIC BLOOD PRESSURE: 70 MMHG

## 2020-01-01 VITALS
DIASTOLIC BLOOD PRESSURE: 84 MMHG | WEIGHT: 134 LBS | TEMPERATURE: 97.8 F | SYSTOLIC BLOOD PRESSURE: 178 MMHG | RESPIRATION RATE: 16 BRPM | HEART RATE: 56 BPM | BODY MASS INDEX: 23.74 KG/M2 | OXYGEN SATURATION: 99 % | HEIGHT: 63 IN

## 2020-01-01 VITALS
WEIGHT: 124 LBS | OXYGEN SATURATION: 99 % | BODY MASS INDEX: 22.5 KG/M2 | RESPIRATION RATE: 16 BRPM | HEART RATE: 96 BPM | TEMPERATURE: 97.7 F | SYSTOLIC BLOOD PRESSURE: 170 MMHG | DIASTOLIC BLOOD PRESSURE: 96 MMHG

## 2020-01-01 VITALS
OXYGEN SATURATION: 97 % | WEIGHT: 141 LBS | TEMPERATURE: 97.5 F | HEART RATE: 68 BPM | BODY MASS INDEX: 24.07 KG/M2 | RESPIRATION RATE: 20 BRPM | SYSTOLIC BLOOD PRESSURE: 122 MMHG | DIASTOLIC BLOOD PRESSURE: 72 MMHG | HEIGHT: 64 IN

## 2020-01-01 VITALS — TEMPERATURE: 96.8 F | RESPIRATION RATE: 18 BRPM | DIASTOLIC BLOOD PRESSURE: 58 MMHG | SYSTOLIC BLOOD PRESSURE: 142 MMHG

## 2020-01-01 VITALS — DIASTOLIC BLOOD PRESSURE: 50 MMHG | SYSTOLIC BLOOD PRESSURE: 106 MMHG | TEMPERATURE: 97.6 F | RESPIRATION RATE: 18 BRPM

## 2020-01-01 VITALS
BODY MASS INDEX: 23.18 KG/M2 | HEART RATE: 64 BPM | SYSTOLIC BLOOD PRESSURE: 128 MMHG | DIASTOLIC BLOOD PRESSURE: 62 MMHG | HEIGHT: 64 IN | RESPIRATION RATE: 16 BRPM | TEMPERATURE: 97.8 F | WEIGHT: 135.8 LBS

## 2020-01-01 VITALS — DIASTOLIC BLOOD PRESSURE: 50 MMHG | TEMPERATURE: 98.9 F | RESPIRATION RATE: 16 BRPM | SYSTOLIC BLOOD PRESSURE: 112 MMHG

## 2020-01-01 VITALS
SYSTOLIC BLOOD PRESSURE: 146 MMHG | TEMPERATURE: 97.5 F | OXYGEN SATURATION: 99 % | DIASTOLIC BLOOD PRESSURE: 80 MMHG | WEIGHT: 133 LBS | RESPIRATION RATE: 16 BRPM | HEART RATE: 57 BPM | BODY MASS INDEX: 24.13 KG/M2

## 2020-01-01 VITALS
HEART RATE: 64 BPM | BODY MASS INDEX: 25.01 KG/M2 | TEMPERATURE: 97.9 F | HEIGHT: 62 IN | DIASTOLIC BLOOD PRESSURE: 72 MMHG | WEIGHT: 135.9 LBS | RESPIRATION RATE: 20 BRPM | SYSTOLIC BLOOD PRESSURE: 134 MMHG

## 2020-01-01 VITALS
WEIGHT: 147 LBS | SYSTOLIC BLOOD PRESSURE: 184 MMHG | HEIGHT: 62 IN | TEMPERATURE: 98 F | BODY MASS INDEX: 27.05 KG/M2 | RESPIRATION RATE: 22 BRPM | HEART RATE: 79 BPM | DIASTOLIC BLOOD PRESSURE: 101 MMHG | OXYGEN SATURATION: 92 %

## 2020-01-01 VITALS
SYSTOLIC BLOOD PRESSURE: 131 MMHG | TEMPERATURE: 97.6 F | DIASTOLIC BLOOD PRESSURE: 55 MMHG | RESPIRATION RATE: 16 BRPM | HEART RATE: 54 BPM

## 2020-01-01 VITALS
BODY MASS INDEX: 23.76 KG/M2 | SYSTOLIC BLOOD PRESSURE: 116 MMHG | TEMPERATURE: 97.3 F | OXYGEN SATURATION: 97 % | HEART RATE: 68 BPM | RESPIRATION RATE: 12 BRPM | WEIGHT: 138.4 LBS | DIASTOLIC BLOOD PRESSURE: 74 MMHG

## 2020-01-01 VITALS
DIASTOLIC BLOOD PRESSURE: 70 MMHG | HEART RATE: 72 BPM | TEMPERATURE: 96.6 F | SYSTOLIC BLOOD PRESSURE: 120 MMHG | OXYGEN SATURATION: 96 %

## 2020-01-01 VITALS
HEART RATE: 48 BPM | OXYGEN SATURATION: 97 % | BODY MASS INDEX: 24.48 KG/M2 | RESPIRATION RATE: 18 BRPM | TEMPERATURE: 97.7 F | HEIGHT: 62 IN | SYSTOLIC BLOOD PRESSURE: 142 MMHG | WEIGHT: 133 LBS | DIASTOLIC BLOOD PRESSURE: 64 MMHG

## 2020-01-01 VITALS — RESPIRATION RATE: 18 BRPM | SYSTOLIC BLOOD PRESSURE: 127 MMHG | DIASTOLIC BLOOD PRESSURE: 48 MMHG | TEMPERATURE: 96.7 F

## 2020-01-01 VITALS
BODY MASS INDEX: 24.66 KG/M2 | HEIGHT: 62 IN | DIASTOLIC BLOOD PRESSURE: 90 MMHG | WEIGHT: 134 LBS | OXYGEN SATURATION: 99 % | TEMPERATURE: 96.7 F | SYSTOLIC BLOOD PRESSURE: 182 MMHG | HEART RATE: 54 BPM | RESPIRATION RATE: 16 BRPM

## 2020-01-01 VITALS
BODY MASS INDEX: 22.42 KG/M2 | HEIGHT: 65 IN | SYSTOLIC BLOOD PRESSURE: 112 MMHG | DIASTOLIC BLOOD PRESSURE: 62 MMHG | HEART RATE: 60 BPM | WEIGHT: 134.6 LBS | TEMPERATURE: 96.3 F | RESPIRATION RATE: 16 BRPM

## 2020-01-01 VITALS
BODY MASS INDEX: 24.24 KG/M2 | DIASTOLIC BLOOD PRESSURE: 90 MMHG | TEMPERATURE: 98.6 F | WEIGHT: 136.8 LBS | RESPIRATION RATE: 18 BRPM | HEIGHT: 63 IN | SYSTOLIC BLOOD PRESSURE: 138 MMHG | HEART RATE: 53 BPM | OXYGEN SATURATION: 96 %

## 2020-01-01 DIAGNOSIS — I10 ESSENTIAL HYPERTENSION: ICD-10-CM

## 2020-01-01 DIAGNOSIS — M62.81 GENERALIZED MUSCLE WEAKNESS: ICD-10-CM

## 2020-01-01 DIAGNOSIS — N18.31 STAGE 3A CHRONIC KIDNEY DISEASE (H): ICD-10-CM

## 2020-01-01 DIAGNOSIS — Z09 ENCOUNTER FOR FOLLOW-UP EXAMINATION: ICD-10-CM

## 2020-01-01 DIAGNOSIS — I21.4 NSTEMI (NON-ST ELEVATED MYOCARDIAL INFARCTION) (H): ICD-10-CM

## 2020-01-01 DIAGNOSIS — M62.81 GENERALIZED MUSCLE WEAKNESS: Primary | ICD-10-CM

## 2020-01-01 DIAGNOSIS — J43.9 PULMONARY EMPHYSEMA, UNSPECIFIED EMPHYSEMA TYPE (H): ICD-10-CM

## 2020-01-01 DIAGNOSIS — K25.3 ACUTE ULCER OF PYLORIC ANTRUM: ICD-10-CM

## 2020-01-01 DIAGNOSIS — E78.2 MIXED HYPERLIPIDEMIA: ICD-10-CM

## 2020-01-01 DIAGNOSIS — I25.10 ATHEROSCLEROSIS OF NATIVE CORONARY ARTERY OF NATIVE HEART WITHOUT ANGINA PECTORIS: ICD-10-CM

## 2020-01-01 DIAGNOSIS — J43.2 CENTRILOBULAR EMPHYSEMA (H): ICD-10-CM

## 2020-01-01 DIAGNOSIS — I15.0 RENOVASCULAR HYPERTENSION: ICD-10-CM

## 2020-01-01 DIAGNOSIS — E11.9 TYPE 2 DIABETES MELLITUS WITHOUT COMPLICATION, WITHOUT LONG-TERM CURRENT USE OF INSULIN (H): ICD-10-CM

## 2020-01-01 DIAGNOSIS — J34.89 SINUS PRESSURE: ICD-10-CM

## 2020-01-01 DIAGNOSIS — Z95.5 HISTORY OF CORONARY ARTERY STENT PLACEMENT: ICD-10-CM

## 2020-01-01 DIAGNOSIS — Z09 ENCOUNTER FOR FOLLOW-UP EXAMINATION: Primary | ICD-10-CM

## 2020-01-01 DIAGNOSIS — R42 VERTIGO: ICD-10-CM

## 2020-01-01 DIAGNOSIS — I48.92 ATRIAL FLUTTER, UNSPECIFIED TYPE (H): ICD-10-CM

## 2020-01-01 DIAGNOSIS — R79.89 LOW VITAMIN B12 LEVEL: ICD-10-CM

## 2020-01-01 DIAGNOSIS — N18.30 CKD (CHRONIC KIDNEY DISEASE) STAGE 3, GFR 30-59 ML/MIN (H): Primary | ICD-10-CM

## 2020-01-01 DIAGNOSIS — R29.6 RECURRENT FALLS: ICD-10-CM

## 2020-01-01 DIAGNOSIS — F17.200 TOBACCO DEPENDENCE: ICD-10-CM

## 2020-01-01 DIAGNOSIS — Z98.890 S/P VASCULAR SURGERY: Primary | ICD-10-CM

## 2020-01-01 DIAGNOSIS — M85.80 OSTEOPENIA, UNSPECIFIED LOCATION: Chronic | ICD-10-CM

## 2020-01-01 DIAGNOSIS — H93.11 TINNITUS, RIGHT: ICD-10-CM

## 2020-01-01 DIAGNOSIS — D72.829 LEUKOCYTOSIS, UNSPECIFIED TYPE: ICD-10-CM

## 2020-01-01 DIAGNOSIS — I48.91 ATRIAL FIBRILLATION (H): ICD-10-CM

## 2020-01-01 DIAGNOSIS — R60.0 BILATERAL LOWER EXTREMITY EDEMA: ICD-10-CM

## 2020-01-01 DIAGNOSIS — F41.9 ANXIETY: ICD-10-CM

## 2020-01-01 DIAGNOSIS — I48.0 PAF (PAROXYSMAL ATRIAL FIBRILLATION) (H): ICD-10-CM

## 2020-01-01 DIAGNOSIS — D32.9 MENINGIOMA (H): ICD-10-CM

## 2020-01-01 DIAGNOSIS — Z86.73 HISTORY OF CVA (CEREBROVASCULAR ACCIDENT): ICD-10-CM

## 2020-01-01 DIAGNOSIS — Z23 NEED FOR DIPHTHERIA-TETANUS-PERTUSSIS (TDAP) VACCINE: ICD-10-CM

## 2020-01-01 DIAGNOSIS — I63.81 LACUNAR STROKE (H): ICD-10-CM

## 2020-01-01 DIAGNOSIS — D50.0 IRON DEFICIENCY ANEMIA DUE TO CHRONIC BLOOD LOSS: ICD-10-CM

## 2020-01-01 DIAGNOSIS — E83.42 HYPOMAGNESEMIA: ICD-10-CM

## 2020-01-01 DIAGNOSIS — I70.1 RIGHT RENAL ARTERY STENOSIS (H): ICD-10-CM

## 2020-01-01 DIAGNOSIS — E87.6 HYPOKALEMIA: ICD-10-CM

## 2020-01-01 DIAGNOSIS — Z95.828 H/O AORTO-FEMORAL BYPASS: ICD-10-CM

## 2020-01-01 DIAGNOSIS — D50.8 IRON DEFICIENCY ANEMIA SECONDARY TO INADEQUATE DIETARY IRON INTAKE: ICD-10-CM

## 2020-01-01 DIAGNOSIS — N18.30 CKD (CHRONIC KIDNEY DISEASE) STAGE 3, GFR 30-59 ML/MIN (H): ICD-10-CM

## 2020-01-01 DIAGNOSIS — I73.9 PAD (PERIPHERAL ARTERY DISEASE) (H): ICD-10-CM

## 2020-01-01 DIAGNOSIS — Z01.818 PREOP GENERAL PHYSICAL EXAM: Primary | ICD-10-CM

## 2020-01-01 DIAGNOSIS — R09.81 NASAL CONGESTION: ICD-10-CM

## 2020-01-01 DIAGNOSIS — R26.9 ABNORMALITY OF GAIT AND MOBILITY: ICD-10-CM

## 2020-01-01 DIAGNOSIS — I70.209 FEMORAL ARTERY STENOSIS (H): ICD-10-CM

## 2020-01-01 DIAGNOSIS — Z79.899 HIGH RISK MEDICATION USE: ICD-10-CM

## 2020-01-01 DIAGNOSIS — R51.9 ACUTE INTRACTABLE HEADACHE, UNSPECIFIED HEADACHE TYPE: Primary | ICD-10-CM

## 2020-01-01 DIAGNOSIS — E11.9 TYPE 2 DIABETES MELLITUS WITHOUT COMPLICATION, WITHOUT LONG-TERM CURRENT USE OF INSULIN (H): Primary | ICD-10-CM

## 2020-01-01 DIAGNOSIS — Z98.62 HISTORY OF REVASCULARIZATION PROCEDURE OF LOWER EXTREMITY: ICD-10-CM

## 2020-01-01 DIAGNOSIS — J43.9 PULMONARY EMPHYSEMA, UNSPECIFIED EMPHYSEMA TYPE (H): Primary | ICD-10-CM

## 2020-01-01 DIAGNOSIS — H93.13 TINNITUS, BILATERAL: ICD-10-CM

## 2020-01-01 DIAGNOSIS — D75.839 THROMBOCYTOSIS: ICD-10-CM

## 2020-01-01 DIAGNOSIS — D50.9 IRON DEFICIENCY ANEMIA, UNSPECIFIED IRON DEFICIENCY ANEMIA TYPE: ICD-10-CM

## 2020-01-01 DIAGNOSIS — H90.3 SENSORINEURAL HEARING LOSS (SNHL) OF BOTH EARS: Primary | ICD-10-CM

## 2020-01-01 DIAGNOSIS — I73.9 PAD (PERIPHERAL ARTERY DISEASE) (H): Primary | ICD-10-CM

## 2020-01-01 DIAGNOSIS — I48.0 PAROXYSMAL ATRIAL FIBRILLATION (H): ICD-10-CM

## 2020-01-01 DIAGNOSIS — R19.7 DIARRHEA, UNSPECIFIED TYPE: Primary | ICD-10-CM

## 2020-01-01 DIAGNOSIS — K29.61 EROSIVE GASTRITIS WITH HEMORRHAGE: ICD-10-CM

## 2020-01-01 DIAGNOSIS — H65.93 FLUID LEVEL BEHIND TYMPANIC MEMBRANE OF BOTH EARS: ICD-10-CM

## 2020-01-01 DIAGNOSIS — I63.81 LACUNAR STROKE (H): Primary | ICD-10-CM

## 2020-01-01 DIAGNOSIS — I25.119 ATHEROSCLEROSIS OF NATIVE CORONARY ARTERY OF NATIVE HEART WITH ANGINA PECTORIS (H): ICD-10-CM

## 2020-01-01 DIAGNOSIS — Z00.00 ENCOUNTER FOR MEDICARE ANNUAL WELLNESS EXAM: Primary | ICD-10-CM

## 2020-01-01 DIAGNOSIS — F17.210 CIGARETTE NICOTINE DEPENDENCE WITHOUT COMPLICATION: ICD-10-CM

## 2020-01-01 DIAGNOSIS — D50.8 OTHER IRON DEFICIENCY ANEMIA: ICD-10-CM

## 2020-01-01 DIAGNOSIS — R42 DIZZINESS: ICD-10-CM

## 2020-01-01 DIAGNOSIS — J32.0 CHRONIC MAXILLARY SINUSITIS: ICD-10-CM

## 2020-01-01 DIAGNOSIS — Z87.891 PERSONAL HISTORY OF NICOTINE DEPENDENCE: ICD-10-CM

## 2020-01-01 DIAGNOSIS — I10 ESSENTIAL HYPERTENSION: Primary | ICD-10-CM

## 2020-01-01 LAB
ALBUMIN SERPL-MCNC: 3.8 G/DL (ref 3.5–5.7)
ALBUMIN SERPL-MCNC: 4.2 G/DL (ref 3.5–5.7)
ALBUMIN SERPL-MCNC: 4.5 G/DL (ref 3.5–5.7)
ALBUMIN UR-MCNC: 30 MG/DL
ALBUMIN UR-MCNC: NEGATIVE MG/DL
ALBUMIN UR-MCNC: NEGATIVE MG/DL
ALP SERPL-CCNC: 49 U/L (ref 34–104)
ALP SERPL-CCNC: 58 U/L (ref 34–104)
ALP SERPL-CCNC: 59 U/L (ref 34–104)
ALT SERPL W P-5'-P-CCNC: 11 U/L (ref 7–52)
ALT SERPL W P-5'-P-CCNC: 12 U/L (ref 7–52)
ALT SERPL W P-5'-P-CCNC: 9 U/L (ref 7–52)
ANION GAP SERPL CALCULATED.3IONS-SCNC: 13 MMOL/L (ref 3–14)
ANION GAP SERPL CALCULATED.3IONS-SCNC: 16 MMOL/L (ref 3–14)
ANION GAP SERPL CALCULATED.3IONS-SCNC: 7 MMOL/L (ref 3–14)
ANION GAP SERPL CALCULATED.3IONS-SCNC: 7 MMOL/L (ref 3–14)
ANION GAP SERPL CALCULATED.3IONS-SCNC: 9 MMOL/L (ref 3–14)
APPEARANCE UR: CLEAR
APTT PPP: 24 SEC (ref 22–37)
APTT PPP: 25 SEC (ref 22–37)
AST SERPL W P-5'-P-CCNC: 12 U/L (ref 13–39)
AST SERPL W P-5'-P-CCNC: 16 U/L (ref 13–39)
AST SERPL W P-5'-P-CCNC: 20 U/L (ref 13–39)
BACTERIA #/AREA URNS HPF: ABNORMAL /HPF
BACTERIA SPEC CULT: ABNORMAL
BACTERIA SPEC CULT: NORMAL
BASOPHILS # BLD AUTO: 0.1 10E9/L (ref 0–0.2)
BASOPHILS NFR BLD AUTO: 0.4 %
BASOPHILS NFR BLD AUTO: 0.6 %
BASOPHILS NFR BLD AUTO: 0.6 %
BASOPHILS NFR BLD AUTO: 0.8 %
BILIRUB SERPL-MCNC: 0.4 MG/DL (ref 0.3–1)
BILIRUB SERPL-MCNC: 0.5 MG/DL (ref 0.3–1)
BILIRUB SERPL-MCNC: 0.6 MG/DL (ref 0.3–1)
BILIRUB UR QL STRIP: NEGATIVE
BUN SERPL-MCNC: 17 MG/DL (ref 7–25)
BUN SERPL-MCNC: 17 MG/DL (ref 7–25)
BUN SERPL-MCNC: 18 MG/DL (ref 7–25)
BUN SERPL-MCNC: 20 MG/DL (ref 7–25)
BUN SERPL-MCNC: 21 MG/DL (ref 7–25)
CALCIUM SERPL-MCNC: 10.2 MG/DL (ref 8.6–10.3)
CALCIUM SERPL-MCNC: 10.5 MG/DL (ref 8.6–10.3)
CALCIUM SERPL-MCNC: 9.6 MG/DL (ref 8.6–10.3)
CALCIUM SERPL-MCNC: 9.8 MG/DL (ref 8.6–10.3)
CALCIUM SERPL-MCNC: 9.9 MG/DL (ref 8.6–10.3)
CHLORIDE SERPL-SCNC: 102 MMOL/L (ref 98–107)
CHLORIDE SERPL-SCNC: 105 MMOL/L (ref 98–107)
CHLORIDE SERPL-SCNC: 98 MMOL/L (ref 98–107)
CHLORIDE SERPL-SCNC: 99 MMOL/L (ref 98–107)
CHLORIDE SERPL-SCNC: 99 MMOL/L (ref 98–107)
CHOLEST SERPL-MCNC: 171 MG/DL
CK SERPL-CCNC: 58 U/L (ref 30–223)
CO2 SERPL-SCNC: 22 MMOL/L (ref 21–31)
CO2 SERPL-SCNC: 24 MMOL/L (ref 21–31)
CO2 SERPL-SCNC: 25 MMOL/L (ref 21–31)
CO2 SERPL-SCNC: 27 MMOL/L (ref 21–31)
CO2 SERPL-SCNC: 30 MMOL/L (ref 21–31)
COLOR UR AUTO: ABNORMAL
COLOR UR AUTO: ABNORMAL
COLOR UR AUTO: YELLOW
CREAT SERPL-MCNC: 1.12 MG/DL (ref 0.6–1.2)
CREAT SERPL-MCNC: 1.18 MG/DL (ref 0.6–1.2)
CREAT SERPL-MCNC: 1.27 MG/DL (ref 0.6–1.2)
CREAT SERPL-MCNC: 1.28 MG/DL (ref 0.6–1.2)
CREAT SERPL-MCNC: 1.37 MG/DL (ref 0.6–1.2)
CREAT UR-MCNC: 356 MG/DL
D DIMER PPP DDU-MCNC: <200 NG/ML D-DU (ref 0–230)
DIFFERENTIAL METHOD BLD: ABNORMAL
EOSINOPHIL # BLD AUTO: 0.1 10E9/L (ref 0–0.7)
EOSINOPHIL # BLD AUTO: 0.1 10E9/L (ref 0–0.7)
EOSINOPHIL # BLD AUTO: 0.2 10E9/L (ref 0–0.7)
EOSINOPHIL # BLD AUTO: 0.2 10E9/L (ref 0–0.7)
EOSINOPHIL NFR BLD AUTO: 0.5 %
EOSINOPHIL NFR BLD AUTO: 1.2 %
EOSINOPHIL NFR BLD AUTO: 1.3 %
EOSINOPHIL NFR BLD AUTO: 2.2 %
ERYTHROCYTE [DISTWIDTH] IN BLOOD BY AUTOMATED COUNT: 14.6 % (ref 10–15)
ERYTHROCYTE [DISTWIDTH] IN BLOOD BY AUTOMATED COUNT: 15 % (ref 10–15)
ERYTHROCYTE [DISTWIDTH] IN BLOOD BY AUTOMATED COUNT: 15.9 % (ref 10–15)
ERYTHROCYTE [DISTWIDTH] IN BLOOD BY AUTOMATED COUNT: 19.2 % (ref 10–15)
ERYTHROCYTE [DISTWIDTH] IN BLOOD BY AUTOMATED COUNT: 19.7 % (ref 10–15)
ERYTHROCYTE [DISTWIDTH] IN BLOOD BY AUTOMATED COUNT: 25.5 % (ref 10–15)
ERYTHROCYTE [DISTWIDTH] IN BLOOD BY AUTOMATED COUNT: 29.8 % (ref 10–15)
FERRITIN SERPL-MCNC: 13 NG/ML (ref 23.9–336.2)
FLUAV+FLUBV RNA SPEC QL NAA+PROBE: NEGATIVE
GFR SERPL CREATININE-BSD FRML MDRD: 38 ML/MIN/{1.73_M2}
GFR SERPL CREATININE-BSD FRML MDRD: 41 ML/MIN/{1.73_M2}
GFR SERPL CREATININE-BSD FRML MDRD: 42 ML/MIN/{1.73_M2}
GFR SERPL CREATININE-BSD FRML MDRD: 45 ML/MIN/{1.73_M2}
GFR SERPL CREATININE-BSD FRML MDRD: 48 ML/MIN/{1.73_M2}
GLUCOSE SERPL-MCNC: 100 MG/DL (ref 70–105)
GLUCOSE SERPL-MCNC: 120 MG/DL (ref 70–105)
GLUCOSE SERPL-MCNC: 123 MG/DL (ref 70–105)
GLUCOSE SERPL-MCNC: 132 MG/DL (ref 70–105)
GLUCOSE SERPL-MCNC: 202 MG/DL (ref 70–105)
GLUCOSE UR STRIP-MCNC: NEGATIVE MG/DL
HBA1C MFR BLD: 6.2 % (ref 4–6)
HBA1C MFR BLD: 6.7 % (ref 4–6)
HBA1C MFR BLD: 6.8 % (ref 4–6)
HCO3 BLDV-SCNC: 23 MMOL/L (ref 21–28)
HCT VFR BLD AUTO: 29.8 % (ref 35–47)
HCT VFR BLD AUTO: 32.4 % (ref 35–47)
HCT VFR BLD AUTO: 37.7 % (ref 35–47)
HCT VFR BLD AUTO: 39.9 % (ref 35–47)
HCT VFR BLD AUTO: 40.4 % (ref 35–47)
HCT VFR BLD AUTO: 42 % (ref 35–47)
HCT VFR BLD AUTO: 47.5 % (ref 35–47)
HDLC SERPL-MCNC: 51 MG/DL (ref 23–92)
HGB BLD-MCNC: 10.4 G/DL (ref 11.7–15.7)
HGB BLD-MCNC: 12.5 G/DL (ref 11.7–15.7)
HGB BLD-MCNC: 12.6 G/DL (ref 11.7–15.7)
HGB BLD-MCNC: 12.9 G/DL (ref 11.7–15.7)
HGB BLD-MCNC: 13 G/DL (ref 11.7–15.7)
HGB BLD-MCNC: 13 G/DL (ref 11.7–15.7)
HGB BLD-MCNC: 14.8 G/DL (ref 11.7–15.7)
HGB BLD-MCNC: 8.3 G/DL (ref 11.7–15.7)
HGB BLD-MCNC: 9.1 G/DL (ref 11.7–15.7)
HGB UR QL STRIP: ABNORMAL
HGB UR QL STRIP: NEGATIVE
HGB UR QL STRIP: NEGATIVE
HYALINE CASTS #/AREA URNS LPF: 12 /LPF (ref 0–2)
IMM GRANULOCYTES # BLD: 0 10E9/L (ref 0–0.4)
IMM GRANULOCYTES # BLD: 0.1 10E9/L (ref 0–0.4)
IMM GRANULOCYTES NFR BLD: 0.3 %
IMM GRANULOCYTES NFR BLD: 0.4 %
IMM GRANULOCYTES NFR BLD: 0.5 %
IMM GRANULOCYTES NFR BLD: 0.5 %
INR PPP: 0.92 (ref 0.86–1.14)
INR PPP: 0.98 (ref 0–1.3)
INTERPRETATION ECG - MUSE: NORMAL
IRON SATN MFR SERPL: 3 % (ref 20–55)
IRON SATN MFR SERPL: 9 % (ref 20–55)
IRON SERPL-MCNC: 15 UG/DL (ref 50–212)
IRON SERPL-MCNC: 37 UG/DL (ref 50–212)
KETONES UR STRIP-MCNC: NEGATIVE MG/DL
LABORATORY COMMENT REPORT: NORMAL
LACTATE BLD-SCNC: 1.6 MMOL/L (ref 0.7–2)
LACTATE BLD-SCNC: 3.1 MMOL/L (ref 0.7–2)
LDLC SERPL CALC-MCNC: 91 MG/DL
LEUKOCYTE ESTERASE UR QL STRIP: ABNORMAL
LEUKOCYTE ESTERASE UR QL STRIP: ABNORMAL
LEUKOCYTE ESTERASE UR QL STRIP: NEGATIVE
LIPASE SERPL-CCNC: 75 U/L (ref 11–82)
LYMPHOCYTES # BLD AUTO: 1.7 10E9/L (ref 0.8–5.3)
LYMPHOCYTES # BLD AUTO: 2 10E9/L (ref 0.8–5.3)
LYMPHOCYTES # BLD AUTO: 2.3 10E9/L (ref 0.8–5.3)
LYMPHOCYTES # BLD AUTO: 2.6 10E9/L (ref 0.8–5.3)
LYMPHOCYTES NFR BLD AUTO: 12.7 %
LYMPHOCYTES NFR BLD AUTO: 14.6 %
LYMPHOCYTES NFR BLD AUTO: 22.3 %
LYMPHOCYTES NFR BLD AUTO: 23.2 %
MAGNESIUM SERPL-MCNC: 1.5 MG/DL (ref 1.9–2.7)
MAGNESIUM SERPL-MCNC: 1.6 MG/DL (ref 1.9–2.7)
MAGNESIUM SERPL-MCNC: 1.7 MG/DL (ref 1.9–2.7)
MAGNESIUM SERPL-MCNC: 1.7 MG/DL (ref 1.9–2.7)
MCH RBC QN AUTO: 18.3 PG (ref 26.5–33)
MCH RBC QN AUTO: 18.5 PG (ref 26.5–33)
MCH RBC QN AUTO: 20.4 PG (ref 26.5–33)
MCH RBC QN AUTO: 24.9 PG (ref 26.5–33)
MCH RBC QN AUTO: 25.1 PG (ref 26.5–33)
MCH RBC QN AUTO: 25.3 PG (ref 26.5–33)
MCH RBC QN AUTO: 27.2 PG (ref 26.5–33)
MCHC RBC AUTO-ENTMCNC: 27.6 G/DL (ref 31.5–36.5)
MCHC RBC AUTO-ENTMCNC: 27.9 G/DL (ref 31.5–36.5)
MCHC RBC AUTO-ENTMCNC: 28.1 G/DL (ref 31.5–36.5)
MCHC RBC AUTO-ENTMCNC: 30.9 G/DL (ref 31.5–36.5)
MCHC RBC AUTO-ENTMCNC: 31 G/DL (ref 31.5–36.5)
MCHC RBC AUTO-ENTMCNC: 31.2 G/DL (ref 31.5–36.5)
MCHC RBC AUTO-ENTMCNC: 31.6 G/DL (ref 31.5–36.5)
MCV RBC AUTO: 66 FL (ref 78–100)
MCV RBC AUTO: 66 FL (ref 78–100)
MCV RBC AUTO: 74 FL (ref 78–100)
MCV RBC AUTO: 81 FL (ref 78–100)
MCV RBC AUTO: 86 FL (ref 78–100)
MICROALBUMIN UR-MCNC: 59 MG/L
MICROALBUMIN/CREAT UR: 16.63 MG/G CR (ref 0–25)
MONOCYTES # BLD AUTO: 1.1 10E9/L (ref 0–1.3)
MONOCYTES # BLD AUTO: 1.2 10E9/L (ref 0–1.3)
MONOCYTES # BLD AUTO: 1.3 10E9/L (ref 0–1.3)
MONOCYTES # BLD AUTO: 1.5 10E9/L (ref 0–1.3)
MONOCYTES NFR BLD AUTO: 10.4 %
MONOCYTES NFR BLD AUTO: 11.1 %
MONOCYTES NFR BLD AUTO: 12.6 %
MONOCYTES NFR BLD AUTO: 8.4 %
MUCOUS THREADS #/AREA URNS LPF: PRESENT /LPF
MUCOUS THREADS #/AREA URNS LPF: PRESENT /LPF
NEUTROPHILS # BLD AUTO: 10.3 10E9/L (ref 1.6–8.3)
NEUTROPHILS # BLD AUTO: 6.4 10E9/L (ref 1.6–8.3)
NEUTROPHILS # BLD AUTO: 7.2 10E9/L (ref 1.6–8.3)
NEUTROPHILS # BLD AUTO: 9.6 10E9/L (ref 1.6–8.3)
NEUTROPHILS NFR BLD AUTO: 61.6 %
NEUTROPHILS NFR BLD AUTO: 64.3 %
NEUTROPHILS NFR BLD AUTO: 72 %
NEUTROPHILS NFR BLD AUTO: 77.5 %
NITRATE UR QL: NEGATIVE
NONHDLC SERPL-MCNC: 120 MG/DL
NT-PROBNP SERPL-MCNC: 537 PG/ML (ref 0–100)
NT-PROBNP SERPL-MCNC: 85 PG/ML (ref 0–100)
O2/TOTAL GAS SETTING VFR VENT: 0 %
OXYHGB MFR BLDV: 51 %
PCO2 BLDV: 35 MM HG (ref 40–50)
PH BLDV: 7.44 PH (ref 7.32–7.43)
PH UR STRIP: 5 PH (ref 5–9)
PH UR STRIP: 5.5 PH (ref 5–7)
PH UR STRIP: 6.5 PH (ref 5–7)
PLATELET # BLD AUTO: 298 10E9/L (ref 150–450)
PLATELET # BLD AUTO: 315 10E9/L (ref 150–450)
PLATELET # BLD AUTO: 392 10E9/L (ref 150–450)
PLATELET # BLD AUTO: 403 10E9/L (ref 150–450)
PLATELET # BLD AUTO: 429 10E9/L (ref 150–450)
PLATELET # BLD AUTO: 455 10E9/L (ref 150–450)
PLATELET # BLD AUTO: 547 10E9/L (ref 150–450)
PO2 BLDV: 29 MM HG (ref 25–47)
POTASSIUM SERPL-SCNC: 3.8 MMOL/L (ref 3.5–5.1)
POTASSIUM SERPL-SCNC: 3.9 MMOL/L (ref 3.5–5.1)
POTASSIUM SERPL-SCNC: 4.2 MMOL/L (ref 3.5–5.1)
POTASSIUM SERPL-SCNC: 4.7 MMOL/L (ref 3.5–5.1)
POTASSIUM SERPL-SCNC: 5 MMOL/L (ref 3.5–5.1)
PROCALCITONIN SERPL-MCNC: 0.76 NG/ML
PROT SERPL-MCNC: 7.2 G/DL (ref 6.4–8.9)
PROT SERPL-MCNC: 7.3 G/DL (ref 6.4–8.9)
PROT SERPL-MCNC: 7.8 G/DL (ref 6.4–8.9)
RBC # BLD AUTO: 4.53 10E12/L (ref 3.8–5.2)
RBC # BLD AUTO: 4.63 10E12/L (ref 3.8–5.2)
RBC # BLD AUTO: 4.91 10E12/L (ref 3.8–5.2)
RBC # BLD AUTO: 5.02 10E12/L (ref 3.8–5.2)
RBC # BLD AUTO: 5.11 10E12/L (ref 3.8–5.2)
RBC # BLD AUTO: 5.17 10E12/L (ref 3.8–5.2)
RBC # BLD AUTO: 5.84 10E12/L (ref 3.8–5.2)
RBC #/AREA URNS AUTO: 118 /HPF (ref 0–2)
RBC #/AREA URNS AUTO: <1 /HPF (ref 0–2)
RETICS # AUTO: 213.6 10E9/L (ref 25–95)
RETICS/RBC NFR AUTO: 4.2 % (ref 0.5–2)
RSV RNA SPEC NAA+PROBE: NEGATIVE
RSV RNA SPEC QL NAA+PROBE: NEGATIVE
SARS-COV-2 RNA SPEC QL NAA+PROBE: NEGATIVE
SARS-COV-2 RNA SPEC QL NAA+PROBE: NOT DETECTED
SODIUM SERPL-SCNC: 135 MMOL/L (ref 134–144)
SODIUM SERPL-SCNC: 136 MMOL/L (ref 134–144)
SODIUM SERPL-SCNC: 137 MMOL/L (ref 134–144)
SODIUM SERPL-SCNC: 137 MMOL/L (ref 134–144)
SODIUM SERPL-SCNC: 138 MMOL/L (ref 134–144)
SOURCE: ABNORMAL
SOURCE: ABNORMAL
SOURCE: NORMAL
SP GR UR STRIP: 1.01 (ref 1–1.03)
SP GR UR STRIP: 1.01 (ref 1–1.03)
SP GR UR STRIP: 1.02 (ref 1–1.03)
SPECIMEN SOURCE: ABNORMAL
SPECIMEN SOURCE: NORMAL
SQUAMOUS #/AREA URNS AUTO: 1 /HPF (ref 0–1)
SQUAMOUS #/AREA URNS AUTO: 2 /HPF (ref 0–1)
TIBC SERPL-MCNC: 428.4 UG/DL (ref 245–400)
TIBC SERPL-MCNC: 553 UG/DL (ref 245–400)
TRIGL SERPL-MCNC: 147 MG/DL
TROPONIN I SERPL-MCNC: 107.8 PG/ML
TROPONIN I SERPL-MCNC: 7.2 PG/ML
TROPONIN I SERPL-MCNC: 74.1 PG/ML
UIBC (UNSATURATED): 391.4 MG/DL
UIBC (UNSATURATED): 538 MG/DL
UROBILINOGEN UR STRIP-ACNC: 0.2 EU/DL (ref 0.2–1)
UROBILINOGEN UR STRIP-MCNC: NORMAL MG/DL (ref 0–2)
UROBILINOGEN UR STRIP-MCNC: NORMAL MG/DL (ref 0–2)
VIT B12 SERPL-MCNC: 288 PG/ML (ref 180–914)
WBC # BLD AUTO: 10 10E9/L (ref 4–11)
WBC # BLD AUTO: 10.3 10E9/L (ref 4–11)
WBC # BLD AUTO: 10.6 10E9/L (ref 4–11)
WBC # BLD AUTO: 10.7 10E9/L (ref 4–11)
WBC # BLD AUTO: 11.1 10E9/L (ref 4–11)
WBC # BLD AUTO: 13.3 10E9/L (ref 4–11)
WBC # BLD AUTO: 13.4 10E9/L (ref 4–11)
WBC #/AREA URNS AUTO: 10 /HPF (ref 0–5)
WBC #/AREA URNS AUTO: 3 /HPF (ref 0–5)

## 2020-01-01 PROCEDURE — 93005 ELECTROCARDIOGRAM TRACING: CPT | Performed by: FAMILY MEDICINE

## 2020-01-01 PROCEDURE — 83036 HEMOGLOBIN GLYCOSYLATED A1C: CPT | Mod: ZL | Performed by: INTERNAL MEDICINE

## 2020-01-01 PROCEDURE — 96365 THER/PROPH/DIAG IV INF INIT: CPT

## 2020-01-01 PROCEDURE — 82043 UR ALBUMIN QUANTITATIVE: CPT | Mod: ZL | Performed by: INTERNAL MEDICINE

## 2020-01-01 PROCEDURE — 94729 DIFFUSING CAPACITY: CPT | Mod: 26 | Performed by: INTERNAL MEDICINE

## 2020-01-01 PROCEDURE — 81003 URINALYSIS AUTO W/O SCOPE: CPT | Performed by: FAMILY MEDICINE

## 2020-01-01 PROCEDURE — 96366 THER/PROPH/DIAG IV INF ADDON: CPT | Performed by: FAMILY MEDICINE

## 2020-01-01 PROCEDURE — 96366 THER/PROPH/DIAG IV INF ADDON: CPT | Performed by: PHYSICIAN ASSISTANT

## 2020-01-01 PROCEDURE — 99213 OFFICE O/P EST LOW 20 MIN: CPT | Mod: 95 | Performed by: NURSE PRACTITIONER

## 2020-01-01 PROCEDURE — 99214 OFFICE O/P EST MOD 30 MIN: CPT | Performed by: PHYSICIAN ASSISTANT

## 2020-01-01 PROCEDURE — 94729 DIFFUSING CAPACITY: CPT

## 2020-01-01 PROCEDURE — 25000128 H RX IP 250 OP 636: Performed by: FAMILY MEDICINE

## 2020-01-01 PROCEDURE — 99214 OFFICE O/P EST MOD 30 MIN: CPT | Performed by: INTERNAL MEDICINE

## 2020-01-01 PROCEDURE — 85045 AUTOMATED RETICULOCYTE COUNT: CPT | Mod: ZL | Performed by: INTERNAL MEDICINE

## 2020-01-01 PROCEDURE — 36415 COLL VENOUS BLD VENIPUNCTURE: CPT | Mod: ZL | Performed by: INTERNAL MEDICINE

## 2020-01-01 PROCEDURE — 84145 PROCALCITONIN (PCT): CPT | Performed by: FAMILY MEDICINE

## 2020-01-01 PROCEDURE — 93005 ELECTROCARDIOGRAM TRACING: CPT | Mod: 76 | Performed by: PHYSICIAN ASSISTANT

## 2020-01-01 PROCEDURE — 99214 OFFICE O/P EST MOD 30 MIN: CPT | Mod: 25 | Performed by: INTERNAL MEDICINE

## 2020-01-01 PROCEDURE — 25800030 ZZH RX IP 258 OP 636: Performed by: INTERNAL MEDICINE

## 2020-01-01 PROCEDURE — 85610 PROTHROMBIN TIME: CPT | Performed by: FAMILY MEDICINE

## 2020-01-01 PROCEDURE — 81001 URINALYSIS AUTO W/SCOPE: CPT | Mod: ZL | Performed by: INTERNAL MEDICINE

## 2020-01-01 PROCEDURE — 99285 EMERGENCY DEPT VISIT HI MDM: CPT | Mod: 25 | Performed by: FAMILY MEDICINE

## 2020-01-01 PROCEDURE — 99213 OFFICE O/P EST LOW 20 MIN: CPT | Performed by: NURSE PRACTITIONER

## 2020-01-01 PROCEDURE — G0463 HOSPITAL OUTPT CLINIC VISIT: HCPCS

## 2020-01-01 PROCEDURE — 81001 URINALYSIS AUTO W/SCOPE: CPT | Performed by: PHYSICIAN ASSISTANT

## 2020-01-01 PROCEDURE — 94726 PLETHYSMOGRAPHY LUNG VOLUMES: CPT | Mod: 26 | Performed by: INTERNAL MEDICINE

## 2020-01-01 PROCEDURE — 250N000013 HC RX MED GY IP 250 OP 250 PS 637: Performed by: PHYSICIAN ASSISTANT

## 2020-01-01 PROCEDURE — 85027 COMPLETE CBC AUTOMATED: CPT | Mod: ZL | Performed by: INTERNAL MEDICINE

## 2020-01-01 PROCEDURE — 80048 BASIC METABOLIC PNL TOTAL CA: CPT | Mod: ZL | Performed by: INTERNAL MEDICINE

## 2020-01-01 PROCEDURE — 255N000002 HC RX 255 OP 636: Performed by: INTERNAL MEDICINE

## 2020-01-01 PROCEDURE — 36415 COLL VENOUS BLD VENIPUNCTURE: CPT | Mod: ZL | Performed by: NURSE PRACTITIONER

## 2020-01-01 PROCEDURE — C9113 INJ PANTOPRAZOLE SODIUM, VIA: HCPCS | Performed by: FAMILY MEDICINE

## 2020-01-01 PROCEDURE — 83540 ASSAY OF IRON: CPT | Mod: ZL | Performed by: NURSE PRACTITIONER

## 2020-01-01 PROCEDURE — 83735 ASSAY OF MAGNESIUM: CPT | Mod: ZL | Performed by: NURSE PRACTITIONER

## 2020-01-01 PROCEDURE — U0003 INFECTIOUS AGENT DETECTION BY NUCLEIC ACID (DNA OR RNA); SEVERE ACUTE RESPIRATORY SYNDROME CORONAVIRUS 2 (SARS-COV-2) (CORONAVIRUS DISEASE [COVID-19]), AMPLIFIED PROBE TECHNIQUE, MAKING USE OF HIGH THROUGHPUT TECHNOLOGIES AS DESCRIBED BY CMS-2020-01-R: HCPCS | Mod: ZL | Performed by: INTERNAL MEDICINE

## 2020-01-01 PROCEDURE — G0463 HOSPITAL OUTPT CLINIC VISIT: HCPCS | Mod: 25

## 2020-01-01 PROCEDURE — 71045 X-RAY EXAM CHEST 1 VIEW: CPT

## 2020-01-01 PROCEDURE — 84484 ASSAY OF TROPONIN QUANT: CPT | Performed by: PHYSICIAN ASSISTANT

## 2020-01-01 PROCEDURE — 83735 ASSAY OF MAGNESIUM: CPT | Performed by: PHYSICIAN ASSISTANT

## 2020-01-01 PROCEDURE — 84484 ASSAY OF TROPONIN QUANT: CPT | Performed by: FAMILY MEDICINE

## 2020-01-01 PROCEDURE — 87636 SARSCOV2 & INF A&B AMP PRB: CPT | Performed by: PHYSICIAN ASSISTANT

## 2020-01-01 PROCEDURE — 87086 URINE CULTURE/COLONY COUNT: CPT | Mod: ZL,GZ | Performed by: INTERNAL MEDICINE

## 2020-01-01 PROCEDURE — 85730 THROMBOPLASTIN TIME PARTIAL: CPT | Performed by: FAMILY MEDICINE

## 2020-01-01 PROCEDURE — 99406 BEHAV CHNG SMOKING 3-10 MIN: CPT | Performed by: INTERNAL MEDICINE

## 2020-01-01 PROCEDURE — 80061 LIPID PANEL: CPT | Mod: ZL | Performed by: INTERNAL MEDICINE

## 2020-01-01 PROCEDURE — G0463 HOSPITAL OUTPT CLINIC VISIT: HCPCS | Mod: 25 | Performed by: INTERNAL MEDICINE

## 2020-01-01 PROCEDURE — 25000128 H RX IP 250 OP 636: Performed by: INTERNAL MEDICINE

## 2020-01-01 PROCEDURE — 93010 ELECTROCARDIOGRAM REPORT: CPT | Performed by: INTERNAL MEDICINE

## 2020-01-01 PROCEDURE — 94640 AIRWAY INHALATION TREATMENT: CPT

## 2020-01-01 PROCEDURE — 99284 EMERGENCY DEPT VISIT MOD MDM: CPT | Mod: Z6 | Performed by: FAMILY MEDICINE

## 2020-01-01 PROCEDURE — 85025 COMPLETE CBC W/AUTO DIFF WBC: CPT | Mod: ZL | Performed by: NURSE PRACTITIONER

## 2020-01-01 PROCEDURE — 99213 OFFICE O/P EST LOW 20 MIN: CPT | Mod: 25 | Performed by: PHYSICIAN ASSISTANT

## 2020-01-01 PROCEDURE — 85027 COMPLETE CBC AUTOMATED: CPT | Mod: ZL | Performed by: NURSE PRACTITIONER

## 2020-01-01 PROCEDURE — 87086 URINE CULTURE/COLONY COUNT: CPT | Performed by: PHYSICIAN ASSISTANT

## 2020-01-01 PROCEDURE — G0180 MD CERTIFICATION HHA PATIENT: HCPCS | Performed by: INTERNAL MEDICINE

## 2020-01-01 PROCEDURE — 93005 ELECTROCARDIOGRAM TRACING: CPT | Performed by: PHYSICIAN ASSISTANT

## 2020-01-01 PROCEDURE — 70450 CT HEAD/BRAIN W/O DYE: CPT

## 2020-01-01 PROCEDURE — 94060 EVALUATION OF WHEEZING: CPT | Mod: 26 | Performed by: INTERNAL MEDICINE

## 2020-01-01 PROCEDURE — 96365 THER/PROPH/DIAG IV INF INIT: CPT | Mod: XU | Performed by: PHYSICIAN ASSISTANT

## 2020-01-01 PROCEDURE — 99285 EMERGENCY DEPT VISIT HI MDM: CPT | Mod: 25 | Performed by: PHYSICIAN ASSISTANT

## 2020-01-01 PROCEDURE — 94726 PLETHYSMOGRAPHY LUNG VOLUMES: CPT

## 2020-01-01 PROCEDURE — 85379 FIBRIN DEGRADATION QUANT: CPT | Performed by: FAMILY MEDICINE

## 2020-01-01 PROCEDURE — 80053 COMPREHEN METABOLIC PANEL: CPT | Performed by: FAMILY MEDICINE

## 2020-01-01 PROCEDURE — 36415 COLL VENOUS BLD VENIPUNCTURE: CPT | Performed by: FAMILY MEDICINE

## 2020-01-01 PROCEDURE — 96375 TX/PRO/DX INJ NEW DRUG ADDON: CPT | Performed by: FAMILY MEDICINE

## 2020-01-01 PROCEDURE — 96365 THER/PROPH/DIAG IV INF INIT: CPT | Performed by: FAMILY MEDICINE

## 2020-01-01 PROCEDURE — 99214 OFFICE O/P EST MOD 30 MIN: CPT | Performed by: NURSE PRACTITIONER

## 2020-01-01 PROCEDURE — 96372 THER/PROPH/DIAG INJ SC/IM: CPT | Performed by: INTERNAL MEDICINE

## 2020-01-01 PROCEDURE — G0438 PPPS, INITIAL VISIT: HCPCS | Performed by: INTERNAL MEDICINE

## 2020-01-01 PROCEDURE — 85018 HEMOGLOBIN: CPT | Mod: ZL | Performed by: INTERNAL MEDICINE

## 2020-01-01 PROCEDURE — 85025 COMPLETE CBC W/AUTO DIFF WBC: CPT | Performed by: PHYSICIAN ASSISTANT

## 2020-01-01 PROCEDURE — 92504 EAR MICROSCOPY EXAMINATION: CPT | Performed by: PHYSICIAN ASSISTANT

## 2020-01-01 PROCEDURE — 250N000011 HC RX IP 250 OP 636: Performed by: PHYSICIAN ASSISTANT

## 2020-01-01 PROCEDURE — 92504 EAR MICROSCOPY EXAMINATION: CPT

## 2020-01-01 PROCEDURE — 83550 IRON BINDING TEST: CPT | Mod: ZL | Performed by: NURSE PRACTITIONER

## 2020-01-01 PROCEDURE — A9575 INJ GADOTERATE MEGLUMI 0.1ML: HCPCS | Performed by: INTERNAL MEDICINE

## 2020-01-01 PROCEDURE — 82550 ASSAY OF CK (CPK): CPT | Mod: ZL | Performed by: NURSE PRACTITIONER

## 2020-01-01 PROCEDURE — 40000275 ZZH STATISTIC RCP TIME EA 10 MIN

## 2020-01-01 PROCEDURE — 82607 VITAMIN B-12: CPT | Mod: ZL | Performed by: NURSE PRACTITIONER

## 2020-01-01 PROCEDURE — 83880 ASSAY OF NATRIURETIC PEPTIDE: CPT | Performed by: FAMILY MEDICINE

## 2020-01-01 PROCEDURE — 99406 BEHAV CHNG SMOKING 3-10 MIN: CPT | Performed by: NURSE PRACTITIONER

## 2020-01-01 PROCEDURE — 36415 COLL VENOUS BLD VENIPUNCTURE: CPT | Mod: XU | Performed by: PHYSICIAN ASSISTANT

## 2020-01-01 PROCEDURE — 83735 ASSAY OF MAGNESIUM: CPT | Performed by: FAMILY MEDICINE

## 2020-01-01 PROCEDURE — 92550 TYMPANOMETRY & REFLEX THRESH: CPT | Performed by: AUDIOLOGIST

## 2020-01-01 PROCEDURE — 85025 COMPLETE CBC W/AUTO DIFF WBC: CPT | Performed by: FAMILY MEDICINE

## 2020-01-01 PROCEDURE — 80053 COMPREHEN METABOLIC PANEL: CPT | Performed by: PHYSICIAN ASSISTANT

## 2020-01-01 PROCEDURE — C9803 HOPD COVID-19 SPEC COLLECT: HCPCS | Performed by: PHYSICIAN ASSISTANT

## 2020-01-01 PROCEDURE — 250N000009 HC RX 250: Performed by: PHYSICIAN ASSISTANT

## 2020-01-01 PROCEDURE — 93880 EXTRACRANIAL BILAT STUDY: CPT

## 2020-01-01 PROCEDURE — 25800030 ZZH RX IP 258 OP 636: Performed by: FAMILY MEDICINE

## 2020-01-01 PROCEDURE — 87631 RESP VIRUS 3-5 TARGETS: CPT | Performed by: FAMILY MEDICINE

## 2020-01-01 PROCEDURE — 96376 TX/PRO/DX INJ SAME DRUG ADON: CPT | Performed by: PHYSICIAN ASSISTANT

## 2020-01-01 PROCEDURE — 83605 ASSAY OF LACTIC ACID: CPT | Performed by: PHYSICIAN ASSISTANT

## 2020-01-01 PROCEDURE — 94060 EVALUATION OF WHEEZING: CPT

## 2020-01-01 PROCEDURE — 40000809 ZZH STATISTIC NO DOCUMENTATION TO SUPPORT CHARGE

## 2020-01-01 PROCEDURE — 85610 PROTHROMBIN TIME: CPT | Performed by: PHYSICIAN ASSISTANT

## 2020-01-01 PROCEDURE — 83880 ASSAY OF NATRIURETIC PEPTIDE: CPT | Mod: GZ | Performed by: PHYSICIAN ASSISTANT

## 2020-01-01 PROCEDURE — 80048 BASIC METABOLIC PNL TOTAL CA: CPT | Mod: ZL | Performed by: NURSE PRACTITIONER

## 2020-01-01 PROCEDURE — 85025 COMPLETE CBC W/AUTO DIFF WBC: CPT | Mod: ZL | Performed by: INTERNAL MEDICINE

## 2020-01-01 PROCEDURE — 99214 OFFICE O/P EST MOD 30 MIN: CPT | Mod: 25 | Performed by: NURSE PRACTITIONER

## 2020-01-01 PROCEDURE — 82805 BLOOD GASES W/O2 SATURATION: CPT | Performed by: FAMILY MEDICINE

## 2020-01-01 PROCEDURE — 70450 CT HEAD/BRAIN W/O DYE: CPT | Mod: TC

## 2020-01-01 PROCEDURE — 250N000011 HC RX IP 250 OP 636: Performed by: INTERNAL MEDICINE

## 2020-01-01 PROCEDURE — 70553 MRI BRAIN STEM W/O & W/DYE: CPT

## 2020-01-01 PROCEDURE — 85018 HEMOGLOBIN: CPT | Mod: ZL | Performed by: NURSE PRACTITIONER

## 2020-01-01 PROCEDURE — 92557 COMPREHENSIVE HEARING TEST: CPT | Performed by: AUDIOLOGIST

## 2020-01-01 PROCEDURE — 85730 THROMBOPLASTIN TIME PARTIAL: CPT | Performed by: PHYSICIAN ASSISTANT

## 2020-01-01 PROCEDURE — 25000132 ZZH RX MED GY IP 250 OP 250 PS 637: Performed by: FAMILY MEDICINE

## 2020-01-01 PROCEDURE — C9803 HOPD COVID-19 SPEC COLLECT: HCPCS

## 2020-01-01 PROCEDURE — 82728 ASSAY OF FERRITIN: CPT | Mod: ZL | Performed by: NURSE PRACTITIONER

## 2020-01-01 PROCEDURE — 87088 URINE BACTERIA CULTURE: CPT | Performed by: PHYSICIAN ASSISTANT

## 2020-01-01 PROCEDURE — 85045 AUTOMATED RETICULOCYTE COUNT: CPT

## 2020-01-01 PROCEDURE — 83690 ASSAY OF LIPASE: CPT | Performed by: FAMILY MEDICINE

## 2020-01-01 PROCEDURE — 25000125 ZZHC RX 250

## 2020-01-01 PROCEDURE — 80053 COMPREHEN METABOLIC PANEL: CPT | Mod: ZL | Performed by: NURSE PRACTITIONER

## 2020-01-01 PROCEDURE — 99285 EMERGENCY DEPT VISIT HI MDM: CPT | Performed by: PHYSICIAN ASSISTANT

## 2020-01-01 PROCEDURE — G0463 HOSPITAL OUTPT CLINIC VISIT: HCPCS | Mod: 25,27

## 2020-01-01 RX ORDER — CITALOPRAM HYDROBROMIDE 20 MG/1
TABLET ORAL
Qty: 90 TABLET | Refills: 0 | OUTPATIENT
Start: 2020-01-01

## 2020-01-01 RX ORDER — DILTIAZEM HYDROCHLORIDE 30 MG/1
30 TABLET, FILM COATED ORAL 3 TIMES DAILY PRN
Qty: 30 TABLET | Refills: 3 | Status: SHIPPED | OUTPATIENT
Start: 2020-01-01

## 2020-01-01 RX ORDER — FUROSEMIDE 20 MG
TABLET ORAL
Qty: 90 TABLET | Refills: 1 | Status: SHIPPED | OUTPATIENT
Start: 2020-01-01 | End: 2020-01-01

## 2020-01-01 RX ORDER — ASPIRIN 81 MG/1
TABLET, CHEWABLE ORAL
Qty: 90 TABLET | Refills: 3 | Status: SHIPPED | OUTPATIENT
Start: 2020-01-01 | End: 2020-01-01

## 2020-01-01 RX ORDER — HEPARIN SODIUM,PORCINE 10 UNIT/ML
5 VIAL (ML) INTRAVENOUS
Status: CANCELLED | OUTPATIENT
Start: 2020-01-01

## 2020-01-01 RX ORDER — METFORMIN HCL 500 MG
500 TABLET, EXTENDED RELEASE 24 HR ORAL
Qty: 90 TABLET | Refills: 3 | Status: SHIPPED | OUTPATIENT
Start: 2020-01-01 | End: 2020-01-01

## 2020-01-01 RX ORDER — METOPROLOL SUCCINATE 100 MG/1
100 TABLET, EXTENDED RELEASE ORAL DAILY
Qty: 90 TABLET | Refills: 3 | Status: SHIPPED | OUTPATIENT
Start: 2020-01-01 | End: 2020-01-01

## 2020-01-01 RX ORDER — DILTIAZEM HYDROCHLORIDE 5 MG/ML
25 INJECTION INTRAVENOUS ONCE
Status: COMPLETED | OUTPATIENT
Start: 2020-01-01 | End: 2020-01-01

## 2020-01-01 RX ORDER — FERROUS SULFATE 325(65) MG
325 TABLET, DELAYED RELEASE (ENTERIC COATED) ORAL ONCE
Status: COMPLETED | OUTPATIENT
Start: 2020-01-01 | End: 2020-01-01

## 2020-01-01 RX ORDER — POTASSIUM CHLORIDE 1500 MG/1
20 TABLET, EXTENDED RELEASE ORAL DAILY
Qty: 90 TABLET | Refills: 3 | COMMUNITY
Start: 2020-01-01

## 2020-01-01 RX ORDER — CITALOPRAM HYDROBROMIDE 20 MG/1
20 TABLET ORAL DAILY
Qty: 90 TABLET | Refills: 3 | Status: SHIPPED | OUTPATIENT
Start: 2020-01-01

## 2020-01-01 RX ORDER — HEPARIN SODIUM (PORCINE) LOCK FLUSH IV SOLN 100 UNIT/ML 100 UNIT/ML
5 SOLUTION INTRAVENOUS
Status: CANCELLED | OUTPATIENT
Start: 2020-01-01

## 2020-01-01 RX ORDER — RIVAROXABAN 20 MG/1
TABLET, FILM COATED ORAL
Qty: 90 TABLET | Refills: 0 | Status: SHIPPED | OUTPATIENT
Start: 2020-01-01 | End: 2020-01-01

## 2020-01-01 RX ORDER — ACETAMINOPHEN 500 MG
500-1000 TABLET ORAL EVERY 6 HOURS PRN
COMMUNITY

## 2020-01-01 RX ORDER — AMLODIPINE BESYLATE 10 MG/1
10 TABLET ORAL DAILY
Qty: 90 TABLET | Refills: 3 | Status: SHIPPED | OUTPATIENT
Start: 2020-01-01 | End: 2020-01-01

## 2020-01-01 RX ORDER — OMEPRAZOLE 40 MG/1
40 CAPSULE, DELAYED RELEASE ORAL DAILY
Qty: 90 CAPSULE | Refills: 3 | Status: SHIPPED | OUTPATIENT
Start: 2020-01-01 | End: 2020-01-01

## 2020-01-01 RX ORDER — CLOPIDOGREL BISULFATE 75 MG/1
75 TABLET ORAL DAILY
Qty: 90 TABLET | Refills: 3 | Status: SHIPPED | OUTPATIENT
Start: 2020-01-01 | End: 2020-01-01

## 2020-01-01 RX ORDER — ATORVASTATIN CALCIUM 40 MG/1
40 TABLET, FILM COATED ORAL AT BEDTIME
Qty: 90 TABLET | Refills: 3 | Status: SHIPPED | OUTPATIENT
Start: 2020-01-01 | End: 2020-01-01

## 2020-01-01 RX ORDER — SODIUM CHLORIDE 9 MG/ML
1000 INJECTION, SOLUTION INTRAVENOUS CONTINUOUS
Status: DISCONTINUED | OUTPATIENT
Start: 2020-01-01 | End: 2020-01-01 | Stop reason: HOSPADM

## 2020-01-01 RX ORDER — OMEPRAZOLE 40 MG/1
40 CAPSULE, DELAYED RELEASE ORAL DAILY
Qty: 90 CAPSULE | Refills: 3
Start: 2020-01-01 | End: 2020-01-01

## 2020-01-01 RX ORDER — METFORMIN HCL 500 MG
TABLET, EXTENDED RELEASE 24 HR ORAL
Qty: 90 TABLET | Refills: 3 | Status: SHIPPED | OUTPATIENT
Start: 2020-01-01

## 2020-01-01 RX ORDER — LOSARTAN POTASSIUM 25 MG/1
50 TABLET ORAL DAILY
Qty: 90 TABLET | Refills: 3 | Status: SHIPPED | OUTPATIENT
Start: 2020-01-01 | End: 2020-01-01

## 2020-01-01 RX ORDER — AMLODIPINE BESYLATE 10 MG/1
10 TABLET ORAL DAILY
Qty: 90 TABLET | Refills: 3 | Status: SHIPPED | OUTPATIENT
Start: 2020-01-01

## 2020-01-01 RX ORDER — ISOSORBIDE MONONITRATE 30 MG/1
60 TABLET, EXTENDED RELEASE ORAL DAILY
Qty: 180 TABLET | Refills: 3 | Status: SHIPPED | OUTPATIENT
Start: 2020-01-01 | End: 2020-01-01

## 2020-01-01 RX ORDER — BUSPIRONE HYDROCHLORIDE 10 MG/1
TABLET ORAL
Qty: 90 TABLET | Refills: 3 | Status: SHIPPED | OUTPATIENT
Start: 2020-01-01 | End: 2020-01-01

## 2020-01-01 RX ORDER — OMEPRAZOLE 40 MG/1
CAPSULE, DELAYED RELEASE ORAL
Qty: 180 CAPSULE | Refills: 3 | Status: SHIPPED | OUTPATIENT
Start: 2020-01-01

## 2020-01-01 RX ORDER — HYDROCHLOROTHIAZIDE 12.5 MG/1
12.5 TABLET ORAL
COMMUNITY
Start: 2020-01-01 | End: 2020-01-01 | Stop reason: ALTCHOICE

## 2020-01-01 RX ORDER — ISOSORBIDE MONONITRATE 30 MG/1
90 TABLET, EXTENDED RELEASE ORAL DAILY
Qty: 270 TABLET | Refills: 3 | Status: SHIPPED | OUTPATIENT
Start: 2020-01-01

## 2020-01-01 RX ORDER — MAGNESIUM SULFATE HEPTAHYDRATE 40 MG/ML
4 INJECTION, SOLUTION INTRAVENOUS EVERY 4 HOURS PRN
Status: DISCONTINUED | OUTPATIENT
Start: 2020-01-01 | End: 2020-01-01 | Stop reason: HOSPADM

## 2020-01-01 RX ORDER — BUSPIRONE HYDROCHLORIDE 10 MG/1
TABLET ORAL
Qty: 90 TABLET | Refills: 3 | Status: SHIPPED | OUTPATIENT
Start: 2020-01-01

## 2020-01-01 RX ORDER — HYDRALAZINE HYDROCHLORIDE 10 MG/1
TABLET, FILM COATED ORAL
Qty: 180 TABLET | OUTPATIENT
Start: 2020-01-01

## 2020-01-01 RX ORDER — CETIRIZINE HYDROCHLORIDE 10 MG/1
10 TABLET ORAL DAILY
Qty: 90 TABLET | Refills: 1 | Status: SHIPPED | OUTPATIENT
Start: 2020-01-01

## 2020-01-01 RX ORDER — ISOSORBIDE MONONITRATE 30 MG/1
TABLET, EXTENDED RELEASE ORAL
Qty: 90 TABLET | Refills: 0 | OUTPATIENT
Start: 2020-01-01

## 2020-01-01 RX ORDER — ATORVASTATIN CALCIUM 80 MG/1
80 TABLET, FILM COATED ORAL AT BEDTIME
Qty: 90 TABLET | Refills: 3 | Status: SHIPPED | OUTPATIENT
Start: 2020-01-01 | End: 2021-11-15

## 2020-01-01 RX ORDER — APIXABAN 5 MG (74)
KIT ORAL
Status: CANCELLED | OUTPATIENT
Start: 2020-01-01 | End: 2020-01-01

## 2020-01-01 RX ORDER — DILTIAZEM HYDROCHLORIDE 100 MG/1
5-15 INJECTION, POWDER, LYOPHILIZED, FOR SOLUTION INTRAVENOUS CONTINUOUS
Status: DISCONTINUED | OUTPATIENT
Start: 2020-01-01 | End: 2020-01-01 | Stop reason: HOSPADM

## 2020-01-01 RX ORDER — CLOPIDOGREL BISULFATE 75 MG/1
75 TABLET ORAL DAILY
COMMUNITY
Start: 2020-01-01 | End: 2020-01-01

## 2020-01-01 RX ORDER — CITALOPRAM HYDROBROMIDE 20 MG/1
20 TABLET ORAL DAILY
Qty: 90 TABLET | Refills: 3 | Status: SHIPPED | OUTPATIENT
Start: 2020-01-01 | End: 2020-01-01

## 2020-01-01 RX ORDER — ALBUTEROL SULFATE 0.83 MG/ML
2.5 SOLUTION RESPIRATORY (INHALATION)
Status: COMPLETED | OUTPATIENT
Start: 2020-01-01 | End: 2020-01-01

## 2020-01-01 RX ORDER — HYDRALAZINE HYDROCHLORIDE 10 MG/1
10 TABLET, FILM COATED ORAL 2 TIMES DAILY
Qty: 120 TABLET | Refills: 1 | Status: SHIPPED | OUTPATIENT
Start: 2020-01-01 | End: 2020-01-01

## 2020-01-01 RX ORDER — ASPIRIN 81 MG/1
324 TABLET, CHEWABLE ORAL ONCE
Status: COMPLETED | OUTPATIENT
Start: 2020-01-01 | End: 2020-01-01

## 2020-01-01 RX ORDER — METOPROLOL SUCCINATE 100 MG/1
50 TABLET, EXTENDED RELEASE ORAL DAILY
Qty: 30 TABLET | Refills: 1 | Status: SHIPPED | OUTPATIENT
Start: 2020-01-01 | End: 2020-01-01

## 2020-01-01 RX ORDER — LOSARTAN POTASSIUM 25 MG/1
TABLET ORAL
Qty: 180 TABLET | Refills: 3 | Status: SHIPPED | OUTPATIENT
Start: 2020-01-01

## 2020-01-01 RX ORDER — METOPROLOL SUCCINATE 100 MG/1
100 TABLET, EXTENDED RELEASE ORAL DAILY
Qty: 90 TABLET | Refills: 3 | Status: SHIPPED | OUTPATIENT
Start: 2020-01-01

## 2020-01-01 RX ORDER — LOSARTAN POTASSIUM 25 MG/1
25 TABLET ORAL DAILY
Qty: 90 TABLET | Refills: 3 | Status: SHIPPED | OUTPATIENT
Start: 2020-01-01 | End: 2020-01-01

## 2020-01-01 RX ORDER — FLUTICASONE PROPIONATE 50 MCG
2 SPRAY, SUSPENSION (ML) NASAL DAILY
Qty: 16 G | Refills: 11 | Status: SHIPPED | OUTPATIENT
Start: 2020-01-01 | End: 2020-01-01

## 2020-01-01 RX ORDER — NITROGLYCERIN 0.4 MG/1
0.4 TABLET SUBLINGUAL EVERY 5 MIN PRN
Status: DISCONTINUED | OUTPATIENT
Start: 2020-01-01 | End: 2020-01-01 | Stop reason: HOSPADM

## 2020-01-01 RX ORDER — METFORMIN HCL 500 MG
TABLET, EXTENDED RELEASE 24 HR ORAL
Qty: 90 TABLET | Refills: 0 | Status: SHIPPED | OUTPATIENT
Start: 2020-01-01 | End: 2020-01-01

## 2020-01-01 RX ORDER — CYANOCOBALAMIN 1000 UG/ML
1000 INJECTION, SOLUTION INTRAMUSCULAR; SUBCUTANEOUS
Status: ACTIVE | OUTPATIENT
Start: 2020-01-01

## 2020-01-01 RX ORDER — CITALOPRAM HYDROBROMIDE 20 MG/1
TABLET ORAL
Qty: 90 TABLET | Refills: 0 | Status: SHIPPED | OUTPATIENT
Start: 2020-01-01 | End: 2020-01-01

## 2020-01-01 RX ORDER — MAGNESIUM SULFATE HEPTAHYDRATE 40 MG/ML
2 INJECTION, SOLUTION INTRAVENOUS ONCE
Status: COMPLETED | OUTPATIENT
Start: 2020-01-01 | End: 2020-01-01

## 2020-01-01 RX ORDER — FERROUS SULFATE 325(65) MG
325 TABLET, DELAYED RELEASE (ENTERIC COATED) ORAL DAILY
Qty: 30 TABLET | Refills: 0 | Status: SHIPPED | OUTPATIENT
Start: 2020-01-01 | End: 2020-01-01

## 2020-01-01 RX ORDER — POTASSIUM CHLORIDE 1500 MG/1
TABLET, EXTENDED RELEASE ORAL
Qty: 270 TABLET | Refills: 3 | Status: SHIPPED | OUTPATIENT
Start: 2020-01-01 | End: 2020-01-01

## 2020-01-01 RX ORDER — FUROSEMIDE 20 MG
20 TABLET ORAL DAILY
Qty: 90 TABLET | Refills: 1 | Status: CANCELLED | OUTPATIENT
Start: 2020-01-01

## 2020-01-01 RX ORDER — FUROSEMIDE 20 MG
20 TABLET ORAL DAILY
Qty: 90 TABLET | Refills: 3 | Status: SHIPPED | OUTPATIENT
Start: 2020-01-01 | End: 2020-01-01 | Stop reason: ALTCHOICE

## 2020-01-01 RX ORDER — METFORMIN HCL 500 MG
TABLET, EXTENDED RELEASE 24 HR ORAL
Qty: 90 TABLET | Refills: 3 | Status: SHIPPED | OUTPATIENT
Start: 2020-01-01 | End: 2020-01-01

## 2020-01-01 RX ORDER — DILTIAZEM HYDROCHLORIDE 5 MG/ML
20 INJECTION INTRAVENOUS ONCE
Status: COMPLETED | OUTPATIENT
Start: 2020-01-01 | End: 2020-01-01

## 2020-01-01 RX ORDER — CEPHALEXIN 500 MG/1
500 CAPSULE ORAL 2 TIMES DAILY
Qty: 14 CAPSULE | Refills: 0 | Status: SHIPPED | OUTPATIENT
Start: 2020-01-01 | End: 2020-01-01

## 2020-01-01 RX ADMIN — SODIUM CHLORIDE 1000 ML: 9 INJECTION, SOLUTION INTRAVENOUS at 20:49

## 2020-01-01 RX ADMIN — PANTOPRAZOLE SODIUM 40 MG: 40 INJECTION, POWDER, LYOPHILIZED, FOR SOLUTION INTRAVENOUS at 21:29

## 2020-01-01 RX ADMIN — SODIUM CHLORIDE 250 ML: 9 INJECTION, SOLUTION INTRAVENOUS at 13:24

## 2020-01-01 RX ADMIN — IRON SUCROSE 200 MG: 20 INJECTION, SOLUTION INTRAVENOUS at 11:25

## 2020-01-01 RX ADMIN — ASPIRIN 81 MG CHEWABLE TABLET 324 MG: 81 TABLET CHEWABLE at 22:20

## 2020-01-01 RX ADMIN — GADOTERATE MEGLUMINE 12 ML: 376.9 INJECTION INTRAVENOUS at 11:10

## 2020-01-01 RX ADMIN — DILTIAZEM HYDROCHLORIDE 5 MG/HR: 100 INJECTION, POWDER, LYOPHILIZED, FOR SOLUTION INTRAVENOUS at 20:08

## 2020-01-01 RX ADMIN — CYANOCOBALAMIN 1000 MCG: 1000 INJECTION, SOLUTION INTRAMUSCULAR at 11:25

## 2020-01-01 RX ADMIN — IRON SUCROSE 200 MG: 20 INJECTION, SOLUTION INTRAVENOUS at 10:58

## 2020-01-01 RX ADMIN — DILTIAZEM HYDROCHLORIDE 25 MG: 5 INJECTION, SOLUTION INTRAVENOUS at 19:25

## 2020-01-01 RX ADMIN — IRON SUCROSE 200 MG: 20 INJECTION, SOLUTION INTRAVENOUS at 11:10

## 2020-01-01 RX ADMIN — SODIUM CHLORIDE 1000 ML: 9 INJECTION, SOLUTION INTRAVENOUS at 19:06

## 2020-01-01 RX ADMIN — MAGNESIUM SULFATE HEPTAHYDRATE 4 G: 40 INJECTION, SOLUTION INTRAVENOUS at 20:06

## 2020-01-01 RX ADMIN — DILTIAZEM HYDROCHLORIDE 20 MG: 5 INJECTION, SOLUTION INTRAVENOUS at 18:57

## 2020-01-01 RX ADMIN — NITROGLYCERIN 0.4 MG: 0.4 TABLET SUBLINGUAL at 23:02

## 2020-01-01 RX ADMIN — MAGNESIUM SULFATE HEPTAHYDRATE 2 G: 40 INJECTION, SOLUTION INTRAVENOUS at 20:14

## 2020-01-01 RX ADMIN — FERROUS SULFATE TAB EC 325 MG (65 MG FE EQUIVALENT) 325 MG: 325 (65 FE) TABLET DELAYED RESPONSE at 22:35

## 2020-01-01 RX ADMIN — ALBUTEROL SULFATE 2.5 MG: 2.5 SOLUTION RESPIRATORY (INHALATION) at 14:31

## 2020-01-01 RX ADMIN — IRON SUCROSE 200 MG: 20 INJECTION, SOLUTION INTRAVENOUS at 11:59

## 2020-01-01 RX ADMIN — IRON SUCROSE 200 MG: 20 INJECTION, SOLUTION INTRAVENOUS at 13:25

## 2020-01-01 ASSESSMENT — ANXIETY QUESTIONNAIRES
GAD7 TOTAL SCORE: 0
5. BEING SO RESTLESS THAT IT IS HARD TO SIT STILL: NOT AT ALL
GAD7 TOTAL SCORE: 0
6. BECOMING EASILY ANNOYED OR IRRITABLE: NOT AT ALL
IF YOU CHECKED OFF ANY PROBLEMS ON THIS QUESTIONNAIRE, HOW DIFFICULT HAVE THESE PROBLEMS MADE IT FOR YOU TO DO YOUR WORK, TAKE CARE OF THINGS AT HOME, OR GET ALONG WITH OTHER PEOPLE: NOT DIFFICULT AT ALL
7. FEELING AFRAID AS IF SOMETHING AWFUL MIGHT HAPPEN: NOT AT ALL
2. NOT BEING ABLE TO STOP OR CONTROL WORRYING: NOT AT ALL
3. WORRYING TOO MUCH ABOUT DIFFERENT THINGS: NOT AT ALL
1. FEELING NERVOUS, ANXIOUS, OR ON EDGE: NOT AT ALL

## 2020-01-01 ASSESSMENT — ENCOUNTER SYMPTOMS
NAUSEA: 0
UNEXPECTED WEIGHT CHANGE: 0
CONSTITUTIONAL NEGATIVE: 1
RECTAL PAIN: 1
HEMATOCHEZIA: 0
DIARRHEA: 0
BRUISES/BLEEDS EASILY: 0
FEVER: 0
COUGH: 1
LIGHT-HEADEDNESS: 1
HEMATOLOGIC/LYMPHATIC NEGATIVE: 1
VOMITING: 0
CONFUSION: 0
BACK PAIN: 1
FATIGUE: 0
COUGH: 1
WEAKNESS: 1
CONSTIPATION: 0
WEAKNESS: 0
PALPITATIONS: 0
NAUSEA: 0
WOUND: 0
SHORTNESS OF BREATH: 1
VOMITING: 0
HEADACHES: 1
SHORTNESS OF BREATH: 0
SHORTNESS OF BREATH: 1
BACK PAIN: 0
ROS SKIN COMMENTS: BRUISING.
SEIZURES: 0
BRUISES/BLEEDS EASILY: 1
CHILLS: 0
CHILLS: 0
NUMBNESS: 0
APPETITE CHANGE: 0
NAUSEA: 0
DIARRHEA: 1
CONSTIPATION: 0
ADENOPATHY: 0
CHEST TIGHTNESS: 0
LIGHT-HEADEDNESS: 1
CONSTIPATION: 0
FEVER: 0
HEADACHES: 0
DIZZINESS: 1
FACIAL ASYMMETRY: 0
DIZZINESS: 0
ABDOMINAL PAIN: 0
DIAPHORESIS: 0
WEAKNESS: 0
ACTIVITY CHANGE: 0
SINUS PRESSURE: 1
HEMATURIA: 0
CHILLS: 0
PSYCHIATRIC NEGATIVE: 1
MUSCULOSKELETAL NEGATIVE: 1
MYALGIAS: 1
FEVER: 0
TREMORS: 0
APNEA: 0
WHEEZING: 0
BLOOD IN STOOL: 0
PALPITATIONS: 0
ABDOMINAL PAIN: 0
CHEST TIGHTNESS: 0
ABDOMINAL PAIN: 0
DIZZINESS: 1
CHOKING: 0
VOMITING: 0
HEARTBURN: 0
STRIDOR: 0
SPEECH DIFFICULTY: 0

## 2020-01-01 ASSESSMENT — MIFFLIN-ST. JEOR
SCORE: 1125.98
SCORE: 1106.71
SCORE: 1149.57
SCORE: 1127.14
SCORE: 1094.33
SCORE: 1144.01
SCORE: 1081.53
SCORE: 1090.04
SCORE: 1132.45
SCORE: 1097.94

## 2020-01-01 ASSESSMENT — PAIN SCALES - GENERAL
PAINLEVEL: SEVERE PAIN (7)
PAINLEVEL: NO PAIN (0)
PAINLEVEL: NO PAIN (0)
PAINLEVEL: MILD PAIN (2)
PAINLEVEL: NO PAIN (0)
PAINLEVEL: NO PAIN (0)
PAINLEVEL: SEVERE PAIN (7)
PAINLEVEL: SEVERE PAIN (7)
PAINLEVEL: MILD PAIN (3)
PAINLEVEL: MILD PAIN (2)
PAINLEVEL: NO PAIN (0)
PAINLEVEL: MILD PAIN (3)

## 2020-01-01 ASSESSMENT — PATIENT HEALTH QUESTIONNAIRE - PHQ9
SUM OF ALL RESPONSES TO PHQ QUESTIONS 1-9: 0
5. POOR APPETITE OR OVEREATING: NOT AT ALL

## 2020-01-02 NOTE — ED AVS SNAPSHOT
[facility]  [address]  [place] [address]  Phone:  [phone]  Fax:  [phone]                                    Ovidio Luna   MRN: [id number]    Department:  [facility]   Date of Visit:  1/2/2020           After Visit Summary Signature Page    I have received my discharge instructions, and my questions have been answered. I have discussed any challenges I see with this plan with the nurse or doctor.    ..........................................................................................................................................  Patient/Patient Representative Signature      ..........................................................................................................................................  Patient Representative Print Name and Relationship to Patient    ..................................................               ................................................  Date                                   Time    ..........................................................................................................................................  Reviewed by Signature/Title    ...................................................              ..............................................  Date                                               Time          22EPIC Rev 08/18

## 2020-01-02 NOTE — TELEPHONE ENCOUNTER
Routing refill request to provider for review/approval because:  Drug not on the [facility] refill protocol   Medication is reported/historical    LOV; 11/13/19 patient to follow up in 2 months   Appointment noted on 1/24/2020    Gris Dial RN on 1/2/2020 at 12:06 PM

## 2020-01-03 NOTE — DISCHARGE INSTRUCTIONS
Dear MsMirian Luna,  It was nice to meet you.  As we talked, your dizziness seems to be partly due to your medications and partly due to your anemia.    I would like you to take 1/2 of your metoprolol dose each morning (cut down from 100mg/day to just 50mg/day).    And I would like you to take an iron pill each day unless it causes stomach upset.  Hold if it upsets your stomach.    Dr. Mata Aguilar

## 2020-01-03 NOTE — ED PROVIDER NOTES
History     Chief Complaint   Patient presents with     Dizziness     HPI  Nonamarikathy Luna is a 69 year old female who presents to ER for concern of dizziness. Patient states that symptoms started late last night while watching TV . Feels like room spinning .   Chest pain this AM while shoveling , lasted 20 -25 minutes . Was a burning pressure , associated with numbness in both arms.  NO nausea. Will get sob at onset of dizziness. NO recent head injury . Feels pressure in head. No cough , URI symptoms.  Remote history of CAD with 2 stents placed years ago. Normal fidelia scan on  7/19. NO fever chills or systemic symptoms. NO Uti symptoms.  Changing position makes her diiziness more severe.     Allergies:  Allergies   Allergen Reactions     Diphenhydramine Palpitations and Other (See Comments)     wheezing  Rapid heart Rate     Morphine      Tachycardia       Propoxyphene N-Apap Unknown     Tremors       Varenicline Nausea and Vomiting     Codeine Palpitations     Lisinopril Palpitations and Cough     No Clinical Screening - See Comments Rash     Pt states allergies to white/yellow gold.  Sugical steel.  Copper./ developed infection     Tetracycline Nausea and Vomiting and Rash       Problem List:    Patient Active Problem List    Diagnosis Date Noted     Right renal artery stenosis (H) 12/23/2019     Priority: Medium     Renovascular hypertension 12/23/2019     Priority: Medium     CKD (chronic kidney disease) stage 3, GFR 30-59 ml/min (H) 12/11/2019     Priority: Medium     Iron deficiency anemia due to chronic blood loss 12/11/2019     Priority: Medium     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/07/2019     Priority: Medium     Occlusion of popliteal artery (H) 11/07/2019     Priority: Medium     H/O adenomatous polyp of colon 09/23/2019     Priority: Medium     Acute ulcer of pyloric antrum 09/23/2019     Priority: Medium     Anemia 09/19/2019     Priority: Medium     H/O aorto-femoral bypass (2004) 04/03/2019      Priority: Medium     Type 2 diabetes mellitus without complication, without long-term current use of insulin (H) 03/25/2019     Priority: Medium     Tachy-merline syndrome (H) 03/11/2019     Priority: Medium     PAF (paroxysmal atrial fibrillation) (H) 03/10/2019     Priority: Medium     Chronic non-seasonal allergic rhinitis 07/09/2018     Priority: Medium     Atherosclerotic coronary vascular disease 01/17/2018     Priority: Medium     Overview:   Coronary artery disease, PTCA of mid LAD and PTCA of the proximal right   coronary artery 2/28/04, normal left ventricular systolic function       Hemangioma 01/17/2018     Priority: Medium     Overview:   Multiple capillary spider hemangioma       Hyperlipidemia 01/17/2018     Priority: Medium     PAD (peripheral artery disease) (H) 01/17/2018     Priority: Medium     Tobacco abuse 01/17/2018     Priority: Medium     Centrilobular emphysema (H) 12/12/2016     Priority: Medium     Meningioma (H) 05/17/2012     Priority: Medium     Tobacco dependence 05/16/2012     Priority: Medium     Erosive gastritis with hemorrhage 10/27/2008     Priority: Medium     Overview:   Acute          Past Medical History:    Past Medical History:   Diagnosis Date     Allergy status to other antibiotic agents status      Atherosclerosis      Atherosclerotic heart disease of native coronary artery without angina pectoris 2004     Atrial fibrillation (H) 3/10/2019     Centrilobular emphysema (H) 12/12/2016     Cerebral infarction (H) 05/20/2012     Closed fracture of shaft of left tibia 12/26/2001     H/O aorto-femoral bypass (2004) 4/3/2019     Hyperlipidemia 1/17/2018     Hypertension 1/17/2018     Meningioma (H) 5/17/2012     Migraine      Osteopenia 03/14/2006     Peripheral vascular disease (H) 1/17/2018     Tobacco abuse 1/17/2018     Type 2 diabetes mellitus without complication, without long-term current use of insulin (H) 3/25/2019     Zoster without complications 2008     Zoster without  complications        Past Surgical History:    Past Surgical History:   Procedure Laterality Date     COLONOSCOPY  2004    2 hyperplastic polyps, repeat in 2014     COLONOSCOPY N/A 9/23/2019    F/U 2024 adenomatous polyps     ESOPHAGOSCOPY, GASTROSCOPY, DUODENOSCOPY (EGD), COMBINED N/A 9/23/2019    Antral ulcer     HEMORRHOID SURGERY       HYSTERECTOMY TOTAL ABDOMINAL, BILATERAL SALPINGO-OOPHORECTOMY, COMBINED  1987    total hyster - BSO     OTHER SURGICAL HISTORY Right 2004    18929.0,W[place] BYPASS GRAFT AORTOBIFEMORAL     OTHER SURGICAL HISTORY      2004,LOC-1006.05,PTCA,mid LAD, proximal RCA     OTHER SURGICAL HISTORY      03/08/16,[id number],CYSTOSCOPY W/ URETEROSCOPY W/ LITHOTRIPSY,Right,Dr. Mario [place]       Family History:    Family History   Problem Relation Age of Onset     Heart Disease Father         Heart Disease     Cancer Father         Cancer,myeloma     Heart Disease Mother         Heart Disease,MI, stenting     Cerebrovascular Disease Brother      Other - See Comments Brother         sciatica     Other - See Comments Other         FHx Father's side Coronary artery disease     Other - See Comments Paternal Uncle         Stroke     Cancer Paternal Uncle         Cancer,Bladder x2     Other - See Comments Sister         chronic pain syndrome     Breast Cancer No family hx of         Cancer-breast       Social History:  Marital Status:  Single [1]  Social History     Tobacco Use     Smoking status: Current Every Day Smoker     Packs/day: 0.75     Years: 40.00     Pack years: 30.00     Types: Cigarettes     Start date: 1/1/1966     Smokeless tobacco: Never Used     Tobacco comment: 0.5 - 1 ppd -    Substance Use Topics     Alcohol use: No     Drug use: No        Medications:    albuterol (PROAIR HFA/PROVENTIL HFA/VENTOLIN HFA) 108 (90 Base) MCG/ACT Inhaler  amLODIPine (NORVASC) 10 MG tablet  aspirin 81 MG EC tablet  atorvastatin (LIPITOR) 40 MG tablet  busPIRone (BUSPAR) 10 MG tablet  Cholecalciferol (VITAMIN  "D3) 2000 UNITS CAPS  ferrous sulfate (FE TABS) 325 (65 Fe) MG EC tablet  fexofenadine (ALLEGRA) 180 MG tablet  furosemide (LASIX) 20 MG tablet  hydrochlorothiazide (HYDRODIURIL) 12.5 MG tablet  isosorbide mononitrate (IMDUR) 30 MG 24 hr tablet  losartan (COZAAR) 25 MG tablet  metoprolol succinate ER (TOPROL-XL) 100 MG 24 hr tablet  omeprazole (PRILOSEC) 40 MG DR capsule  potassium chloride ER (K-DUR/KLOR-CON M) 20 MEQ CR tablet  citalopram (CELEXA) 20 MG tablet  COMPRESSION STOCKINGS  diltiazem (CARDIZEM) 30 MG tablet  fluticasone-salmeterol (ADVAIR) 250-50 MCG/DOSE inhaler  ipratropium - albuterol 0.5 mg/2.5 mg/3 mL (DUONEB) 0.5-2.5 (3) MG/3ML neb solution  MAGNESIUM-OXIDE 400 (241.3 Mg) MG tablet  metFORMIN (GLUCOPHAGE-XR) 500 MG 24 hr tablet  order for [facility]  Respiratory Therapy Supplies (NEBULIZER/TUBING/MOUTHPIECE) KIT          Review of Systems   Constitutional: Negative for activity change, appetite change, chills, diaphoresis, fatigue, fever and unexpected weight change.   HENT: Negative.    Respiratory: Positive for cough and shortness of breath. Negative for apnea, choking, chest tightness, wheezing and stridor.    Cardiovascular: Positive for chest pain. Negative for palpitations and leg swelling.   Gastrointestinal: Negative for abdominal pain, blood in stool, constipation, nausea and vomiting.   Genitourinary: Negative.    Musculoskeletal: Negative.    Neurological: Positive for dizziness, light-headedness and headaches. Negative for tremors, seizures, syncope, facial asymmetry, speech difficulty, weakness and numbness.   Hematological: Negative.    Psychiatric/Behavioral: Negative.        Physical Exam   BP: 108/61  Pulse: 55  Heart Rate: 67  Temp: 97.6  F (36.4  C)  Resp: 20  Height: 157.5 cm (5' 2\")  Weight: 60.3 kg (133 lb)  SpO2: 96 %      Physical Exam  Vitals signs and nursing note reviewed.   Constitutional:       General: She is not in acute distress.     Appearance: Normal appearance.   HENT:    "   Head: Normocephalic and atraumatic.      Nose: Nose normal.   Neck:      Musculoskeletal: Normal range of motion and neck supple. No muscular tenderness.   Cardiovascular:      Rate and Rhythm: Bradycardia present.      Comments: Intermittent bradycardia   Pulmonary:      Breath sounds: Rales present.      Comments: Diminished breath sounds throughout with bibasilar crackles   Abdominal:      General: Abdomen is flat. There is no distension.      Palpations: Abdomen is soft. There is no mass.      Tenderness: There is no abdominal tenderness. There is no right CVA tenderness, left CVA tenderness, guarding or rebound.      Hernia: No hernia is present.   Lymphadenopathy:      Cervical: No cervical adenopathy.   Skin:     General: Skin is warm and dry.   Neurological:      Mental Status: She is alert. Mental status is at baseline.   Psychiatric:         Mood and Affect: Mood normal.         ED Course        Procedures              Patient presents to ER with 1 day history of dizziness. Patient triaged to exam room . Vital signs reviewed. Patient with intermittent bradycardia. Cardiac monitors placed. EKG obtained. EKG showing non specific changes anterolateral leads that is unchanged from most recent EKG. Patient with recent adjustment in her metoprolol dose which likely is contributing to her symptoms. Medical records reviewed. History and exam completed. IVF bolus ordered.  . While waiting for results of labs and diagnostics patient care transitioned to oncoming provider DR Aguilar .          Results for orders placed or performed during the hospital encounter of 01/02/20   CBC with platelets differential     Status: Abnormal   Result Value Ref Range    WBC 11.1 (H) 4.0 - 11.0 10e9/L    RBC Count 4.53 3.8 - 5.2 10e12/L    Hemoglobin 8.3 (L) 11.7 - 15.7 g/dL    Hematocrit 29.8 (L) 35.0 - 47.0 %    MCV 66 (L) 78 - 100 fl    MCH 18.3 (L) 26.5 - 33.0 pg    MCHC 27.9 (L) 31.5 - 36.5 g/dL    RDW 19.2 (H) 10.0 - 15.0 %     Platelet Count 455 (H) 150 - 450 10e9/L    Diff Method Automated Method     % Neutrophils 64.3 %    % Lymphocytes 23.2 %    % Monocytes 10.4 %    % Eosinophils 1.2 %    % Basophils 0.6 %    % Immature Granulocytes 0.3 %    Absolute Neutrophil 7.2 1.6 - 8.3 10e9/L    Absolute Lymphocytes 2.6 0.8 - 5.3 10e9/L    Absolute Monocytes 1.2 0.0 - 1.3 10e9/L    Absolute Eosinophils 0.1 0.0 - 0.7 10e9/L    Absolute Basophils 0.1 0.0 - 0.2 10e9/L    Abs Immature Granulocytes 0.0 0 - 0.4 10e9/L   Troponin GH     Status: None   Result Value Ref Range    Troponin 7.2 <34.0 pg/mL   INR     Status: None   Result Value Ref Range    INR 0.98 0 - 1.3   Partial thromboplastin time     Status: None   Result Value Ref Range    PTT 24 22 - 37 sec   Comprehensive metabolic panel     Status: Abnormal   Result Value Ref Range    Sodium 135 134 - 144 mmol/L    Potassium 3.8 3.5 - 5.1 mmol/L    Chloride 98 98 - 107 mmol/L    Carbon Dioxide 24 21 - 31 mmol/L    Anion Gap 13 3 - 14 mmol/L    Glucose 120 (H) 70 - 105 mg/dL    Urea Nitrogen 21 7 - 25 mg/dL    Creatinine 1.27 (H) 0.60 - 1.20 mg/dL    GFR Estimate 42 (L) >60 mL/min/[1.73_m2]    GFR Estimate If Black 50 (L) >60 mL/min/[1.73_m2]    Calcium 9.9 8.6 - 10.3 mg/dL    Bilirubin Total 0.4 0.3 - 1.0 mg/dL    Albumin 4.2 3.5 - 5.7 g/dL    Protein Total 7.3 6.4 - 8.9 g/dL    Alkaline Phosphatase 49 34 - 104 U/L    ALT 11 7 - 52 U/L    AST 16 13 - 39 U/L   Magnesium     Status: Abnormal   Result Value Ref Range    Magnesium 1.5 (L) 1.9 - 2.7 mg/dL   Lipase     Status: None   Result Value Ref Range    Lipase 75 11 - 82 U/L   D-Dimer GH     Status: None   Result Value Ref Range    D-Dimer ng/mL <200 0 - 230 ng/ml D-DU   Blood gas venous and oxyhgb     Status: Abnormal   Result Value Ref Range    Ph Venous 7.44 (H) 7.32 - 7.43 pH    PCO2 Venous 35 (L) 40 - 50 mm Hg    PO2 Venous 29 25 - 47 mm Hg    Bicarbonate Venous 23 21 - 28 mmol/L    FIO2 0     Oxyhemoglobin Venous 51 %   Nt probnp inpatient  (BNP)     Status: None   Result Value Ref Range    N-Terminal Pro BNP Inpatient 85 0 - 100 pg/mL   *UA reflex to Microscopic     Status: None   Result Value Ref Range    Color Urine Yellow     Appearance Urine Clear     Glucose Urine Negative NEG^Negative mg/dL    Bilirubin Urine Negative NEG^Negative    Ketones Urine Negative NEG^Negative mg/dL    Specific Gravity Urine 1.025 1.000 - 1.030    Blood Urine Negative NEG^Negative    pH Urine 5.0 5.0 - 9.0 pH    Protein Albumin Urine Negative NEG^Negative mg/dL    Urobilinogen Urine 0.2 0.2 - 1.0 EU/dL    Nitrite Urine Negative NEG^Negative    Leukocyte Esterase Urine Negative NEG^Negative    Source Midstream Urine    Procalcitonin     Status: None   Result Value Ref Range    Procalcitonin 0.76 ng/ml   Magnesium     Status: Abnormal   Result Value Ref Range    Magnesium 1.5 (L) 1.9 - 2.7 mg/dL   Influenza A and B and RSV PCR     Status: None   Result Value Ref Range    Specimen Description Nares     Influenza A PCR Negative NEG^Negative    Influenza B PCR Negative NEG^Negative    Resp Syncytial Virus Negative NEG^Negative      Study Result     PROCEDURE INFORMATION:   Exam: CT Head Without Contrast   Exam date and time: 1/2/2020 7:52 PM   Age: 69 years old   Clinical indication: Patient HX: Off balance and light headed dizziness for 2   weeks with HX of stroke causing left sided weakness in 2012; Additional info:   See the clinical information for interpreting provider      TECHNIQUE:   Imaging protocol: Computed tomography of the head without contrast.   Radiation optimization: All CT scans at this facility use at least one of these   dose optimization techniques: automated exposure control; mA and/or kV   adjustment per patient size (includes targeted exams where dose is matched to   clinical indication); or iterative reconstruction.      COMPARISON:   CT HEAD BRAIN [facility] 12/17/2012 9:16 AM      FINDINGS:   Brain: Peripherally calcified mass again identified in the left  frontal region   inferiorly measuring approximately 2.5 x 2.0 x 2.5 cm. No appreciable change in   appearance from the comparison study. Area of encephalomalacia in the left   frontal lobe adjacent to the mass. Stable findings of chronic small vessel   ischemia. No acute hemorrhage or edema.   Ventricles: Normal. No ventriculomegaly.   Bones/joints: Unremarkable. No acute fracture.   Sinuses: Visualized sinuses are unremarkable. No fluid levels.   Mastoid air cells: Visualized mastoid air cells are well aerated.   Soft tissues:                                                                       IMPRESSION:   1. Probable left frontal meningioma without change from the comparison study.   2. Stable small vessel ischemic changes.   3. No acute intracranial abnormality.   4. No change from the comparison study.      THIS DOCUMENT HAS BEEN ELECTRONICALLY SIGNED BY HEATH MCKENNA MD     Study Result     PROCEDURE INFORMATION:   Exam: XR Chest, 1 View   Exam date and time: 1/2/2020 6:40 PM   Age: 69 years old   Clinical indication: Other: Dizzy      TECHNIQUE:   Imaging protocol: XR of the chest   Views: 1 view.      COMPARISON:   CR XR CHEST PORT 1 VW 3/10/2019 7:18 PM      FINDINGS:   Lungs: Unremarkable. No consolidation.   Pleural space: Unremarkable. No pleural effusion. No pneumothorax.   Heart/Mediastinum: Unremarkable. No cardiomegaly.   Diaphragm: Slight elevation of the right hemidiaphragm.   Bones/joints: Unremarkable.                                                                       IMPRESSION:   No evidence of acute cardiopulmonary abnormality      THIS DOCUMENT HAS BEEN ELECTRONICALLY SIGNED BY LELIA HARGROVE MD           Medications   0.9% sodium chloride BOLUS (0 mLs Intravenous Stopped 1/2/20 2050)     Followed by   sodium chloride 0.9% infusion (0 mLs Intravenous Stopped 1/2/20 2324)   magnesium sulfate 4 g in 100 mL sterile water (premade) (0 g Intravenous Stopped 1/2/20 2226)   pantoprazole  (PROTONIX) 40 mg IV push injection (40 mg Intravenous Given 1/2/20 2129)   ferrous sulfate (FE TABS) EC tablet 325 mg (325 mg Oral Given 1/2/20 2235)     7:43 PM recheck patient and informed of low Magnesium.  No diarrhea, N/V, or alcohol use and patient on oral Magnesium.  She has a hx of stroke in 2012 with left sided weakness and some residual weakness in her LUE on 81mg aspirin.  She reports 2 weeks of both light headed dizziness and off balance dizziness.  No change in vision/hearing/speaking/swallowoing.  Repeat Neurocheck with no focal weakness or nystagmus but states she had an atypical presentation with her previous stroke initially and will get CT head.  Roberto Aguilar MD     9:10 PM recheck of patient and she is feeling better.  Will give regular diet and IV protonix as she is due for her evening Prilosec.  She denies any melena or rectal bleeding.  She does have a peptic ulcer and anemia that has been fairly stable.  She is not on iron replacement.  Roberto Aguilar MD     10:18 PM  I discussed patient briefly with Dr. Sesay, Hospitalist agreeing with decrease in Metoprolol to 50mg XL qAM and continue with other medications as prescribed; also agreeing with starting iron. Roberto Aguilar MD     11:09 PM patient has completed her Magnesium infusion and feels better and ready for discharge home.  Daughter and granddaughter in [place] but brother in [facility] who can check on her if needed.  She declines help getting home tonight.  Roberto Aguilar MD     Assessments & Plan (with Medical Decision Making)   69-year-old female chronic smoker with recent bleeding ulcer causing persistent anemia and also a  history of paroxysmal atrial fibrillation on (aspirin only due to recent bleeding) rate control with metoprolol 100 mg XL daily and Norvasc 10 mg daily also receiving treatment for hypertension with additional Cozaar Lasix and hydrochlorothiazide and exertional CP on imdur; presenting with 2 weeks  of lightheaded and off balance dizziness.  She has work-up showing fairly stable anemia with hemoglobin at 8.3 and no history to suggest ongoing GI bleeding.  She has no evidence of heart injury or blood clot formation or stroke with benign troponin, d-dimer, and CT head study.  She shows significant bradycardia in the high 40s to low 50s but has fairly normal blood pressures with systolic blood pressures in the 130s to 150s.  Concerned that maybe both her anemia and her bradycardia are contributing to her dizziness.  We will decrease her metoprolol to 50 mg a day and maintain all her other medications.  We will start her on iron pills to see if we can increase her hemoglobin.  She is encouraged to stop smoking.  She is given a dose of Protonix here because she is missing her evening Prilosec dose.  She has finding of significant hypomagnesemia down to 1.5 and received 4 g of IV replacement in the emergency department and will continue on her oral replacement as she already is doing.  She feels much improved and is ready for discharge home.  She is encouraged to follow-up in the next week in clinic for recheck of her hemoglobin and tolerance of iron.    I have reviewed the nursing notes.    I have reviewed the findings, diagnosis, plan and need for follow up with the patient.      Discharge Medication List as of 1/2/2020 11:25 PM      START taking these medications    Details   ferrous sulfate (FE TABS) 325 (65 Fe) MG EC tablet Take 1 tablet (325 mg) by mouth daily, Disp-30 tablet, R-0, E-Prescribe      citalopram (CELEXA) 20 MG tablet TAKE 1 TABLET(20 MG) BY MOUTH DAILY, Disp-90 tablet, R-0, E-Prescribe      MAGNESIUM-OXIDE 400 (241.3 Mg) MG tablet TAKE 1 TABLET(400 MG) BY MOUTH DAILY, Disp-90 tablet, R-0, E-Prescribe      metFORMIN (GLUCOPHAGE-XR) 500 MG 24 hr tablet TAKE 1 TABLET BY MOUTH ONCE DAILY WITH DINNER., Disp-90 tablet, R-0, E-Prescribe             Final diagnoses:   Dizziness - I suspect this is partly  due to blood pressure medications and partly due to her anemia.   Other iron deficiency anemia - previous blood loss from ulcer causing anemia and now needing iron replacement.   PAF (paroxysmal atrial fibrillation) (H) - just on an 81mg aspirin daily because of past ulcer bleeding.   Type 2 diabetes mellitus without complication, without long-term current use of insulin (H)       1/2/2020   [facility] Lindsay Thomas MD  01/02/20 1901       Roberto Aguilar MD  01/03/20 0053

## 2020-01-03 NOTE — PROGRESS NOTES
"Pt up to bedside commode, voided 175 mL's clear yellow urine. Reported being \"slightly dizzy\" during transfer.   "

## 2020-01-03 NOTE — PROGRESS NOTES
Pt back to room from radiology. Settled onto cot, call light within reach.   Denies chest pain or dizziness.

## 2020-01-03 NOTE — ED TRIAGE NOTES
Pt reports she's been trying to lie down but the room starts spinning, she feels lightheaded & shaky when she gets up, sx have been going on pretty much all day.  Pt denies having a H/A but says she has a pressure in her head, C/P this AM when she was trying to shovel.

## 2020-01-04 NOTE — PROGRESS NOTES
SUBJECTIVE:   Ovidio Luna is a 69 year old female who presents to clinic today for the following health issues:    HPI  Diarrhea x 2 days. Will have have loose stools urgently after eating withing 30-45 minutes.   Started after getting iron and magnesium in the ER 2 days ago. Appetite is normal. Not eating today d/t the diarrhea.   Eats toast for breakfast, sandwich for lunch and yogurt/pudding for supper.   No belly pain, no nausea. Drinking fluids.   Has been taking magnesium for the past year orally.   Missed work, needs a note.    Patient Active Problem List    Diagnosis Date Noted     Right renal artery stenosis (H) 12/23/2019     Priority: Medium     Renovascular hypertension 12/23/2019     Priority: Medium     CKD (chronic kidney disease) stage 3, GFR 30-59 ml/min (H) 12/11/2019     Priority: Medium     Iron deficiency anemia due to chronic blood loss 12/11/2019     Priority: Medium     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/07/2019     Priority: Medium     Occlusion of popliteal artery (H) 11/07/2019     Priority: Medium     H/O adenomatous polyp of colon 09/23/2019     Priority: Medium     Acute ulcer of pyloric antrum 09/23/2019     Priority: Medium     Anemia 09/19/2019     Priority: Medium     H/O aorto-femoral bypass (2004) 04/03/2019     Priority: Medium     Type 2 diabetes mellitus without complication, without long-term current use of insulin (H) 03/25/2019     Priority: Medium     Tachy-melrine syndrome (H) 03/11/2019     Priority: Medium     PAF (paroxysmal atrial fibrillation) (H) 03/10/2019     Priority: Medium     Chronic non-seasonal allergic rhinitis 07/09/2018     Priority: Medium     Atherosclerotic coronary vascular disease 01/17/2018     Priority: Medium     Overview:   Coronary artery disease, PTCA of mid LAD and PTCA of the proximal right   coronary artery 2/28/04, normal left ventricular systolic function       Hemangioma 01/17/2018     Priority: Medium     Overview:   Multiple capillary  spider hemangioma       Hyperlipidemia 01/17/2018     Priority: Medium     PAD (peripheral artery disease) (H) 01/17/2018     Priority: Medium     Tobacco abuse 01/17/2018     Priority: Medium     Centrilobular emphysema (H) 12/12/2016     Priority: Medium     Meningioma (H) 05/17/2012     Priority: Medium     Tobacco dependence 05/16/2012     Priority: Medium     Erosive gastritis with hemorrhage 10/27/2008     Priority: Medium     Overview:   Acute       Past Medical History:   Diagnosis Date     Allergy status to other antibiotic agents status      Atherosclerosis     History of ASO with claudication     Atherosclerotic heart disease of native coronary artery without angina pectoris 2004    Stent times 2     Atrial fibrillation (H) 3/10/2019     Centrilobular emphysema (H) 12/12/2016     Cerebral infarction (H) 05/20/2012    CVA with residual left sided weakness, incidental meningioma found     Closed fracture of shaft of left tibia 12/26/2001    History of bilateral tibial fx, work related injury     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   Start Fosamax 08/07, stopped fosamax.     Peripheral vascular disease (H) 1/17/2018     Tobacco abuse 1/17/2018     Type 2 diabetes mellitus without complication, without long-term current use of insulin (H) 3/25/2019     Zoster without complications 2008    left shoulder and neck     Zoster without complications     2009/2008,Herpes zoster, left shoulder and neck      Past Surgical History:   Procedure Laterality Date     COLONOSCOPY  2004    2 hyperplastic polyps, repeat in 2014     COLONOSCOPY N/A 9/23/2019    F/U 2024 adenomatous polyps     ESOPHAGOSCOPY, GASTROSCOPY, DUODENOSCOPY (EGD), COMBINED N/A 9/23/2019    Antral ulcer     HEMORRHOID SURGERY       HYSTERECTOMY TOTAL ABDOMINAL,  BILATERAL SALPINGO-OOPHORECTOMY, COMBINED  1987    total hyster - BSO     OTHER SURGICAL HISTORY Right 2004    03524.0,VA BYPASS GRAFT AORTOBIFEMORAL     OTHER SURGICAL HISTORY      2004,LOC-1006.05,PTCA,mid LAD, proximal RCA     OTHER SURGICAL HISTORY      03/08/16,[id number],CYSTOSCOPY W/ URETEROSCOPY W/ LITHOTRIPSY,Right,Dr. Mario [place]     Family History   Problem Relation Age of Onset     Heart Disease Father         Heart Disease     Cancer Father         Cancer,myeloma     Heart Disease Mother         Heart Disease,MI, stenting     Cerebrovascular Disease Brother      Other - See Comments Brother         sciatica     Other - See Comments Other         FHx Father's side Coronary artery disease     Other - See Comments Paternal Uncle         Stroke     Cancer Paternal Uncle         Cancer,Bladder x2     Other - See Comments Sister         chronic pain syndrome     Breast Cancer No family hx of         Cancer-breast     Social History     Tobacco Use     Smoking status: Current Every Day Smoker     Packs/day: 0.75     Years: 40.00     Pack years: 30.00     Types: Cigarettes     Start date: 1/1/1966     Smokeless tobacco: Never Used     Tobacco comment: 0.5 - 1 ppd -    Substance Use Topics     Alcohol use: No     Social History     Social History Narrative    Works at [employer]  Melly Spawn Mother  Delma Laureano Sister; One daughter, Catie, lives with Francesca; Granddaughter Mary, lives with Francesca also.     Current Outpatient Medications   Medication Sig Dispense Refill     albuterol (PROAIR HFA/PROVENTIL HFA/VENTOLIN HFA) 108 (90 Base) MCG/ACT Inhaler Inhale 1-2 puffs into the lungs every 6 hours as needed for shortness of breath / dyspnea or wheezing 1 Inhaler 0     amLODIPine (NORVASC) 10 MG tablet TAKE 1 TABLET(10 MG) BY MOUTH DAILY 90 tablet 3     aspirin 81 MG EC tablet Take 1 tablet by mouth daily       atorvastatin (LIPITOR) 40 MG tablet Take 1 tablet (40 mg) by mouth At Bedtime 90 tablet 3      busPIRone (BUSPAR) 10 MG tablet TAKE 1 TABLET BY MOUTH 2 TIMES DAILY AS NEEDED FOR ANXIETY 90 tablet 1     Cholecalciferol (VITAMIN D3) 2000 UNITS CAPS Take 4,000 Units by mouth 2 times daily        citalopram (CELEXA) 20 MG tablet TAKE 1 TABLET(20 MG) BY MOUTH DAILY 90 tablet 0     COMPRESSION STOCKINGS 1 each daily Knee high; 15-20mmHg 1 each 0     diltiazem (CARDIZEM) 30 MG tablet Take 1 tablet (30 mg) by mouth 3 times daily as needed (racing heart, palpitations) 30 tablet 1     fexofenadine (ALLEGRA) 180 MG tablet Take 180 mg by mouth 2 times daily        fluticasone-salmeterol (ADVAIR) 250-50 MCG/DOSE inhaler Inhale 1 puff into the lungs daily as needed       furosemide (LASIX) 20 MG tablet Take 1 tablet (20 mg) by mouth daily 90 tablet 1     hydrochlorothiazide (HYDRODIURIL) 12.5 MG tablet Take 2 tablets (25 mg) by mouth daily (Patient taking differently: Take 12.5 mg by mouth 2 times daily ) 180 tablet 3     ipratropium - albuterol 0.5 mg/2.5 mg/3 mL (DUONEB) 0.5-2.5 (3) MG/3ML neb solution Take 1 vial (3 mLs) by nebulization every morning . May also use 1 neb every 6 hours as needed for shortness of breath. 1 Box 3     losartan (COZAAR) 25 MG tablet Take 1 tablet (25 mg) by mouth daily 90 tablet 3     MAGNESIUM-OXIDE 400 (241.3 Mg) MG tablet TAKE 1 TABLET(400 MG) BY MOUTH DAILY 90 tablet 0     metFORMIN (GLUCOPHAGE-XR) 500 MG 24 hr tablet TAKE 1 TABLET BY MOUTH ONCE DAILY WITH DINNER. 90 tablet 0     metoprolol succinate ER (TOPROL-XL) 100 MG 24 hr tablet Take 0.5 tablets (50 mg) by mouth daily 30 tablet 1     omeprazole (PRILOSEC) 40 MG DR capsule Take 1 capsule (40 mg) by mouth 2 times daily Increase in frequency 180 capsule 1     order for [facility] [employer] NEBULIZER MACHINE ITEM #[id number] DX J44 [facility]   5     potassium chloride ER (K-DUR/KLOR-CON M) 20 MEQ CR tablet Take 2 tabs (40 mg) in the morning with a meal, and take 1 tab (20 mg) in the evening with a meal every day. 270 tablet 1      "Respiratory Therapy Supplies (NEBULIZER/TUBING/MOUTHPIECE) KIT Dx: COPD with exacerbation. Sig: Use as directed. 1 kit 0     ferrous sulfate (FE TABS) 325 (65 Fe) MG EC tablet Take 1 tablet (325 mg) by mouth daily (Patient not taking: Reported on 1/4/2020) 30 tablet 0     isosorbide mononitrate (IMDUR) 30 MG 24 hr tablet Take 2 tablets (60 mg) by mouth daily 90 tablet 1     Allergies   Allergen Reactions     Diphenhydramine Palpitations and Other (See Comments)     wheezing  Rapid heart Rate     Morphine      Tachycardia       Propoxyphene N-Apap Unknown     Tremors       Varenicline Nausea and Vomiting     Codeine Palpitations     Lisinopril Palpitations and Cough     No Clinical Screening - See Comments Rash     Pt states allergies to white/yellow gold.  Sugical steel.  Copper./ developed infection     Tetracycline Nausea and Vomiting and Rash       Review of Systems   Constitutional: Negative.    Gastrointestinal: Positive for diarrhea and rectal pain (D/t frequent stools, using tucks). Negative for abdominal pain, constipation, heartburn, hematochezia, nausea and vomiting.   Genitourinary: Negative.    Musculoskeletal:        No new or unusual pain   Skin: Negative.    Allergic/Immunologic: Positive for immunocompromised state.   Neurological: Negative for dizziness, weakness and headaches.        OBJECTIVE:     /72   Pulse 64   Temp 97.9  F (36.6  C) (Tympanic)   Resp 20   Ht 1.58 m (5' 2.21\")   Wt 61.6 kg (135 lb 14.4 oz)   BMI 24.69 kg/m    Body mass index is 24.69 kg/m .  Physical Exam  Vitals signs and nursing note reviewed.   Constitutional:       General: She is not in acute distress.     Appearance: Normal appearance. She is not ill-appearing.   HENT:      Head: Normocephalic and atraumatic.      Nose: Nose normal.      Mouth/Throat:      Mouth: Mucous membranes are moist.      Pharynx: Oropharynx is clear.   Eyes:      General: No scleral icterus.     Conjunctiva/sclera: Conjunctivae normal. "   Neck:      Musculoskeletal: Normal range of motion.   Cardiovascular:      Rate and Rhythm: Normal rate and regular rhythm.      Heart sounds: Normal heart sounds. No murmur.   Pulmonary:      Effort: Pulmonary effort is normal.      Breath sounds: Normal breath sounds. No wheezing or rales.   Abdominal:      General: Bowel sounds are normal.      Tenderness: There is no abdominal tenderness.   Musculoskeletal: Normal range of motion.   Skin:     General: Skin is warm and dry.   Neurological:      General: No focal deficit present.      Mental Status: She is alert and oriented to person, place, and time.      Gait: Gait normal.   Psychiatric:         Mood and Affect: Mood normal.         Diagnostic Test Results:  No results found for this or any previous visit (from the past 24 hour(s)).    ASSESSMENT/PLAN:       ICD-10-CM    1. Diarrhea, unspecified type R19.7         PLAN:  Pt likely having diarrhea since getting magnesium infusion.   Advised to stop her oral magnesium for 1 week.   [place] foods and clear liquids. Monitor and f/u in 1-2 days if not improving.   ER if sx worsen. Given Epic educational materials.   Given a work note to be out of work tonight.     I explained my diagnostic considerations and recommendations to pt who voiced understanding and agreement with the treatment plan. All questions were answered. We discussed potential side effects of any prescribed or recommended therapies, as well as expectations for response to treatments.    Disclaimer:  This note consists of words and symbols derived from keyboarding, dictation, or using voice recognition software. As a result, there may be errors in the script that have gone undetected. Please consider this when interpreting information found in this note.      VOLODYMYR Mosquera, NP-C  1/4/2020 at 10:39 AM  [facility]

## 2020-01-04 NOTE — LETTER
January 4, 2020      To Whom It May Concern:      Ovidio Luna was seen today for illness. She will need to be off work tonight. I expect her condition to improve over the next 1-3 days.  She may return to work when improved without restriction.    Sincerely,        VOLODYMYR Mosquera, NP-C  [facility]  10:57 AM January 4, 2020

## 2020-01-04 NOTE — NURSING NOTE
"Chief Complaint   Patient presents with     Diarrhea     x 24 hours       Initial /72   Pulse 64   Temp 97.9  F (36.6  C) (Tympanic)   Resp 20   Ht 1.58 m (5' 2.21\")   Wt 61.6 kg (135 lb 14.4 oz)   BMI 24.69 kg/m   Estimated body mass index is 24.69 kg/m  as calculated from the following:    Height as of this encounter: 1.58 m (5' 2.21\").    Weight as of this encounter: 61.6 kg (135 lb 14.4 oz).  Medication Reconciliation: complete    Veena Mayen LPN  "

## 2020-01-04 NOTE — PATIENT INSTRUCTIONS
Patient Education   Follow up in the clinic on Monday if not improving, ER if symptoms worsen.   Stop magnesium for 1 week then restart.   Treating Diarrhea  Diarrhea happens when you have loose, watery, or frequent bowel movements. It is a common problem with many causes. Most cases of diarrhea clear up on their own. But certain cases may need treatment. Be sure to see your healthcare provider if your symptoms do not improve within a few days.  Getting relief  Treatment of diarrhea depends on its cause. Diarrhea caused by bacterial or parasite infection is often treated with antibiotics. Diarrhea caused by other factors, such as a stomach virus, often improves with simple home treatment. The tips below may also help relieve your symptoms.    Drink plenty of fluids. This helps prevent too much fluid loss (dehydration). Water, clear soups, and electrolyte solutions are good choices. Avoid alcohol, coffee, tea, and milk. These can irritate your intestines and make symptoms worse.    Suck on ice chips if drinking makes you queasy.    Return to your normal diet slowly. You may want to eat bland foods at first, such as rice and toast. Also, you may need to avoid certain foods for a while, such as dairy products. These can make symptoms worse. Ask your healthcare provider if there are any other foods you should avoid.    Do not take anti-diarrhea medicines without asking your healthcare provider first.  Call your healthcare provider   Call your healthcare provider if you have any of the following:     A fever of 100.4 F (38.0 C) or higher, or as directed by your healthcare provider    Severe pain    Worsening diarrhea or diarrhea for more than 2 days    Bloody vomit or stool    Signs of dehydration (dizziness, dry mouth and tongue, rapid pulse, dark urine)  Date Last Reviewed: 7/1/2016 2000-2019 [employer]. [address] [place] [address], [place] [address]. All rights reserved. This information is not intended  as a substitute for professional medical care. Always follow your healthcare professional's instructions.

## 2020-01-07 PROBLEM — Z09 ENCOUNTER FOR FOLLOW-UP EXAMINATION: Status: ACTIVE | Noted: 2020-01-01

## 2020-01-07 PROBLEM — E83.42 HYPOMAGNESEMIA: Status: ACTIVE | Noted: 2020-01-01

## 2020-01-07 NOTE — PROGRESS NOTES
SUBJECTIVE:                                                      Ms. Luna is a 69 year old female who presents to the clinic today for an ED follow up visit.      Emergency Department: [facility] ED    Date of Admission: 1/2/2020  Date of Discharge: 1/2/2020  Reason(s) for Admission: dizziness, chest pain    Summary of ED Visit:  Ovidio Luna is a 69 year old female who presented to ER for concern of positional dizziness. Patient stated that symptoms started late 1/1/2020 while watching TV, felt like the room was spinning. Had chest pain the morning of ED presentation while shoveling which lasted 20-25 minutes, reporting it was a burning pressure, associated with numbness in both arms.  She denies any associated nausea. She did report that she would get SOB when she became dizzy. Denied any recent head injury but did report pressure in head. Denied cough, other URI symptoms.  She does have a remote history of CAD with 2 stents placed years ago. Reported no fever, chills or systemic symptoms, no UTI symptoms.        Diagnostic Tests/Treatments reviewed.    - EKG: showed non specific changes anterolateral leads that were unchanged from most recent EKG. Patient did have recent adjustment in her metoprolol dose which likely is contributing to her symptoms.  - Head CT: 1. Probable left frontal meningioma without change from the comparison study. 2. Stable small vessel ischemic changes. 3. No acute intracranial abnormality. 4. No change from the comparison study.   - Labs: WBC 11.1, Hgb 8.3, Mag 1.5  - CXR: unremarkable  - Received IVF and magnesium infusion 4 grams.    Follow up needed:  Recommended to follow up with PCP and review labs.    Update since discharge: Dizziness remains, no episodes of passing out. Eating and drinking ok. Does report chest pain with exertion, usually lasting a couple minutes to 15 minutes, depending on what she is doing. Had one episode of afib three days ago which did not last  long.    The patient has a past medical history which is reviewed and listed as below:     Past Medical History:   Diagnosis Date     Allergy status to other antibiotic agents status      Atherosclerosis     History of ASO with claudication     Atherosclerotic heart disease of native coronary artery without angina pectoris 2004    Stent times 2     Atrial fibrillation (H) 3/10/2019     Centrilobular emphysema (H) 12/12/2016     Cerebral infarction (H) 05/20/2012    CVA with residual left sided weakness, incidental meningioma found     Closed fracture of shaft of left tibia 12/26/2001    History of bilateral tibial fx, work related injury     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   Start Fosamax 08/07, stopped fosamax.     Peripheral vascular disease (H) 1/17/2018     Tobacco abuse 1/17/2018     Type 2 diabetes mellitus without complication, without long-term current use of insulin (H) 3/25/2019     Zoster without complications 2008    left shoulder and neck     Zoster without complications     2009/2008,Herpes zoster, left shoulder and neck     ROS:     GENERAL: Denies fevers, chills, night sweats or weight changes.  HEAD: Denies headaches, syncope, or vertigo. Reports dizziness.  EYES: Denies difficulty with vision, changes in vision, or double vision. Denies eye pain, discharge, or lesion.  EARS: Denies difficulty hearing, tinnitus, redness, or drainage.  NOSE/SINUSES: Denies frequent colds, sneezing, congestion, obstruction, epistaxis, abnormal discharge, or alteration in smell.   MOUTH/THROAT: Denies tooth or gum pain, sores or lesions in mouth. Denies dysphagia, voice changes, hoarseness, or sore throat.  RESPIRATORY: Denies pain with breathing, wheezing, shortness of breath or cough.  CARDIOVASCULAR: Reports episodes of chest  "pain as stated above, episode of a fib as stated above, Denies palpitations, murmurs, edema, cyanosis or fatigue.   GASTROINTESTINAL: Appetite fair with no change. Denies nausea, vomiting, abdominal pain, or constipation. Reports diarrhea after magnesium infusion.  GENITOURINARY: Denies dysuria, increased urgency, frequency, hesitancy, straining or hematuria. Reports urine is pale to clear.  HEMATOLOGIC/LYMPHATIC: Denies abnormal bleeding or bruising. Denies noted enlarged nodes.   INTEGUMENTARY: Denies skin changes, rashes, lesions, redness, itching or new changing moles. Denies hair changes or thinning. Denies changes in finger or toe nails.   MUSCULOSKELETAL/RHEUM: Denies joint pain or swelling, limitations with ROM, or falls.  NEUROLOGICAL: Denies any weakness, tremors, numbness, or tingling. Denies history of fainting, mood or behavior disturbances.  PSYCHOLOGICAL: Denies mood changes, depression, anxiety, anhedonia, thoughts of self harm or suicidal ideation.      OBJECTIVE:                                                      /62 (BP Location: Left arm, Patient Position: Sitting, Cuff Size: Adult Regular)   Pulse 60   Temp 96.3  F (35.7  C) (Tympanic)   Resp 16   Ht 1.645 m (5' 4.75\")   Wt 61.1 kg (134 lb 9.6 oz)   BMI 22.57 kg/m      Estimated body mass index is 24.69 kg/m  as calculated from the following:    Height as of 1/4/20: 1.58 m (5' 2.21\").    Weight as of 1/4/20: 61.6 kg (135 lb 14.4 oz).     GENERAL: Vitals reviewed, orthostatic BPs were taken also. Well nourished, well developed, pale in color. Patient is alert, oriented and in no acute distress. Cooperative with exam.  HEENT: Normocephalic, atraumatic.  Normal external eye, conjunctiva, lids, cornea with no scleral icterus or conjunctival erythema. Pupils equally round. External ears within normal limits, normal hearing. External nose normal in appearance, nares patent, nasal discharge absent, general lip and mouth appearance normal. " Oropharynx with no erythema or exudates. Dentition adequate.    NECK: Supple; no thyromegaly or masses noted.  No cervical or supraclavicular lymphadenopathy.  CARDIOVASCULAR: Heart regular in rate and rhythm, faint heart tones. Normal S1 and S2.  RESPIRATORY: Lungs clear to auscultation bilaterally, no crackles or wheezes.   GASTROINTESTINAL:  Soft, nontender, and nondistended.  Bowel sounds positive in all four quadrants.  INTEGUMENTARY: Warm and dry to touch.  Pale in color. No rash or suspicious lesions noted on face, arms and legs.  EXTREMITIES: No clubbing or cyanosis.  No peripheral edema.  NEUROLOGICAL: Alert and oriented to person, place, and time.  Cranial nerves II-XII grossly intact with no focal or lateralizing deficits.  Muscle tone normal.  Gait normal. No tremor.   MUSCULOSKELETAL: ROM of upper and lower extremities is symmetric and full.  PSYCHOLOGICAL: Mood is good. Affect appropriate. Speech fluent. Answers questions appropriately and thought process appears normal.    Results for orders placed or performed in visit on 01/07/20   CBC with platelets differential     Status: Abnormal   Result Value Ref Range    WBC 13.4 (H) 4.0 - 11.0 10e9/L    RBC Count 4.91 3.8 - 5.2 10e12/L    Hemoglobin 9.1 (L) 11.7 - 15.7 g/dL    Hematocrit 32.4 (L) 35.0 - 47.0 %    MCV 66 (L) 78 - 100 fl    MCH 18.5 (L) 26.5 - 33.0 pg    MCHC 28.1 (L) 31.5 - 36.5 g/dL    RDW 19.7 (H) 10.0 - 15.0 %    Platelet Count 547 (H) 150 - 450 10e9/L    Diff Method Automated Method     % Neutrophils 72.0 %    % Lymphocytes 14.6 %    % Monocytes 11.1 %    % Eosinophils 1.3 %    % Basophils 0.6 %    % Immature Granulocytes 0.4 %    Absolute Neutrophil 9.6 (H) 1.6 - 8.3 10e9/L    Absolute Lymphocytes 2.0 0.8 - 5.3 10e9/L    Absolute Monocytes 1.5 (H) 0.0 - 1.3 10e9/L    Absolute Eosinophils 0.2 0.0 - 0.7 10e9/L    Absolute Basophils 0.1 0.0 - 0.2 10e9/L    Abs Immature Granulocytes 0.1 0 - 0.4 10e9/L   Magnesium     Status: Abnormal    Result Value Ref Range    Magnesium 1.7 (L) 1.9 - 2.7 mg/dL       ASSESSMENT/PLAN:                                                        ICD-10-CM    1. Encounter for follow-up examination Z09    2. Iron deficiency anemia due to chronic blood loss D50.0 Will get labs today for reevaluation. Discussed the possibility of iron infusion and patient is interested in doing this. Orders placed previously and confirmed, Infusion will call patient to get her going.   3. Hypomagnesemia E83.42 Will get labs today for reevaluation. Continue PO magnesium supplementation 400 mg daily as ordered from ED.     Completed Return to Work form for her employer with no restrictions.    Reviewed lab results with Dr. Perdomo. Ordered iron studies to be added on and peripheral smear to be obtained in near future when patient comes in.  Dr. Perdomo will review at follow up appointment.     Return in 17 days (on 1/24/2020), with Dr. Perdomo - already scheduled.     ONESIMO FLETCHER NP  1/8/2020 12:47 PM

## 2020-01-07 NOTE — TELEPHONE ENCOUNTER
Call received from Ana Perdomo regarding Iron infusions to clarify if iron orders were in chart for patient as she has not been called. At this time iron orders in but appears that treatment is pending approval . Call to [facility] care coordinator who will forward chart to [facility] so patient can get scheduled once iron infusions are approved. Shirley Nunn RN on 1/7/2020 at 1:58 PM

## 2020-01-07 NOTE — LETTER
January 9, 2020      Nonamarie Jeremy  [address] [place] [address]        Dear ,    We are writing to inform you of your test results.    Test results indicate you may require additional follow up, see comment below.    Resulted Orders   CBC with platelets differential   Result Value Ref Range    WBC 13.4 (H) 4.0 - 11.0 10e9/L    RBC Count 4.91 3.8 - 5.2 10e12/L    Hemoglobin 9.1 (L) 11.7 - 15.7 g/dL    Hematocrit 32.4 (L) 35.0 - 47.0 %    MCV 66 (L) 78 - 100 fl    MCH 18.5 (L) 26.5 - 33.0 pg    MCHC 28.1 (L) 31.5 - 36.5 g/dL    RDW 19.7 (H) 10.0 - 15.0 %    Platelet Count 547 (H) 150 - 450 10e9/L    Diff Method Automated Method     % Neutrophils 72.0 %    % Lymphocytes 14.6 %    % Monocytes 11.1 %    % Eosinophils 1.3 %    % Basophils 0.6 %    % Immature Granulocytes 0.4 %    Absolute Neutrophil 9.6 (H) 1.6 - 8.3 10e9/L    Absolute Lymphocytes 2.0 0.8 - 5.3 10e9/L    Absolute Monocytes 1.5 (H) 0.0 - 1.3 10e9/L    Absolute Eosinophils 0.2 0.0 - 0.7 10e9/L    Absolute Basophils 0.1 0.0 - 0.2 10e9/L    Abs Immature Granulocytes 0.1 0 - 0.4 10e9/L   Magnesium   Result Value Ref Range    Magnesium 1.7 (L) 1.9 - 2.7 mg/dL     Please keep appointment with Dr Perdomo to follow up on these labs.  If you have any questions or concerns, please call the clinic at the number listed above.     I hope your return to work went smoothly.     Sincerely,        Bethany Nieves NP

## 2020-01-07 NOTE — NURSING NOTE
Chief Complaint   Patient presents with     RECHECK     Patient presents to the clinic today for a follow up from the ED from dizziness. Patient states she still is having some dizziness and is NOT checking her blood pressure and to have note signed to release her back to work   Medication Reconciliation: completed   Mala Rivera LPN  1/7/2020 1:02 PM

## 2020-01-08 NOTE — TELEPHONE ENCOUNTER
Ordered peripheral smear to be completed. Patient to be notified via phone to stop into the lab and have this drawn. Bethany Nieves NP

## 2020-01-09 NOTE — TELEPHONE ENCOUNTER
Spoke with patient and relayed message and transferred to scheduling. Antoinette Nguyen LPN .............1/9/2020  10:23 AM

## 2020-01-14 NOTE — PROGRESS NOTES
Infusion Nursing Note:  Ovidio Luna presents today for Venofer infusion 1 of 5.    Patient seen by provider today: No   present during visit today: Not Applicable.    Note: N/A.    Intravenous Access:  Peripheral IV placed.    Treatment Conditions:  Not Applicable.    Post Infusion Assessment:  Patient tolerated infusion without incident.  Patient observed for 30 minutes post Venofer end time per protocol.  Blood return noted pre and post infusion.  Site patent and intact, free from redness, edema or discomfort.  No evidence of extravasations.  Access discontinued per protocol.     Discharge Plan:   Copy of AVS reviewed with patient and/or family.  Patient will return 1/16/20 for next appointment.  Patient discharged in stable condition accompanied by: self.  Departure Mode: Ambulatory.    Brenda J. Goodell, RN

## 2020-01-16 NOTE — PROGRESS NOTES
Infusion Nursing Note:  Ovidio Luna presents today for VENOFER 2/5.    Patient seen by provider today: No   present during visit today: Not Applicable.    Note: N/A.    Intravenous Access:  Peripheral IV placed to right back forearm.    Treatment Conditions:  Not Applicable.  Patient verbalized that she tolerated first dose of Venofer without concern.      Post Infusion Assessment:  Patient tolerated infusion without incident.  Blood return noted pre and post infusion.  Site patent and intact, free from redness, edema or discomfort.  No evidence of extravasations.  Access discontinued per protocol.       Discharge Plan:   Patient discharged in stable condition accompanied by: self.  Declined AVS, verbalized that she had her last one with infusion dates.  Departure Mode: Ambulatory.    Homa Carson RN

## 2020-01-20 NOTE — PROGRESS NOTES
Infusion Nursing Note:  Ovidio Luna presents today for Venofer infusion 3 of 5.    Patient seen by provider today: No   present during visit today: Not Applicable.    Note: N/A.    Intravenous Access:  Peripheral IV placed.    Treatment Conditions:  Not Applicable.    Post Infusion Assessment:  Patient tolerated infusion without incident.  Blood return noted pre and post infusion.  Site patent and intact, free from redness, edema or discomfort.  No evidence of extravasations.  Access discontinued per protocol.     Discharge Plan:   Patient and/or family verbalized understanding of discharge instructions and all questions answered.  Refused AVS.  Patient discharged in stable condition accompanied by: self.  Departure Mode: Ambulatory.  Will return 1/22/20.    Brenda J. Goodell, RN

## 2020-01-22 NOTE — PROGRESS NOTES
Infusion Nursing Note:  Ovidio Luna presents today for Venofer 4/5.    Patient seen by provider today: No   present during visit today: Not Applicable.    Note: N/A.    Intravenous Access:  Peripheral IV placed to right antecubital space with brisk blood return    Treatment Conditions:  Not Applicable.      Post Infusion Assessment:  Patient tolerated infusion without incident.  Blood return noted pre  infusion.  Site patent and intact, free from redness, edema or discomfort.  No evidence of extravasations.  Access discontinued per protocol.       Discharge Plan:   Patient discharged in stable condition accompanied by: self.  Departure Mode: Ambulatory and patient will return Friday for last dose.    Homa Carson RN

## 2020-01-24 NOTE — LETTER
January 26, 2020      Nonamarikathy Luna  [address] [place] [address]        Dear ,    We are writing to inform you of your test results.  Hemoglobin along with other blood counts have improved.  Peripheral blood smear is pending.  Blood sugars have improved from prior.      CBC with platelets differential   Result Value Ref Range    WBC 10.3 4.0 - 11.0 10e9/L    RBC Count 5.11 3.8 - 5.2 10e12/L    Hemoglobin 10.4 (L) 11.7 - 15.7 g/dL    Hematocrit 37.7 35.0 - 47.0 %    MCV 74 (L) 78 - 100 fl    MCH 20.4 (L) 26.5 - 33.0 pg    MCHC 27.6 (L) 31.5 - 36.5 g/dL    RDW 29.8 (H) 10.0 - 15.0 %    Platelet Count 392 150 - 450 10e9/L    Diff Method Automated Method     % Neutrophils 61.6 %    % Lymphocytes 22.3 %    % Monocytes 12.6 %    % Eosinophils 2.2 %    % Basophils 0.8 %    % Immature Granulocytes 0.5 %    Absolute Neutrophil 6.4 1.6 - 8.3 10e9/L    Absolute Lymphocytes 2.3 0.8 - 5.3 10e9/L    Absolute Monocytes 1.3 0.0 - 1.3 10e9/L    Absolute Eosinophils 0.2 0.0 - 0.7 10e9/L    Absolute Basophils 0.1 0.0 - 0.2 10e9/L    Abs Immature Granulocytes 0.1 0 - 0.4 10e9/L   Hemoglobin A1c   Result Value Ref Range    Hemoglobin A1C 6.2 (H) 4.0 - 6.0 %       If you have any questions or concerns, please call the clinic at the number listed above.       Sincerely,        Ana Perdomo, DO

## 2020-01-24 NOTE — PROGRESS NOTES
Nursing Notes:   Eveline Parker LPN  1/24/2020 11:02 AM  Signed  Patient presents to the clinic for follow-up anemia.     Medication Reconciliation: complete   Eveline Parker LPN............. January 24, 2020 10:45 AM                Chief Complaint   Patient presents with     RECHECK         HPI: Ms. Luna is a 69 year old female who presents today for follow up of anemia and diabetes.    Nursing note reviewed with patient.  Accurracy and completeness verified.      Patients diabetes is controlled.  She currently denies significant lows.  Not currently checking.   she denies obvious side effects from diabetic medications which include metformin.  She is on a statin and aspirin.    She has a history of GI bleed with resulting anemia.  She has been on iron supplementation off and on for the last several months however recently stopped it when she went through iron infusions which were ordered because she was having minimal improvement with the iron supplement.  She feels she has improved since having the infusions.  She feels that her coloring has improved.  She feels better overall.  She has been noted on her CBC to have thrombocytosis and leukocytosis.  We have discussed that this may be related to her iron deficiency however with abnormalities in 3 lines of blood cells we discussed potentially obtaining a blood smear.  She is interested in obtaining this today.    She has a history of emphysema.  She has continued to have shortness of breath.  She has not had pulmonary function studies anytime recently.    She is due for her tetanus vaccine.    Past medical history reviewed as below:     Past Medical History:   Diagnosis Date     Allergy status to other antibiotic agents status      Atherosclerosis     History of ASO with claudication     Atherosclerotic heart disease of native coronary artery without angina pectoris 2004    Stent times 2     Atrial fibrillation (H) 3/10/2019     Centrilobular  "emphysema (H) 12/12/2016     Cerebral infarction (H) 05/20/2012    CVA with residual left sided weakness, incidental meningioma found     Closed fracture of shaft of left tibia 12/26/2001    History of bilateral tibial fx, work related injury     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   Start Fosamax 08/07, stopped fosamax.     Peripheral vascular disease (H) 1/17/2018     Tobacco abuse 1/17/2018     Type 2 diabetes mellitus without complication, without long-term current use of insulin (H) 3/25/2019     Zoster without complications 2008    left shoulder and neck     Zoster without complications     2009/2008,Herpes zoster, left shoulder and neck   .      ROS  Pertinent ROS was performed and was negative, including for fever, chills, increased lower extremity edema, changes in bowel or bladder, blood in the stool, difficulty swallowing, sores in the mouth.  She continues to have regular chest pain and some shortness of breath.  She is following with cardiology.  No other concerns, with exception of HPI above.      EXAM:   /62 (BP Location: Right arm, Patient Position: Sitting, Cuff Size: Adult Regular)   Pulse 64   Temp 97.8  F (36.6  C) (Tympanic)   Resp 16   Ht 1.626 m (5' 4\")   Wt 61.6 kg (135 lb 12.8 oz)   Breastfeeding No   BMI 23.31 kg/m      Estimated body mass index is 23.31 kg/m  as calculated from the following:    Height as of this encounter: 1.626 m (5' 4\").    Weight as of this encounter: 61.6 kg (135 lb 12.8 oz).      GEN: Vitals reviewed. Healthy appearing. Patient is in no acute distress. Cooperative with exam.  HEENT: Normocephalic atraumatic.  Pupils equally round.  No scleral icterus, no conjunctival erythema. Oropharynx with no erythema or exudates. Dentition adequate.  NECK: " Supple; no thyromegaly.  No neck, cervical LAD.   CV: Heart regular in rate and rhythm with no murmur.    LUNGS: Lungs clear to auscultation bilaterally.  Chest rise equal bilaterally.  No accessory muscle use.  ABD: Nondistended  SKIN: Warm and dry to touch.  No rash on face, arms and legs.  EXT: No clubbing or cyanosis.  No peripheral edema.  PSYCH: Mood is good.  Affect appropriate. Speech fluent. Answers questions appropriately and thought process normal.     ASSESSMENT AND PLAN:  Type 2 diabetes mellitus without complication, without long-term current use of insulin (H)  Diabetes overall is well controlled.  Continue with current medications.  - Hemoglobin A1c    Iron deficiency anemia, unspecified iron deficiency anemia type  -CBC shows improvement in anemia with iron infusions.  The blood smear indicates that abnormal cell lines are likely all due to iron deficiency anemia.  Plan for recheck when I see her in follow-up in 3 months.  - Reticulocyte Count  - CBC with platelets differential  - Blood Morphology Pathologist Review    Leukocytosis, unspecified type  - Reticulocyte Count  - CBC with platelets differential  - Blood Morphology Pathologist Review    Thrombocytosis (H)  - Reticulocyte Count  - CBC with platelets differential  - Blood Morphology Pathologist Review    Pulmonary emphysema, unspecified emphysema type (H)  -Given her ongoing shortness of breath we discussed getting pulmonary studies to be assess degree of disease.  - Pulmonary Function Test ()    Need for diphtheria-tetanus-pertussis (Tdap) vaccine  Rx given  - Tdap, tetanus-diphtheria-acell pertussis, (ADACEL) 5-2-15.5 LF-MCG/0.5 injection  Dispense: 0.5 mL; Refill: 0           Return in about 3 months (around 4/24/2020) for Annual Review, medicare wellness visit.      EMILY BENJAMIN DO   1/24/2020 11:02 AM    This document was prepared using voice generated softwear. While every attempt was made for accuracy, grammatical errors may  exist.

## 2020-01-24 NOTE — PROGRESS NOTES
Infusion Nursing Note:  Ovidio Luna presents today for Venofer 5/5.    Patient seen by provider today: No   present during visit today: Not Applicable.    Note: N/A.    Intravenous Access:  Peripheral IV placed.    Treatment Conditions:  Not Applicable.      Post Infusion Assessment:  Patient tolerated infusion without incident.  Blood return noted pre and post infusion.  Site patent and intact, free from redness, edema or discomfort.  No evidence of extravasations.  Access discontinued per protocol.       Discharge Plan:   Discharge instructions reviewed with: Patient.  Patient discharged in stable condition accompanied by: self.  Departure Mode: Ambulatory.  Patient declined AVS, will Follow-up with Ana Perdomo as needed as this was final iron infusion.    Shirley Nunn RN

## 2020-01-24 NOTE — NURSING NOTE
Patient presents to the clinic for follow-up anemia.     Medication Reconciliation: complete   Eveline Parker LPN............. January 24, 2020 10:45 AM

## 2020-02-05 NOTE — TELEPHONE ENCOUNTER
Please clarify how often she is using her duo nebs, albuterol inhaler and Advair.  Likely it is the albuterol/albuterol in these medications that is causing the anxiety and shakiness.  She should not be using all 3 of them repeatedly on a daily basis.  Once I hear back I will give her further instructions for which ones to continue on how often.

## 2020-02-05 NOTE — TELEPHONE ENCOUNTER
After proper verification, patient stated that she feels like the advair inhaler is making her fell on the anxious side and shaky. She would like to know if she could try something else or if provider had any ideas. She also would like to know if she should be changing her nebulizer from PRN to scheduled.  Mala Rivera LPN on 2/5/2020 at 9:32 AM

## 2020-02-06 NOTE — TELEPHONE ENCOUNTER
Patient has been doing duo neb 1x weekly, albuterol inhaler 1x weekly, advair she is not using as it causes anxiety. Antoinette Nguyen LPN .............2/6/2020  9:34 AM

## 2020-02-06 NOTE — TELEPHONE ENCOUNTER
I suspect based on this that it is the steroid and the Advair that is causing the anxiety.  There are several other inhalers that we can use if she is interested in trying something completely different.  They may not control the breathing as well.  If she would like to discuss it in person further I am happy to have her in clinic to see either myself or Bethany to discuss the different options otherwise I can just send in an inhaler that is covered by insurance and she can try this and let us know if there are any issues.  She should stop her Advair and monitor her breathing.

## 2020-02-06 NOTE — TELEPHONE ENCOUNTER
After proper verification, patient was relayed message from below. Patient stated that she would like to try another inhaler at this time and will let us know if she any more concerns.   Mala Rivera LPN on 2/6/2020 at 2:20 PM

## 2020-02-06 NOTE — TELEPHONE ENCOUNTER
I have sent in an order for anoro ellipta.  She should discontinue the Advair and monitor her breathing.  Pending on how she is doing with this we may need to try other inhalers.

## 2020-02-25 NOTE — TELEPHONE ENCOUNTER
Patient called to see how her chest pain and symptoms are.  No answer.  I will plan to call again Wednesday.

## 2020-02-27 NOTE — TELEPHONE ENCOUNTER
"Requested Prescriptions   Pending Prescriptions Disp Refills     aspirin (ASA) 81 MG chewable tablet [Pharmacy Med Name: ASPIRIN 81MG CHEWABLE CHRY TABLETS] 90 tablet 1     Sig: CHEW AND SWALLOW 1 TABLET(81 MG) BY MOUTH DAILY       Analgesics (Non-Narcotic Tylenol and ASA Only) Passed - 2/27/2020  9:32 AM        Passed - Recent (12 mo) or future (30 days) visit within the authorizing provider's specialty     Patient has had an office visit with the authorizing provider or a provider within the authorizing providers department within the previous 12 mos or has a future within next 30 days. See \"Patient Info\" tab in inbasket, or \"Choose Columns\" in Meds & Orders section of the refill encounter.              Passed - Patient is age 20 years or older     If ASA is flagged for ages under 20 years old. Forward to provider for confirmation Ryes Syndrome is not a concern.              Passed - Medication is active on med list        LOV 12/11/20219 with VOLODYMYR Harper CNP.  11/13/2019 VOLODYMYR Harper CNP \"She is not on oral anticoagulation with bleeding complications, she does have [id number] >2. Vascular surgery recommended she stay on ASA 81 mg once daily. This will not protect patient from possible embolic event with AF.\" Unable to complete prescription refill per RN medication refill policy. Will route to provider for review and consideration.  Kenya Rivera RN on 2/27/2020 at 2:41 PM          "

## 2020-02-27 NOTE — TELEPHONE ENCOUNTER
Please continue to call patient as I have been unable to get ahold of her.  I wanted to follow up if she is still having chest pain regularly.  I talked about her case with cardiology and if she is still having symptoms, she may benefit from angiogram of the heart or further discussion with Dr Henson.

## 2020-02-27 NOTE — TELEPHONE ENCOUNTER
After proper verification, patient stated she was not having any chest pain for the last two weeks. This writer informed her if she started having them or had any questions to make sure and call us back.  Mala Rivera LPN on 2/27/2020 at 10:52 AM

## 2020-03-05 NOTE — TELEPHONE ENCOUNTER
"Requested Prescriptions   Pending Prescriptions Disp Refills     furosemide (LASIX) 20 MG tablet [Pharmacy Med Name: FUROSEMIDE 20MG TABLETS] 90 tablet 1     Sig: TAKE 1 TABLET(20 MG) BY MOUTH DAILY       Diuretics (Including Combos) Protocol Failed - 3/5/2020  8:23 AM        Failed - Normal serum creatinine on file in past 12 months     Recent Labs   Lab Test 01/02/20  1850   CR 1.27*              Passed - Blood pressure under 140/90 in past 12 months     BP Readings from Last 3 Encounters:   01/24/20 131/55   01/24/20 128/62   01/22/20 106/50                 Passed - Recent (12 mo) or future (30 days) visit within the authorizing provider's specialty     Patient has had an office visit with the authorizing provider or a provider within the authorizing providers department within the previous 12 mos or has a future within next 30 days. See \"Patient Info\" tab in inbasket, or \"Choose Columns\" in Meds & Orders section of the refill encounter.              Passed - Medication is active on med list        Passed - Patient is age 18 or older        Passed - No active pregancy on record        Passed - Normal serum potassium on file in past 12 months     Recent Labs   Lab Test 01/02/20  1850   POTASSIUM 3.8                    Passed - Normal serum sodium on file in past 12 months     Recent Labs   Lab Test 01/02/20  1850                 Passed - No positive pregnancy test in past 12 months        Last Written Prescription Date:  09/19/2019  Last Fill Quantity: 90,   # refills: 1  Last Office Visit: 01/24/2020 with Ana Perdomo DO  Future Office visit:    Next 5 appointments (look out 90 days)    Mar 16, 2020 10:20 AM CDT  Office Visit with Ana Perdomo DO  [facility] [address]  [place] [address]  [phone]   Mar 23, 2020 10:15 AM CDT  Return Visit with VOLODYMYR [facility] [address]  [place] [address]  [phone]   May 04, 2020 10:00 AM CDT  PHYSICAL with Ana Perdomo DO  [facility] [address]  [place] [address]  [phone]      Unable to complete prescription refill per RN medication refill policy. Will route to provider for review and consideration.  Kenya Rivera RN on 3/5/2020 at 8:57 AM

## 2020-03-16 NOTE — PROGRESS NOTES
[facility] AND [facility]  [address]  [place] [address]  [phone]  Dept: [phone]    PRE-OP EVALUATION:  Today's date: 3/16/2020    Ovidio Luna (: 1950) presents for pre-operative evaluation assessment as requested by Dr. Dukes.  She requires evaluation and anesthesia risk assessment prior to undergoing surgery/procedure for treatment of Aortic Covered Stent .    Proposed Surgery/ Procedure: Aortic Covered Stent   Date of Surgery/ Procedure: 2020  Time of Surgery/ Procedure: Unknown  Hospital/Surgical Facility: [facility]  Primary Physician: Ana Perdomo  Type of Anesthesia Anticipated: General    Patient has a Health Care Directive or Living Will:  YES     2. NO - Do you ever have any pain or discomfort in your chest?  3. NO - Do you have a history of  Heart Failure?  4. NO - Are you troubled by shortness of breath when: walking on the level, up a slight hill or at night?  5. NO - Do you currently have a cold, bronchitis or other respiratory infection?  6. NO - Do you have a cough, shortness of breath or wheezing?  8. NO - Do you or anyone in your family have previous history of blood clots?  9. NO - Do you or does anyone in your family have a serious bleeding problem such as prolonged bleeding following surgeries or cuts?  11. NO - Have you had any abnormal blood loss such as black, tarry or bloody stools, or abnormal vaginal bleeding?  12. NO - Have you ever had a blood transfusion?  13. NO - Have you or any of your relatives ever had problems with anesthesia?  14. NO - Do you have sleep apnea, excessive snoring or daytime drowsiness?  15. NO - Do you have any prosthetic heart valves?  16. NO - Do you have prosthetic joints?  17. NO - Is there any chance that you may be pregnant?  1. YES, 2 stents in heart and stroke- Do you have a history of heart attack, stroke, stent, bypass or surgery on an artery in the head, neck, heart or legs?  7.  YES - Do you sometimes get pains in the calves of your legs when you walk?  10. YES - Have you ever had problems with anemia or been told to take iron pills?      HPI:     HPI related to upcoming procedure: Patient denies any questions regarding her upcoming procedure other than whether it will continue on schedule given pandemic concerns.    She denies any issues at this time.  She reports that the chest pain she was having previously with her atrial fibrillation has gone mostly.  She is able to work outside, shovel snow, chip away ice with no significant concern.  She has continued on her regular medications.  She has not had to take her diltiazem.  Does need a refill of most of her meds.  She denies any side effects from this.    She continues on amlodipine, metoprolol, furosemide, Imdur and losartan for her blood pressure and heart.  She is on Combivent and Anoro Ellipta for her COPD.  She continues on daily Prilosec.  She was able to decrease this and has tolerated the lower dose.    She continues on low-dose metformin daily.  She is not due for another A1c for a month.  Her last one was well controlled at 6.2%.      She denies any issues or side effects with all of her different medications.      MEDICAL HISTORY:      Past Medical History:   Diagnosis Date     Allergy status to other antibiotic agents status      Atherosclerosis     History of ASO with claudication     Atherosclerotic heart disease of native coronary artery without angina pectoris 2004    Stent times 2     Atrial fibrillation (H) 3/10/2019     Centrilobular emphysema (H) 12/12/2016     Cerebral infarction (H) 05/20/2012    CVA with residual left sided weakness, incidental meningioma found     Closed fracture of shaft of left tibia 12/26/2001    History of bilateral tibial fx, work related injury     H/O aorto-femoral bypass (2004) 4/3/2019     Hyperlipidemia 1/17/2018     Hypertension 1/17/2018     Hypomagnesemia 1/7/2020     Meningioma (H)  5/17/2012     Migraine     History of  migraines     Osteopenia 03/14/2006    Osteopenia, proximal femur.   Start Fosamax 08/07, stopped fosamax.     Peripheral vascular disease (H) 1/17/2018     Tobacco abuse 1/17/2018     Type 2 diabetes mellitus without complication, without long-term current use of insulin (H) 3/25/2019     Zoster without complications 2008    left shoulder and neck     Zoster without complications     2009/2008,Herpes zoster, left shoulder and neck     Past Surgical History:   Procedure Laterality Date     COLONOSCOPY  2004    2 hyperplastic polyps, repeat in 2014     COLONOSCOPY N/A 9/23/2019    F/U 2024 adenomatous polyps     ESOPHAGOSCOPY, GASTROSCOPY, DUODENOSCOPY (EGD), COMBINED N/A 9/23/2019    Antral ulcer     HEMORRHOID SURGERY       HYSTERECTOMY TOTAL ABDOMINAL, BILATERAL SALPINGO-OOPHORECTOMY, COMBINED  1987    total hyster - BSO     OTHER SURGICAL HISTORY Right 2004    07099.0,ND BYPASS GRAFT AORTOBIFEMORAL     OTHER SURGICAL HISTORY      2004,LOC-1006.05,PTCA,mid LAD, proximal RCA     OTHER SURGICAL HISTORY      03/08/16,FOU852,CYSTOSCOPY W/ URETEROSCOPY W/ LITHOTRIPSY,Right,Dr. Mario DC     Current Outpatient Medications   Medication Sig Dispense Refill     acetaminophen (TYLENOL) 500 MG tablet Take 500-1,000 mg by mouth every 6 hours as needed for mild pain TAKES 1500 mg as needed. Educated patient to not exceed 4000 mg per day.       albuterol (PROAIR HFA/PROVENTIL HFA/VENTOLIN HFA) 108 (90 Base) MCG/ACT Inhaler Inhale 1-2 puffs into the lungs every 6 hours as needed for shortness of breath / dyspnea or wheezing 1 Inhaler 0     amLODIPine (NORVASC) 10 MG tablet TAKE 1 TABLET(10 MG) BY MOUTH DAILY 90 tablet 3     aspirin (ASA) 81 MG chewable tablet CHEW AND SWALLOW 1 TABLET(81 MG) BY MOUTH DAILY 90 tablet 3     aspirin 81 MG EC tablet Take 1 tablet by mouth daily       atorvastatin (LIPITOR) 40 MG tablet Take 1 tablet (40 mg) by mouth At Bedtime 90 tablet 3     busPIRone  (BUSPAR) 10 MG tablet TAKE 1 TABLET BY MOUTH 2 TIMES DAILY AS NEEDED FOR ANXIETY 90 tablet 1     Cholecalciferol (VITAMIN D3) 2000 UNITS CAPS Take 4,000 Units by mouth 2 times daily        citalopram (CELEXA) 20 MG tablet TAKE 1 TABLET(20 MG) BY MOUTH DAILY 90 tablet 0     COMPRESSION STOCKINGS 1 each daily Knee high; 15-20mmHg 1 each 0     diltiazem (CARDIZEM) 30 MG tablet Take 1 tablet (30 mg) by mouth 3 times daily as needed (racing heart, palpitations) 30 tablet 1     fexofenadine (ALLEGRA) 180 MG tablet Take 180 mg by mouth 2 times daily        fluticasone-salmeterol (ADVAIR) 250-50 MCG/DOSE inhaler Inhale 1 puff into the lungs daily as needed       furosemide (LASIX) 20 MG tablet TAKE 1 TABLET(20 MG) BY MOUTH DAILY 90 tablet 1     hydrochlorothiazide (HYDRODIURIL) 12.5 MG tablet Take 1 tablet (12.5 mg) by mouth 2 times daily       ipratropium - albuterol 0.5 mg/2.5 mg/3 mL (DUONEB) 0.5-2.5 (3) MG/3ML neb solution Take 1 vial (3 mLs) by nebulization every morning . May also use 1 neb every 6 hours as needed for shortness of breath. 1 Box 3     Ipratropium-Albuterol (COMBIVENT RESPIMAT)  MCG/ACT inhaler Inhale 1 puff into the lungs 4 times daily 4 g 11     isosorbide mononitrate (IMDUR) 30 MG 24 hr tablet Take 2 tablets (60 mg) by mouth daily 90 tablet 1     losartan (COZAAR) 25 MG tablet Take 1 tablet (25 mg) by mouth daily 90 tablet 3     MAGNESIUM-OXIDE 400 (241.3 Mg) MG tablet TAKE 1 TABLET(400 MG) BY MOUTH DAILY 90 tablet 0     metFORMIN (GLUCOPHAGE-XR) 500 MG 24 hr tablet TAKE 1 TABLET BY MOUTH ONCE DAILY WITH DINNER. 90 tablet 0     metoprolol succinate ER (TOPROL-XL) 100 MG 24 hr tablet Take 0.5 tablets (50 mg) by mouth daily 30 tablet 1     omeprazole (PRILOSEC) 40 MG DR capsule Take 1 capsule (40 mg) by mouth 2 times daily Increase in frequency 180 capsule 1     [facility]UL[facility]ACH[facility] ITEM #[id number] DX J44 [facility]   5     potassium chloride ER (OLIVIER/KLOR-CON M) 20  MEQ CR tablet Take 2 tabs (40 mg) in the morning with a meal, and take 1 tab (20 mg) in the evening with a meal every day. 270 tablet 1     Respiratory Therapy Supplies (NEBULIZER/TUBING/MOUTHPIECE) KIT Dx: COPD with exacerbation. Sig: Use as directed. 1 kit 0     umeclidinium-vilanterol (ANORO ELLIPTA) 62.5-25 MCG/INH oral inhaler Inhale 1 puff into the lungs daily 1 Inhaler 4     OTC products: None, except as noted above    Allergies   Allergen Reactions     Diphenhydramine Palpitations and Other (See Comments)     wheezing  Rapid heart Rate     Morphine      Tachycardia       Propoxyphene N-Apap Unknown     Tremors       Varenicline Nausea and Vomiting     Codeine Palpitations     Lisinopril Palpitations and Cough     No Clinical Screening - See Comments Rash     Pt states allergies to white/yellow gold.  Sugical steel.  Copper./ developed infection     Tetracycline Nausea and Vomiting and Rash      Latex Allergy: NO    Social History     Tobacco Use     Smoking status: Current Every Day Smoker     Packs/day: 0.75     Years: 40.00     Pack years: 30.00     Types: Cigarettes     Start date: 1/1/1966     Smokeless tobacco: Never Used     Tobacco comment: 0.5 - 1 ppd -    Substance Use Topics     Alcohol use: No     History   Drug Use No       REVIEW OF SYSTEMS:   GEN: -fevers/-chills/-night sweats/-wt change  NEURO: -headaches/-vision changes  EARS: -hearing changes  NOSE: -drainage/-congestion  MOUTH/THROAT: - sore throat/-dysphagia/-sores  LUNGS: -Increased sob/-cough  CARDIOVASCULAR: -Significant cp/-palpitations  ABD: -pain/-change in bowels/-bloody stools  : -change in bladder  HEMATOLOGIC/LYMPHATIC: -swollen nodes  SKIN: -rashes/-lesions  MSK/RHEUM: Occasional joint pain/-joint swelling  NEURO: -weakness/-parasthesias  PSYCH: -depression/+anxiety but controlled          EXAM:   /70 (BP Location: Right arm, Patient Position: Sitting, Cuff Size: Adult Regular)   Pulse 80   Temp 96.5  F (35.8  C)  "(Tympanic)   Resp 16   Ht 1.626 m (5' 4.02\")   Wt 61.7 kg (136 lb)   SpO2 98%   Breastfeeding No   BMI 23.33 kg/m      GENERAL APPEARANCE: healthy, alert and no distress     EYES: EOMI, PERRL     HENT: nose and mouth without ulcers or lesions     NECK: no adenopathy, no asymmetry, masses, or scars and thyroid normal to palpation     RESP: lungs clear to auscultation - no rales, rhonchi or wheezes     CV: regular rates and rhythm, normal S1 S2, no S3 or S4 and no murmur, click or rub; trace bilateral lower extremity peripheral edema     ABDOMEN: Nondistended     MS: extremities normal- no gross deformities noted, no evidence of inflammation in joints, FROM in all extremities.     SKIN: no suspicious lesions or rashes     NEURO: Normal strength and tone, sensory exam grossly normal, mentation intact and speech normal     PSYCH: mentation appears normal. and affect normal/bright     LYMPHATICS: No cervical adenopathy    DIAGNOSTICS:     Labs Resulted Today:   Results for orders placed or performed in visit on 03/16/20   Basic Metabolic Panel     Status: Abnormal   Result Value Ref Range    Sodium 138 134 - 144 mmol/L    Potassium 4.2 3.5 - 5.1 mmol/L    Chloride 99 98 - 107 mmol/L    Carbon Dioxide 30 21 - 31 mmol/L    Anion Gap 9 3 - 14 mmol/L    Glucose 132 (H) 70 - 105 mg/dL    Urea Nitrogen 18 7 - 25 mg/dL    Creatinine 1.28 (H) 0.60 - 1.20 mg/dL    GFR Estimate 41 (L) >60 mL/min/[1.73_m2]    GFR Estimate If Black 50 (L) >60 mL/min/[1.73_m2]    Calcium 10.2 8.6 - 10.3 mg/dL   CBC W PLT No Diff     Status: Abnormal   Result Value Ref Range    WBC 10.6 4.0 - 11.0 10e9/L    RBC Count 5.84 (H) 3.8 - 5.2 10e12/L    Hemoglobin 14.8 11.7 - 15.7 g/dL    Hematocrit 47.5 (H) 35.0 - 47.0 %    MCV 81 78 - 100 fl    MCH 25.3 (L) 26.5 - 33.0 pg    MCHC 31.2 (L) 31.5 - 36.5 g/dL    RDW 25.5 (H) 10.0 - 15.0 %    Platelet Count 298 150 - 450 10e9/L     EKG was reviewed personally from January 2, 2020 and was unchanged from " prior.    IMPRESSION:   Reason for surgery/procedure: Vascular stenting  Diagnosis/reason for consult: Multiple medical issues, renewal of medications    The proposed surgical procedure is considered INTERMEDIATE risk.    REVISED CARDIAC RISK INDEX  The patient has the following serious cardiovascular risks for perioperative complications such as (MI, PE, VFib and 3  AV Block):  Coronary Artery Disease (MI, positive stress test, angina, Qs on EKG)  INTERPRETATION: 1 risks: Class II (low risk - 0.9% complication rate)    The patient has the following additional risks for perioperative complications:  No identified additional risks      ICD-10-CM    1. Preop general physical exam  Z01.818 CBC W PLT No Diff     Basic Metabolic Panel     Basic Metabolic Panel     CBC W PLT No Diff   2. Bilateral lower extremity edema  R60.0    3. Type 2 diabetes mellitus without complication, without long-term current use of insulin (H)  E11.9 metFORMIN (GLUCOPHAGE-XR) 500 MG 24 hr tablet   4. Anxiety  F41.9 citalopram (CELEXA) 20 MG tablet     busPIRone (BUSPAR) 10 MG tablet   5. PAF (paroxysmal atrial fibrillation) (H)  I48.0 metoprolol succinate ER (TOPROL-XL) 100 MG 24 hr tablet    just on an 81mg aspirin daily because of past ulcer bleeding.   6. Acute ulcer of pyloric antrum  K25.3 omeprazole (PRILOSEC) 40 MG DR capsule     DISCONTINUED: omeprazole (PRILOSEC) 40 MG DR capsule   7. Essential hypertension  I10 amLODIPine (NORVASC) 10 MG tablet   8. Mixed hyperlipidemia  E78.2 atorvastatin (LIPITOR) 40 MG tablet   9. Atherosclerosis of native coronary artery of native heart without angina pectoris  I25.10 atorvastatin (LIPITOR) 40 MG tablet   10. Hypokalemia  E87.6 potassium chloride ER (KLOR-CON M) 20 MEQ CR tablet   Patient's medications are renewed today.  Labs are obtained and overall look good.  Hemoglobin has improved significantly.  Renal function is stable from prior.  She is to call if she has any questions or concerns  postop.    RECOMMENDATIONS:     --Consult hospital rounder / IM to assist post-op medical management    --Patient is to take all scheduled medications on the day of surgery EXCEPT for modifications listed in patient instructions    APPROVAL GIVEN to proceed with proposed procedure, without further diagnostic evaluation       Signed Electronically by: Ana Perdomo DO    Copy of this evaluation report is provided to requesting physician.    [facility] Preop Guidelines    Revised Cardiac Risk Index

## 2020-03-16 NOTE — NURSING NOTE
"Patient presents to the clinic today for a pre-op for an aortic covered stent, Dr. Dukes, [facility], 03/26/2020.  Last EKG 01/02/2020.  Magy Meeks LPN 3/16/2020   10:27 AM    Chief Complaint   Patient presents with     Pre-Op Exam     Aortic Covered Stent, Dr. Dukes, [facility]       Initial /70 (BP Location: Right arm, Patient Position: Sitting, Cuff Size: Adult Regular)   Pulse 80   Temp 96.5  F (35.8  C) (Tympanic)   Resp 16   Ht 1.626 m (5' 4.02\")   Wt 61.7 kg (136 lb)   SpO2 98%   Breastfeeding No   BMI 23.33 kg/m   Estimated body mass index is 23.33 kg/m  as calculated from the following:    Height as of this encounter: 1.626 m (5' 4.02\").    Weight as of this encounter: 61.7 kg (136 lb).  Medication Reconciliation: complete  Magy Meeks LPN    "

## 2020-03-16 NOTE — Clinical Note
Please send note with labs from today along with EKG from January 2 to /[facility] for surgery on the 26th.

## 2020-03-16 NOTE — LETTER
March 16, 2020      Madelineamarikathy Luna  [address] [place] [address]        Dear ,    We are writing to inform you of your test results.    Your test results fall within the expected range(s) or remain unchanged from previous results.  Hemoglobin has improved significantly.  Please continue with current treatment plan.    Resulted Orders   Basic Metabolic Panel   Result Value Ref Range    Sodium 138 134 - 144 mmol/L    Potassium 4.2 3.5 - 5.1 mmol/L    Chloride 99 98 - 107 mmol/L    Carbon Dioxide 30 21 - 31 mmol/L    Anion Gap 9 3 - 14 mmol/L    Glucose 132 (H) 70 - 105 mg/dL    Urea Nitrogen 18 7 - 25 mg/dL    Creatinine 1.28 (H) 0.60 - 1.20 mg/dL    GFR Estimate 41 (L) >60 mL/min/[1.73_m2]    GFR Estimate If Black 50 (L) >60 mL/min/[1.73_m2]    Calcium 10.2 8.6 - 10.3 mg/dL   CBC W PLT No Diff   Result Value Ref Range    WBC 10.6 4.0 - 11.0 10e9/L    RBC Count 5.84 (H) 3.8 - 5.2 10e12/L    Hemoglobin 14.8 11.7 - 15.7 g/dL    Hematocrit 47.5 (H) 35.0 - 47.0 %    MCV 81 78 - 100 fl    MCH 25.3 (L) 26.5 - 33.0 pg    MCHC 31.2 (L) 31.5 - 36.5 g/dL    RDW 25.5 (H) 10.0 - 15.0 %    Platelet Count 298 150 - 450 10e9/L       If you have any questions or concerns, please call the clinic at the number listed above.       Sincerely,        Ana Perdomo, DO

## 2020-03-16 NOTE — PATIENT INSTRUCTIONS
PREOP RECOMMENDATIONS:  -- Hold Advil/ibuprofen, Aleve/naproxen, for 7 days prior to surgery  -- Hold vitamins and supplements for 2 weeks prior to surgery  -- Hold your regular medication of furosemide, metformin the morning of surgery  -- Okay to use Tylenol (Acetaminophen)  -- No food or drink after midnight the night before surgery    Please call our office and the surgical services with any change in condition or new symptoms that develop between today and your surgical date, in particular if you have anynew chest pain, shortness of breath, swelling or fevers.       Before Your Surgery      Call your surgeon if there is any change in your health. This includes signs of a cold or flu (such as a sore throat, runny nose, cough, rash or fever).    Do not smoke, drink alcohol or take over the counter medicine (unless your surgeon or primary care doctor tells you to) for the 24 hours before and after surgery.    If you take prescribed drugs: Follow your doctor s orders about which medicines to take and which to stop until after surgery.    Eating and drinking prior to surgery: follow the instructions from your surgeon    Take a shower or bath the night before surgery. Use the soap your surgeon gave you to gently clean your skin. If you do not have soap from your surgeon, use your regular soap. Do not shave or scrub the surgery site.  Wear clean pajamas and have clean sheets on your bed.

## 2020-05-11 NOTE — PATIENT INSTRUCTIONS
You were seen by  VOLODYMYR Harper CNP       1. Laboratory blood work has been ordered.  You will be notified by phone call or [email or web address] message when the results are available.       2. Refills on medications sent to your pharmacy.    3. Follow up with Dr. LONG    4. No other changes at this time.      You will follow up with [facility] in 6 months, sooner if needed.       Please call the cardiology office with problems, questions, or concerns at [phone].    If you experience chest pain, chest pressure, chest tightness, shortness of breath, fainting, lightheadedness, nausea, vomiting, or other concerning symptoms, please report to the Emergency Department or call 911. These symptoms may be emergent, and best treated in the Emergency Department.     Cardiology Nurses  APRIL Alvarez, BRAYAN HOLDEN, BRAYAN  [facility] ([address])  [phone]

## 2020-05-11 NOTE — PROGRESS NOTES
[facility] HEART CARE   CARDIOLOGY PROGRESS NOTE    Ovidio Luna   [address]  [facility] [address]      Glynn Botello     Chief Complaint   Patient presents with     Follow Up     s/p vascular surgery 5/1/20        HPI:   Ms. Luna is a 69 year old female who presents for cardiology follow-up to visit on 12/23/2019 with history of known coronary atherosclerosis, recently diagnosed early this year with AF with RVR and HTN.  Patient has a history of aortofemoral bypass in 2004, tachybradycardia syndrome, newly identified atrial fibrillation, CAD, hyperlipidemia, hypertension, PAD, hypertension, DM 2, COPD, tobacco abuse, emphysema, history of CVA and gastritis.    Patient has a known history of coronary artery disease with PTCA of the mLAD and pRCA on 2/28/04 with preserved LVEF. PTCA at [facility] in [place].     She has a history of PAD with aortofemoral bypass of the right LE in 2004.     Patient presented to the ED on 3/10/2019 with complaints of chest pressure and numbness radiating into the left arm.  She reported associated shortness of breath, nausea and vomiting, no diaphoresis.  This was described as a nonexertional pain experienced when watching television.  She had no relief with nitroglycerin.  In the ED she was found to be in A. fib with RVR and she was started on a Cardizem drip and admitted to the ICU.  Her first 2 troponins were normal and the third 1 was mildly elevated.  In the ICU she was noted to have a brief sinus pause on monitoring and therefore Cardizem drip was discontinued and the patient spontaneously converted to normal sinus rhythm following this.  Given her upward troponin trend during her hospitalization patient was transferred to [facility] in [place] for coronary evaluation given her known ischemic heart disease history.     Echocardiogram was performed during her hospitalization at [place] with preserved LVEF, no significant valvular or morphologic  abnormalities.  She had no anginal symptoms on presentation to [facility].  With her newly identified atrial fibrillation she was started on a DOAC for stoke prophylaxis.  Additionally she was found to be a type II diabetic and was also started on metformin.  Her statin was adjusted to high intensity atorvastatin.  She was discharged from [facility] on 3/12/2019.    At recent visit we reviewed results of stable TTE with no systolic or diastolic failure, no concerning valvular abnormalities and no identified pulmonary HTN. She completed a Lexiscan stress test on 7/9/2019 with no stress induced ischemia, there is a small fixed defect which is again present at the cardiac apex.  This was unchanged.  Myocardial motility was preserved with an EF of 66%.  There was no EKG changes.    Previously reviewed results of her CTA study of lower extremities. She was found to have patent aortobifemoral bypass graft, moderate to severe stenosis at the junction of the left superficial femoral artery and popliteal artery. Severe stenosis versus short occlusion of the mid popliteal artery just above the level of the tibiofemoral articulation. She was referred back to vascular surgery as a result of these findings. Patient was seen by vascular surgeon Dr. Dukes on 10/30/19. In review of her recent CTA there was concern that her irregular infrarenal aortic thrombus may place her at risk for occlusion of her aortobifemoral graft. Favored treatment with endovascular technique which may include placement of aortic cuffs from her renal artery origins into the main body of the graft. This would be performed outpatient through a right proximal superficial femoral artery cutdown.    INTERVAL HISTORY:  On 5/1/2020 patient was admitted for scheduled operation at [facility], she underwent treatment of her aortoiliac occlusive disease with placement of a bifurcated stent graft from the level of her renal arteries into  both aortofemoral graft limbs. In the operating room after placement of her endograft, she was noted to have an ischemic left lower extremity. Angiography was performed through a left brachial approach, which demonstrated proximal left superficial femoral artery occlusion. This was treated with placement of a self-expanding covered stent graft. Her postoperative course was unremarkable. She was ambulating independently and tolerating a regular diet. Her dressings were dry and intact. She was judged ready for discharge on 2 May, 2020. She is scheduled for vascular surgery follow-up in [place] with repeat CT angio of the aorta and bilateral lower extremities in 3 months post-op.     Today patient admits that she is feeling great, chest pain improved and only a couple rare episodes of palpitations. Has not felt any AF as of recent. She had been on DOAC with bleeding complications which was discontinued. She did remain on ASA 81 mg daily and now she will also be on Plavix daily for 3 months post-op following revascularization. Dyspnea remains stable. She is very happy with improved color to her lower extremities and reduced edema.     PAST MEDICAL HISTORY:   Past Medical History:   Diagnosis Date     Allergy status to other antibiotic agents status      Atherosclerosis     History of ASO with claudication     Atherosclerotic heart disease of native coronary artery without angina pectoris 2004    Stent times 2     Atrial fibrillation (H) 3/10/2019     Centrilobular emphysema (H) 12/12/2016     Cerebral infarction (H) 05/20/2012    CVA with residual left sided weakness, incidental meningioma found     Closed fracture of shaft of left tibia 12/26/2001    History of bilateral tibial fx, work related injury     H/O aorto-femoral bypass (2004) 4/3/2019     Hyperlipidemia 1/17/2018     Hypertension 1/17/2018     Hypomagnesemia 1/7/2020     Meningioma (H) 5/17/2012     Migraine     History of  migraines     Osteopenia 03/14/2006     Osteopenia, proximal femur.   Start Fosamax 08/07, stopped fosamax.     Peripheral vascular disease (H) 1/17/2018     Tobacco abuse 1/17/2018     Type 2 diabetes mellitus without complication, without long-term current use of insulin (H) 3/25/2019     Zoster without complications 2008    left shoulder and neck     Zoster without complications     2009/2008,Herpes zoster, left shoulder and neck          FAMILY HISTORY:   Family History   Problem Relation Age of Onset     Heart Disease Father         Heart Disease     Cancer Father         Cancer,myeloma     Heart Disease Mother         Heart Disease,MI, stenting     Cerebrovascular Disease Brother      Other - See Comments Brother         sciatica     Other - See Comments Other         FHx Father's side Coronary artery disease     Other - See Comments Paternal Uncle         Stroke     Cancer Paternal Uncle         Cancer,Bladder x2     Other - See Comments Sister         chronic pain syndrome     Breast Cancer No family hx of         Cancer-breast          PAST SURGICAL HISTORY:   Past Surgical History:   Procedure Laterality Date     COLONOSCOPY  2004    2 hyperplastic polyps, repeat in 2014     COLONOSCOPY N/A 9/23/2019    F/U 2024 adenomatous polyps     ESOPHAGOSCOPY, GASTROSCOPY, DUODENOSCOPY (EGD), COMBINED N/A 9/23/2019    Antral ulcer     HEMORRHOID SURGERY       HYSTERECTOMY TOTAL ABDOMINAL, BILATERAL SALPINGO-OOPHORECTOMY, COMBINED  1987    total hyster - BSO     OTHER SURGICAL HISTORY Right 2004    54752.0,SC BYPASS GRAFT AORTOBIFEMORAL     OTHER SURGICAL HISTORY      2004,LOC-1006.05,PTCA,mid LAD, proximal RCA     OTHER SURGICAL HISTORY      03/08/16,OHL415,CYSTOSCOPY W/ URETEROSCOPY W/ LITHOTRIPSY,Right,Dr. Mario DC          SOCIAL HISTORY:   Social History     Socioeconomic History     Marital status: Single     Spouse name: None     Number of children: None     Years of education: None     Highest education level: None   Occupational History      None   Social Needs     Financial resource strain: None     Food insecurity:     Worry: None     Inability: None     Transportation needs:     Medical: None     Non-medical: None   Tobacco Use     Smoking status: Current Every Day Smoker     Packs/day: 0.50     Years: 16.00     Pack years: 8.00     Types: Cigarettes     Smokeless tobacco: Never Used   Substance and Sexual Activity     Alcohol use: No     Drug use: No     Sexual activity: Not Currently   Lifestyle     Physical activity:     Days per week: None     Minutes per session: None     Stress: None   Relationships     Social connections:     Talks on phone: None     Gets together: None     Attends Christianity service: None     Active member of club or organization: None     Attends meetings of clubs or organizations: None     Relationship status: None     Intimate partner violence:     Fear of current or ex partner: None     Emotionally abused: None     Physically abused: None     Forced sexual activity: None   Other Topics Concern     Parent/sibling w/ CABG, MI or angioplasty before 65F 55M? Not Asked   Social History Narrative    ** Merged History Encounter **         Works at [employer]  Melly Spawn Mother  [facility] Sister  One brother          CURRENT MEDICATIONS:   Prior to Admission medications    Medication Sig Start Date End Date Taking? Authorizing Provider   albuterol (PROAIR HFA/PROVENTIL HFA/VENTOLIN HFA) 108 (90 Base) MCG/ACT Inhaler Inhale 1-2 puffs into the lungs every 6 hours as needed for shortness of breath / dyspnea or wheezing 6/7/18  Yes Fely Gregorio APRN CNP   amLODIPine (NORVASC) 10 MG tablet Take 1 tablet (10 mg) by mouth daily 3/25/19  Yes Lizet Garcia PA-C   atorvastatin (LIPITOR) 40 MG tablet Take 40 mg by mouth 3/12/19  Yes Reported, Patient   busPIRone (BUSPAR) 10 MG tablet Take 10 mg by mouth daily   Yes Unknown, Entered By History   Cholecalciferol (VITAMIN D3) 2000 UNITS CAPS Take  4,000 Units by mouth 2 times daily  9/18/12  Yes Reported, Patient   citalopram (CELEXA) 10 MG tablet Take 1 tablet (10 mg) by mouth every morning 11/2/18  Yes Glynn Botello MD   fexofenadine (ALLEGRA) 180 MG tablet Take 180 mg by mouth 2 times daily  9/18/12  Yes Reported, Patient   fluticasone (FLONASE) 50 MCG/ACT nasal spray Spray 1 spray into both nostrils daily as needed for rhinitis or allergies   Yes Unknown, Entered By History   fluticasone-salmeterol (ADVAIR) 250-50 MCG/DOSE inhaler Inhale 1 puff into the lungs daily as needed   Yes Unknown, Entered By History   hydrochlorothiazide (HYDRODIURIL) 25 MG tablet Take 1 tablet (25 mg) by mouth daily 8/27/18  Yes Glynn Botello MD   ipratropium - albuterol 0.5 mg/2.5 mg/3 mL (DUONEB) 0.5-2.5 (3) MG/3ML neb solution Take 1 vial by nebulization every morning . May also use 1 neb every 6 hours as needed for shortness of breath.   Yes Unknown, Entered By History   metFORMIN (GLUCOPHAGE) 500 MG tablet Take 1 tablet (500 mg) by mouth daily (with breakfast) 3/21/19  Yes Lizet Garcia PA-C   metoprolol tartrate (LOPRESSOR) 50 MG tablet Take 1.5 tablets (75 mg) by mouth 2 times daily 3/29/19  Yes Hawa Grissom MD   nicotine (COMMIT) 2 MG lozenge Take 2 mg by mouth 3/12/19  Yes Reported, Patient   order for [facility] Ax[employer] NEBULIZER MACHINE ITEM #[id number] DX J44 [facility]  2/20/19  Yes Reported, Patient   potassium chloride SA (K-DUR/KLOR-CON M) 20 MEQ CR tablet Take 1 tablet (20 mEq) by mouth 2 times daily (with meals) 6/5/18  Yes Glynn Botello MD   Respiratory Therapy Supplies (NEBULIZER/TUBING/MOUTHPIECE) KIT Dx: COPD with exacerbation. Sig: Use as directed. 2/20/19  Yes Lizet Garcia PA-C   rivaroxaban ANTICOAGULANT (XARELTO) 20 MG TABS tablet Take 20 mg by mouth 3/12/19  Yes Reported, Patient          ALLERGIES:   Allergies   Allergen Reactions     Diphenhydramine Palpitations and Other (See Comments)     wheezing  Rapid heart  "Rate     Morphine      Tachycardia       Propoxyphene N-Apap Unknown     Tremors       Varenicline Nausea and Vomiting     Codeine Palpitations     Lisinopril Palpitations and Cough     No Clinical Screening - See Comments Rash     Pt states allergies to white/yellow gold.  Sugical steel.  Copper./ developed infection     Tetracycline Nausea and Vomiting and Rash          ROS:   CONSTITUTIONAL: No reported fever or chills. No changes in weight.  ENT: No visual disturbance, ear ache, epistaxis or sore throat.   CARDIOVASCULAR: Rare chest pain and rare palpitations, lower extremity edema improved.   RESPIRATORY: Positive for chronic shortness of breath and dyspnea upon exertion which she feels is unchanged. No cough, wheezing or hemoptysis.  No orthopnea or PND.  GI: No reported abdominal pain, no melena or hematochezia.  : No reported hematuria or dysuria.   NEUROLOGICAL: No lightheadedness, dizziness, syncope, ataxia, paresthesias or weakness.   HEMATOLOGIC: No history of anemia. No bleeding or excessive bruising. No history of blood clots.   MUSCULOSKELETAL: No reported new joint pain or swelling, no muscle pain.  ENDOCRINOLOGIC: No temperature intolerance. No hair or skin changes.  SKIN: No abnormal rashes or sores, no unusual itching. Groin access site and left upper arm access site healing well. Denies any drainage or redness.   PSYCHIATRIC: No history of depression or anxiety. No changes in mood, feeling down or anxious. No changes in sleep.      PHYSICAL EXAM:   /72 (BP Location: Right arm, Patient Position: Sitting, Cuff Size: Adult Regular)   Pulse 68   Temp 97.5  F (36.4  C) (Tympanic)   Resp 20   Ht 1.626 m (5' 4\")   Wt 64 kg (141 lb)   SpO2 97%   BMI 24.20 kg/m    GENERAL: The patient appears malnourished, in no apparent distress.  HEENT: Head is normocephalic and atraumatic. Eyes are symmetrical with normal visual tracking. No icterus, no xanthelasmas. Nares appeared normal without nasal " drainage. Mucous membranes are moist, no cyanosis.  NECK: Supple.   CHEST/ LUNGS: Lungs clear to auscultation, no rales, rhonchi or wheezes, no use of accessory muscles, no retractions, respirations unlabored and normal respiratory rate.   CARDIO: Regular rate and rhythm normal with S1 and S2, no murmur, click or rub.   ABD: Abdomen is nondistended.   EXTREMITIES: No lower extremity edema present, no clubbing or cyanosis, skin appears pink.    MUSCULOSKELETAL: No visible joint swelling.   NEUROLOGIC: Alert and oriented X3. Normal speech, gait and affect. No focal neurologic deficits.   SKIN: No jaundice. No rashes or visible skin lesions present. No ecchymosis. Bilateral femoral incisions are healing well, approximated with no drainage or redness, left upper arm incision also healing well with little ecchymosis, well approximated incision and no drainage or redness.     LAB RESULTS:   Office Visit on 01/13/2019   Component Date Value Ref Range Status     Specimen Description 01/13/2019 Nasal   Final     Influenza A PCR 01/13/2019 Negative  NEG^Negative Final     Influenza B PCR 01/13/2019 Negative  NEG^Negative Final     Resp Syncytial Virus 01/13/2019 Negative  NEG^Negative Final     WBC 01/13/2019 11.3* 4.0 - 11.0 10e9/L Final     RBC Count 01/13/2019 4.96  3.8 - 5.2 10e12/L Final     Hemoglobin 01/13/2019 14.4  11.7 - 15.7 g/dL Final     Hematocrit 01/13/2019 44.3  35.0 - 47.0 % Final     MCV 01/13/2019 89  78 - 100 fl Final     MCH 01/13/2019 29.0  26.5 - 33.0 pg Final     MCHC 01/13/2019 32.5  31.5 - 36.5 g/dL Final     RDW 01/13/2019 12.8  10.0 - 15.0 % Final     Platelet Count 01/13/2019 252  150 - 450 10e9/L Final     Diff Method 01/13/2019 Automated Method   Final     % Neutrophils 01/13/2019 59.7  % Final     % Lymphocytes 01/13/2019 23.2  % Final     % Monocytes 01/13/2019 13.6  % Final     % Eosinophils 01/13/2019 2.6  % Final     % Basophils 01/13/2019 0.6  % Final     % Immature Granulocytes 01/13/2019  0.3  % Final     Absolute Neutrophil 01/13/2019 6.8  1.6 - 8.3 10e9/L Final     Absolute Lymphocytes 01/13/2019 2.6  0.8 - 5.3 10e9/L Final     Absolute Monocytes 01/13/2019 1.5* 0.0 - 1.3 10e9/L Final     Absolute Eosinophils 01/13/2019 0.3  0.0 - 0.7 10e9/L Final     Absolute Basophils 01/13/2019 0.1  0.0 - 0.2 10e9/L Final     Abs Immature Granulocytes 01/13/2019 0.0  0 - 0.4 10e9/L Final          ASSESSMENT:   Madelineamarikathy Luna presents for cardiology follow-up to visit on 12/23/2019 with history of known coronary atherosclerosis, recently diagnosed early this year with AF with RVR and HTN.  Patient has a history of aortofemoral bypass in 2004, tachybradycardia syndrome, newly identified atrial fibrillation, CAD, hyperlipidemia, hypertension, PAD, hypertension, DM 2, COPD, tobacco abuse, emphysema, history of CVA and gastritis.  On 5/1/2020 patient was admitted for scheduled operation at [facility], she underwent treatment of her aortoiliac occlusive disease with placement of a bifurcated stent graft from the level of her renal arteries into both aortofemoral graft limbs and self-expanding covered stent graft to  proximal left superficial femoral artery occlusion.  Today patient admits that she is feeling great, chest pain improved and only a couple rare episodes of palpitations. Has not felt any AF as of recent. She had been on DOAC with bleeding complications which was discontinued. She did remain on ASA 81 mg daily and now she will also be on Plavix daily for 3 months post-op following revascularization. Dyspnea remains stable. She is very happy with improved color to her lower extremities and reduced edema.     1. PAD (peripheral artery disease) (H)  2. Essential hypertension  3. History of revascularization procedure of lower extremity (5/1/2020)  4. Tobacco dependence  5. Mixed hyperlipidemia  6. Type 2 diabetes mellitus without complication, without long-term current use of insulin (H)  7. Atherosclerosis of  native coronary artery of native heart without angina pectoris  8. PAF (paroxysmal atrial fibrillation) (H)  9. CKD (chronic kidney disease) stage 3, GFR 30-59 ml/min (H)  10. H/O aorto-femoral bypass (2004)  11. History of coronary artery stent placement    PLAN:  1. Patient with known CAD, PTCA of the mLAD and pRCA on 2/28/04 with preserved LVEF. PTCA at [facility] in [place]. Lexiscan stress test completed on 7/9/19 with no evidence of stress induced ischemia. Chest pain has been very limited/ rare since increased Imdur to 60 mg once daily.   2. Previous episodes of palpitations. Recent ZIO with no evidence of AF. Symptoms associated to NSR and rare ventricular ectopy. She will continue with Toprol  mg daily as previous. CCB not effective. Bleeding with DOAC. She has remained on daily ASA.  3. In addition to ASA she will take Plavix 75 mg daily for DAPT 3 months s/p LE revascularization.  4. She will continue high intensity statin.   5. Strongly encouraged tobacco cessation.   6. BP currently well controlled.   7. Follow-up with vascular surgery scheduled with repeat CTA in 3 months.   8. Labs today, see orders.   9. Follow-up with cardiology in 6 months, certainly sooner if needed.       Thank you for allowing me to participate in the care of your patient. Please do not hesitate to contact me if you have any questions.     Martina Roberto

## 2020-05-11 NOTE — NURSING NOTE
"Chief Complaint   Patient presents with     Follow Up     s/p vascular surgery 5/1/20       Initial /72 (BP Location: Right arm, Patient Position: Sitting, Cuff Size: Adult Regular)   Pulse 68   Temp 97.5  F (36.4  C) (Tympanic)   Resp 20   Ht 1.626 m (5' 4\")   Wt 64 kg (141 lb)   SpO2 97%   BMI 24.20 kg/m   Estimated body mass index is 24.2 kg/m  as calculated from the following:    Height as of this encounter: 1.626 m (5' 4\").    Weight as of this encounter: 64 kg (141 lb).  Meds Reconciled: complete  Pt is on Aspirin  Pt is on a Statin  PHQ and/or MIKE reviewed. Pt referred to PCP/MH Provider as appropriate.    Jeri Garibay LPN      "

## 2020-05-27 NOTE — TELEPHONE ENCOUNTER
"Requested Prescriptions   Pending Prescriptions Disp Refills     isosorbide mononitrate (IMDUR) 30 MG 24 hr tablet [Pharmacy Med Name: ISOSORBIDE MONONITRATE 30MG ER TABS] 90 tablet      Sig: TAKE 1 TABLET(30 MG) BY MOUTH DAILY       Nitrates Passed - 5/27/2020  7:46 AM        Passed - Blood pressure under 140/90 in past 12 months     BP Readings from Last 3 Encounters:   05/11/20 122/72   03/16/20 110/70   01/24/20 131/55                 Passed - Pt is not on erectile dysfunction medications        Passed - Recent (12 mo) or future (30 days) visit within the authorizing provider's specialty     Patient has had an office visit with the authorizing provider or a provider within the authorizing providers department within the previous 12 mos or has a future within next 30 days. See \"Patient Info\" tab in inbasket, or \"Choose Columns\" in Meds & Orders section of the refill encounter.              Passed - Medication is active on med list        Passed - Patient is age 18 or older         Medication denied patient is currently taking Imdur 60mg once daily this was sent to pharmacy on 05/11/2020 per VOLODYMYR Harper CNP see her note.  Kenya Rivera RN on 5/27/2020 at 8:45 AM        "

## 2020-06-30 NOTE — NURSING NOTE
Patient Information     Patient Name MRN Sex Ovidio Reynoso [id number] Female 1950      Nursing Note by Makayla Quigley at 10/8/2017  2:15 PM     Author:  Makayla Quigley Service:  (none) Author Type:  (none)     Filed:  10/8/2017  3:24 PM Encounter Date:  10/8/2017 Status:  Signed     :  Makayla Quigley            Patient presents to the clinic for vomiting that started on Friday. Patient reports her left ear aching, having a raw, scratchy throat and being unable to keep anything down due to the vomiting.  Makayla JIMENEZ, DAYANNA.......10/8/2017..2:59 PM           Spoke to el, told her I re faxed the lab work to 's office and to call if they do not receive it

## 2020-07-13 PROBLEM — K25.3 ACUTE ULCER OF PYLORIC ANTRUM: Status: RESOLVED | Noted: 2019-09-23 | Resolved: 2020-01-01

## 2020-07-13 NOTE — LETTER
July 15, 2020      Madelineamarikathy Luna  [address] [place] [address]        Dear ,    We are writing to inform you of your test results.    Your test results fall within the expected range(s) or remain unchanged from previous results.  Please continue with current treatment plan.    Resulted Orders   Hemoglobin   Result Value Ref Range    Hemoglobin 13.0 11.7 - 15.7 g/dL   Lipid Panel   Result Value Ref Range    Cholesterol 171 <200 mg/dL    Triglycerides 147 <150 mg/dL    HDL Cholesterol 51 23 - 92 mg/dL    LDL Cholesterol Calculated 91 <100 mg/dL      Comment:      Desirable:       <100 mg/dl    Non HDL Cholesterol 120 <130 mg/dL   Hemoglobin A1c   Result Value Ref Range    Hemoglobin A1C 6.7 (H) 4.0 - 6.0 %   Albumin Random Urine Quantitative with Creat Ratio   Result Value Ref Range    Creatinine Urine 356 mg/dL    Albumin Urine mg/L 59 mg/L    Albumin Urine mg/g Cr 16.63 0 - 25 mg/g Cr       If you have any questions or concerns, please call the clinic at the number listed above.       Sincerely,        Ana Perdomo, DO

## 2020-07-13 NOTE — NURSING NOTE
Patient presents to clinic today for diabetic check.   Previous A1C is at goal of <8  Lab Results   Component Value Date    A1C 6.2 01/24/2020    A1C 7.0 10/14/2019    A1C 7.0 07/08/2019     Urine microalbumin:creatine: 7/8/19  Foot exam 3/16/20 (?)  Eye exam waiting for insurance to renew    Tobacco User yes  Patient is on a daily aspirin  Patient is on a Statin.  Blood pressure today of:     BP Readings from Last 1 Encounters:   07/13/20 116/74      is at the goal of <139/89 for diabetics.    Medication reconciliation completed.  Teri Zuleta CMA([facility])..................7/13/2020   9:13 AM

## 2020-07-13 NOTE — PROGRESS NOTES
Nursing Notes:   Teri Zuleta [facility]  7/13/2020  9:29 AM  Signed  Patient presents to clinic today for diabetic check.   Previous A1C is at goal of <8  Lab Results   Component Value Date    A1C 6.2 01/24/2020    A1C 7.0 10/14/2019    A1C 7.0 07/08/2019     Urine microalbumin:creatine: 7/8/19  Foot exam 3/16/20 (?)  Eye exam waiting for insurance to renew    Tobacco User yes  Patient is on a daily aspirin  Patient is on a Statin.  Blood pressure today of:     BP Readings from Last 1 Encounters:   07/13/20 116/74      is at the goal of <139/89 for diabetics.    Medication reconciliation completed.  Teri Zuleta [facility])..................7/13/2020   9:13 AM            Chief Complaint   Patient presents with     Diabetes         HPI: Ms. Luna is a 69 year old female who presents today for follow up of pain.    Nursing note reviewed with patient.  Accurracy and completeness verified.      Patients diabetes is controlled. She is not checking blood sugars.  She denies obvious side effects from diabetic medications which include metformin.  She is due for a repeat A1c today.  She is due for microalbumin.    She has a history of hyperlipidemia and vascular disease.  She is on medication.  She most recently had a peripheral bypass and has been on Plavix and aspirin.  She has a history of anemia when on prior blood thinners.  Most recent hemoglobin was in the normal range.  She has had some increased bruising with these medications.    She has a history of COPD.  She has found that her breathing has been slightly worse with the increased humidity.  She is using her Anoro Ellipta along with Combivent.    She continues to smoke.  She is smoking 1 pack/day at most.  She has previously tried Chantix and has found that it makes her sick.  She is aware that it is increasing her risk for cardiovascular disease along with worsening her emphysema.  We discussed all this in depth today.    Past medical history reviewed as  "below:     Past Medical History:   Diagnosis Date     Allergy status to other antibiotic agents status      Atherosclerosis     History of ASO with claudication     Atherosclerotic heart disease of native coronary artery without angina pectoris 2004    Stent times 2     Atrial fibrillation (H) 3/10/2019     Centrilobular emphysema (H) 12/12/2016     Cerebral infarction (H) 05/20/2012    CVA with residual left sided weakness, incidental meningioma found     Closed fracture of shaft of left tibia 12/26/2001    History of bilateral tibial fx, work related injury     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   Start Fosamax 08/07, stopped fosamax.     Peripheral vascular disease (H) 1/17/2018     Tobacco abuse 1/17/2018     Type 2 diabetes mellitus without complication, without long-term current use of insulin (H) 3/25/2019     Zoster without complications 2008    left shoulder and neck   .      ROS  Pertinent ROS was performed and was negative, including for fever, chills, chest pain, increased lower extremity edema, changes in bowel or bladder, blood in the stool, difficulty swallowing, sores in the mouth. No other concerns, with exception of HPI above.      EXAM:   /74 (BP Location: Right arm, Patient Position: Sitting, Cuff Size: Adult Regular)   Pulse 68   Temp 97.3  F (36.3  C) (Tympanic)   Resp 12   Wt 62.8 kg (138 lb 6.4 oz)   SpO2 97%   BMI 23.76 kg/m      Estimated body mass index is 23.76 kg/m  as calculated from the following:    Height as of 5/11/20: 1.626 m (5' 4\").    Weight as of this encounter: 62.8 kg (138 lb 6.4 oz).      GEN: Vitals reviewed. Healthy appearing. Patient is in no acute distress. Cooperative with exam.  HEENT: Normocephalic atraumatic.  Eyes grossly normal to inspection. "  No discharge or erythema, or obvious scleral/conjunctival abnormalities.   CV: Heart regular in rate and rhythm with no murmur.    LUNGS: No audible wheeze, cough, or visible cyanosis.  No visible retractions or increased work of breathing.  Lungs clear to auscultation bilaterally.    ABD:  Nondistended  SKIN: Warm and dry to touch.  Visible skin clear. No significant rash, abnormal pigmentation or lesions.  Spider veins are on bilateral ankles.    EXT: No clubbing or cyanosis.  No peripheral edema.  PSYCH: Mood is good.  Mentation appears normal, affect normal/bright, judgement and insight intact, normal speech and appearance well-groomed.     ASSESSMENT AND PLAN:  Type 2 diabetes mellitus without complication, without long-term current use of insulin (H)  Recheck A1c along with microalbumin today.  Continue metformin.  Call with any new or changing symptoms.  If her A1c is elevated we will need to consider more regular checking of her blood sugars.  - Hemoglobin A1c  - Albumin Random Urine Quantitative with Creat Ratio    Mixed hyperlipidemia  Recheck today.  Continue on statin.  - Lipid Panel    High risk medication use  With the use of aspirin and Plavix we will recheck her hemoglobin to be sure that it is remaining stable.  - Hemoglobin    Pulmonary emphysema, unspecified emphysema type (H)  Continue with current inhalers.  Encouraged to avoid high humidity weather.  Encouraged to quit smoking as below.  - umeclidinium-vilanterol (ANORO ELLIPTA) 62.5-25 MCG/INH oral inhaler  Dispense: 1 Inhaler; Refill: 9    Cigarette nicotine dependence without complication  - Greater than 3 minutes were spent on smoking cessation including encouragement to reduce cigarette use and discussion of modalities to assist with this  - cessation was encouraged in order to improve pain, cardiovascular risk, pulmonary disease and long term outcomes  - Brochures/handouts provided  - NRT offered and declined at this time.  She will call  if she is interested in the future.  -  SMOKING CESSATION COUNSELING, 3-10 MIN           Return in about 4 months (around 11/13/2020).        EMILY BENJAMIN, DO   7/13/2020 9:29 AM    This document was prepared using voice generated softwear. While every attempt was made for accuracy, grammatical errors may exist.

## 2020-08-04 NOTE — TELEPHONE ENCOUNTER
"Requested Prescriptions   Pending Prescriptions Disp Refills     omeprazole (PRILOSEC) 40 MG DR capsule [Pharmacy Med Name: OMEPRAZOLE 40MG CAPSULES] 180 capsule      Sig: TAKE 1 CAPSULE BY MOUTH TWICE DAILY       PPI Protocol Failed - 7/31/2020  3:39 PM        Failed - Not on Clopidogrel (unless Pantoprazole ordered)        Passed - No diagnosis of osteoporosis on record        Passed - Recent (12 mo) or future (30 days) visit within the authorizing provider's specialty     Patient has had an office visit with the authorizing provider or a provider within the authorizing providers department within the previous 12 mos or has a future within next 30 days. See \"Patient Info\" tab in inbasket, or \"Choose Columns\" in Meds & Orders section of the refill encounter.              Passed - Medication is active on med list        Passed - Patient is age 18 or older        Passed - No active pregnacy on record        Passed - No positive pregnancy test in past 12 months               Last Written Prescription Date:  03/16/2020  Last Fill Quantity: 90,   # refills: 3  Last Office Visit: 07/13/2020 with Ana Perdomo DO  Future Office visit:    Next 5 appointments (look out 90 days)    Aug 14, 2020 10:40 AM CDT  PHYSICAL with Ana Perdomo DO  [facility] ([facility]) [address]  [place] [address] [phone]      Unable to complete prescription refill per RN medication refill policy. Will route to provider for review and consideration.  Kenya Rivera RN on 8/4/2020 at 8:30 AM             "

## 2020-08-17 NOTE — PROGRESS NOTES
HPI:    Ovidio Luna is a 69 year old female who presents to clinic today for evaluation of headache. Onset yesterday. Location right side of head. Feels like a tight band squeezing her head, can go circumferential. Right now only hurting on right side. She is also concerned about her tinnitus that is gradually worsening  Treatments: yesterday - tylenol last dose 1000 mg at 3 AM, no relief. She is unable to take NSAID's related to Chronic Kidney disease.    Patient is concerned today because she has had a prior stroke    Patient works at [employer] in [place]. She states it is a loud environment.     Patient Active Problem List   Diagnosis     Centrilobular emphysema (H)     Atherosclerotic coronary vascular disease     Erosive gastritis with hemorrhage     Hemangioma     Hyperlipidemia     PAD (peripheral artery disease) (H)     Tobacco abuse     Chronic non-seasonal allergic rhinitis     Meningioma (H)     Tobacco dependence     PAF (paroxysmal atrial fibrillation) (H)     Tachy-merline syndrome (H)     Type 2 diabetes mellitus without complication, without long-term current use of insulin (H)     H/O aorto-femoral bypass (2004)     Anemia     H/O adenomatous polyp of colon     Femoral artery stenosis (H)     Occlusion of popliteal artery (H)     CKD (chronic kidney disease) stage 3, GFR 30-59 ml/min (H)     Iron deficiency anemia due to chronic blood loss     Right renal artery stenosis (H)     Renovascular hypertension     Encounter for follow-up examination     Hypomagnesemia       Past Medical History:   Diagnosis Date     Allergy status to other antibiotic agents status      Atherosclerosis     History of ASO with claudication     Atherosclerotic heart disease of native coronary artery without angina pectoris 2004    Stent times 2     Atrial fibrillation (H) 3/10/2019     Centrilobular emphysema (H) 12/12/2016     Cerebral infarction (H) 05/20/2012    CVA with residual left sided weakness, incidental  meningioma found     Closed fracture of shaft of left tibia 12/26/2001    History of bilateral tibial fx, work related injury     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   Start Fosamax 08/07, stopped fosamax.     Peripheral vascular disease (H) 1/17/2018     Tobacco abuse 1/17/2018     Type 2 diabetes mellitus without complication, without long-term current use of insulin (H) 3/25/2019     Zoster without complications 2008    left shoulder and neck       Current Outpatient Medications   Medication Sig Dispense Refill     acetaminophen (TYLENOL) 500 MG tablet Take 500-1,000 mg by mouth every 6 hours as needed for mild pain TAKES 1500 mg as needed. Educated patient to not exceed 4000 mg per day.       albuterol (PROAIR HFA/PROVENTIL HFA/VENTOLIN HFA) 108 (90 Base) MCG/ACT Inhaler Inhale 1-2 puffs into the lungs every 6 hours as needed for shortness of breath / dyspnea or wheezing 1 Inhaler 0     amLODIPine (NORVASC) 10 MG tablet Take 1 tablet (10 mg) by mouth daily 90 tablet 3     aspirin (ASA) 81 MG chewable tablet CHEW AND SWALLOW 1 TABLET(81 MG) BY MOUTH DAILY 90 tablet 3     atorvastatin (LIPITOR) 40 MG tablet Take 1 tablet (40 mg) by mouth At Bedtime 90 tablet 3     busPIRone (BUSPAR) 10 MG tablet TAKE 1 TABLET BY MOUTH TWICE DAILY AS NEEDED FOR ANXIETY 90 tablet 3     Cholecalciferol (VITAMIN D3) 2000 UNITS CAPS Take 4,000 Units by mouth 2 times daily        citalopram (CELEXA) 20 MG tablet Take 1 tablet (20 mg) by mouth daily 90 tablet 3     diltiazem (CARDIZEM) 30 MG tablet Take 1 tablet (30 mg) by mouth 3 times daily as needed (racing heart, palpitations) 30 tablet 1     fexofenadine (ALLEGRA) 180 MG tablet Take 180 mg by mouth daily       furosemide (LASIX) 20 MG tablet TAKE 1 TABLET(20 MG) BY MOUTH  DAILY 90 tablet 1     ipratropium - albuterol 0.5 mg/2.5 mg/3 mL (DUONEB) 0.5-2.5 (3) MG/3ML neb solution Take 1 vial (3 mLs) by nebulization every morning . May also use 1 neb every 6 hours as needed for shortness of breath. 1 Box 3     Ipratropium-Albuterol (COMBIVENT RESPIMAT)  MCG/ACT inhaler Inhale 1 puff into the lungs 4 times daily 4 g 11     isosorbide mononitrate (IMDUR) 30 MG 24 hr tablet Take 2 tablets (60 mg) by mouth daily 180 tablet 3     losartan (COZAAR) 25 MG tablet Take 1 tablet (25 mg) by mouth daily 90 tablet 3     magnesium oxide (MAGNESIUM-OXIDE) 400 (241.3 Mg) MG tablet Take 1 tablet (400 mg) by mouth daily 90 tablet 3     metFORMIN (GLUCOPHAGE-XR) 500 MG 24 hr tablet TAKE 1 TABLET BY MOUTH EVERY DAY WITH DINNER 90 tablet 3     metoprolol succinate ER (TOPROL-XL) 100 MG 24 hr tablet Take 1 tablet (100 mg) by mouth daily 90 tablet 3     omeprazole (PRILOSEC) 40 MG DR capsule TAKE 1 CAPSULE BY MOUTH TWICE DAILY 180 capsule 3     order for [facility] vIPtela NEBULIZER MACHINE ITEM #[id number] DX J44 [facility]   5     potassium chloride ER (KLOR-CON M) 20 MEQ CR tablet Take 1 tablet (20 mEq) by mouth daily Take 2 tabs (40 mg) in the morning with a meal, and take 1 tab (20 mg) in the evening with a meal every day. 90 tablet 3     Respiratory Therapy Supplies (NEBULIZER/TUBING/MOUTHPIECE) KIT Dx: COPD with exacerbation. Sig: Use as directed. 1 kit 0     umeclidinium-vilanterol (ANORO ELLIPTA) 62.5-25 MCG/INH oral inhaler Inhale 1 puff into the lungs daily 1 Inhaler 9       Allergies   Allergen Reactions     Diphenhydramine Palpitations and Other (See Comments)     wheezing  Rapid heart Rate     Morphine      Tachycardia       Propoxyphene N-Apap Unknown     Tremors       Varenicline Nausea and Vomiting     Codeine Palpitations     Lisinopril Palpitations and Cough     No Clinical Screening - See Comments Rash     Pt states allergies to white/yellow gold.  Sugical steel.  Copper./ developed  "infection     Tetracycline Nausea and Vomiting and Rash       ROS:  General: no fever  HENT: has allergies, no worsening or new symptoms, no sinus pain  Respiratory: has COPD, no new cough, or exacerbation  Abdomen: negative  Musculoskeletal: no new symptoms  Skin: no rash    EXAM:  Vitals:    08/17/20 0944   BP: (!) 138/90   BP Location: Right arm   Patient Position: Sitting   Cuff Size: Adult Regular   Pulse: 53   Resp: 18   Temp: 98.6  F (37  C)   TempSrc: Temporal   SpO2: 96%   Weight: 62.1 kg (136 lb 12.8 oz)   Height: 1.588 m (5' 2.5\")     General appearance: well appearing female, in no acute distress  Head: normocephalic, atraumatic  Ears: TM's with cone of light, no erythema, canals clear bilaterally  Eyes: conjunctivae normal, GILMA, EOMI  Orophayrnx: moist mucous membranes, mild oral pharynx erythema, no exudates or petechia  TMJ - non tender with no clicking  Neck: supple without adenopathy, non tender c-spine, normal ROM, no radiculopathy  Respiratory: normal respiration, clear to auscultation bilaterally  Cardiac: RRR with no murmurs  Dermatological: no rashes or lesions  Neurological: Alert, oriented x 3, Cranial nerves II-XI intact. GILMA, EOM's intact,  Normal sensation to soft touch and strength 5/5 upper and lower extremities. Reflexes are symmetrical. Normal gait & posture. Normal heel toe walking. Normal SHIRA's, Romberg negative,   Psychological: normal affect, alert and pleasant    Results for orders placed or performed in visit on 08/17/20   CT Head w/o Contrast     Status: None    Narrative    PROCEDURE: CT HEAD W/O CONTRAST     HISTORY: Headache, acute, normal neuro exam; Acute intractable  headache, unspecified headache type.    COMPARISON: January 2, 2020    TECHNIQUE:  Helical images of the head from the foramen magnum to the  vertex were obtained without contrast.    FINDINGS: There is a dural based mass that appears to arise from the  planum sphenoidale centrally. There is some sclerosis " seen in the  planum sphenoidale. This sclerosis would be typical of a meningioma.  This mass is most consistent with a meningioma. Comparison with  previous examination on January 2, 2020 reveals the meningioma has not  changed. There are small vessel changes noted in both hemispheres.  There is some frontal white matter low-density associated with the  meningioma greater on the left of the right The visualized paranasal  sinuses are clear.      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 Acute intractable headache, unspecified headache type  CT Head w/o Contrast, symptomatic treatments, follow up with PCP   2. Meningioma (H)  Follow up with PCP   3. Tinnitus, right  AUDIOLOGY ADULT REFERRAL          Headache x 24 hours, new symptom that previous migraine headaches. She hasn't had migraines for many years. She did have a similar episode to her symptoms today about 2 weeks ago, this lasted for 3 days.   Pain today is 4/10, right sided. On exam mild right occipital tenderness. No cervical tenderness. No trapezius trigger point tenderness.   No change in hearing/vision/mental status/strength/memory/gait. Normal neurological exam. History of tinitis x 3 years. Will refer to audiology.   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 Negative for subdural bleed  Recommend treatment with tylenol, 1000 mg every 4-6 hours, max 4000 mg/day  Restart daily antihistamine  Return to clinic in 24 hours if symptoms persist, sooner for worsening  Referral to audiology regarding tinnitus  Follow up with PCP for recheck in 1 week, sooner if no improvement in 24 hours. ER for severe symptoms  Patient received verbal and written instruction including review of warning signs    Lisa Quigley PA-C

## 2020-08-17 NOTE — NURSING NOTE
"Chief Complaint   Patient presents with     Headache     Patient presents to clinic with headache that has been going on since yesterday. Has pain and dryness in right eye also. Took extra strength Tylenol the last time at 3:00 am.    Initial There were no vitals taken for this visit. Estimated body mass index is 23.76 kg/m  as calculated from the following:    Height as of 5/11/20: 1.626 m (5' 4\").    Weight as of 7/13/20: 62.8 kg (138 lb 6.4 oz).    Medication Reconciliation: complete      Sweta Lee  "

## 2020-08-17 NOTE — PATIENT INSTRUCTIONS
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 Negative for subdural bleed  Recommend treatment with tylenol, 1000 mg every 4-6 hours, max 4000 mg/day  Restart daily antihistamine  Return to clinic in 24 hours if symptoms persist, sooner for worsening        Patient Education     Self-Care for Headaches    Most headaches aren't serious and can be relieved with self-care. But some headaches may be a sign of another health problem like eye trouble or high blood pressure. To find the best treatment, learn what kind of headaches you get. For tension headaches, self-care will usually help. To treat migraines, ask your healthcare provider for advice. It is also possible to get both tension and migraine headaches. Self-care involves relieving the pain and avoiding headache  triggers  if you can.  Ways to reduce pain and tension  Try these steps:    Apply a cold compress or ice pack to the pain site.    Drink fluids. If nausea makes it hard to drink, try sucking on ice.    Rest. Protect yourself from bright light and loud noises.    Calm your emotions by imagining a peaceful scene.    Massage tight neck, shoulder, and head muscles.    To relax muscles, soak in a hot bath or use a hot shower.  Use medicines  Aspirin or other over-the-counter pain medicines, such as ibuprofen and acetaminophen, can relieve headache. Remember: Never give aspirin to anyone 18 years old or younger because of the risk of developing Reye syndrome. Use pain medicines only when needed. Certain prescription medicines, if taken too often, can lead to rebound headaches. Check with your healthcare provider or pharmacist about your medicines.  Track your headaches  Keeping a headache diary can help you and your healthcare provider identify what's causing your headaches:    Note when each headache happens.    Identify your activities and the  "foods you've eaten 6 to 8 hours before the headache began.    Look for any trends or \"triggers.\"  Signs of tension headache  Any of the following can be signs:    Dull pain or feeling of pressure in a tight band around your head    Pain in your neck or shoulders    Headache without a definite beginning or end    Headache after an activity such as driving or working on a computer  Signs of migraine  Any of the following can be signs:    Throbbing pain on one or both sides of your head    Nausea or vomiting    Extreme sensitivity to light, sound, and smells    Bright spots, flashes, or other visual changes    Pain or nausea so severe that you can't continue your daily activities  Call your healthcare provider   If you have any of the following symptoms, contact your healthcare provider:    A headache that lingers after a recent injury or bump to the head.    A fever with a stiff neck or pain when you bend your head toward your chest.    A headache along with slurred speech, changes in your vision, or numbness or weakness in your arms or legs.    A headache for longer than 3 days.    Frequent headaches, especially in the morning.    Headaches with seizures     Seek immediate medical attention if you have a headache that you would call \"the worst headache you have ever had.\"  Date Last Reviewed: 3/1/2018    9554-5222 [employer]. [address] [place] [address], [place] [address]. All rights reserved. This information is not intended as a substitute for professional medical care. Always follow your healthcare professional's instructions.           "

## 2020-08-17 NOTE — LETTER
August 17, 2020      Ovidio Luna  [address]  [facility] [address]        To Whom It May Concern:    Ovidio Luna  was seen on 8/17/2020.  Please excuse her for 24-48 hours due to illness.         Sincerely,        Lisa Quigley PA-C

## 2020-09-11 NOTE — PROGRESS NOTES
Audiology Evaluation Completed. Please refer SCANNED AUDIOGRAM and/or TYMPANOGRAM for BACKGROUND, RESULTS, RECOMMENDATIONS.      Sara MA, [facility]  Audiologist #[id number]

## 2020-09-24 NOTE — PATIENT INSTRUCTIONS
Reviewed audiogram.   Repeat hearing test in 1 year   Hearing protection.     Start Flonase 2 sprays to each nostril daily.   Start Keflex one tablet twice a day.   If nasal symptoms worsen or do not improve- return to ENT/ consider sinus CT.       Thank you for allowing [facility] and our ENT team to participate in your care.  If your medications are too expensive, please give the nurse a call.  We can possibly change this medication.  If you have a scheduling or an appointment question please contact our Health Unit Coordinator at their direct line [phone].   ALL nursing questions or concerns can be directed to your ENT nurse at: [phone]    We spent the remainder of today's visit discussing the current leading theory on tinnitus, in that it originates from the central nervous system itself, similar to Phantom Limb Syndrome.  Also discussed were steps that can be taken to mask the noise, such as a low volume de-tuned radio, a fan in the background, and hearing aids.  Correlation with stress, anxiety, depression, and high blood pressure were also discussed.  The patient was also cautioned on the numerous expensive non-pharmaceutical options that are advertised, and have no proven benefit.

## 2020-09-25 NOTE — NURSING NOTE
"Chief Complaint   Patient presents with     Hearing Problem     Pt is here for bilateral SNHL and bilateral tinnitus.  Pt is here for hearing aid medical clearance.       Initial /70   Pulse 72   Temp 96.6  F (35.9  C) (Tympanic)   SpO2 96%  Estimated body mass index is 24.62 kg/m  as calculated from the following:    Height as of 8/17/20: 1.588 m (5' 2.5\").    Weight as of 8/17/20: 62.1 kg (136 lb 12.8 oz).  Medication Reconciliation: complete  Nikki Walker LPN  "

## 2020-09-25 NOTE — PROGRESS NOTES
Otolaryngology Consultation    Patient: Ovidio Luna  : 1950    Patient presents with:  Hearing Problem: Pt is here for bilateral SNHL and bilateral tinnitus.  Pt is here for hearing aid medical clearance.      HPI:  Ovidio Luna is a 69 year old female seen today for hearing loss. Francesca has ongoing hearing loss for the last 2-3 years. Tinnitus increased during that times.   Tinnitus is bilateral and constant.   She does have a harder time sleeping related to Tinnitus. She has tried home remedies w/o much relief.   She does drink iced coffee a lot.   Patient has difficulty with hearing at work due to background noises.   Hx of COM as a child.  Denies  Adult COM or otologic surgeries.   Denies otalgia, otorrhea. +dull ache to her left ear. No claudia clenching or grinding.   Denies fluctuating hearing loss or tinnitus.   Denies vertigo or facial paraesthesia.     Francesca has left sided nasal congestion. She has frequent infections 5/ per year.   Tried saline w/o relief.   She is on Allegra BID.     Current Outpatient Rx   Medication Sig Dispense Refill     acetaminophen (TYLENOL) 500 MG tablet Take 500-1,000 mg by mouth every 6 hours as needed for mild pain TAKES 1500 mg as needed. Educated patient to not exceed 4000 mg per day.       albuterol (PROAIR HFA/PROVENTIL HFA/VENTOLIN HFA) 108 (90 Base) MCG/ACT Inhaler Inhale 1-2 puffs into the lungs every 6 hours as needed for shortness of breath / dyspnea or wheezing 1 Inhaler 0     amLODIPine (NORVASC) 10 MG tablet Take 1 tablet (10 mg) by mouth daily 90 tablet 3     aspirin (ASA) 81 MG chewable tablet CHEW AND SWALLOW 1 TABLET(81 MG) BY MOUTH DAILY 90 tablet 3     atorvastatin (LIPITOR) 40 MG tablet Take 1 tablet (40 mg) by mouth At Bedtime 90 tablet 3     busPIRone (BUSPAR) 10 MG tablet TAKE 1 TABLET BY MOUTH TWICE DAILY AS NEEDED FOR ANXIETY 90 tablet 3     Cholecalciferol (VITAMIN D3) 2000 UNITS CAPS Take 4,000 Units by mouth 2 times daily        citalopram  (CELEXA) 20 MG tablet Take 1 tablet (20 mg) by mouth daily 90 tablet 3     diltiazem (CARDIZEM) 30 MG tablet Take 1 tablet (30 mg) by mouth 3 times daily as needed (racing heart, palpitations) 30 tablet 1     fexofenadine (ALLEGRA) 180 MG tablet Take 180 mg by mouth daily       furosemide (LASIX) 20 MG tablet TAKE 1 TABLET(20 MG) BY MOUTH DAILY 90 tablet 1     ipratropium - albuterol 0.5 mg/2.5 mg/3 mL (DUONEB) 0.5-2.5 (3) MG/3ML neb solution Take 1 vial (3 mLs) by nebulization every morning . May also use 1 neb every 6 hours as needed for shortness of breath. 1 Box 3     Ipratropium-Albuterol (COMBIVENT RESPIMAT)  MCG/ACT inhaler Inhale 1 puff into the lungs 4 times daily 4 g 11     isosorbide mononitrate (IMDUR) 30 MG 24 hr tablet Take 2 tablets (60 mg) by mouth daily 180 tablet 3     losartan (COZAAR) 25 MG tablet Take 1 tablet (25 mg) by mouth daily 90 tablet 3     magnesium oxide (MAGNESIUM-OXIDE) 400 (241.3 Mg) MG tablet Take 1 tablet (400 mg) by mouth daily 90 tablet 3     metFORMIN (GLUCOPHAGE-XR) 500 MG 24 hr tablet TAKE 1 TABLET BY MOUTH EVERY DAY WITH DINNER 90 tablet 3     metoprolol succinate ER (TOPROL-XL) 100 MG 24 hr tablet Take 1 tablet (100 mg) by mouth daily 90 tablet 3     omeprazole (PRILOSEC) 40 MG DR capsule TAKE 1 CAPSULE BY MOUTH TWICE DAILY 180 capsule 3     order for [facility] [employer] NEBULIZER MACHINE ITEM #[id number] DX J44 [facility]   5     potassium chloride ER (KLOR-CON M) 20 MEQ CR tablet Take 1 tablet (20 mEq) by mouth daily Take 2 tabs (40 mg) in the morning with a meal, and take 1 tab (20 mg) in the evening with a meal every day. 90 tablet 3     Respiratory Therapy Supplies (NEBULIZER/TUBING/MOUTHPIECE) KIT Dx: COPD with exacerbation. Sig: Use as directed. 1 kit 0     umeclidinium-vilanterol (ANORO ELLIPTA) 62.5-25 MCG/INH oral inhaler Inhale 1 puff into the lungs daily 1 Inhaler 9       Allergies: Diphenhydramine; Morphine; Propoxyphene n-apap; Varenicline; Codeine;  Lisinopril; No clinical screening - see comments; and Tetracycline     Past Medical History:   Diagnosis Date     Allergy status to other antibiotic agents status      Atherosclerosis     History of ASO with claudication     Atherosclerotic heart disease of native coronary artery without angina pectoris 2004    Stent times 2     Atrial fibrillation (H) 3/10/2019     Centrilobular emphysema (H) 12/12/2016     Cerebral infarction (H) 05/20/2012    CVA with residual left sided weakness, incidental meningioma found     Closed fracture of shaft of left tibia 12/26/2001    History of bilateral tibial fx, work related injury     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   Start Fosamax 08/07, stopped fosamax.     Peripheral vascular disease (H) 1/17/2018     Tobacco abuse 1/17/2018     Type 2 diabetes mellitus without complication, without long-term current use of insulin (H) 3/25/2019     Zoster without complications 2008    left shoulder and neck       Past Surgical History:   Procedure Laterality Date     COLONOSCOPY  2004    2 hyperplastic polyps, repeat in 2014     COLONOSCOPY N/A 9/23/2019    F/U 2024 adenomatous polyps     ESOPHAGOSCOPY, GASTROSCOPY, DUODENOSCOPY (EGD), COMBINED N/A 9/23/2019    Antral ulcer     HEMORRHOID SURGERY       HYSTERECTOMY TOTAL ABDOMINAL, BILATERAL SALPINGO-OOPHORECTOMY, COMBINED  1987    total hyster - BSO     OTHER SURGICAL HISTORY Right 2004    75868.0,[place] BYPASS GRAFT AORTOBIFEMORAL     OTHER SURGICAL HISTORY      2004,LOC-1006.05,PTCA,mid LAD, proximal RCA     OTHER SURGICAL HISTORY      03/08/16,[id number],CYSTOSCOPY W/ URETEROSCOPY W/ LITHOTRIPSY,Right,Dr. Martin - [facility]       ENT family history reviewed    Social History     Tobacco Use     Smoking status: Current Every Day Smoker      Packs/day: 1.00     Years: 40.00     Pack years: 40.00     Types: Cigarettes     Start date: 1/1/1966     Smokeless tobacco: Never Used   Substance Use Topics     Alcohol use: No     Drug use: No       Review of Systems  ROS: 10 point ROS neg other than the symptoms noted above in the HPI     Physical Exam  /70   Pulse 72   Temp 96.6  F (35.9  C) (Tympanic)   SpO2 96%   General - The patient is well nourished and well developed, and appears to have good nutritional status.  Alert and oriented to person and place, answers questions and cooperates with examination appropriately.   Head and Face - Normocephalic and atraumatic, with no gross asymmetry noted.  The facial nerve is intact, with strong symmetric movements.  Voice and Breathing - The patient was breathing comfortably without the use of accessory muscles. There was no wheezing, stridor, or stertor.  The patients voice was clear and strong, and had appropriate pitch and quality.  Ears - Ears examined with otoscope and under otologic microscopy. The external auditory canals are patent, the tympanic membranes are intact without effusion, retraction or mass.  Bony landmarks are intact.  Eyes - Extraocular movements intact, and the pupils were reactive to light.  Sclera were not icteric or injected, conjunctiva were pink and moist.  Mouth - Examination of the oral cavity showed pink, healthy oral mucosa. No lesions or ulcerations noted.  The tongue was mobile and midline  Throat - The walls of the oropharynx were smooth, pink, moist, symmetric, and had no lesions or ulcerations.  The tonsillar pillars and soft palate were symmetric.  The uvula was midline on elevation.    Neck - Normal midline excursion of the laryngotracheal complex during swallowing.  Full range of motion on passive movement.  Palpation of the occipital, submental, submandibular, internal jugular chain, and supraclavicular nodes did not demonstrate any abnormal lymph nodes or masses.   Palpation of the thyroid was soft and smooth, with no nodules or goiter appreciated.  The trachea was mobile and midline.  Nose - External contour is symmetric, no gross deflection or scars.  Nasal mucosa is pink and moist with no abnormal mucus.  The septum and turbinates were evaluated:   Noted edema bilaterally. Active drainage from left sinus.     ASSESSMENT:      ICD-10-CM    1. Sensorineural hearing loss (SNHL) of both ears  H90.3 AUDIOLOGY ADULT REFERRAL   2. Tinnitus, bilateral  H93.13 AUDIOLOGY ADULT REFERRAL   3. Chronic maxillary sinusitis  J32.0 cephALEXin (KEFLEX) 500 MG capsule     DISCONTINUED: fluticasone (FLONASE) 50 MCG/ACT nasal spray   4. Nasal congestion  R09.81      Reviewed audiogram.   She is borderline hearing aid candidate.   She may proceed with HAC if she wishes.   Repeat hearing test in 1 year   Hearing protection.     Start Flonase 2 sprays to each nostril daily.   Start Keflex one tablet twice a day.   If nasal symptoms worsen or do not improve- return to ENT/ consider sinus CT.      A follow-up audiogram is recommended in 12 months.  This should be sooner if a patient develops vertigo, new or asymmetric hearing loss or unresolved unilateral tinnitus.  If these symptoms were to develop, an MRI would need to be obtained.    The recommendations from the tinnitus brochure were discussed.  The most common reason for tinnitus is damage to the microscopic endings of the hearing nerve in the inner ear.  Injury to the nerve endings brings on hearing loss and often tinnitus.  Advancing age can accompany hearing loss and tinnitus.  If a person is younger, loud noise probably is the leading cause of tinnitus.  Delayed damage to hearing is often seen as well.  Ear protection from noise exposure was reviewed.  Highlights included low salt diet, control of hypertension, avoiding stimulants such as caffeine, tobacco or alcohol and proper sleep, exercise and stress management.         Olive Renteria  PAUL  ENT  [facility], [place]  [phone]

## 2020-09-25 NOTE — LETTER
2020         RE: Ovidio Luna  [id number]  [facility] [address]        Dear Colleague,    Thank you for referring your patient, [facility]vidio Luna[facility]. Please see a copy of my visit note below.    Otolaryngology Consultation    Patient: Ovidio Luna  : 1950    Patient presents with:  Hearing Problem: Pt is here for bilateral SNHL and bilateral tinnitus.  Pt is here for hearing aid medical clearance.      HPI:  Ovidio Luna is a 69 year old female seen today for hearing loss. Francesca has ongoing hearing loss for the last 2-3 years. Tinnitus increased during that times.   Tinnitus is bilateral and constant.   She does have a harder time sleeping related to Tinnitus. She has tried home remedies w/o much relief.   She does drink iced coffee a lot.   Patient has difficulty with hearing at work due to background noises.   Hx of COM as a child.  Denies  Adult COM or otologic surgeries.   Denies otalgia, otorrhea. +dull ache to her left ear. No claudia clenching or grinding.   Denies fluctuating hearing loss or tinnitus.   Denies vertigo or facial paraesthesia.     Francesca has left sided nasal congestion. She has frequent infections 5/ per year.   Tried saline w/o relief.   She is on Allegra BID.     Current Outpatient Rx   Medication Sig Dispense Refill     acetaminophen (TYLENOL) 500 MG tablet Take 500-1,000 mg by mouth every 6 hours as needed for mild pain TAKES 1500 mg as needed. Educated patient to not exceed 4000 mg per day.       albuterol (PROAIR HFA/PROVENTIL HFA/VENTOLIN HFA) 108 (90 Base) MCG/ACT Inhaler Inhale 1-2 puffs into the lungs every 6 hours as needed for shortness of breath / dyspnea or wheezing 1 Inhaler 0     amLODIPine (NORVASC) 10 MG tablet Take 1 tablet (10 mg) by mouth daily 90 tablet 3     aspirin (ASA) 81 MG chewable tablet CHEW AND SWALLOW 1 TABLET(81 MG) BY MOUTH DAILY 90 tablet 3     atorvastatin (LIPITOR) 40 MG tablet Take 1 tablet  (40 mg) by mouth At Bedtime 90 tablet 3     busPIRone (BUSPAR) 10 MG tablet TAKE 1 TABLET BY MOUTH TWICE DAILY AS NEEDED FOR ANXIETY 90 tablet 3     Cholecalciferol (VITAMIN D3) 2000 UNITS CAPS Take 4,000 Units by mouth 2 times daily        citalopram (CELEXA) 20 MG tablet Take 1 tablet (20 mg) by mouth daily 90 tablet 3     diltiazem (CARDIZEM) 30 MG tablet Take 1 tablet (30 mg) by mouth 3 times daily as needed (racing heart, palpitations) 30 tablet 1     fexofenadine (ALLEGRA) 180 MG tablet Take 180 mg by mouth daily       furosemide (LASIX) 20 MG tablet TAKE 1 TABLET(20 MG) BY MOUTH DAILY 90 tablet 1     ipratropium - albuterol 0.5 mg/2.5 mg/3 mL (DUONEB) 0.5-2.5 (3) MG/3ML neb solution Take 1 vial (3 mLs) by nebulization every morning . May also use 1 neb every 6 hours as needed for shortness of breath. 1 Box 3     Ipratropium-Albuterol (COMBIVENT RESPIMAT)  MCG/ACT inhaler Inhale 1 puff into the lungs 4 times daily 4 g 11     isosorbide mononitrate (IMDUR) 30 MG 24 hr tablet Take 2 tablets (60 mg) by mouth daily 180 tablet 3     losartan (COZAAR) 25 MG tablet Take 1 tablet (25 mg) by mouth daily 90 tablet 3     magnesium oxide (MAGNESIUM-OXIDE) 400 (241.3 Mg) MG tablet Take 1 tablet (400 mg) by mouth daily 90 tablet 3     metFORMIN (GLUCOPHAGE-XR) 500 MG 24 hr tablet TAKE 1 TABLET BY MOUTH EVERY DAY WITH DINNER 90 tablet 3     metoprolol succinate ER (TOPROL-XL) 100 MG 24 hr tablet Take 1 tablet (100 mg) by mouth daily 90 tablet 3     omeprazole (PRILOSEC) 40 MG DR capsule TAKE 1 CAPSULE BY MOUTH TWICE DAILY 180 capsule 3     order for [facility] [employer] NEBULIZER MACHINE ITEM #[id number] DX J44 [facility]   5     potassium chloride ER (KLOR-CON M) 20 MEQ CR tablet Take 1 tablet (20 mEq) by mouth daily Take 2 tabs (40 mg) in the morning with a meal, and take 1 tab (20 mg) in the evening with a meal every day. 90 tablet 3     Respiratory Therapy Supplies (NEBULIZER/TUBING/MOUTHPIECE) KIT Dx: COPD with  exacerbation. Sig: Use as directed. 1 kit 0     umeclidinium-vilanterol (ANORO ELLIPTA) 62.5-25 MCG/INH oral inhaler Inhale 1 puff into the lungs daily 1 Inhaler 9       Allergies: Diphenhydramine; Morphine; Propoxyphene n-apap; Varenicline; Codeine; Lisinopril; No clinical screening - see comments; and Tetracycline     Past Medical History:   Diagnosis Date     Allergy status to other antibiotic agents status      Atherosclerosis     History of ASO with claudication     Atherosclerotic heart disease of native coronary artery without angina pectoris 2004    Stent times 2     Atrial fibrillation (H) 3/10/2019     Centrilobular emphysema (H) 12/12/2016     Cerebral infarction (H) 05/20/2012    CVA with residual left sided weakness, incidental meningioma found     Closed fracture of shaft of left tibia 12/26/2001    History of bilateral tibial fx, work related injury     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   Start Fosamax 08/07, stopped fosamax.     Peripheral vascular disease (H) 1/17/2018     Tobacco abuse 1/17/2018     Type 2 diabetes mellitus without complication, without long-term current use of insulin (H) 3/25/2019     Zoster without complications 2008    left shoulder and neck       Past Surgical History:   Procedure Laterality Date     COLONOSCOPY  2004    2 hyperplastic polyps, repeat in 2014     COLONOSCOPY N/A 9/23/2019    F/U 2024 adenomatous polyps     ESOPHAGOSCOPY, GASTROSCOPY, DUODENOSCOPY (EGD), COMBINED N/A 9/23/2019    Antral ulcer     HEMORRHOID SURGERY       HYSTERECTOMY TOTAL ABDOMINAL, BILATERAL SALPINGO-OOPHORECTOMY, COMBINED  1987    total hyster - BSO     OTHER SURGICAL HISTORY Right 2004    74842.0,ID BYPASS GRAFT AORTOBIFEMORAL     OTHER SURGICAL HISTORY       2004,LOC-1006.05,PTCA,mid LAD, proximal RCA     OTHER SURGICAL HISTORY      03/08/16,[id number],CYSTOSCOPY W/ URETEROSCOPY W/ LITHOTRIPSY,Right,Dr. Mario [place]       ENT family history reviewed    Social History     Tobacco Use     Smoking status: Current Every Day Smoker     Packs/day: 1.00     Years: 40.00     Pack years: 40.00     Types: Cigarettes     Start date: 1/1/1966     Smokeless tobacco: Never Used   Substance Use Topics     Alcohol use: No     Drug use: No       Review of Systems  ROS: 10 point ROS neg other than the symptoms noted above in the HPI     Physical Exam  /70   Pulse 72   Temp 96.6  F (35.9  C) (Tympanic)   SpO2 96%   General - The patient is well nourished and well developed, and appears to have good nutritional status.  Alert and oriented to person and place, answers questions and cooperates with examination appropriately.   Head and Face - Normocephalic and atraumatic, with no gross asymmetry noted.  The facial nerve is intact, with strong symmetric movements.  Voice and Breathing - The patient was breathing comfortably without the use of accessory muscles. There was no wheezing, stridor, or stertor.  The patients voice was clear and strong, and had appropriate pitch and quality.  Ears - Ears examined with otoscope and under otologic microscopy. The external auditory canals are patent, the tympanic membranes are intact without effusion, retraction or mass.  Bony landmarks are intact.  Eyes - Extraocular movements intact, and the pupils were reactive to light.  Sclera were not icteric or injected, conjunctiva were pink and moist.  Mouth - Examination of the oral cavity showed pink, healthy oral mucosa. No lesions or ulcerations noted.  The tongue was mobile and midline  Throat - The walls of the oropharynx were smooth, pink, moist, symmetric, and had no lesions or ulcerations.  The tonsillar pillars and soft palate were symmetric.  The uvula was midline on elevation.    Neck - Normal  midline excursion of the laryngotracheal complex during swallowing.  Full range of motion on passive movement.  Palpation of the occipital, submental, submandibular, internal jugular chain, and supraclavicular nodes did not demonstrate any abnormal lymph nodes or masses.  Palpation of the thyroid was soft and smooth, with no nodules or goiter appreciated.  The trachea was mobile and midline.  Nose - External contour is symmetric, no gross deflection or scars.  Nasal mucosa is pink and moist with no abnormal mucus.  The septum and turbinates were evaluated:   Noted edema bilaterally. Active drainage from left sinus.     ASSESSMENT:      ICD-10-CM    1. Sensorineural hearing loss (SNHL) of both ears  H90.3 AUDIOLOGY ADULT REFERRAL   2. Tinnitus, bilateral  H93.13 AUDIOLOGY ADULT REFERRAL   3. Chronic maxillary sinusitis  J32.0 cephALEXin (KEFLEX) 500 MG capsule     DISCONTINUED: fluticasone (FLONASE) 50 MCG/ACT nasal spray   4. Nasal congestion  R09.81      Reviewed audiogram.   She is borderline hearing aid candidate.   She may proceed with HAC if she wishes.   Repeat hearing test in 1 year   Hearing protection.     Start Flonase 2 sprays to each nostril daily.   Start Keflex one tablet twice a day.   If nasal symptoms worsen or do not improve- return to ENT/ consider sinus CT.      A follow-up audiogram is recommended in 12 months.  This should be sooner if a patient develops vertigo, new or asymmetric hearing loss or unresolved unilateral tinnitus.  If these symptoms were to develop, an MRI would need to be obtained.    The recommendations from the tinnitus brochure were discussed.  The most common reason for tinnitus is damage to the microscopic endings of the hearing nerve in the inner ear.  Injury to the nerve endings brings on hearing loss and often tinnitus.  Advancing age can accompany hearing loss and tinnitus.  If a person is younger, loud noise probably is the leading cause of tinnitus.  Delayed damage to  hearing is often seen as well.  Ear protection from noise exposure was reviewed.  Highlights included low salt diet, control of hypertension, avoiding stimulants such as caffeine, tobacco or alcohol and proper sleep, exercise and stress management.         Olive Renteria PA-C  ENT  [facility], [place]  [phone]      Again, thank you for allowing me to participate in the care of your patient.        Sincerely,        Olive Renteria PA-C

## 2020-11-11 NOTE — TELEPHONE ENCOUNTER
losartan      Last Written Prescription Date:  today  Last Fill Quantity: 90,   # refills: 3  Last Office Visit: today  Future Office visit:    Next 5 appointments (look out 90 days)    Nov 12, 2020 10:00 AM  PHYSICAL with Ana Perdomo DO  [facility] ([facility]) [address]  [place] [address]  [phone]   Dec 09, 2020  9:15 AM  Return Visit with VOLODYMYR Saavedra CNP  [facility] ([facility]) [address]  [place] [address]  [phone]

## 2020-11-11 NOTE — PROGRESS NOTES
[facility] HEART CARE   CARDIOLOGY PROGRESS NOTE    Ovidio Luna   [address]  [facility] [address]      Glynn Botello     Chief Complaint   Patient presents with     Follow Up     6 month follow up on PAD        HPI:   Ms. Luna is a 69 year old female who presents for cardiology follow-up to visit on 5/11/2020 with history of known coronary atherosclerosis, recently diagnosed early this year with AF with RVR and HTN.  Patient has a history of aortofemoral bypass in 2004, tachybradycardia syndrome, newly identified atrial fibrillation, CAD, hyperlipidemia, hypertension, PAD, hypertension, DM 2, COPD, tobacco abuse, emphysema, history of CVA and gastritis.    Patient has a known history of coronary artery disease with PTCA of the mLAD and pRCA on 2/28/04 with preserved LVEF. PTCA at [facility] in [place].     She has a history of PAD with aortofemoral bypass of the right LE in 2004 at [facility] in [place].    Patient presented to the ED on 3/10/2019 with complaints of chest pressure and numbness radiating into the left arm.  She reported associated shortness of breath, nausea and vomiting, no diaphoresis.  This was described as a nonexertional pain experienced when watching television.  She had no relief with nitroglycerin.  In the ED she was found to be in A. fib with RVR and she was started on a Cardizem drip and admitted to the ICU.  Her first 2 troponins were normal and the third 1 was mildly elevated.  In the ICU she was noted to have a brief sinus pause on monitoring and therefore Cardizem drip was discontinued and the patient spontaneously converted to normal sinus rhythm following this.  Given her upward troponin trend during her hospitalization patient was transferred to [facility] in [place] for coronary evaluation given her known ischemic heart disease history.     Echocardiogram was performed during her hospitalization at [facility] with preserved LVEF, no significant valvular or  morphologic abnormalities.  She had no anginal symptoms on presentation to [facility].  With her newly identified atrial fibrillation she was started on a NOAC for stoke prophylaxis.  Additionally she was found to be a type II diabetic and was also started on metformin.  Her statin was adjusted to high intensity atorvastatin.  She was discharged from [facility] on 3/12/2019.    At recent visit we reviewed results of stable TTE with no systolic or diastolic failure, no concerning valvular abnormalities and no identified pulmonary HTN. She completed a Lexiscan stress test on 7/9/2019 with no stress induced ischemia, there is a small fixed defect which is again present at the cardiac apex.  This was unchanged.  Myocardial motility was preserved with an EF of 66%.  There was no EKG changes.    Previously reviewed results of her CTA study of lower extremities. She was found to have patent aortobifemoral bypass graft, moderate to severe stenosis at the junction of the left superficial femoral artery and popliteal artery. Severe stenosis versus short occlusion of the mid popliteal artery just above the level of the tibiofemoral articulation. She was referred back to vascular surgery as a result of these findings. Patient was seen by vascular surgeon Dr. Dukes on 10/30/19. In review of her recent CTA there was concern that her irregular infrarenal aortic thrombus may place her at risk for occlusion of her aortobifemoral graft. Favored treatment with endovascular technique which may include placement of aortic cuffs from her renal artery origins into the main body of the graft. This would be performed outpatient through a right proximal superficial femoral artery cutdown.    On 5/1/2020 patient was admitted for  at [facility], she underwent endovascular revascularization of aortoiliac occlusive disease. She had placement of a bifurcated stent graft from the level of her renal arteries into both  aortofemoral graft limbs. In the operating room after placement of her endograft, she was noted to have an ischemic left lower extremity. Angiography was performed through a left brachial approach, which demonstrated proximal left superficial femoral artery occlusion. This was treated with placement of a self-expanding covered stent graft. Her postoperative course was unremarkable.     Patient had been doing very well postoperatively until just recently.  She describes noticing muscle weakness to both lower extremities approximately 3 months ago.  Since Monday this weakness has significantly worsened.  She describes her legs giving out on her and difficulty standing.  She does not describe any claudication or pain associated.  No increased edema.  No shortness of breath or increased BRENNER.  No chest pain or palpitations.  No lightheadedness or syncope.  She has not had any med adjustments other than previously reducing her potassium to once daily dosing.  Her blood pressure had been well controlled and has been trending upwards.  She does describe feeling weakness when her blood pressure was uncontrolled previously.  She is wondering if that may be contributing.      PAST MEDICAL HISTORY:   Past Medical History:   Diagnosis Date     Allergy status to other antibiotic agents status      Atherosclerosis     History of ASO with claudication     Atherosclerotic heart disease of native coronary artery without angina pectoris 2004    Stent times 2     Atrial fibrillation (H) 3/10/2019     Centrilobular emphysema (H) 12/12/2016     Cerebral infarction (H) 05/20/2012    CVA with residual left sided weakness, incidental meningioma found     Closed fracture of shaft of left tibia 12/26/2001    History of bilateral tibial fx, work related injury     H/O aorto-femoral bypass (2004) 4/3/2019     Hyperlipidemia 1/17/2018     Hypertension 1/17/2018     Hypomagnesemia 1/7/2020     Meningioma (H) 5/17/2012     Migraine     History of   migraines     Osteopenia 03/14/2006    Osteopenia, proximal femur.   Start Fosamax 08/07, stopped fosamax.     Peripheral vascular disease (H) 1/17/2018     Tobacco abuse 1/17/2018     Type 2 diabetes mellitus without complication, without long-term current use of insulin (H) 3/25/2019     Zoster without complications 2008    left shoulder and neck          FAMILY HISTORY:   Family History   Problem Relation Age of Onset     Heart Disease Father         Heart Disease     Cancer Father         Cancer,myeloma     Heart Disease Mother         Heart Disease,MI, stenting     Cerebrovascular Disease Brother      Other - See Comments Brother         sciatica     Other - See Comments Other         FHx Father's side Coronary artery disease     Other - See Comments Paternal Uncle         Stroke     Cancer Paternal Uncle         Cancer,Bladder x2     Other - See Comments Sister         chronic pain syndrome     Breast Cancer No family hx of         Cancer-breast          PAST SURGICAL HISTORY:   Past Surgical History:   Procedure Laterality Date     COLONOSCOPY  2004    2 hyperplastic polyps, repeat in 2014     COLONOSCOPY N/A 9/23/2019    F/U 2024 adenomatous polyps     ESOPHAGOSCOPY, GASTROSCOPY, DUODENOSCOPY (EGD), COMBINED N/A 9/23/2019    Antral ulcer     HEMORRHOID SURGERY       HYSTERECTOMY TOTAL ABDOMINAL, BILATERAL SALPINGO-OOPHORECTOMY, COMBINED  1987    total hyster - BSO     OTHER SURGICAL HISTORY Right 2004    06769.0,UT BYPASS GRAFT AORTOBIFEMORAL     OTHER SURGICAL HISTORY      2004,LOC-1006.05,PTCA,mid LAD, proximal RCA     OTHER SURGICAL HISTORY      03/08/16,SOR663,CYSTOSCOPY W/ URETEROSCOPY W/ LITHOTRIPSY,Right,Dr. Mario DC          SOCIAL HISTORY:   Social History     Socioeconomic History     Marital status: Single     Spouse name: None     Number of children: None     Years of education: None     Highest education level: None   Occupational History     None   Social Needs     Financial resource  strain: None     Food insecurity:     Worry: None     Inability: None     Transportation needs:     Medical: None     Non-medical: None   Tobacco Use     Smoking status: Current Every Day Smoker     Packs/day: 0.50     Years: 16.00     Pack years: 8.00     Types: Cigarettes     Smokeless tobacco: Never Used   Substance and Sexual Activity     Alcohol use: No     Drug use: No     Sexual activity: Not Currently   Lifestyle     Physical activity:     Days per week: None     Minutes per session: None     Stress: None   Relationships     Social connections:     Talks on phone: None     Gets together: None     Attends [employer] service: None     Active member of club or organization: None     Attends meetings of clubs or organizations: None     Relationship status: None     Intimate partner violence:     Fear of current or ex partner: None     Emotionally abused: None     Physically abused: None     Forced sexual activity: None   Other Topics Concern     Parent/sibling w/ CABG, MI or angioplasty before 65F 55M? Not Asked   Social History Narrative    ** Merged History Encounter **         Works at [employer]  Melly Spawn Mother  [facility] Sister  One brother          CURRENT MEDICATIONS:   Prior to Admission medications    Medication Sig Start Date End Date Taking? Authorizing Provider   albuterol (PROAIR HFA/PROVENTIL HFA/VENTOLIN HFA) 108 (90 Base) MCG/ACT Inhaler Inhale 1-2 puffs into the lungs every 6 hours as needed for shortness of breath / dyspnea or wheezing 6/7/18  Yes Fely Gregorio APRN CNP   amLODIPine (NORVASC) 10 MG tablet Take 1 tablet (10 mg) by mouth daily 3/25/19  Yes Lizet Garcia PA-C   atorvastatin (LIPITOR) 40 MG tablet Take 40 mg by mouth 3/12/19  Yes Reported, Patient   busPIRone (BUSPAR) 10 MG tablet Take 10 mg by mouth daily   Yes Unknown, Entered By History   Cholecalciferol (VITAMIN D3) 2000 UNITS CAPS Take 4,000 Units by mouth 2 times daily  9/18/12   Yes Reported, Patient   citalopram (CELEXA) 10 MG tablet Take 1 tablet (10 mg) by mouth every morning 11/2/18  Yes Glynn Botello MD   fexofenadine (ALLEGRA) 180 MG tablet Take 180 mg by mouth 2 times daily  9/18/12  Yes Reported, Patient   fluticasone (FLONASE) 50 MCG/ACT nasal spray Spray 1 spray into both nostrils daily as needed for rhinitis or allergies   Yes Unknown, Entered By History   fluticasone-salmeterol (ADVAIR) 250-50 MCG/DOSE inhaler Inhale 1 puff into the lungs daily as needed   Yes Unknown, Entered By History   hydrochlorothiazide (HYDRODIURIL) 25 MG tablet Take 1 tablet (25 mg) by mouth daily 8/27/18  Yes Glynn Botello MD   ipratropium - albuterol 0.5 mg/2.5 mg/3 mL (DUONEB) 0.5-2.5 (3) MG/3ML neb solution Take 1 vial by nebulization every morning . May also use 1 neb every 6 hours as needed for shortness of breath.   Yes Unknown, Entered By History   metFORMIN (GLUCOPHAGE) 500 MG tablet Take 1 tablet (500 mg) by mouth daily (with breakfast) 3/21/19  Yes Lizet Garcia PA-C   metoprolol tartrate (LOPRESSOR) 50 MG tablet Take 1.5 tablets (75 mg) by mouth 2 times daily 3/29/19  Yes Hawa Grissom MD   nicotine (COMMIT) 2 MG lozenge Take 2 mg by mouth 3/12/19  Yes Reported, Patient   order for [facility] [employer] NEBULIZER MACHINE ITEM #[id number] DX [id number] [facility]  2/20/19  Yes Reported, Patient   potassium chloride SA (K-DUR/KLOR-CON M) 20 MEQ CR tablet Take 1 tablet (20 mEq) by mouth 2 times daily (with meals) 6/5/18  Yes Glynn Botello MD   Respiratory Therapy Supplies (NEBULIZER/TUBING/MOUTHPIECE) KIT Dx: COPD with exacerbation. Sig: Use as directed. 2/20/19  Yes Lizet Garcia PA-C   rivaroxaban ANTICOAGULANT (XARELTO) 20 MG TABS tablet Take 20 mg by mouth 3/12/19  Yes Reported, Patient          ALLERGIES:   Allergies   Allergen Reactions     Diphenhydramine Palpitations and Other (See Comments)     wheezing  Rapid heart Rate     Morphine      Tachycardia        "Propoxyphene N-Apap Unknown     Tremors       Varenicline Nausea and Vomiting     Codeine Palpitations     Lisinopril Palpitations and Cough     No Clinical Screening - See Comments Rash     Pt states allergies to white/yellow gold.  Sugical steel.  Copper./ developed infection     Tetracycline Nausea and Vomiting and Rash          ROS:   CONSTITUTIONAL: No reported fever or chills. No changes in weight.  ENT: No visual disturbance, ear ache, epistaxis or sore throat.   CARDIOVASCULAR: No chest pain, rare palpitations, no LE edema. She has been off Lasix since endovascular revascularization earlier this year.  RESPIRATORY: Positive for chronic shortness of breath, no increased dyspnea upon exertion. No cough, wheezing or hemoptysis.  No orthopnea or PND.  GI: No reported abdominal pain, melena or hematochezia.  : No reported hematuria or dysuria.   NEUROLOGICAL: No lightheadedness, dizziness or syncope. Positive for ataxia, mild paresthesias or progressive LE muscle weakness.   HEMATOLOGIC: No history of anemia. No bleeding or excessive bruising. No history of blood clots.   MUSCULOSKELETAL: No reported new joint pain or swelling, no muscle pain.  ENDOCRINOLOGIC: No temperature intolerance. No hair or skin changes.  SKIN: No abnormal rashes or sores, no unusual itching. Groin access site and left upper arm access site healing well. Denies any drainage or redness.       PHYSICAL EXAM:   BP (!) 178/84 (BP Location: Right arm, Patient Position: Sitting, Cuff Size: Adult Regular)   Pulse 56   Temp 97.8  F (36.6  C) (Tympanic)   Resp 16   Ht 1.588 m (5' 2.52\")   Wt 60.8 kg (134 lb)   SpO2 99%   BMI 24.10 kg/m    GENERAL: The patient appears malnourished, in no apparent distress.  HEENT: Head is normocephalic and atraumatic. Eyes are symmetrical with normal visual tracking. No icterus, no xanthelasmas. Nares appeared normal without nasal drainage. Mucous membranes are moist, no cyanosis.  NECK: Supple. No JVD " visible.  CHEST/ LUNGS: Lungs clear to auscultation, no rales, rhonchi or wheezes, no use of accessory muscles, no retractions, respirations unlabored and normal respiratory rate.   CARDIO: Regular rate and rhythm normal with S1 and S2, no murmur, click or rub.   ABD: Abdomen is nondistended.   EXTREMITIES: No lower extremity edema present.  MUSCULOSKELETAL: No visible joint swelling.   NEUROLOGIC: Alert and oriented X3. Normal speech, gait and affect. No focal neurologic deficits.   SKIN: No jaundice. No rashes or visible skin lesions present. No ecchymosis.    LAB RESULTS:   Office Visit on 01/13/2019   Component Date Value Ref Range Status     Specimen Description 01/13/2019 Nasal   Final     Influenza A PCR 01/13/2019 Negative  NEG^Negative Final     Influenza B PCR 01/13/2019 Negative  NEG^Negative Final     Resp Syncytial Virus 01/13/2019 Negative  NEG^Negative Final     WBC 01/13/2019 11.3* 4.0 - 11.0 10e9/L Final     RBC Count 01/13/2019 4.96  3.8 - 5.2 10e12/L Final     Hemoglobin 01/13/2019 14.4  11.7 - 15.7 g/dL Final     Hematocrit 01/13/2019 44.3  35.0 - 47.0 % Final     MCV 01/13/2019 89  78 - 100 fl Final     MCH 01/13/2019 29.0  26.5 - 33.0 pg Final     MCHC 01/13/2019 32.5  31.5 - 36.5 g/dL Final     RDW 01/13/2019 12.8  10.0 - 15.0 % Final     Platelet Count 01/13/2019 252  150 - 450 10e9/L Final     Diff Method 01/13/2019 Automated Method   Final     % Neutrophils 01/13/2019 59.7  % Final     % Lymphocytes 01/13/2019 23.2  % Final     % Monocytes 01/13/2019 13.6  % Final     % Eosinophils 01/13/2019 2.6  % Final     % Basophils 01/13/2019 0.6  % Final     % Immature Granulocytes 01/13/2019 0.3  % Final     Absolute Neutrophil 01/13/2019 6.8  1.6 - 8.3 10e9/L Final     Absolute Lymphocytes 01/13/2019 2.6  0.8 - 5.3 10e9/L Final     Absolute Monocytes 01/13/2019 1.5* 0.0 - 1.3 10e9/L Final     Absolute Eosinophils 01/13/2019 0.3  0.0 - 0.7 10e9/L Final     Absolute Basophils 01/13/2019 0.1  0.0 -  0.2 10e9/L Final     Abs Immature Granulocytes 01/13/2019 0.0  0 - 0.4 10e9/L Final          ASSESSMENT:   Nonamarie Jeremy presents for cardiology follow-up to visit on 5/11/2020 with history of known coronary atherosclerosis, recently diagnosed early this year with AF with RVR and HTN.  Patient has a history of aortofemoral bypass in 2004, tachybradycardia syndrome, newly identified atrial fibrillation, CAD, hyperlipidemia, hypertension, PAD, hypertension, DM 2, COPD, tobacco abuse, emphysema, history of CVA and gastritis.    On 5/1/2020 patient was admitted for  at [facility], she underwent endovascular revascularization of aortoiliac occlusive disease. Patient had been doing very well postoperatively until just recently.  She describes noticing muscle weakness to both lower extremities approximately 3 months ago.  Since Monday this weakness has significantly worsened.  She describes her legs giving out on her and difficulty standing.  She does not describe any claudication or pain associated.  No increased edema.  No shortness of breath or increased BRENNER.  No chest pain or palpitations.  No lightheadedness or syncope.  She has not had any med adjustments other than previously reducing her potassium to once daily dosing.  Her blood pressure had been well controlled and has been trending upwards.  She does describe feeling weakness when her blood pressure was uncontrolled previously.  She is wondering if that may be contributing.    1. Generalized muscle weakness  2. PAF (paroxysmal atrial fibrillation) (H)  3. Bilateral lower extremity edema  4. PAD (peripheral artery disease) (H)  5. H/O aorto-femoral bypass (2004)  6. Renovascular hypertension  7. Stage 3a chronic kidney disease  8. History of coronary artery stent placement  9. History of revascularization procedure of lower extremity (5/1/2020)  10. Tobacco dependence  11. Mixed hyperlipidemia  12. Type 2 diabetes mellitus without complication, without long-term  current use of insulin (H)  13. Right renal artery stenosis (H)  14. Iron deficiency anemia     PLAN:  1. Patient with known CAD, PTCA of the mLAD and pRCA on 2/28/04 with preserved LVEF. PTCA at [facility] in [place]. Lexiscan stress test completed on 7/9/19 with no evidence of stress induced ischemia. No chest pain. Has continued on Imdur with good anginal control.  2. Previous episodes of palpitations. Recent ZIO with no evidence of AF. Symptoms associated to NSR and rare ventricular ectopy. She will continue with Toprol  mg daily as previous. CCB not effective. Bleeding with NOAC. She has remained on daily ASA. She has also gone off Plavix secondary to bleeding complications.   3. She did complete 3 months DAPT s/p LE revascularization.  4. She will continue high intensity statin.   5. Strongly encouraged tobacco cessation. She is cutting back.  6. BP elevated today, admits that this has been running higher. She will increase her Losartan to 50 mg daily.  7. Reports increased LE weakness without pain or claudication. Describes legs giving out on her and now using walker. Labs today to assess for anemia, assess electrolytes, CK and B12. Consider PT referral which was discussed with patient today.  8. Vascular surgery has recommended repeat arterial duplex study of LLE in December 2020 at [facility] for post op follow-up imaging.      Thank you for allowing me to participate in the care of your patient. Please do not hesitate to contact me if you have any questions.      Martina Roberto

## 2020-11-11 NOTE — NURSING NOTE
"Chief Complaint   Patient presents with     Follow Up     6 month follow up on PAD       Initial BP (!) 158/82 (BP Location: Right arm, Patient Position: Sitting, Cuff Size: Adult Regular)   Pulse 56   Temp 97.8  F (36.6  C) (Tympanic)   Resp 16   Ht 1.588 m (5' 2.52\")   Wt 60.8 kg (134 lb)   SpO2 99%   BMI 24.10 kg/m   Estimated body mass index is 24.1 kg/m  as calculated from the following:    Height as of this encounter: 1.588 m (5' 2.52\").    Weight as of this encounter: 60.8 kg (134 lb).  Meds Reconciled: complete  Pt is on Aspirin  Pt is on a Statin  PHQ and/or MIKE reviewed. Pt referred to PCP/MH Provider as appropriate.    Jeri Garibay LPN      "

## 2020-11-11 NOTE — PATIENT INSTRUCTIONS
You were seen by  VOLODYMYR Harper CNP       1. Laboratory blood work has been ordered.  You will be notified by phone call or [employer] message when the results are available.       2. Start hydrALAZINE (APRESOLINE) 10 MG tablet Take 1 tablet (10 mg) by mouth 2 times daily.  Prescription sent to pharmacy.    3. Check blood pressure daily and call in 1 week with readings.    4. Discontinue Lasix.    5. No other changes at this time.      You will follow up with [facility] in 4 weeks, sooner if needed.       Please call the cardiology office with problems, questions, or concerns at [phone].    If you experience chest pain, chest pressure, chest tightness, shortness of breath, fainting, lightheadedness, nausea, vomiting, or other concerning symptoms, please report to the Emergency Department or call 911. These symptoms may be emergent, and best treated in the Emergency Department.     Cardiology Nurses  APRIL Alvarez, BRAYAN HOLDEN, BRAYAN  [facility] ([address])  [phone]

## 2020-11-12 PROBLEM — I25.119 ATHEROSCLEROSIS OF NATIVE CORONARY ARTERY OF NATIVE HEART WITH ANGINA PECTORIS (H): Status: ACTIVE | Noted: 2018-01-17

## 2020-11-12 NOTE — TELEPHONE ENCOUNTER
hydralazine    Discontinued yesterday  Last Written Prescription Date:  0  Last Fill Quantity: 0,   # refills: 0  Last Office Visit: yesterday  Future Office visit:    Next 5 appointments (look out 90 days)    Dec 09, 2020  9:15 AM  Return Visit with VOLODYMYR Saavedra CNP  [facility] ([facility]) [address]  [place] [address]  [phone]      Could you clarify is patient taking this or not.  I see in Jeri JONES LPN result note states not to start hydralazine will send to VOLODYMYR Harper CNP to review and fill if appropriate.  Kenya Rivera RN on 11/12/2020 at 3:13 PM

## 2020-11-12 NOTE — NURSING NOTE
Patient presents to clinic today for annual Medicare wellness visit. She just saw cardiology yesterday.  Medication reconciliation completed.  Teri Zuleta CMA([facility])..................11/12/2020   10:20 AM        Clinic Administered Medication Documentation      Injectable Medication Documentation    Patient was given Cyanocobalamin (B-12). Prior to medication administration, verified patients identity using patient s name and date of birth. Please see MAR and medication order for additional information. Patient instructed to report any adverse reaction to staff immediately .      Was entire vial of medication used? Yes  Vial/Syringe: Single dose vial  Expiration Date:  10/21  Was this medication supplied by the patient? No   Teri Zuleta CMA([facility])..................11/12/2020   11:26 AM

## 2020-11-12 NOTE — PATIENT INSTRUCTIONS
Patient Education   Personalized Prevention Plan  You are due for the preventive services outlined below.  Your care team is available to assist you in scheduling these services.  If you have already completed any of these items, please share that information with your care team to update in your medical record.  Health Maintenance Due   Topic Date Due     COPD Action Plan  1950     Eye Exam  1950     Zoster (Shingles) Vaccine (1 of 2) 11/21/2000     Annual Wellness Visit  11/21/2015     Be sure to get your flu shot!          Preventing Falls in the Home  An adult or child can fall for many reasons. If you are an older adult, you may fall because your reaction time slows down. Your muscles and joints may get stiff, weak, or less flexible because of illness, medicines, or a physical condition. These things can also make a child more likely to fall or be injured in a fall.  Other health problems that make falls more likely include:    Arthritis    Dizziness or lightheadedness when you get out of bed (orthostatic hypotension)    History of a stroke    Dizziness    Anemia    Certain medicines taken for mental illness or to control blood pressure.    Problems with balance or gait    Bladder or urinary problems    History of falls with or without an injury    Changes in vision (vision impairment)    Changes in thinking skills and memory (cognitive impairment)  Injuries from a fall can include broken bones, dislocated joints, internal bleeding and cuts. When these injuries are serious enough, they can make it impossible for you or a child who is injured in a fall to live on his or her ownhome.  Prevention tips  To help prevent falls and fall-related injuries, follow the tips below.   Floors  Make floors safer by doing the following:     Put nonskid pads under area rugs.    Remove throw rugs.    Replace worn floor coverings.    Tack carpets firmly to each step on carpeted stairs. Put nonskid strips on the  edges of uncarpeted stairs.    Keep floors and stairs free of clutter and cords.    Arrange furniture so there are clear pathways.    Clean up any spills right away.    Wear shoes that fit.  Bathrooms    Make bathrooms safer by doing the following:     Install grab bars in the tub or shower.    Apply nonskid strips or put a nonskid rubber mat in the tub or shower.    Sit on a bath chair to bathe.    Use bathmats with nonskid backing.  Lighting and the environment  Improve lighting in your home by doing the following:     Keep a flashlight in each room. Or put a lamp next to the bed within easy reach.    Put nightlights in the bedrooms, hallways, kitchen, and bathrooms.    Make sure all stairways have good lighting.    Take your time when going up and down stairs.    Put handrails on both sides of stairs and in walkways for more support. To prevent injury to your wrist or arm, don t use handrails to pull yourself up.    Install grab bars to pull yourself up.    Move or rearrange items that you use often. This will make them easier to find or reach.    Look at your home to find any safety hazards. Especially look at doorways, walkways, and the driveway. Remove or repair any safety problems that you find.  [employer] last reviewed this educational content on 8/1/2016 2000-2020 [employer] [address] [facility], [place] [address]. All rights reserved. This information is not intended as a substitute for professional medical care. Always follow your healthcare professional's instructions.

## 2020-11-12 NOTE — PROGRESS NOTES
"Medicare Wellness Visit   Ms. Luna is a 69 year old female who presents today who presents for Preventive Visit.      Are you in the first 12 months of your Medicare coverage?  No    Health Risk Assessment     Do you feel safe in your environment? Yes    Physical Health:    In general, how would you rate your overall physical health? fair    Outside of work, how many days during the week do you exercise? none    Outside of work, approximately how many minutes a day do you exercise?not applicable    If you drink alcohol do you typically have >3 drinks per day or >7 drinks per week? Not Applicable    Do you usually eat at least 4 servings of fruit and vegetables a day, include whole grains & fiber and avoid regularly eating high fat or \"junk\" foods? NO    Do you have any problems taking medications regularly?  No    Do you have any side effects from medications? none    Needs assistance for the following daily activities: transportation and shopping    Which of the following safety concerns are present in your home?  lack of grab bars in the bathroom     Hearing impairment: No    In the past 6 months, have you been bothered by leaking of urine? no      Screening     Fall risk  Fallen 2 or more times in the past year?: Yes  Any fall with injury in the past year?: No    Cognitive Screening   1) Repeat 3 items (Leader, Season, Table)    2) Clock draw: NORMAL  3) 3 item recall: Recalls NO objects   Results: 0 items recalled: PROBABLE COGNITIVE IMPAIRMENT, **INFORM PROVIDER**    Mini-CogTM Copyright REY Mcke[employer]. Licensed by the author for use in [facility]; reprinted with permission (gerald@.[facility]). All rights reserved.      Do you have sleep apnea, excessive snoring or daytime drowsiness?: no    Breast cancer screenin2019    Regular dental visits: lost insurance, so last visit was in 2020    Eye exam with in 2 years: overdue for eye exam      End of Life Planning     Have you ever done Advance Care " Planning? (For example, a Health Directive, POLST, or a discussion with a medical provider or your loved ones about your wishes): No, advance care planning information given to patient to review.  Advanced care planning was discussed at today's visit.      End of Life Planning:  Patient currently has an advanced directive: No.  I have verified the patient's ablity to prepare an advanced directive/make health care decisions.  Literature was provided to assist patient in preparing an advanced directive.        Medical Record Update     Reviewed and updated as needed this visit by clinical staff  Tobacco  Allergies  Meds  Med Hx  Surg Hx  Fam Hx  Soc Hx      Reviewed and updated as needed this visit by Provider  Tobacco     Med Hx  Surg Hx  Fam Hx            Current providers sharing in care for this patient include:   Patient Care Team:  Ana Perdomo DO as PCP - General (Internal Medicine)  Glynn Botello (Family Practice)  Ana Perdomo DO as Assigned PCP  Olive Renteria PA-C as Assigned Surgical Provider  Martina oRberto APRN CNP as Assigned Heart and Vascular Provider     Subjective:   Patient reports that she has been having severely elevated blood pressures this week.  This is new in the last couple days.  She is also noted some general weakness and has had 5 falls in the last 3 days.  Her initial blood pressure today is significantly elevated.  When she was seen by cardiology yesterday it was also elevated.  She is on a multitude of medications.  She has known atherosclerotic disease and atrial fibrillation.  She follows with cardiology and vascular surgery regularly.  She continues on atorvastatin and aspirin.    She has underlining pulmonary emphysema.  She continues on Anoro Ellipta for this.  She feels overall her breathing has been okay.    She has type 2 diabetes mellitus.  She has not been checking her blood sugars.  She continues on low-dose Metformin.  Her last A1c was 6.7%.    She has a  "history of anxiety.  She is on BuSpar and citalopram.  She feels this has been beneficial.    We discussed today that the exact etiology of her recurrent falls and hypertension this week is unknown.  She denies any new medications or changes in over-the-counter medications.  She has not necessarily felt sick this week just generally weak.        Review of Systems     GEN: -fevers/-chills/-night sweats/-wt change  NEURO: +mild headaches increased in last 3-4 weeks/-vision changes  EARS: -hearing changes/-tinnitus  NOSE: -drainage/-congestion  MOUTH/THROAT: - sore throat/-dysphagia/-sores  LUNGS: -increased sob/-cough  CARDIOVASCULAR: -cp/-palpitations  ABD: -pain/-change in bowels/-bloody stools  : -change in bladder/-sores/-vaginal discharge or bleeding  HEMATOLOGIC/LYMPHATIC: -swollen nodes  SKIN: -rashes/-lesions  MSK/RHEUM: -joint pain/-joint swelling  NEURO: +weakness/-parasthesias  PSYCH: -depression/-anxiety          OBJECTIVE:     Vitals:    11/12/20 1023 11/12/20 1124   BP: (!) 210/98 (!) 182/90   BP Location: Right arm Right arm   Patient Position: Sitting Sitting   Cuff Size: Adult Regular Adult Regular   Pulse: 54    Resp: 16    Temp: 96.7  F (35.9  C)    TempSrc: Tympanic    SpO2: 99%    Weight: 60.8 kg (134 lb)    Height: 1.581 m (5' 2.25\")         Estimated body mass index is 24.31 kg/m  as calculated from the following:    Height as of this encounter: 1.581 m (5' 2.25\").    Weight as of this encounter: 60.8 kg (134 lb).     Physical Exam  GEN: Vitals reviewed. Healthy appearing. Patient is in no acute distress. Cooperative with exam.  HEENT: Normocephalic atraumatic.  Eyes grossly normal to inspection.  No discharge or erythema, or obvious scleral/conjunctival abnormalities.   NECK: Supple; no thyromegaly or masses noted.  No cervical or supraclavicular lymphadenopathy.  CV: Heart regular rate and rhythm with quiet systolic murmur   LUNGS: No audible wheeze, cough, or visible cyanosis.  No visible " retractions or increased work of breathing.  Lungs clear to auscultation bilaterally.    ABD: Nondistended  SKIN: Warm and dry to touch.  Visible skin clear. No significant rash, abnormal pigmentation or lesions.  EXT: No clubbing or cyanosis.  No peripheral edema.  NEURO: Alert and oriented to person, place, and time.  Cranial nerves II-XII grossly intact with no focal or lateralizing deficits.  Muscle tone normal.  Gait normal. No tremor.   MSK: ROM of upper and lower ext symmetric and full.  PSYCH: Mood is fair.  Mentation appears normal, affect normal/bright, judgement and insight intact, normal speech and appearance well-groomed.      Labs reviewed in EPIC    ASSESSMENT / PLAN:     Encounter for Medicare annual wellness exam    Pulmonary emphysema, unspecified emphysema type (H)  - Stable.  Continue current regimen.    - umeclidinium-vilanterol (ANORO ELLIPTA) 62.5-25 MCG/INH oral inhaler  Dispense: 1 Inhaler; Refill: 11    Anxiety  - Stable.  Continue current regimen.    - busPIRone (BUSPAR) 10 MG tablet  Dispense: 90 tablet; Refill: 3  - citalopram (CELEXA) 20 MG tablet  Dispense: 90 tablet; Refill: 3    Type 2 diabetes mellitus without complication, without long-term current use of insulin (H)  -A1c added onto labs from yesterday and remains controlled.  Continue to monitor on current medications.  - metFORMIN (GLUCOPHAGE-XR) 500 MG 24 hr tablet  Dispense: 90 tablet; Refill: 3  - Hemoglobin A1c    Essential hypertension  -Her blood pressure is uncontrolled.  She is encouraged to use as needed medications with significantly elevated blood pressures.  She is encouraged to follow-up in 1 week.  - amLODIPine (NORVASC) 10 MG tablet  Dispense: 90 tablet; Refill: 3  - MR Brain w/o Contrast    PAF (paroxysmal atrial fibrillation) (H)  -Stable on current medications.  - metoprolol succinate ER (TOPROL-XL) 100 MG 24 hr tablet  Dispense: 90 tablet; Refill: 3    Mixed hyperlipidemia  - Stable.  Continue current regimen.     - atorvastatin (LIPITOR) 40 MG tablet  Dispense: 90 tablet; Refill: 3    Atherosclerosis of native coronary artery of native heart without angina pectoris  - on asa, statin, ACEI/ARB, beta blocker, SLNTG  - no new cardiac symptoms at this time; continue meds as prescribed  - atorvastatin (LIPITOR) 40 MG tablet  Dispense: 90 tablet; Refill: 3    Low vitamin B12 level  B12 injection given today.  Recommend continuing with monthly injections.    Generalized muscle weakness  -Exact etiology is unknown.  Given her history of vascular disease concern for possible stroke is present.  Extensive lab work was done yesterday and reviewed.  Did not show any obvious abnormality.  Urine obtained today and negative.  Covid testing obtained and negative.  We will pursue MRI of the brain given new symptoms.  - UA with Microscopic reflex to Culture  - Symptomatic COVID-19 Virus (Coronavirus) by PCR  - MR Brain w/o Contrast    Recurrent falls  -With recurrent falls and posterior headaches I do have concern for possible cerebellar stroke.  She is encouraged to come in through the ER should she have acute worsening symptoms.  - MR Brain w/o Contrast        Preventative Care Guidelines and Health Counseling     COUNSELING:  Reviewed preventive health counseling  Special attention given to:   Preventative screening  Healthy diet/nutrition  Immunizations   Reviewed preventive health counseling, as reflected in patient instructions    (!) 210/98       Body mass index is 24.31 kg/m .     Tobacco Cessation Action Plan: Information offered: Patient not interested at this time    Appropriate preventive services were discussed with this patient, including applicable screening as appropriate for cardiovascular disease, diabetes, osteopenia/osteoporosis, and glaucoma.  As appropriate for age/gender, discussed screening for colorectal cancer, prostate cancer, breast cancer, and cervical cancer. Checklist reviewing preventive services available  has been given to the patient.    Reviewed patients plan of care and provided an AVS. The Basic Care Plan (routine screening as documented in Health Maintenance) for patient meets the Care Plan requirement. This Care Plan has been established and reviewed with the Patient and any available family members.    Counseling Resources:  ATP IV Guidelines  Pooled Cohorts Equation Calculator  Breast Cancer Risk Calculator  FRAX Risk Assessment  ICSI Preventive Guidelines  Dietary Guidelines for Americans, 2010  USDA's MyPlate  ASA Prophylaxis  Lung CA Screening    Ana Perdomo DO  11/12/2020  10:27 AM  [facility]

## 2020-11-15 NOTE — RESULT ENCOUNTER NOTE
Please let patient know her results have returned.  COVID-19 test is negative.  Because of this I would recommend getting imaging of the brain given her recurrent falls and blood pressure symptoms.  I have placed an order for an MRI of the brain.  They should be calling to schedule this.  Call if she has any questions.

## 2020-11-17 NOTE — PROGRESS NOTES
Nursing Notes:   Val Carrasco LPN  11/19/2020 10:42 AM  Signed  Previous A1C is at goal of <8  Lab Results   Component Value Date    A1C 6.8 11/11/2020    A1C 6.7 07/13/2020    A1C 6.2 01/24/2020    A1C 7.0 10/14/2019    A1C 7.0 07/08/2019     Urine microalbumin:creatine: NA  Foot exam due   Eye exam 3/16/20    Tobacco User yes    Patient is on a daily aspirin  Patient is on a Statin.  Blood pressure today of:     BP Readings from Last 1 Encounters:   11/19/20 (!) 146/80      is not at the goal of <139/89 for diabetics.    Val Carrasco LPN on 11/19/2020 at 9:13 AM        Val Carrasco LPN  11/19/2020  9:19 AM  Signed  Patient present to clinic for follow up Bps. Reports some dizziness but no falls today.   Val Carrasco LPN on 11/19/2020 at 9:19 AM        Nursing note reviewed with patient.  Accuracy and completeness verified.    Ovidio Luna is a 69 year old year old female patient who presents to the clinic today for recheck on her hypertension.  She was seen 1 week ago for uncontrolled blood pressures and instructed to recheck.    Patient recently had [facility] with Dr. Perdomo on 11/12/2020 where patient reported that she had been having severely elevated blood pressures. She reported  some general weakness and has had 5 falls in the previous 3 days.  Her initial blood pressure today was significantly elevated.  When she was seen by cardiology the day prior, BP was also elevated.  She is on a multitude of medications for her BP.  She has known atherosclerotic disease and atrial fibrillation.  She follows with cardiology and vascular surgery regularly.  She continues on atorvastatin and aspirin.  * She has underlining pulmonary emphysema.  She continues on Anoro Ellipta for this.  She feels overall her breathing has been okay.  * She has type 2 diabetes mellitus.  She has not been checking her blood sugars.  She continues on low-dose Metformin.  Her last A1c was 6.7%.  * She has a history of anxiety.   She is on BuSpar and citalopram.  She feels this has been beneficial.  * It was discussed at the appointment that the exact etiology of her recurrent falls and hypertension was unknown.  She denies any new medications or changes in over-the-counter medications.  She has not necessarily felt sick this week just generally weak.    Today her blood pressure initially was 146/80.  She reports she is taking her blood pressure medication twice daily as directed 8 AM and 8 PM.  She has not missed any doses.  Denies any chest pain, palpitations, headaches.    When asked about her diabetes control and monitoring, she reports she knows nothing about diabetes, doesn't believe she has it.  She does not check her blood sugar nor has she ever.  Denies any symptoms of having diabetes, no neuropathy. Last hemoglobin A1c was 6.7 in July, 2020.  Offered her a referral to meet with our diabetic educator in the clinic and she is not interested in doing this at this time.  She will let us know if she changes her mind.  She was strongly advised to schedule an eye exam to have them checked for diabetic neuropathy.    She reports ongoing dizziness, vertigo with turning her head side to side.  States she feels like she has fluid sloshing around.  She reports she fell yesterday in the bathroom while doing laundry, denies any head trauma or injuries.  This continues to be a problem for her.  She has had recurrent falls in the recent month.    She continues to smoke cigarettes reporting 1 pack will last her 1-1/2 to 2-1/2 days.  Reports she has tried Chantix for smoking cessation and it made her sick.    Problem List/PMH: Reviewed in EMR, and made relevant updates today.  Medications: Reviewed in EMR, and made relevant updates today.  Allergies: Reviewed in EMR, and made relevant updates today.    Current Code Status:  Prior    REVIEW OF SYSTEMS:    Review of Systems   Constitutional: Negative for chills and fever.        Weight has gone from  "#158 to 133# in over a year, but has been trying to lose weight with diet modification.   HENT: Positive for sinus pressure. Negative for ear pain.         \"has bad sinuses\".  Reports \"sloshing feeling in her ears when she turns her head\".   Eyes: Positive for visual disturbance.        Is overdue for eye exam.   Respiratory: Positive for cough and shortness of breath (with certain activities).    Cardiovascular: Negative for chest pain, palpitations and leg swelling.   Gastrointestinal: Negative for constipation, diarrhea, nausea and vomiting.   Genitourinary:        Reports she goes a lot but states she drinks a lot of water.   Musculoskeletal: Positive for back pain (lower back, shoulders, neck posteriorly), gait problem and myalgias.        Needs to go back to chiropractor   Skin:        Bruising.   Neurological: Positive for dizziness, syncope, weakness and light-headedness.        Fall yesterday while doing laundry. Reports vertigo with head turns.   Hematological: Bruises/bleeds easily.   All other systems reviewed and are negative.      EXAM:     BP (!) 146/80 (BP Location: Right arm, Patient Position: Sitting, Cuff Size: Adult Regular)   Pulse 57   Temp 97.5  F (36.4  C) (Tympanic)   Resp 16   Wt 60.3 kg (133 lb)   SpO2 99%   BMI 24.13 kg/m      Current Pain Score: No Pain (0)     Physical Exam  Vitals signs and nursing note reviewed.   Constitutional:       Appearance: Normal appearance.   HENT:      Head: Normocephalic and atraumatic.      Right Ear: Ear canal and external ear normal.      Left Ear: Ear canal and external ear normal.      Ears:      Comments: Fluid noted bilaterally behind TM's     Mouth/Throat:      Mouth: Mucous membranes are moist.      Pharynx: Oropharynx is clear.   Eyes:      Extraocular Movements: Extraocular movements intact.      Conjunctiva/sclera: Conjunctivae normal.      Pupils: Pupils are equal, round, and reactive to light.   Cardiovascular:      Rate and Rhythm: " Normal rate and regular rhythm.      Heart sounds: Normal heart sounds.   Pulmonary:      Effort: Pulmonary effort is normal.      Breath sounds: Normal breath sounds.      Comments: Moist cough during exam  Abdominal:      General: Bowel sounds are normal.      Palpations: Abdomen is soft.   Musculoskeletal:      Right lower leg: No edema.      Left lower leg: No edema.      Comments: Uses walker due to weakness, gait instability noted with ambulation.  Patient's walker noted to be too short for her causing her to push it out in front of her to walk and she is hunched over.  She reports this walker belonged to her mother.  I will send in an order for a new walker for her so it can be sized appropriately for her to be able to ambulate safely.   Skin:     General: Skin is warm and dry.      Findings: Bruising (arms, generalized) present.   Neurological:      Mental Status: She is alert and oriented to person, place, and time. Mental status is at baseline.      Motor: Weakness present.      Gait: Gait abnormal.      Comments: Left foot drags slightly with ambulation   Psychiatric:         Mood and Affect: Mood normal.         Behavior: Behavior normal.         Thought Content: Thought content normal.         Judgment: Judgment normal.          Results for orders placed or performed during the hospital encounter of 11/19/20   MR Brain w/o & w Contrast     Status: None    Narrative    EXAM:  MR BRAIN W/O & W CONTRAST    HISTORY:  Neuro deficit(s), subacute; recurrent falls, elevated BP,  concern for stroke vs mass; Essential hypertension; Generalized muscle  weakness; Recurrent falls. .    TECHNIQUE:  Sagittal T1, axial T1, T2, FLAIR, diffusion, gradient as  well as axial and 3D postcontrast imaging of the whole brain was  performed.    MEDS/CONTRAST: Dotarem 12mL    COMPARISON:  12/17/2012, 8/17/2020     FINDINGS:      The previously described dural based cribriform plate mass has very  slowly increased in size when  compared back to 2012, currently  measuring 2.4 x 2.4 x 1.9 cm, previously 2.3 x 2.1 cm. There is  adjacent mass effect and edema versus gliosis within the anterior  inferior left frontal lobe, both of which were seen to some degree in  2012.    There are advanced background microvascular ischemic changes. Lacunar  injuries are present in both thalami and both lentiform nuclei as well  as the bilateral cerebellum. There is subtle diffusion restriction in  the left thalamocapsular junction without associated enhancement.    Moderate background generalized volume loss is seen. There is no  hydrocephalus.    The T1 marrow signal is unremarkable.       Impression    IMPRESSION: Subtle diffusion restriction within the left  thalamocapsular junction is suspicious for a site of acute to subacute  ischemia. Extensive chronic microvascular ischemic changes as well as  multifocal lacunar injuries are also present.    Slight interval increase in the size of a cribriform plate meningioma  with mass effect upon the left anterior-inferior frontal lobe when  compared back to 2012. Adjacent gliosis or edema, some of which was  present in 2012. Differences in technique limit comparison.    ASHLIE PAEZ MD        ASSESSMENT AND PLAN:    Essential hypertension  Continue on medications as ordered, twice daily. Low sodium diet.    PAD (peripheral artery disease) (H)  Will order her a new walker, fit to her.  - Walker Order for DME - ONLY FOR DME    PAF (paroxysmal atrial fibrillation) (H)  Continue medications as ordered.    Centrilobular emphysema (H)  Tobacco dependence  - SMOKING CESSATION COUNSELING, 3-10 MIN  - Greater than 3 minutes were spent on smoking cessation including encouragement to reduce cigarette use and discussion of modalities to assist with this  - Cessation was encouraged in order to improve pain, lung capacity, quality of life and long term outcomes  - NRT offered and patient declines at this  time.    Meningioma (H)  Patient is scheduled to have MRI of brain today.    Atherosclerosis of native coronary artery of native heart with angina pectoris (H)    Osteopenia, unspecified location  - Walker Order for DME - ONLY FOR DME    Stage 3a chronic kidney disease  Continue adequate water intake.  We will monitor this closely.  Notify provider with any changes in urine.    Iron deficiency anemia due to chronic blood loss  Continue to eat iron rich foods.    Encounter for follow-up examination    Fluid level behind tympanic membrane of both ears  Sinus pressure  Vertigo  Due to the fluid in her ears and sinus pressure and vertigo, we will start her on a trial of cetirizine to see if that helps dry up the fluid to help with her symptoms.  - cetirizine (ZYRTEC) 10 MG tablet  Dispense: 90 tablet; Refill: 1  - Walker Order for DME - ONLY FOR DME    Personal history of nicotine dependence   Extensive discussion had with patient regarding smoking cessation.  - Greater than 3 minutes were spent on smoking cessation including encouragement to reduce cigarette use and discussion of modalities to assist with this.  Discussed doing a cigarette cut back per week trial.  Told her she could start by decreasing 2 cigarettes/week, to lay out her cigarettes for the day.  Also discussed that she could cut up straws and put them in an empty cigarette pack to use in place of a cigarette see if that helps her.  - Cessation was encouraged in order to improve pain, lung capacity, quality of life and long term outcomes  - NRT offered and patient declined at this time.  Encouraged her to contact us if she changes her mind and that would be prescribed for her  - SMOKING CESSATION COUNSELING, 3-10 MIN    Recurrent falls  Patient encouraged to make position changes slowly.  Make sure she is wearing appropriate footwear while in her house and out walking.  She should use her walker at all times.  - Walker Order for DME - ONLY FOR  DME    Abnormality of gait and mobility  Order placed for new walker after face-to-face clinic visit.  Faxed to [facility] by [facility]  - Walker Order for DME - ONLY FOR DME       Patient verbalizes understanding and is agreeable to plan of care.    No follow-ups on file.       VOLODYMYR Burnham, AGNP-C  Internal Medicine  [facility]    11/17/2020 1:41 PM        Documentation of Face-to-Face and Certification for Home Health Services     Patient: Ovidio Luna   YOB: 1950  MR Number: [id number]  Today's Date: 11/19/2020    I certify that patient: Oviido Luna is under my care and that I, or a provider working with me, had a face-to-face encounter that meets the physician face-to-face encounter requirements with this patient on: November 19, 2020.    This encounter with the patient was in whole, or in part, for the following medical condition, which is the primary reason for need for wheeled walker with seat: Gait instability, left foot drag, vertigo, osteopenia, history of recurrent falls and weakness.    The patient is under my care, and I have initiated the establishment of the plan of care.  This patient will be followed by a physician who will periodically review the plan of care.  Physician/Provider to provide follow up care: Ana Perdomo    Responsible Medicare certified PECOS Physician: Ana Perdomo  Physician Signature: See electronic signature associated with these discharge orders.  Date: 11/19/2020

## 2020-11-19 PROBLEM — D64.9 ANEMIA: Status: RESOLVED | Noted: 2019-09-19 | Resolved: 2020-01-01

## 2020-11-19 NOTE — RESULT ENCOUNTER NOTE
Please let patient know her results have returned.  Her MRI shows that she has had a new stroke.  I would recommend that she set up a visit with VOLODYMYR Burnham today or tomorrow to discuss this in changes that may be needed because of it.  This can be done as a virtual visit.  Call if she has any questions.

## 2020-11-19 NOTE — TELEPHONE ENCOUNTER
After proper verification, patient was relayed message from below, Patient was scheduled for a telephone visit for tomorrow am.  Mala Rivera LPN on 11/19/2020 at 2:17 PM]

## 2020-11-19 NOTE — PROGRESS NOTES
"Subjective     Ovidio Luna is a 69 year old female who is being evaluated via a billable telephone visit.      The patient has been notified of following:    \"This telephone visit will be conducted via a call between you and your physician/provider. We have found that certain health care needs can be provided without the need for a physical exam.  This service lets us provide the care you need with a short phone conversation.  If a prescription is necessary we can send it directly to your pharmacy.  If lab work is needed we can place an order for that and you can then stop by our lab to have the test done at a later time. Telephone visits are billed at different rates depending on your insurance coverage. During this emergency period, for some insurers they may be billed the same as an in-person visit.  Please reach out to your insurance provider with any questions. If during the course of the call the physician/provider feels a telephone visit is not appropriate, you will not be charged for this service.\"    Patient has given verbal consent for Telephone visit?  Yes    How would you like to obtain your AVS? Mail a copy    Ovidio Luna is being assessed via telephone visit with the following concerns:    Chief Complaint   Patient presents with     RECHECK     med changes     Review MRI results     Per recent MRI results: Subtle diffusion restriction within the left thalamocapsular junction is suspicious for a site of acute to subacute ischemia. Extensive chronic microvascular ischemic changes as well as multifocal lacunar injuries are also present. Slight interval increase in the size of a cribriform plate meningioma with mass effect upon the left anterior-inferior frontal lobe when compared back to 2012. Adjacent gliosis or edema, some of which was present in 2012.     Concerned that patient is having acute clots, reviewed MRI results with her and discussed the noted left foot dragging which patient reports " she did notice approximately 2 months ago and thought it was just because she was tired. Reviewed all labs with patient, discussed plan of upcoming treatment and evaluation. We ordered US bilateral carotid arteries which is ordered and scheduled for 130 this afternoon.     Patient would be willing to travel for Watchman device placement IF she was deemed to be a candidate. Cardiology will be updated and will be in contact with patient.     We discussed that we will start patient on EC aspirin 81 mg daily. She has been taking a baby aspirin daily. Instructed to stop the baby aspirin and start the EC ASA. Prescription sent to pharmacy.    We discussed that we are going to start her on Eliquis 5 mg twice daily. Prescription sent to pharmacy.    We discussed that I am going to submit a referral for Care Coordination Social Work to follow the patient. She is agreeable to this.    We discussed that patient will increase her daily dose of atorvastatin to 80 mg. She will finish her current 40 mg tablets, taking 2 per day and a new prescription for 80 mg was sent to her pharmacy.    We discussed that we will monitor her hemoglobin for changes on these new medications due to her prior history of hemorrhagic gastric. A lab draw will be done today to establish baseline and then weekly x 4 weeks.    We will also reevaluate her magnesium level today as she has been low.    Assessment/Plan:  Lacunar stroke (H)  - CARE COORDINATION REFERRAL, social work  - US Carotid Bilateral scheduled today at 130 for evaluation    Mixed hyperlipidemia  - atorvastatin (LIPITOR) 80 MG tablet  Dispense: 90 tablet; Refill: 3. This is a dose increase. Patient will use up current supply of 40 mg tablets and then start 80 mg tablets.    Atherosclerosis of native coronary artery of native heart without angina pectoris  - atorvastatin (LIPITOR) 80 MG tablet  Dispense: 90 tablet; Refill: 3  - US Carotid Bilateral scheduled today at 130 for  evaluation    Tobacco dependence  - US Carotid Bilateral scheduled today at 130 for evaluation    Centrilobular emphysema (H)  - US Carotid Bilateral scheduled today at 130 for evaluation    Erosive gastritis with hemorrhage  - Patient will STOP baby aspirin and START EC Aspirin 81 mg.  - Will check hemoglobin today and then weekly x 4 weeks.    Hypomagnesemia  - Will check level today.    PAD (peripheral artery disease) (H)  - US Carotid Bilateral scheduled today at 130 for evaluation    PAF (paroxysmal atrial fibrillation) (H)  - US Carotid Bilateral scheduled today at 130 for evaluation    Femoral artery stenosis (H)  - US Carotid Bilateral scheduled today at 130 for evaluation    Stage 3a chronic kidney disease  - US Carotid Bilateral scheduled today at 130 for evaluation    Return for Lab Work to check hemoglobin every week x 4 weeks..    Patient verbalizes understanding and is agreeable to plan of care.    Phone call duration: 13 minutes    Bethany Nieves NP  11/20/2020 12:39 PM

## 2020-11-19 NOTE — NURSING NOTE
Previous A1C is at goal of <8  Lab Results   Component Value Date    A1C 6.8 11/11/2020    A1C 6.7 07/13/2020    A1C 6.2 01/24/2020    A1C 7.0 10/14/2019    A1C 7.0 07/08/2019     Urine microalbumin:creatine: NA  Foot exam due   Eye exam 3/16/20    Tobacco User yes    Patient is on a daily aspirin  Patient is on a Statin.  Blood pressure today of:     BP Readings from Last 1 Encounters:   11/19/20 (!) 146/80      is not at the goal of <139/89 for diabetics.    Val Carrasco LPN on 11/19/2020 at 9:13 AM

## 2020-11-19 NOTE — NURSING NOTE
Patient present to clinic for follow up Bps. Reports some dizziness but no falls today.   Val Carrasco LPN on 11/19/2020 at 9:19 AM

## 2020-11-20 PROBLEM — I25.10 ATHEROSCLEROSIS OF NATIVE CORONARY ARTERY OF NATIVE HEART WITHOUT ANGINA PECTORIS: Status: ACTIVE | Noted: 2020-01-01

## 2020-11-20 PROBLEM — I63.81 LACUNAR STROKE (H): Status: ACTIVE | Noted: 2020-01-01

## 2020-11-20 NOTE — RESULT ENCOUNTER NOTE
Please let patient know her results have returned. Carotid ultrasound looks ok.  Call if she has any questions.

## 2020-11-27 NOTE — PROGRESS NOTES
Clinic Care Coordination Contact    Writer attempted call X2 on this date.   Went straight to voicemail, writer left message requesting return call.    RAJIV Reina on 11/27/2020 at 1:50 PM    Clinic Care Coordination Contact    Writer received referral from Bethany Nieves NP for care coordination opportunities.    Writer reached out to patient on this date, left voice message requesting return call and discussing care coordination.    RAJIV Reina on 11/27/2020 at 8:58 AM

## 2020-11-30 NOTE — PROGRESS NOTES
Clinic Care Coordination Contact    Writer placed call X3 to patient on this date in attempt to offer care coordination opportunities, message left offering care coordination, resources and ongoing support.     Writer requested return call.     RAJIV Reina on 11/30/2020 at 10:52 AM

## 2020-12-16 NOTE — RESULT ENCOUNTER NOTE
Please call the patient with the results.  Hemoglobin is stable.  Recheck in 3 months, sooner if problems.  VOLODYMYR Burnham, AGNP-C  Internal Medicine

## 2020-12-16 NOTE — NURSING NOTE
"Chief Complaint   Patient presents with     Follow Up     4 week follow up       Initial BP (!) 158/100 (BP Location: Right arm, Patient Position: Sitting, Cuff Size: Adult Regular)   Pulse 96   Temp 97.7  F (36.5  C) (Tympanic)   Resp 16   Wt 56.2 kg (124 lb)   SpO2 99%   BMI 22.50 kg/m   Estimated body mass index is 22.5 kg/m  as calculated from the following:    Height as of 11/12/20: 1.581 m (5' 2.25\").    Weight as of this encounter: 56.2 kg (124 lb).  Meds Reconciled: complete  Pt is on Aspirin  Pt is on a Statin  PHQ and/or MIKE reviewed. Pt referred to PCP/MH Provider as appropriate.    Jeri Garibay LPN      "

## 2020-12-16 NOTE — PROGRESS NOTES
[facility] HEART CARE   CARDIOLOGY PROGRESS NOTE    Ovidio Luna   [address]  [facility] [id number]      Glynn Botello     Chief Complaint   Patient presents with     Follow Up     4 week follow up        HPI:   Ms. Luna is a 70 year old female who presents for cardiology follow-up with history of known coronary atherosclerosis, recently diagnosed early this year with AF with RVR and HTN.  Patient has a history of aortofemoral bypass in 2004, tachybradycardia syndrome, newly identified atrial fibrillation, CAD, hyperlipidemia, hypertension, PAD, hypertension, DM 2, COPD, tobacco abuse, emphysema, history of CVA and gastritis.    Patient has a known history of coronary artery disease with PTCA of the mLAD and pRCA on 2/28/04 with preserved LVEF. PTCA at [facility] in [place].     She has a history of PAD with aortofemoral bypass of the right LE in 2004 at [facility] in [place].    Patient presented to the ED on 3/10/2019 with complaints of chest pressure and numbness radiating into the left arm.  She reported associated shortness of breath, nausea and vomiting, no diaphoresis.  This was described as a nonexertional pain experienced when watching television.  She had no relief with nitroglycerin.  In the ED she was found to be in A. fib with RVR and she was started on a Cardizem drip and admitted to the ICU.  Her first 2 troponins were normal and the third 1 was mildly elevated.  In the ICU she was noted to have a brief sinus pause on monitoring and therefore Cardizem drip was discontinued and the patient spontaneously converted to normal sinus rhythm following this.  Given her upward troponin trend during her hospitalization patient was transferred to [facility] in [place] for coronary evaluation given her known ischemic heart disease history.     Echocardiogram was performed during her hospitalization at [place] with preserved LVEF, no significant valvular or morphologic abnormalities.  She had  no anginal symptoms on presentation to [facility].  With her newly identified atrial fibrillation she was started on a NOAC for stoke prophylaxis.  Additionally she was found to be a type II diabetic and was also started on metformin.  Her statin was adjusted to high intensity atorvastatin.  She was discharged from [facility] on 3/12/2019.    At recent visit we reviewed results of stable TTE with no systolic or diastolic failure, no concerning valvular abnormalities and no identified pulmonary HTN. She completed a Lexiscan stress test on 7/9/2019 with no stress induced ischemia, there is a small fixed defect which is again present at the cardiac apex.  This was unchanged.  Myocardial motility was preserved with an EF of 66%.  There was no EKG changes.    Previously reviewed results of her CTA study of lower extremities. She was found to have patent aortobifemoral bypass graft, moderate to severe stenosis at the junction of the left superficial femoral artery and popliteal artery. Severe stenosis versus short occlusion of the mid popliteal artery just above the level of the tibiofemoral articulation. She was referred back to vascular surgery as a result of these findings. Patient was seen by vascular surgeon Dr. Dukes on 10/30/19. In review of her recent CTA there was concern that her irregular infrarenal aortic thrombus may place her at risk for occlusion of her aortobifemoral graft. Favored treatment with endovascular technique which may include placement of aortic cuffs from her renal artery origins into the main body of the graft. This would be performed outpatient through a right proximal superficial femoral artery cutdown.    On 5/1/2020 patient was admitted for  at [facility], she underwent endovascular revascularization of aortoiliac occlusive disease. She had placement of a bifurcated stent graft from the level of her renal arteries into both aortofemoral graft limbs. In the  operating room after placement of her endograft, she was noted to have an ischemic left lower extremity. Angiography was performed through a left brachial approach, which demonstrated proximal left superficial femoral artery occlusion. This was treated with placement of a self-expanding covered stent graft. Her postoperative course was unremarkable.     Patient had been doing very well postoperatively until just recently.  She described noticing muscle weakness to both lower extremities.  Since Monday this weakness had significantly worsened.  She described her legs giving out on her and difficulty standing.  She did not describe any claudication or pain associated.  No increased edema.  No shortness of breath or increased BRENNER.  No chest pain or palpitations.  No lightheadedness or syncope. Admits that she was terminated from her job on November 14th which is about the time symptoms started. Possible anxiety?     PAST MEDICAL HISTORY:   Past Medical History:   Diagnosis Date     Allergy status to other antibiotic agents status      Anemia 9/19/2019     Atherosclerosis     History of ASO with claudication     Atherosclerotic heart disease of native coronary artery without angina pectoris 2004    Stent times 2     Atrial fibrillation (H) 3/10/2019     Centrilobular emphysema (H) 12/12/2016     Cerebral infarction (H) 05/20/2012    CVA with residual left sided weakness, incidental meningioma found     Closed fracture of shaft of left tibia 12/26/2001    History of bilateral tibial fx, work related injury     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   Start Fosamax 08/07, stopped fosamax.     Peripheral vascular disease (H) 1/17/2018     Tobacco abuse 1/17/2018     Type 2 diabetes mellitus without complication,  without long-term current use of insulin (H) 3/25/2019     Zoster without complications 2008    left shoulder and neck          FAMILY HISTORY:   Family History   Problem Relation Age of Onset     Heart Disease Father         Heart Disease     Cancer Father         Cancer,myeloma     Heart Disease Mother         Heart Disease,MI, stenting     Cerebrovascular Disease Brother      Other - See Comments Brother         sciatica     Other - See Comments Other         FHx Father's side Coronary artery disease     Other - See Comments Paternal Uncle         Stroke     Cancer Paternal Uncle         Cancer,Bladder x2     Other - See Comments Sister         chronic pain syndrome     Breast Cancer No family hx of         Cancer-breast          PAST SURGICAL HISTORY:   Past Surgical History:   Procedure Laterality Date     COLONOSCOPY  2004    2 hyperplastic polyps, repeat in 2014     COLONOSCOPY N/A 9/23/2019    F/U 2024 adenomatous polyps     ESOPHAGOSCOPY, GASTROSCOPY, DUODENOSCOPY (EGD), COMBINED N/A 9/23/2019    Antral ulcer     HEMORRHOID SURGERY       HYSTERECTOMY TOTAL ABDOMINAL, BILATERAL SALPINGO-OOPHORECTOMY, COMBINED  1987    total hyster - BSO     OTHER SURGICAL HISTORY Right 2004    01747.0,ND BYPASS GRAFT AORTOBIFEMORAL     OTHER SURGICAL HISTORY      2004,LOC-1006.05,PTCA,mid LAD, proximal RCA     OTHER SURGICAL HISTORY      03/08/16,NVP876,CYSTOSCOPY W/ URETEROSCOPY W/ LITHOTRIPSY,Right,Dr. Mario DC          SOCIAL HISTORY:   Social History     Socioeconomic History     Marital status: Single     Spouse name: None     Number of children: None     Years of education: None     Highest education level: None   Occupational History     None   Social Needs     Financial resource strain: None     Food insecurity:     Worry: None     Inability: None     Transportation needs:     Medical: None     Non-medical: None   Tobacco Use     Smoking status: Current Every Day Smoker     Packs/day: 0.50     Years: 16.00      Pack years: 8.00     Types: Cigarettes     Smokeless tobacco: Never Used   Substance and Sexual Activity     Alcohol use: No     Drug use: No     Sexual activity: Not Currently   Lifestyle     Physical activity:     Days per week: None     Minutes per session: None     Stress: None   Relationships     Social connections:     Talks on phone: None     Gets together: None     Attends Restorationism service: None     Active member of club or organization: None     Attends meetings of clubs or organizations: None     Relationship status: None     Intimate partner violence:     Fear of current or ex partner: None     Emotionally abused: None     Physically abused: None     Forced sexual activity: None   Other Topics Concern     Parent/sibling w/ CABG, MI or angioplasty before 65F 55M? Not Asked   Social History Narrative    ** Merged History Encounter **         Works at [employer]  Melly Spawn Mother  [facility]  One brother          CURRENT MEDICATIONS:   Prior to Admission medications    Medication Sig Start Date End Date Taking? Authorizing Provider   albuterol (PROAIR HFA/PROVENTIL HFA/VENTOLIN HFA) 108 (90 Base) MCG/ACT Inhaler Inhale 1-2 puffs into the lungs every 6 hours as needed for shortness of breath / dyspnea or wheezing 6/7/18  Yes Fely Gregorio APRN CNP   amLODIPine (NORVASC) 10 MG tablet Take 1 tablet (10 mg) by mouth daily 3/25/19  Yes Lizet Garcia PA-C   atorvastatin (LIPITOR) 40 MG tablet Take 40 mg by mouth 3/12/19  Yes Reported, Patient   busPIRone (BUSPAR) 10 MG tablet Take 10 mg by mouth daily   Yes Unknown, Entered By History   Cholecalciferol (VITAMIN D3) 2000 UNITS CAPS Take 4,000 Units by mouth 2 times daily  9/18/12  Yes Reported, Patient   citalopram (CELEXA) 10 MG tablet Take 1 tablet (10 mg) by mouth every morning 11/2/18  Yes Glynn Botello MD   fexofenadine (ALLEGRA) 180 MG tablet Take 180 mg by mouth 2 times daily  9/18/12  Yes Reported,  Patient   fluticasone (FLONASE) 50 MCG/ACT nasal spray Spray 1 spray into both nostrils daily as needed for rhinitis or allergies   Yes Unknown, Entered By History   fluticasone-salmeterol (ADVAIR) 250-50 MCG/DOSE inhaler Inhale 1 puff into the lungs daily as needed   Yes Unknown, Entered By History   hydrochlorothiazide (HYDRODIURIL) 25 MG tablet Take 1 tablet (25 mg) by mouth daily 8/27/18  Yes Glynn Botello MD   ipratropium - albuterol 0.5 mg/2.5 mg/3 mL (DUONEB) 0.5-2.5 (3) MG/3ML neb solution Take 1 vial by nebulization every morning . May also use 1 neb every 6 hours as needed for shortness of breath.   Yes Unknown, Entered By History   metFORMIN (GLUCOPHAGE) 500 MG tablet Take 1 tablet (500 mg) by mouth daily (with breakfast) 3/21/19  Yes Lizet Garcia PA-C   metoprolol tartrate (LOPRESSOR) 50 MG tablet Take 1.5 tablets (75 mg) by mouth 2 times daily 3/29/19  Yes Hawa Grissom MD   nicotine (COMMIT) 2 MG lozenge Take 2 mg by mouth 3/12/19  Yes Reported, Patient   order for [facility] [email or web address] NEBULIZER MACHINE ITEM #[id number] [facility]  2/20/19  Yes Reported, Patient   potassium chloride SA (K-DUR/KLOR-CON M) 20 MEQ CR tablet Take 1 tablet (20 mEq) by mouth 2 times daily (with meals) 6/5/18  Yes Glynn Botello MD   Respiratory Therapy Supplies (NEBULIZER/TUBING/MOUTHPIECE) KIT Dx: COPD with exacerbation. Sig: Use as directed. 2/20/19  Yes Lizet Garcia PA-C   rivaroxaban ANTICOAGULANT (XARELTO) 20 MG TABS tablet Take 20 mg by mouth 3/12/19  Yes Reported, Patient          ALLERGIES:   Allergies   Allergen Reactions     Diphenhydramine Palpitations and Other (See Comments)     wheezing  Rapid heart Rate     Morphine      Tachycardia       Propoxyphene N-Apap Unknown     Tremors       Varenicline Nausea and Vomiting     Codeine Palpitations     Lisinopril Palpitations and Cough     No Clinical Screening - See Comments Rash     Pt states allergies to white/yellow gold.   "Sugical steel.  Copper./ developed infection     Tetracycline Nausea and Vomiting and Rash          ROS:   CONSTITUTIONAL: No reported fever or chills. No changes in weight.  ENT: No visual disturbance, ear ache, epistaxis or sore throat.   CARDIOVASCULAR: occasional chest pain with position changes \"getting up\", rare palpitations, no LE edema.  RESPIRATORY: Positive for chronic shortness of breath, no increased dyspnea upon exertion. No cough, wheezing or hemoptysis.  No orthopnea or PND.  GI: No reported abdominal pain, melena or hematochezia.  : No reported hematuria or dysuria.   NEUROLOGICAL: No lightheadedness, dizziness or syncope. Positive for ataxia, mild paresthesias and progressive LE muscle weakness.   HEMATOLOGIC: No history of anemia. No bleeding or excessive bruising. No history of blood clots.   MUSCULOSKELETAL: No reported new joint pain or swelling, no muscle pain.  ENDOCRINOLOGIC: No temperature intolerance. No hair or skin changes.  SKIN: No abnormal rashes or sores, no unusual itching.    PHYSICAL EXAM:   BP (!) 170/96 (BP Location: Right arm, Patient Position: Sitting, Cuff Size: Adult Regular)   Pulse 96   Temp 97.7  F (36.5  C) (Tympanic)   Resp 16   Wt 56.2 kg (124 lb)   SpO2 99%   BMI 22.50 kg/m    GENERAL: The patient appears malnourished, in no apparent distress.  HEENT: Head is normocephalic and atraumatic. Eyes are symmetrical with normal visual tracking. No icterus, no xanthelasmas. Nares appeared normal without nasal drainage. Mucous membranes are moist, no cyanosis.  NECK: Supple. No JVD visible.  CHEST/ LUNGS: Lungs clear to auscultation, no rales, rhonchi or wheezes, no use of accessory muscles, no retractions, respirations unlabored and normal respiratory rate.   CARDIO: Regular rate and rhythm normal with S1 and S2, no murmur, click or rub.   ABD: Abdomen is nondistended.   EXTREMITIES: No lower extremity edema present.  MUSCULOSKELETAL: No visible joint swelling. "   NEUROLOGIC: Alert and oriented X3. Normal speech, gait and affect. No focal neurologic deficits.   SKIN: No jaundice. No rashes or visible skin lesions present. No ecchymosis.    LAB RESULTS:   Office Visit on 01/13/2019   Component Date Value Ref Range Status     Specimen Description 01/13/2019 Nasal   Final     Influenza A PCR 01/13/2019 Negative  NEG^Negative Final     Influenza B PCR 01/13/2019 Negative  NEG^Negative Final     Resp Syncytial Virus 01/13/2019 Negative  NEG^Negative Final     WBC 01/13/2019 11.3* 4.0 - 11.0 10e9/L Final     RBC Count 01/13/2019 4.96  3.8 - 5.2 10e12/L Final     Hemoglobin 01/13/2019 14.4  11.7 - 15.7 g/dL Final     Hematocrit 01/13/2019 44.3  35.0 - 47.0 % Final     MCV 01/13/2019 89  78 - 100 fl Final     MCH 01/13/2019 29.0  26.5 - 33.0 pg Final     MCHC 01/13/2019 32.5  31.5 - 36.5 g/dL Final     RDW 01/13/2019 12.8  10.0 - 15.0 % Final     Platelet Count 01/13/2019 252  150 - 450 10e9/L Final     Diff Method 01/13/2019 Automated Method   Final     % Neutrophils 01/13/2019 59.7  % Final     % Lymphocytes 01/13/2019 23.2  % Final     % Monocytes 01/13/2019 13.6  % Final     % Eosinophils 01/13/2019 2.6  % Final     % Basophils 01/13/2019 0.6  % Final     % Immature Granulocytes 01/13/2019 0.3  % Final     Absolute Neutrophil 01/13/2019 6.8  1.6 - 8.3 10e9/L Final     Absolute Lymphocytes 01/13/2019 2.6  0.8 - 5.3 10e9/L Final     Absolute Monocytes 01/13/2019 1.5* 0.0 - 1.3 10e9/L Final     Absolute Eosinophils 01/13/2019 0.3  0.0 - 0.7 10e9/L Final     Absolute Basophils 01/13/2019 0.1  0.0 - 0.2 10e9/L Final     Abs Immature Granulocytes 01/13/2019 0.0  0 - 0.4 10e9/L Final          ASSESSMENT:   Nonamarie Spawn presents for cardiology follow-up with history of known coronary atherosclerosis, recently diagnosed early this year with AF with RVR and HTN.  Patient has a history of aortofemoral bypass in 2004, tachybradycardia syndrome, newly identified atrial fibrillation, CAD,  hyperlipidemia, hypertension, PAD, hypertension, DM 2, COPD, tobacco abuse, emphysema, history of CVA and gastritis.    On 5/1/2020 patient was admitted for  at [facility], she underwent endovascular revascularization of aortoiliac occlusive disease. Patient had been doing very well postoperatively until just recently.  She described noticing muscle weakness to both lower extremities.  Since Monday this weakness had significantly worsened.  She described her legs giving out on her and difficulty standing.  She did not describe any claudication or pain associated.  No increased edema.  No shortness of breath or increased BRENNER.  No chest pain or palpitations.  No lightheadedness or syncope. Admits that she was terminated from her job on November 14th which is about the time symptoms started. Possible anxiety?     1. S/P vascular surgery  2. PAD (peripheral artery disease) (H)  3. Essential hypertension  4. Atherosclerosis of native coronary artery of native heart without angina pectoris  5. History of coronary artery stent placement  6. Hypomagnesemia  7. Lacunar stroke (H)  8. Erosive gastritis with hemorrhage  9. PAF (paroxysmal atrial fibrillation) (H)  10. Stage 3a chronic kidney disease    PLAN:  1. Patient with known CAD, PTCA of the mLAD and pRCA on 2/28/04 with preserved LVEF. PTCA at [facility] in [place]. Lexiscan stress test completed on 7/9/19 with no evidence of stress induced ischemia. No chest pain.   2. Previous episodes of palpitations. Recent ZIO with no evidence of AF. Symptoms associated to NSR and rare ventricular ectopy. She will continue with Toprol  mg daily as previous. CCB not effective. Bleeding with NOAC. She has remained on daily ASA. She has also gone off Plavix secondary to bleeding complications.   3. She did complete 3 months DAPT s/p LE revascularization.  4. She will continue high intensity statin.   5. Strongly encouraged tobacco cessation. She is cutting back.  6. BP remains  elevated today, recently increased Losartan to 50 mg daily. She has continued on Amlodipine 10 mg daily, Toprol  mg daily.  7. Increase Imdur today for improved BP control, she will begin taking 90 mg daily.  7. Reports increased LE weakness without pain or claudication. Describes legs giving out on her and now using walker. Previous labs to assess for anemia, assess electrolytes, CK and B12. All stable. Consider PT referral which was discussed with patient at the last visit, she would like to hold off on this.  8. Vascular surgery had recommended repeat arterial duplex study of LLE in December 2020 at [facility] for post op follow-up imaging, this has been ordered today.       Thank you for allowing me to participate in the care of your patient. Please do not hesitate to contact me if you have any questions.      Martina Roberto

## 2020-12-17 NOTE — ED NOTES
Intake nurse from [facility] called and stated they are able to give a bed assignment when Covid result comes back. Talked to Luis at [phone].

## 2020-12-17 NOTE — PROGRESS NOTES
Clinic Care Coordination Contact    Patient in ER yesterday, writer attempted to reach back out to patient to offer care coordination services that were referred to aprylr the end of November.  Writer had attempted 3 calls out to patient, left voice messages and no return call.    Writer again reach out to patient on this date, left message requesting return call and offering support and ongoing care coordination opportunities.  RAJIV Reina on 12/17/2020 at 1:27 PM

## 2020-12-17 NOTE — ED TRIAGE NOTES
Pt admit to bay 1 via W/C.  Pt reports a stabbing chest pain that started around 1400 when she was watching TV, pain is intermittent, denies SOB, reports hx of a-fib.  Pt placed on cardiac monitor, -180, PA notified & is at bedside.   EKG completed.

## 2020-12-17 NOTE — ED PROVIDER NOTES
History     Chief Complaint   Patient presents with     Chest Pain     HPI  Nonamarie Jeremy is a 70 year old female who presents to the ED for evaluation of chest pain. She reports that a few hours prior to arrival she developed substernal chest pain and that is has not improved. Past hx of CAD, paroxysmal Afib on Eliquis, type 2 DM, hyperlipidemia, tobacco use.  She does not say that she has felt any palpitations.  She denies shortness of breath, abdominal pain, nausea or vomiting, recent fevers or coughs or dysuria.    Allergies:  Allergies   Allergen Reactions     Diphenhydramine Palpitations and Other (See Comments)     wheezing  Rapid heart Rate     Morphine      Tachycardia       Propoxyphene N-Apap Unknown     Tremors       Varenicline Nausea and Vomiting     Codeine Palpitations     Lisinopril Palpitations and Cough     No Clinical Screening - See Comments Rash     Pt states allergies to white/yellow gold.  Sugical steel.  Copper./ developed infection     Tetracycline Nausea and Vomiting and Rash       Problem List:    Patient Active Problem List    Diagnosis Date Noted     Lacunar stroke (H) 11/20/2020     Priority: Medium     Atherosclerosis of native coronary artery of native heart without angina pectoris 11/20/2020     Priority: Medium     Encounter for follow-up examination 01/07/2020     Priority: Medium     Hypomagnesemia 01/07/2020     Priority: Medium     Right renal artery stenosis (H) 12/23/2019     Priority: Medium     Renovascular hypertension 12/23/2019     Priority: Medium     CKD (chronic kidney disease) stage 3, GFR 30-59 ml/min 12/11/2019     Priority: Medium     Iron deficiency anemia due to chronic blood loss 12/11/2019     Priority: Medium     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/07/2019     Priority: Medium     Occlusion of popliteal artery (H) 11/07/2019     Priority: Medium     H/O adenomatous polyp of colon 09/23/2019     Priority: Medium     H/O aorto-femoral bypass (2004) 04/03/2019      Priority: Medium     Type 2 diabetes mellitus without complication, without long-term current use of insulin (H) 03/25/2019     Priority: Medium     Tachy-merline syndrome (H) 03/11/2019     Priority: Medium     PAF (paroxysmal atrial fibrillation) (H) 03/10/2019     Priority: Medium     Chronic non-seasonal allergic rhinitis 07/09/2018     Priority: Medium     Atherosclerosis of native coronary artery of native heart with angina pectoris (H) 01/17/2018     Priority: Medium     Overview:   Coronary artery disease, PTCA of mid LAD and PTCA of the proximal right   coronary artery 2/28/04, normal left ventricular systolic function       Hemangioma 01/17/2018     Priority: Medium     Overview:   Multiple capillary spider hemangioma       Hyperlipidemia 01/17/2018     Priority: Medium     PAD (peripheral artery disease) (H) 01/17/2018     Priority: Medium     Tobacco abuse 01/17/2018     Priority: Medium     Centrilobular emphysema (H) 12/12/2016     Priority: Medium     Meningioma (H) 05/17/2012     Priority: Medium     Tobacco dependence 05/16/2012     Priority: Medium     Erosive gastritis with hemorrhage 10/27/2008     Priority: Medium     Overview:   Acute       Osteopenia 03/14/2006     Priority: Medium     Osteopenia, proximal femur.   Start Fosamax 08/07, stopped fosamax.          Past Medical History:    Past Medical History:   Diagnosis Date     Allergy status to other antibiotic agents status      Anemia 9/19/2019     Atherosclerosis      Atherosclerotic heart disease of native coronary artery without angina pectoris 2004     Atrial fibrillation (H) 3/10/2019     Centrilobular emphysema (H) 12/12/2016     Cerebral infarction (H) 05/20/2012     Closed fracture of shaft of left tibia 12/26/2001     H/O aorto-femoral bypass (2004) 4/3/2019     Hyperlipidemia 1/17/2018     Hypertension 1/17/2018     Hypomagnesemia 1/7/2020     Meningioma (H) 5/17/2012     Migraine      Osteopenia 03/14/2006     Peripheral vascular  disease (H) 1/17/2018     Tobacco abuse 1/17/2018     Type 2 diabetes mellitus without complication, without long-term current use of insulin (H) 3/25/2019     Zoster without complications 2008       Past Surgical History:    Past Surgical History:   Procedure Laterality Date     COLONOSCOPY  2004    2 hyperplastic polyps, repeat in 2014     COLONOSCOPY N/A 9/23/2019    F/U 2024 adenomatous polyps     ESOPHAGOSCOPY, GASTROSCOPY, DUODENOSCOPY (EGD), COMBINED N/A 9/23/2019    Antral ulcer     HEMORRHOID SURGERY       HYSTERECTOMY TOTAL ABDOMINAL, BILATERAL SALPINGO-OOPHORECTOMY, COMBINED  1987    total hyster - BSO     OTHER SURGICAL HISTORY Right 2004    06925.0,[place] BYPASS GRAFT AORTOBIFEMORAL     OTHER SURGICAL HISTORY      2004,LOC-1006.05,PTCA,mid LAD, proximal RCA     OTHER SURGICAL HISTORY      03/08/16,BEY909,CYSTOSCOPY W/ URETEROSCOPY W/ LITHOTRIPSY,Right,Dr. Mario [place]       Family History:    Family History   Problem Relation Age of Onset     Heart Disease Father         Heart Disease     Cancer Father         Cancer,myeloma     Heart Disease Mother         Heart Disease,MI, stenting     Cerebrovascular Disease Brother      Other - See Comments Brother         sciatica     Other - See Comments Other         FHx Father's side Coronary artery disease     Other - See Comments Paternal Uncle         Stroke     Cancer Paternal Uncle         Cancer,Bladder x2     Other - See Comments Sister         chronic pain syndrome     Breast Cancer No family hx of         Cancer-breast       Social History:  Marital Status:  Single [1]  Social History     Tobacco Use     Smoking status: Current Every Day Smoker     Packs/day: 0.25     Years: 40.00     Pack years: 10.00     Types: Cigarettes     Start date: 1/1/1966     Smokeless tobacco: Never Used     Tobacco comment: working on quitting   Substance Use Topics     Alcohol use: No     Drug use: No        Medications:         acetaminophen (TYLENOL) 500 MG tablet        "amLODIPine (NORVASC) 10 MG tablet       apixaban ANTICOAGULANT (ELIQUIS ANTICOAGULANT) 5 MG tablet       aspirin (ASA) 81 MG EC tablet       atorvastatin (LIPITOR) 80 MG tablet       busPIRone (BUSPAR) 10 MG tablet       cetirizine (ZYRTEC) 10 MG tablet       Cholecalciferol (VITAMIN D3) 2000 UNITS CAPS       citalopram (CELEXA) 20 MG tablet       isosorbide mononitrate (IMDUR) 30 MG 24 hr tablet       losartan (COZAAR) 25 MG tablet       magnesium oxide (MAGNESIUM-OXIDE) 400 (241.3 Mg) MG tablet       metFORMIN (GLUCOPHAGE-XR) 500 MG 24 hr tablet       metoprolol succinate ER (TOPROL-XL) 100 MG 24 hr tablet       omeprazole (PRILOSEC) 40 MG DR capsule       potassium chloride ER (KLOR-CON M) 20 MEQ CR tablet       albuterol (PROAIR HFA/PROVENTIL HFA/VENTOLIN HFA) 108 (90 Base) MCG/ACT Inhaler       diltiazem (CARDIZEM) 30 MG tablet       fluticasone (FLONASE) 50 MCG/ACT nasal spray       ipratropium - albuterol 0.5 mg/2.5 mg/3 mL (DUONEB) 0.5-2.5 (3) MG/3ML neb solution       Ipratropium-Albuterol (COMBIVENT RESPIMAT)  MCG/ACT inhaler       order for [facility]       Respiratory Therapy Supplies (NEBULIZER/TUBING/MOUTHPIECE) KIT       umeclidinium-vilanterol (ANORO ELLIPTA) 62.5-25 MCG/INH oral inhaler          Review of Systems   Constitutional: Negative for chills and fever.   HENT: Negative for congestion.    Eyes: Negative for visual disturbance.   Respiratory: Negative for chest tightness and shortness of breath.    Cardiovascular: Positive for chest pain.   Gastrointestinal: Negative for abdominal pain.   Genitourinary: Negative for hematuria.   Musculoskeletal: Negative for back pain.   Skin: Negative for rash and wound.   Neurological: Negative for syncope.   Hematological: Negative for adenopathy. Does not bruise/bleed easily.   Psychiatric/Behavioral: Negative for confusion.       Physical Exam   BP: (!) 190/149  Pulse: 170  Temp: 98  F (36.7  C)  Resp: 22  Height: 158.1 cm (5' 2.25\")  Weight: 66.7 kg (147 " lb)  SpO2: 99 %      Physical Exam  Constitutional:       General: She is not in acute distress.     Appearance: She is not diaphoretic.   HENT:      Head: Normocephalic and atraumatic.   Eyes:      General: No scleral icterus.     Conjunctiva/sclera: Conjunctivae normal.   Neck:      Musculoskeletal: Neck supple.   Cardiovascular:      Rate and Rhythm: Tachycardia present. Rhythm irregular.   Pulmonary:      Effort: Pulmonary effort is normal.      Breath sounds: Normal breath sounds.   Abdominal:      Palpations: Abdomen is soft.      Tenderness: There is no abdominal tenderness.   Musculoskeletal:         General: No deformity.   Lymphadenopathy:      Cervical: No cervical adenopathy.   Skin:     General: Skin is warm and dry.      Findings: No rash.   Neurological:      Mental Status: She is alert and oriented to person, place, and time. Mental status is at baseline.   Psychiatric:         Mood and Affect: Mood normal.         Behavior: Behavior normal.         ED Course        Procedures          EKG read at 1839. HR variable, ~ 165. Atrial fibrillation with RVR. Occasional PVC.      EKG read at 1918. HR variable, ~ 140. Atrial Fibrillation with RVR, Occasional PVCs.     EKG read at 2117.  Heart rate 69, normal sinus rhythm.  She does have some very deep and inverted T waves in leads V2, V3, and V4.  She also has some T wave inversions in her inferior leads, these are new changes.    Critical Care time:  none              Results for orders placed or performed during the hospital encounter of 12/16/20 (from the past 24 hour(s))   CBC with platelets differential   Result Value Ref Range    WBC 13.3 (H) 4.0 - 11.0 10e9/L    RBC Count 5.02 3.8 - 5.2 10e12/L    Hemoglobin 12.5 11.7 - 15.7 g/dL    Hematocrit 40.4 35.0 - 47.0 %    MCV 81 78 - 100 fl    MCH 24.9 (L) 26.5 - 33.0 pg    MCHC 30.9 (L) 31.5 - 36.5 g/dL    RDW 15.9 (H) 10.0 - 15.0 %    Platelet Count 403 150 - 450 10e9/L    Diff Method Automated Method      % Neutrophils 77.5 %    % Lymphocytes 12.7 %    % Monocytes 8.4 %    % Eosinophils 0.5 %    % Basophils 0.4 %    % Immature Granulocytes 0.5 %    Absolute Neutrophil 10.3 (H) 1.6 - 8.3 10e9/L    Absolute Lymphocytes 1.7 0.8 - 5.3 10e9/L    Absolute Monocytes 1.1 0.0 - 1.3 10e9/L    Absolute Eosinophils 0.1 0.0 - 0.7 10e9/L    Absolute Basophils 0.1 0.0 - 0.2 10e9/L    Abs Immature Granulocytes 0.1 0 - 0.4 10e9/L   Troponin GH   Result Value Ref Range    Troponin 74.1 (H) <34.0 pg/mL   INR   Result Value Ref Range    INR 0.92 0.86 - 1.14   Partial thromboplastin time   Result Value Ref Range    PTT 25 22 - 37 sec   Comprehensive metabolic panel   Result Value Ref Range    Sodium 137 134 - 144 mmol/L    Potassium 3.9 3.5 - 5.1 mmol/L    Chloride 99 98 - 107 mmol/L    Carbon Dioxide 22 21 - 31 mmol/L    Anion Gap 16 (H) 3 - 14 mmol/L    Glucose 202 (H) 70 - 105 mg/dL    Urea Nitrogen 17 7 - 25 mg/dL    Creatinine 1.37 (H) 0.60 - 1.20 mg/dL    GFR Estimate 38 (L) >60 mL/min/[1.73_m2]    GFR Estimate If Black 46 (L) >60 mL/min/[1.73_m2]    Calcium 10.5 (H) 8.6 - 10.3 mg/dL    Bilirubin Total 0.5 0.3 - 1.0 mg/dL    Albumin 4.5 3.5 - 5.7 g/dL    Protein Total 7.8 6.4 - 8.9 g/dL    Alkaline Phosphatase 59 34 - 104 U/L    ALT 12 7 - 52 U/L    AST 20 13 - 39 U/L   Magnesium   Result Value Ref Range    Magnesium 1.5 (L) 1.9 - 2.7 mg/dL   Nt probnp inpatient (BNP)   Result Value Ref Range    N-Terminal Pro BNP Inpatient 537 (H) 0 - 100 pg/mL   Lactic acid whole blood STAT   Result Value Ref Range    Lactic Acid 3.1 (H) 0.7 - 2.0 mmol/L   XR Chest Port 1 View    Narrative    PROCEDURE:  XR CHEST PORT 1 VW    HISTORY: chest pain, afib rvr. .    COMPARISON:  1/2/2020    FINDINGS:    The cardiomediastinal contours are enlarged, unchanged. There is  calcific aortic atherosclerosis.   No focal consolidation, effusion or pneumothorax.      Impression    IMPRESSION:  Stable neural chest.      ASHLIE PAEZ MD   EKG 12-lead,  tracing only   Result Value Ref Range    Interpretation ECG Click View Image link to view waveform and result    Troponin GH   Result Value Ref Range    Troponin 107.8 (H) <34.0 pg/mL   Lactic acid   Result Value Ref Range    Lactic Acid 1.6 0.7 - 2.0 mmol/L   UA reflex to Microscopic   Result Value Ref Range    Color Urine Light Yellow     Appearance Urine Clear     Glucose Urine Negative NEG^Negative mg/dL    Bilirubin Urine Negative NEG^Negative    Ketones Urine Negative NEG^Negative mg/dL    Specific Gravity Urine 1.015 1.003 - 1.035    Blood Urine Large (A) NEG^Negative    pH Urine 6.5 5.0 - 7.0 pH    Protein Albumin Urine 30 (A) NEG^Negative mg/dL    Urobilinogen mg/dL Normal 0.0 - 2.0 mg/dL    Nitrite Urine Negative NEG^Negative    Leukocyte Esterase Urine Small (A) NEG^Negative    Source Midstream Urine     RBC Urine 118 (H) 0 - 2 /HPF    WBC Urine 3 0 - 5 /HPF    Bacteria Urine Few (A) NEG^Negative /HPF    Squamous Epithelial /HPF Urine 2 (H) 0 - 1 /HPF    Mucous Urine Present (A) NEG^Negative /LPF    Hyaline Casts 12 (H) 0 - 2 /LPF   EKG 12-lead, tracing only   Result Value Ref Range    Interpretation ECG Click View Image link to view waveform and result        Medications   diltiazem (CARDIZEM) 100 MG in 100 mL 5% dextrose infusion (0 mg/hr Intravenous Stopped 12/16/20 2158)   diltiazem (CARDIZEM) injection 20 mg (20 mg Intravenous Given 12/16/20 1857)   diltiazem (CARDIZEM) injection 25 mg (25 mg Intravenous Given 12/16/20 1925)   magnesium sulfate 2 g in water intermittent infusion (0 g Intravenous Stopped 12/16/20 2120)   aspirin (ASA) chewable tablet 324 mg (324 mg Oral Given 12/16/20 2220)       Assessments & Plan (with Medical Decision Making)   Patient arrives slight distress due to discomfort.  She has a very fast and irregular heart rhythm around 160 to 180 bpm.  She otherwise is fairly hypertensive but has no fever and normal oxygen saturation.    Her first EKG did appear to show A. fib with RVR  and occasional PVC.    We did give the patient 2 bolus doses of Cardizem.  This did appear to improve her rate greatly however she remained in A. fib with rates approximately 100-130.  We did start her on a diltiazem infusion.    Original lab work did reveal troponin of 74, hypomagnesia, and chest x-ray appeared normal.  At first her elevated troponin was felt to be related to demand ischemia due to her long period of increased heart rate.  However as her rate was slowing down we did repeat troponin and it continued to increase now measuring 108.  The patient did eventually convert to a normal sinus rhythm and for the most part at rest she was chest pain free.  However with getting up to use the commode she did begin to have increased substernal chest pain again.  A repeat EKG did reveal normal sinus rhythm with a heart rate of 70 however she has some deeply inverted T waves in her anterior leads that is very concerning for ischemia possibly even a Wellens type syndrome.  I did discuss the case with Dr. Ruano, cardiologist at [facility].  He does not feel that currently this is a STEMI equivalent for the patient however he is concerned for possible cardiac ischemia/NSTEMI.  Unfortunately [facility] does not have any bed availability.  I do talk with Dr. Ribeiro at [facility] who does accept the patient for transfer.  She recommends giving 324 mg of aspirin however does not recommend any heparin bolus or infusion at this time.  Being that the patient is pain-free at rest we will not start any nitro infusion as well.  The patient remained stable while in the emergency department and was transferred.    Fred Chong PA-C    I have reviewed the nursing notes.    I have reviewed the findings, diagnosis, plan and need for follow up with the patient.       New Prescriptions    No medications on file       Final diagnoses:   NSTEMI (non-ST elevated myocardial infarction) (H)   Atrial fibrillation (H)        12/16/2020   [facility]     Fred Chong PA  12/16/20 1307

## 2020-12-17 NOTE — ED NOTES
Pt up to commode for urine sample SBA. Pt lost balance on the way back to bed x2, supported pt when lost balance and helped pt to bed safely.

## 2020-12-18 NOTE — TELEPHONE ENCOUNTER
Needing verbal orders for SN & PT Assessment, referral came from [facility]'s this morning    Please call    Thank you

## 2020-12-21 NOTE — TELEPHONE ENCOUNTER
Shelby notified.  Teri Zuleta CMA([facility])..................12/21/2020   9:16 AM

## 2020-12-30 ENCOUNTER — TELEPHONE (OUTPATIENT)
Dept: INTERNAL MEDICINE | Facility: OTHER | Age: 70
End: 2020-12-30
Payer: COMMERCIAL

## 2020-12-30 ENCOUNTER — TELEPHONE (OUTPATIENT)
Dept: INTERNAL MEDICINE | Facility: OTHER | Age: 70
End: 2020-12-30

## 2020-12-30 DIAGNOSIS — I20.0 INTERMEDIATE CORONARY SYNDROME (H): Primary | ICD-10-CM

## 2020-12-30 NOTE — TELEPHONE ENCOUNTER
Dr Perdomo reviewed and completed the following home care or hospice form(s) for Nonamarie Jeremy on 1/31/20   This covers the certification period effective 12/19/20 to 02/16/20.  Mala Rivera LPN on 12/30/2020 at 1:53 PM

## 2024-07-10 NOTE — PROGRESS NOTES
Patient Information     Patient Name MRN Sex Ovidio Reynoso [id number] Female 1950      Progress Notes by Glynn Botello MD at 2017  9:45 AM     Author:  Glynn Botello MD Service:  (none) Author Type:  Physician     Filed:  2017 10:26 AM Encounter Date:  2017 Status:  Signed     :  Glynn Botello MD (Physician)            SUBJECTIVE:  Ovidio Luna is a 66 y.o. female here for follow-up.  Patient has a history of coronary artery disease, CVA with left-sided hemiparesis, tobacco abuse, hyperlipidemia, hypertension. She was seen over the winter for atypical chest pain and ultimately had a stress test which came back normal. She reports the last several months she has had increasing chest pain, shortness of breath. This is happening consistently while she is going to work. She admits that she is having quite a bit of stress at work and at home.     At her last visit we had her stop work, start Celexa 10 mg daily for 2 weeks then increase to 20 mg daily. She states that 2 days into her 20 mg tablets she developed a sensation of dropping or falling backwards when she was laying down. This is happening on a daily basis. This has continued.    Overall she does not feel any significant improvement in her anxiety symptoms.    Patient Active Problem List       Diagnosis  Date Noted     Centrilobular emphysema (HC)  2016     SOMATIC DYSFUNCTION, SPINE, THORACIC-LUMBAR  2012     SOMATIC DYSFUNCTION, PELVIC  2012     LUMBAGO  2012     CVA WITH LEFT HEMIPARESIS  06/15/2012     MENOPAUSE-RELATED VASOMOTOR SYMPTOMS  2010     CORONARY ARTERY DISEASE       Coronary artery disease, PTCA of mid LAD and PTCA of the proximal right   coronary artery 04, normal left ventricular systolic function          PERIPHERAL VASCULAR DISEASE       TOBACCO ABUSE       HYPERLIPIDEMIA       HEMANGIOMA       Multiple capillary spider hemangioma          HYPERTENSION        VASCULAR INSUFFICIENCY       right lower extremity          SHOULDER IMPINGEMENT SYNDROME, LEFT       OSTEOPENIA  11/14/2008     DXA scan - osteopenia of hips.  Normal density LS spine.          GASTRITIS  10/27/2008     Acute            Past Medical History:     Diagnosis  Date     ASO (arteriosclerosis obliterans)     History of ASO with claudication      Bilateral tibial fractures 12/26/01    History of bilateral tibial fx, work related injury       Coronary artery disease 2004    Stent times 2      CVA (cerebral infarction) 05/20/2012    CVA with residual left sided weakness, incidental meningioma found      Estrogen Replacement Therapy     ERT      Herpes zoster [id number]    Herpes zoster, left shoulder and neck       History of migraines     History of  migraines      Hx of osteopenia  03/14/06    Osteopenia, proximal femur.  Normal bone mineral density lumbar spine 03/14/06.  Start Fosamax 70 mg. weekly (didn't start until 08/07), stopped fosamax.      Shingles 2008    left shoulder and neck        Past Surgical History:      Procedure  Laterality Date     COLONOSCOPY SCREENING  2004    2 hyperplastic polyps, repeat in 2014       COLONOSCOPY SCREENING  1992, 2004    Colonoscopy, normal       CYSTOSCOPY W/ URETEROSCOPY W/ LITHOTRIPSY Right 03/08/16    Dr. Martin [facility]       HEMORRHOIDECTOMY       NM CARDIAC MPI STRESS TEST  03/06    Adenosine Cardiolite stress done and is negative for ischemia or infarction.  Ejection fraction is 54%.       [place] BYPASS GRAFT AORTOBIFEMORAL  2004     PTCA  2004    mid LAD, proximal RCA       STRESS TEST PHARMACOLOGICAL  2003    dobutamine, normal       STRESS TEST PHARMACOLOGICAL  2006    adenosine, negative       JACK AND BSO  1987       Current Outpatient Prescriptions       Medication  Sig Dispense Refill     amLODIPine (NORVASC) 5 mg tablet Take 1 tablet by mouth once daily. 90 tablet 3     cholecalciferol (VITAMIN D-3) 2,000 unit capsule Take 1 capsule by mouth once daily.  0      citalopram (CELEXA) 10 mg tablet Take 1 tablet by mouth every morning. 30 tablet 1     fexofenadine (ALLEGRA) 180 mg tablet Take 1 tablet by mouth once daily with a meal.  0     hydroCHLOROthiazide (HCTZ) 25 mg tablet Take 1 tablet by mouth once daily. 90 tablet 3     methocarbamol (ROBAXIN) 750 mg tablet 1 tab twice daily as needed 12 tablet 0     metoprolol (LOPRESSOR) 100 mg tablet Take 1 tablet by mouth 2 times daily. 180 tablet 3     nitroglycerin 0.4 mg per dose sublingual (NITROLINGUAL) 0.4 mg/dose spray Place 1 Spray under the tongue every 5 minutes if needed for Chest Pain. 1 Bottle 11     omega-3 fatty acids-vitamin E (FISH OIL) 1,000 mg cap Take 1 capsule by mouth once daily.  0     omeprazole (PRILOSEC) 20 mg Delayed-Release capsule Take 1 capsule by mouth once daily before a meal. 90 capsule 3     potassium chloride (KLOR-CON M20) 20 mEq Extended-Release tablet Take 1 tablet by mouth 2 times daily with meals. 180 tablet 3     simvastatin (ZOCOR) 80 mg tablet Take 1 tablet by mouth once daily. 90 tablet 3     No current facility-administered medications for this visit.      Medications have been reviewed by me and are current to the best of my knowledge and ability.      Allergies:  Allergies      Allergen   Reactions     Altaryl  [Diphenhydramine Hcl]  Tachycardia     Rapid heart Rate      Benadryl [Diphenhydramine]  Tachycardia     Rapid heart Rate      Chantix [Varenicline]  Nausea And Vomiting     Codeine  Confusion     Rapid heart rate.  Nauseated      Darvocet [Propoxyphene-Acetaminophen]  Tremors     unknown      Lisinopril  Cough     Morphine Sulfate  Tachycardia     Rapid heart rate      Tetracycline  Rash and Tremors     unknown        Family History       Problem   Relation Age of Onset     Heart Disease  Father      Cancer  Father      myeloma       Heart Disease  Mother      MI, stenting       Other  Sister      chronic pain syndrome       Other  Other      FHx Father's side Coronary  artery disease       Stroke  Paternal Uncle      Cancer  Paternal Uncle      Bladder x2       Cancer-breast  No Family History        Social History       Substance Use Topics         Smoking status:   Current Every Day Smoker     Packs/day:  0.50     Years:  46.00     Types:  Cigarettes     Smokeless tobacco:   Never Used      Comment: states very hard to do.       Alcohol use   No       ROS:    As above otherwise ROS is unremarkable.      OBJECTIVE:  BP 98/61  Pulse 51  Wt 66 kg (145 lb 6.4 oz)  BMI 26.42 kg/m2    EXAM:  General Appearance: Pleasant, alert, appropriate appearance for age. No acute distress  Neurologic Exam: CN 2-12 grossly intact.  Normal gait.  Symmetric DTRs, No focal motor or sensory deficits. No tremor.  Psychiatric Exam: Alert and oriented, appropriate affect. She is tearful at times. She has no psychomotor agitation or retardation. No hallucinations. No thoughts of harming herself or others.    ASSESSEMENT AND PLAN:    Ovidio was seen today for follow up.    Diagnoses and all orders for this visit:    Anxiety  -     citalopram (CELEXA) 10 mg tablet; Take 1 tablet by mouth every morning.    We'll decrease Celexa back to 10 mg daily. She'll follow-up in one month for reassessment. She once again requested taking time off work which I think is reasonable but discussed that her workplace may have difficulty justifying. Recommend follow-up for psychotherapy.        Anup Botello MD           on the discharge service for the patient. I have reviewed and made amendments to the documentation where necessary.

## (undated) DEVICE — ESU ENDO FORCEP BX HOT FD-210U

## (undated) DEVICE — ENDO SNARE POLYPECTOMY OVAL 15MM LOOP SD-240U-15

## (undated) DEVICE — ENDO KIT COMPLIANCE DYKENDOCMPLY

## (undated) DEVICE — TUBING SUCTION 10'X3/16" N510

## (undated) DEVICE — SOL WATER 1500ML

## (undated) DEVICE — SUCTION MANIFOLD NEPTUNE 2 SYS 4 PORT 0702-020-000

## (undated) DEVICE — ENDO BRUSH CHANNEL MASTER CLEANING 2-4.2MM BW-412T

## (undated) DEVICE — ENDO BITE BLOCK 60 MAXI LF 00712804

## (undated) DEVICE — SYR 50ML LL W/O NDL 309653

## (undated) DEVICE — ESU GROUND PAD ADULT W/CORD E7507

## (undated) DEVICE — ENDO FORCEP ENDOJAW BIOPSY 2.8MMX230CM FB-220U

## (undated) DEVICE — Device

## (undated) RX ORDER — PANTOPRAZOLE SODIUM 40 MG/10ML
INJECTION, POWDER, LYOPHILIZED, FOR SOLUTION INTRAVENOUS
Status: DISPENSED
Start: 2020-01-01

## (undated) RX ORDER — DILTIAZEM HYDROCHLORIDE 100 MG/1
INJECTION, POWDER, LYOPHILIZED, FOR SOLUTION INTRAVENOUS
Status: DISPENSED
Start: 2020-01-01

## (undated) RX ORDER — IPRATROPIUM BROMIDE AND ALBUTEROL SULFATE 2.5; .5 MG/3ML; MG/3ML
SOLUTION RESPIRATORY (INHALATION)
Status: DISPENSED
Start: 2019-02-19

## (undated) RX ORDER — LIDOCAINE HYDROCHLORIDE 20 MG/ML
INJECTION, SOLUTION EPIDURAL; INFILTRATION; INTRACAUDAL; PERINEURAL
Status: DISPENSED
Start: 2019-09-23

## (undated) RX ORDER — FERROUS SULFATE 325(65) MG
TABLET, DELAYED RELEASE (ENTERIC COATED) ORAL
Status: DISPENSED
Start: 2020-01-01

## (undated) RX ORDER — DILTIAZEM HYDROCHLORIDE 5 MG/ML
INJECTION INTRAVENOUS
Status: DISPENSED
Start: 2020-01-01

## (undated) RX ORDER — SODIUM CHLORIDE 9 MG/ML
INJECTION, SOLUTION INTRAVENOUS
Status: DISPENSED
Start: 2019-03-10

## (undated) RX ORDER — SODIUM CHLORIDE 9 MG/ML
INJECTION, SOLUTION INTRAVENOUS
Status: DISPENSED
Start: 2020-01-01

## (undated) RX ORDER — SODIUM CHLORIDE 9 MG/ML
INJECTION, SOLUTION INTRAVENOUS
Status: DISPENSED
Start: 2019-02-19

## (undated) RX ORDER — MAGNESIUM SULFATE HEPTAHYDRATE 40 MG/ML
INJECTION, SOLUTION INTRAVENOUS
Status: DISPENSED
Start: 2020-01-01

## (undated) RX ORDER — REGADENOSON 0.08 MG/ML
INJECTION, SOLUTION INTRAVENOUS
Status: DISPENSED
Start: 2019-07-09

## (undated) RX ORDER — IBUPROFEN 400 MG/1
TABLET, FILM COATED ORAL
Status: DISPENSED
Start: 2019-02-19

## (undated) RX ORDER — DEXTROSE MONOHYDRATE 50 MG/ML
INJECTION, SOLUTION INTRAVENOUS
Status: DISPENSED
Start: 2019-03-10

## (undated) RX ORDER — ASPIRIN 81 MG/1
TABLET, CHEWABLE ORAL
Status: DISPENSED
Start: 2020-01-01

## (undated) RX ORDER — FENTANYL CITRATE 50 UG/ML
INJECTION, SOLUTION INTRAMUSCULAR; INTRAVENOUS
Status: DISPENSED
Start: 2019-03-10

## (undated) RX ORDER — IBUPROFEN 200 MG
TABLET ORAL
Status: DISPENSED
Start: 2019-02-19

## (undated) RX ORDER — CYANOCOBALAMIN 1000 UG/ML
INJECTION, SOLUTION INTRAMUSCULAR; SUBCUTANEOUS
Status: DISPENSED
Start: 2020-01-01

## (undated) RX ORDER — PROPOFOL 10 MG/ML
INJECTION, EMULSION INTRAVENOUS
Status: DISPENSED
Start: 2019-09-23

## (undated) RX ORDER — METHYLPREDNISOLONE SODIUM SUCCINATE 125 MG/2ML
INJECTION, POWDER, LYOPHILIZED, FOR SOLUTION INTRAMUSCULAR; INTRAVENOUS
Status: DISPENSED
Start: 2019-02-19

## (undated) RX ORDER — IPRATROPIUM BROMIDE AND ALBUTEROL SULFATE 2.5; .5 MG/3ML; MG/3ML
SOLUTION RESPIRATORY (INHALATION)
Status: DISPENSED
Start: 2019-02-20

## (undated) RX ORDER — DILTIAZEM HYDROCHLORIDE 5 MG/ML
INJECTION INTRAVENOUS
Status: DISPENSED
Start: 2019-03-10

## (undated) RX ORDER — NITROGLYCERIN 0.4 MG/1
TABLET SUBLINGUAL
Status: DISPENSED
Start: 2020-01-01